# Patient Record
Sex: MALE | Race: WHITE | Employment: STUDENT | URBAN - METROPOLITAN AREA
[De-identification: names, ages, dates, MRNs, and addresses within clinical notes are randomized per-mention and may not be internally consistent; named-entity substitution may affect disease eponyms.]

---

## 2017-01-06 ENCOUNTER — APPOINTMENT (OUTPATIENT)
Dept: CT IMAGING | Age: 22
End: 2017-01-06
Attending: EMERGENCY MEDICINE
Payer: COMMERCIAL

## 2017-01-06 ENCOUNTER — HOSPITAL ENCOUNTER (EMERGENCY)
Age: 22
Discharge: HOME | End: 2017-01-06
Attending: EMERGENCY MEDICINE
Payer: COMMERCIAL

## 2017-01-06 ENCOUNTER — APPOINTMENT (OUTPATIENT)
Dept: ULTRASOUND IMAGING | Age: 22
End: 2017-01-06
Attending: EMERGENCY MEDICINE
Payer: COMMERCIAL

## 2017-01-06 VITALS
DIASTOLIC BLOOD PRESSURE: 68 MMHG | SYSTOLIC BLOOD PRESSURE: 130 MMHG | OXYGEN SATURATION: 98 % | RESPIRATION RATE: 18 BRPM | WEIGHT: 140 LBS | TEMPERATURE: 98 F | HEART RATE: 90 BPM | BODY MASS INDEX: 22 KG/M2

## 2017-01-06 DIAGNOSIS — N23 RENAL COLIC: Primary | ICD-10-CM

## 2017-01-06 LAB
BILIRUB UR QL STRIP.AUTO: NEGATIVE
BUN BLD-MCNC: 13 MG/DL (ref 8–20)
CALCIUM BLD-MCNC: 8.3 MG/DL (ref 8.3–10.3)
CHLORIDE: 100 MMOL/L (ref 101–111)
CLARITY UR REFRACT.AUTO: CLEAR
CO2: 33 MMOL/L (ref 22–32)
COLOR UR AUTO: YELLOW
CREAT BLD-MCNC: 0.88 MG/DL (ref 0.7–1.3)
GLUCOSE BLD-MCNC: 102 MG/DL (ref 70–99)
GLUCOSE UR STRIP.AUTO-MCNC: NEGATIVE MG/DL
KETONES UR STRIP.AUTO-MCNC: NEGATIVE MG/DL
LEUKOCYTE ESTERASE UR QL STRIP.AUTO: NEGATIVE
NITRITE UR QL STRIP.AUTO: NEGATIVE
PH UR STRIP.AUTO: 8 [PH] (ref 4.5–8)
POTASSIUM SERPL-SCNC: 4.1 MMOL/L (ref 3.6–5.1)
PROT UR STRIP.AUTO-MCNC: NEGATIVE MG/DL
RBC UR QL AUTO: NEGATIVE
SODIUM SERPL-SCNC: 139 MMOL/L (ref 136–144)
SP GR UR STRIP.AUTO: 1.02 (ref 1–1.03)
UROBILINOGEN UR STRIP.AUTO-MCNC: 0.2 MG/DL

## 2017-01-06 PROCEDURE — 99285 EMERGENCY DEPT VISIT HI MDM: CPT

## 2017-01-06 PROCEDURE — 81003 URINALYSIS AUTO W/O SCOPE: CPT | Performed by: EMERGENCY MEDICINE

## 2017-01-06 PROCEDURE — 76775 US EXAM ABDO BACK WALL LIM: CPT

## 2017-01-06 PROCEDURE — 74176 CT ABD & PELVIS W/O CONTRAST: CPT

## 2017-01-06 PROCEDURE — 96361 HYDRATE IV INFUSION ADD-ON: CPT

## 2017-01-06 PROCEDURE — 80048 BASIC METABOLIC PNL TOTAL CA: CPT | Performed by: EMERGENCY MEDICINE

## 2017-01-06 PROCEDURE — 96374 THER/PROPH/DIAG INJ IV PUSH: CPT

## 2017-01-06 PROCEDURE — 96375 TX/PRO/DX INJ NEW DRUG ADDON: CPT

## 2017-01-06 RX ORDER — TAMSULOSIN HYDROCHLORIDE 0.4 MG/1
0.4 CAPSULE ORAL DAILY
Qty: 7 CAPSULE | Refills: 0 | Status: SHIPPED | OUTPATIENT
Start: 2017-01-06 | End: 2017-01-13

## 2017-01-06 RX ORDER — SODIUM CHLORIDE 9 MG/ML
INJECTION, SOLUTION INTRAVENOUS ONCE
Status: COMPLETED | OUTPATIENT
Start: 2017-01-06 | End: 2017-01-06

## 2017-01-06 RX ORDER — HYDROCODONE BITARTRATE AND ACETAMINOPHEN 5; 325 MG/1; MG/1
1-2 TABLET ORAL EVERY 6 HOURS PRN
Qty: 20 TABLET | Refills: 0 | Status: SHIPPED | OUTPATIENT
Start: 2017-01-06 | End: 2017-01-16

## 2017-01-06 RX ORDER — MORPHINE SULFATE 4 MG/ML
INJECTION, SOLUTION INTRAMUSCULAR; INTRAVENOUS
Status: COMPLETED
Start: 2017-01-06 | End: 2017-01-06

## 2017-01-06 RX ORDER — ONDANSETRON 8 MG/1
8 TABLET, ORALLY DISINTEGRATING ORAL EVERY 6 HOURS PRN
Qty: 10 TABLET | Refills: 0 | Status: SHIPPED | OUTPATIENT
Start: 2017-01-06 | End: 2017-01-13

## 2017-01-06 RX ORDER — ONDANSETRON 2 MG/ML
4 INJECTION INTRAMUSCULAR; INTRAVENOUS
Status: DISCONTINUED | OUTPATIENT
Start: 2017-01-06 | End: 2017-01-07

## 2017-01-06 RX ORDER — HYDROMORPHONE HYDROCHLORIDE 1 MG/ML
1 INJECTION, SOLUTION INTRAMUSCULAR; INTRAVENOUS; SUBCUTANEOUS EVERY 30 MIN PRN
Status: DISCONTINUED | OUTPATIENT
Start: 2017-01-06 | End: 2017-01-06

## 2017-01-06 RX ORDER — MORPHINE SULFATE 4 MG/ML
5 INJECTION, SOLUTION INTRAMUSCULAR; INTRAVENOUS EVERY 30 MIN PRN
Status: DISCONTINUED | OUTPATIENT
Start: 2017-01-06 | End: 2017-01-07

## 2017-01-07 NOTE — ED PROVIDER NOTES
Patient Seen in: [facility] Emergency Department In [place]    History   Patient presents with:  Abdomen/Flank Pain (GI/)    Stated Complaint: flank pain    HPI    Patient complains of pain in the flank.   He has been having pain in his lower back and fl Alcohol Use: No                Review of Systems    Positive for stated complaint: flank pain  Other systems are as noted in HPI. Constitutional and vital signs reviewed. All other systems reviewed and negative except as noted above.     PSFH element abdominal organs is incomplete due to lack of intravenous contrast.  There are no calculi in the kidneys, renal collecting systems, ureters or bladder. Renal Collecting systems are not dilated. No lesions in the kidneys. Lung bases are unremarkable.  No p Kathya Noble  [phone]    Schedule an appointment as soon as possible for a visit        Medications Prescribed:  Current Discharge Medication List    START taking these medications    HYDROcodone-acetaminophen 5-325 MG Oral Tab  Take 1-2 tab

## 2018-01-05 ENCOUNTER — LAB ENCOUNTER (OUTPATIENT)
Dept: LAB | Age: 23
End: 2018-01-05
Attending: INTERNAL MEDICINE
Payer: COMMERCIAL

## 2018-01-05 DIAGNOSIS — E72.09: Primary | ICD-10-CM

## 2018-01-05 PROCEDURE — 82131 AMINO ACIDS SINGLE QUANT: CPT

## 2018-06-05 PROCEDURE — 83993 ASSAY FOR CALPROTECTIN FECAL: CPT | Performed by: INTERNAL MEDICINE

## 2018-06-05 PROCEDURE — 87177 OVA AND PARASITES SMEARS: CPT | Performed by: INTERNAL MEDICINE

## 2018-06-05 PROCEDURE — 87209 SMEAR COMPLEX STAIN: CPT | Performed by: INTERNAL MEDICINE

## 2018-06-05 PROCEDURE — 87329 GIARDIA AG IA: CPT | Performed by: INTERNAL MEDICINE

## 2018-06-05 PROCEDURE — 87272 CRYPTOSPORIDIUM AG IF: CPT | Performed by: INTERNAL MEDICINE

## 2018-06-22 PROCEDURE — 88305 TISSUE EXAM BY PATHOLOGIST: CPT | Performed by: INTERNAL MEDICINE

## 2019-04-29 PROBLEM — F33.9 MAJOR DEPRESSIVE DISORDER, RECURRENT EPISODE (HCC): Status: ACTIVE | Noted: 2019-04-29

## 2019-04-29 PROBLEM — F32.3 MAJOR DEPRESSION WITH PSYCHOTIC FEATURES (HCC): Status: ACTIVE | Noted: 2019-04-29

## 2019-04-29 PROBLEM — F33.9 MAJOR DEPRESSIVE DISORDER, RECURRENT EPISODE: Status: ACTIVE | Noted: 2019-04-29

## 2019-06-22 ENCOUNTER — HOSPITAL ENCOUNTER (EMERGENCY)
Facility: HOSPITAL | Age: 24
Discharge: HOME | End: 2019-06-23
Payer: COMMERCIAL

## 2019-06-22 DIAGNOSIS — T88.7XXA MEDICATION SIDE EFFECT: ICD-10-CM

## 2019-06-22 DIAGNOSIS — G43.809 OTHER MIGRAINE WITHOUT STATUS MIGRAINOSUS, NOT INTRACTABLE: Primary | ICD-10-CM

## 2019-06-22 PROCEDURE — 96374 THER/PROPH/DIAG INJ IV PUSH: CPT

## 2019-06-22 PROCEDURE — 80053 COMPREHEN METABOLIC PANEL: CPT

## 2019-06-22 PROCEDURE — 99284 EMERGENCY DEPT VISIT MOD MDM: CPT

## 2019-06-22 PROCEDURE — 96376 TX/PRO/DX INJ SAME DRUG ADON: CPT

## 2019-06-22 PROCEDURE — 96375 TX/PRO/DX INJ NEW DRUG ADDON: CPT

## 2019-06-22 RX ORDER — DIPHENHYDRAMINE HYDROCHLORIDE 50 MG/ML
25 INJECTION INTRAMUSCULAR; INTRAVENOUS ONCE
Status: COMPLETED | OUTPATIENT
Start: 2019-06-22 | End: 2019-06-22

## 2019-06-22 RX ORDER — METOCLOPRAMIDE HYDROCHLORIDE 5 MG/ML
5 INJECTION INTRAMUSCULAR; INTRAVENOUS ONCE
Status: COMPLETED | OUTPATIENT
Start: 2019-06-22 | End: 2019-06-22

## 2019-06-22 RX ORDER — VALSARTAN AND HYDROCHLOROTHIAZIDE 320; 12.5 MG/1; MG/1
25 TABLET, FILM COATED ORAL NIGHTLY
COMMUNITY
End: 2019-06-27

## 2019-06-22 RX ORDER — SODIUM CHLORIDE 9 MG/ML
1000 INJECTION, SOLUTION INTRAVENOUS ONCE
Status: DISCONTINUED | OUTPATIENT
Start: 2019-06-22 | End: 2019-06-23

## 2019-06-23 ENCOUNTER — APPOINTMENT (OUTPATIENT)
Dept: CT IMAGING | Facility: HOSPITAL | Age: 24
End: 2019-06-23
Payer: COMMERCIAL

## 2019-06-23 VITALS
RESPIRATION RATE: 16 BRPM | HEART RATE: 84 BPM | WEIGHT: 165 LBS | SYSTOLIC BLOOD PRESSURE: 128 MMHG | HEIGHT: 67 IN | BODY MASS INDEX: 25.9 KG/M2 | TEMPERATURE: 97 F | DIASTOLIC BLOOD PRESSURE: 88 MMHG | OXYGEN SATURATION: 98 %

## 2019-06-23 PROCEDURE — 70450 CT HEAD/BRAIN W/O DYE: CPT

## 2019-06-23 RX ORDER — SODIUM CHLORIDE 9 MG/ML
1000 INJECTION, SOLUTION INTRAVENOUS ONCE
Status: COMPLETED | OUTPATIENT
Start: 2019-06-23 | End: 2019-06-23

## 2019-06-23 RX ORDER — DIPHENHYDRAMINE HYDROCHLORIDE 50 MG/ML
25 INJECTION INTRAMUSCULAR; INTRAVENOUS ONCE
Status: COMPLETED | OUTPATIENT
Start: 2019-06-23 | End: 2019-06-23

## 2019-06-23 RX ORDER — LORAZEPAM 2 MG/ML
0.5 INJECTION INTRAMUSCULAR ONCE
Status: COMPLETED | OUTPATIENT
Start: 2019-06-23 | End: 2019-06-23

## 2019-06-23 NOTE — ED PROVIDER NOTES
Patient Seen in: [facility] Emergency Department    History   Patient presents with:  Headache (neurologic)    Stated Complaint: headache    HPI    59-year-old with complex history including psychiatric disease and intermittent migraines in the leigha Smokeless tobacco: Never Used    Alcohol use: No    Drug use: No      Review of Systems    Positive for stated complaint: headache  Other systems are as noted in HPI. Constitutional and vital signs reviewed.       All other systems reviewed and negative ex extremities throughout         ED Course     Labs Reviewed   COMP METABOLIC PANEL (14) - Abnormal; Notable for the following components:       Result Value    Glucose 100 (*)     AST 11 (*)     All other components within normal limits          CT HEAD WIT and Plan     Clinical Impression:  Other migraine without status migrainosus, not intractable  (primary encounter diagnosis)  Medication side effect    Disposition:  There is no disposition on file for this visit.   There is no disposition time on file for

## 2019-06-23 NOTE — ED INITIAL ASSESSMENT (HPI)
Pt to ED for c/o headache and difficulty sleeping. Pt was started on fluvoximine recently from [facility]. Pt also c/o generalized muscle spasms.

## 2020-01-15 PROBLEM — G43.001 MIGRAINE WITHOUT AURA AND WITH STATUS MIGRAINOSUS, NOT INTRACTABLE: Status: ACTIVE | Noted: 2020-01-15

## 2020-03-21 ENCOUNTER — APPOINTMENT (OUTPATIENT)
Dept: ULTRASOUND IMAGING | Age: 25
End: 2020-03-21
Attending: PHYSICIAN ASSISTANT
Payer: COMMERCIAL

## 2020-03-21 ENCOUNTER — HOSPITAL ENCOUNTER (EMERGENCY)
Age: 25
Discharge: HOME | End: 2020-03-21
Attending: EMERGENCY MEDICINE
Payer: COMMERCIAL

## 2020-03-21 VITALS
TEMPERATURE: 100 F | HEIGHT: 67 IN | HEART RATE: 78 BPM | SYSTOLIC BLOOD PRESSURE: 132 MMHG | BODY MASS INDEX: 24.33 KG/M2 | DIASTOLIC BLOOD PRESSURE: 85 MMHG | RESPIRATION RATE: 18 BRPM | WEIGHT: 155 LBS | OXYGEN SATURATION: 98 %

## 2020-03-21 DIAGNOSIS — R10.9 BILATERAL FLANK PAIN: Primary | ICD-10-CM

## 2020-03-21 LAB
ALBUMIN SERPL-MCNC: 4.6 G/DL (ref 3.4–5)
ALBUMIN/GLOB SERPL: 1.2 {RATIO} (ref 1–2)
ALP LIVER SERPL-CCNC: 63 U/L (ref 45–117)
ALT SERPL-CCNC: 31 U/L (ref 16–61)
ANION GAP SERPL CALC-SCNC: 5 MMOL/L (ref 0–18)
AST SERPL-CCNC: 40 U/L (ref 15–37)
BASOPHILS # BLD AUTO: 0.04 X10(3) UL (ref 0–0.2)
BASOPHILS NFR BLD AUTO: 0.8 %
BILIRUB SERPL-MCNC: 0.6 MG/DL (ref 0.1–2)
BILIRUB UR QL STRIP.AUTO: NEGATIVE
BUN BLD-MCNC: 18 MG/DL (ref 7–18)
BUN/CREAT SERPL: 19.8 (ref 10–20)
CALCIUM BLD-MCNC: 9.4 MG/DL (ref 8.5–10.1)
CHLORIDE SERPL-SCNC: 100 MMOL/L (ref 98–112)
CO2 SERPL-SCNC: 30 MMOL/L (ref 21–32)
COLOR UR AUTO: YELLOW
CREAT BLD-MCNC: 0.91 MG/DL (ref 0.7–1.3)
DEPRECATED RDW RBC AUTO: 40.8 FL (ref 35.1–46.3)
EOSINOPHIL # BLD AUTO: 0.05 X10(3) UL (ref 0–0.7)
EOSINOPHIL NFR BLD AUTO: 1 %
ERYTHROCYTE [DISTWIDTH] IN BLOOD BY AUTOMATED COUNT: 12.1 % (ref 11–15)
GLOBULIN PLAS-MCNC: 3.9 G/DL (ref 2.8–4.4)
GLUCOSE BLD-MCNC: 86 MG/DL (ref 70–99)
GLUCOSE UR STRIP.AUTO-MCNC: NEGATIVE MG/DL
HCT VFR BLD AUTO: 48.3 % (ref 39–53)
HGB BLD-MCNC: 16 G/DL (ref 13–17.5)
IMM GRANULOCYTES # BLD AUTO: 0.01 X10(3) UL (ref 0–1)
IMM GRANULOCYTES NFR BLD: 0.2 %
KETONES UR STRIP.AUTO-MCNC: NEGATIVE MG/DL
LEUKOCYTE ESTERASE UR QL STRIP.AUTO: NEGATIVE
LYMPHOCYTES # BLD AUTO: 1.76 X10(3) UL (ref 1–4)
LYMPHOCYTES NFR BLD AUTO: 34 %
M PROTEIN MFR SERPL ELPH: 8.5 G/DL (ref 6.4–8.2)
MCH RBC QN AUTO: 30.5 PG (ref 26–34)
MCHC RBC AUTO-ENTMCNC: 33.1 G/DL (ref 31–37)
MCV RBC AUTO: 92 FL (ref 80–100)
MONOCYTES # BLD AUTO: 0.41 X10(3) UL (ref 0.1–1)
MONOCYTES NFR BLD AUTO: 7.9 %
NEUTROPHILS # BLD AUTO: 2.9 X10 (3) UL (ref 1.5–7.7)
NEUTROPHILS # BLD AUTO: 2.9 X10(3) UL (ref 1.5–7.7)
NEUTROPHILS NFR BLD AUTO: 56.1 %
NITRITE UR QL STRIP.AUTO: NEGATIVE
OSMOLALITY SERPL CALC.SUM OF ELEC: 281 MOSM/KG (ref 275–295)
PH UR STRIP.AUTO: 8.5 [PH] (ref 4.5–8)
PLATELET # BLD AUTO: 301 10(3)UL (ref 150–450)
POTASSIUM SERPL-SCNC: 4.8 MMOL/L (ref 3.5–5.1)
PROT UR STRIP.AUTO-MCNC: NEGATIVE MG/DL
RBC # BLD AUTO: 5.25 X10(6)UL (ref 4.3–5.7)
RBC #/AREA URNS AUTO: >10 /HPF
SODIUM SERPL-SCNC: 135 MMOL/L (ref 136–145)
SP GR UR STRIP.AUTO: 1.01 (ref 1–1.03)
UROBILINOGEN UR STRIP.AUTO-MCNC: 0.2 MG/DL
WBC # BLD AUTO: 5.2 X10(3) UL (ref 4–11)

## 2020-03-21 PROCEDURE — 96375 TX/PRO/DX INJ NEW DRUG ADDON: CPT

## 2020-03-21 PROCEDURE — 96361 HYDRATE IV INFUSION ADD-ON: CPT

## 2020-03-21 PROCEDURE — 99284 EMERGENCY DEPT VISIT MOD MDM: CPT

## 2020-03-21 PROCEDURE — 76770 US EXAM ABDO BACK WALL COMP: CPT | Performed by: PHYSICIAN ASSISTANT

## 2020-03-21 PROCEDURE — 80053 COMPREHEN METABOLIC PANEL: CPT | Performed by: PHYSICIAN ASSISTANT

## 2020-03-21 PROCEDURE — 96374 THER/PROPH/DIAG INJ IV PUSH: CPT

## 2020-03-21 PROCEDURE — 85025 COMPLETE CBC W/AUTO DIFF WBC: CPT | Performed by: PHYSICIAN ASSISTANT

## 2020-03-21 PROCEDURE — 81001 URINALYSIS AUTO W/SCOPE: CPT | Performed by: PHYSICIAN ASSISTANT

## 2020-03-21 RX ORDER — HYDROCODONE BITARTRATE AND ACETAMINOPHEN 5; 325 MG/1; MG/1
1-2 TABLET ORAL EVERY 6 HOURS PRN
Qty: 12 TABLET | Refills: 0 | Status: SHIPPED | OUTPATIENT
Start: 2020-03-21 | End: 2020-04-09

## 2020-03-21 RX ORDER — KETOROLAC TROMETHAMINE 30 MG/ML
30 INJECTION, SOLUTION INTRAMUSCULAR; INTRAVENOUS ONCE
Status: COMPLETED | OUTPATIENT
Start: 2020-03-21 | End: 2020-03-21

## 2020-03-21 RX ORDER — ONDANSETRON 2 MG/ML
4 INJECTION INTRAMUSCULAR; INTRAVENOUS ONCE
Status: COMPLETED | OUTPATIENT
Start: 2020-03-21 | End: 2020-03-21

## 2020-03-21 RX ORDER — MORPHINE SULFATE 4 MG/ML
4 INJECTION, SOLUTION INTRAMUSCULAR; INTRAVENOUS ONCE
Status: COMPLETED | OUTPATIENT
Start: 2020-03-21 | End: 2020-03-21

## 2020-03-21 RX ORDER — TAMSULOSIN HYDROCHLORIDE 0.4 MG/1
0.4 CAPSULE ORAL DAILY
Qty: 7 CAPSULE | Refills: 0 | Status: SHIPPED | OUTPATIENT
Start: 2020-03-21 | End: 2020-04-09

## 2020-03-21 NOTE — ED PROVIDER NOTES
Patient Seen in: [facility] Emergency Department In [place]      History   Patient presents with:  Urinary Symptoms  Abdomen/Flank Pain    Stated Complaint: [address]    HPI    Patient is a 28-year-old male.   Congenital cystinuria, renal ca Current:/85   Pulse 78   Temp 99.5 °F (37.5 °C)   Resp 18   Ht 170.2 cm (5' 7\")   Wt 70.3 kg   SpO2 98%   BMI 24.28 kg/m²         Physical Exam    Gen: Well appearing, well groomed, alert and aware x 3  Neck: Supple, full range of motion, no t 3/21/2020  PROCEDURE:  US KIDNEY/BLADDER (CPT=76770)  [facility] 1/06/2017, 8:27 PM.  INDICATIONS:  HEMITURIA AND FLANK PAIN  TECHNIQUE:  Transabdominal gray scale ultrasound imaging of the bilateral kidneys and bl nephrology and urology. Report to the main campus for any acute worsening or changes. Patient remains nontoxic-appearing with normal vital signs. His pain is well controlled.           Disposition and Plan     Clinical Impression:  Bilateral flank pain

## 2020-03-22 NOTE — ED PROVIDER NOTES
I reviewed that chart and discussed the case. I have examined the patient and noted lungs clear to auscultation bilaterally. Cardiovascular plus S1-S2 regular rate rhythm. Abdomen soft nontender nondistended. Extremities range of motion. Slater August   No CVA tende

## 2020-05-09 ENCOUNTER — HOSPITAL ENCOUNTER (EMERGENCY)
Age: 25
Discharge: HOME | End: 2020-05-09
Attending: EMERGENCY MEDICINE
Payer: COMMERCIAL

## 2020-05-09 ENCOUNTER — APPOINTMENT (OUTPATIENT)
Dept: GENERAL RADIOLOGY | Age: 25
End: 2020-05-09
Attending: EMERGENCY MEDICINE
Payer: COMMERCIAL

## 2020-05-09 VITALS
DIASTOLIC BLOOD PRESSURE: 70 MMHG | WEIGHT: 159 LBS | OXYGEN SATURATION: 98 % | TEMPERATURE: 99 F | SYSTOLIC BLOOD PRESSURE: 131 MMHG | HEART RATE: 96 BPM | RESPIRATION RATE: 16 BRPM | BODY MASS INDEX: 24.96 KG/M2 | HEIGHT: 67 IN

## 2020-05-09 DIAGNOSIS — N20.0 KIDNEY STONE: Primary | ICD-10-CM

## 2020-05-09 PROCEDURE — 87086 URINE CULTURE/COLONY COUNT: CPT | Performed by: EMERGENCY MEDICINE

## 2020-05-09 PROCEDURE — 99285 EMERGENCY DEPT VISIT HI MDM: CPT

## 2020-05-09 PROCEDURE — 96374 THER/PROPH/DIAG INJ IV PUSH: CPT

## 2020-05-09 PROCEDURE — 96375 TX/PRO/DX INJ NEW DRUG ADDON: CPT

## 2020-05-09 PROCEDURE — 81015 MICROSCOPIC EXAM OF URINE: CPT | Performed by: EMERGENCY MEDICINE

## 2020-05-09 PROCEDURE — 96376 TX/PRO/DX INJ SAME DRUG ADON: CPT

## 2020-05-09 PROCEDURE — 51701 INSERT BLADDER CATHETER: CPT

## 2020-05-09 PROCEDURE — 99284 EMERGENCY DEPT VISIT MOD MDM: CPT

## 2020-05-09 PROCEDURE — 96361 HYDRATE IV INFUSION ADD-ON: CPT

## 2020-05-09 PROCEDURE — 85025 COMPLETE CBC W/AUTO DIFF WBC: CPT | Performed by: EMERGENCY MEDICINE

## 2020-05-09 PROCEDURE — 74018 RADEX ABDOMEN 1 VIEW: CPT | Performed by: EMERGENCY MEDICINE

## 2020-05-09 PROCEDURE — 81001 URINALYSIS AUTO W/SCOPE: CPT | Performed by: EMERGENCY MEDICINE

## 2020-05-09 PROCEDURE — 80053 COMPREHEN METABOLIC PANEL: CPT | Performed by: EMERGENCY MEDICINE

## 2020-05-09 RX ORDER — ONDANSETRON 2 MG/ML
4 INJECTION INTRAMUSCULAR; INTRAVENOUS ONCE
Status: DISCONTINUED | OUTPATIENT
Start: 2020-05-09 | End: 2020-05-09

## 2020-05-09 RX ORDER — ONDANSETRON 4 MG/1
TABLET, ORALLY DISINTEGRATING ORAL
Status: DISCONTINUED
Start: 2020-05-09 | End: 2020-05-09

## 2020-05-09 RX ORDER — MORPHINE SULFATE 4 MG/ML
4 INJECTION, SOLUTION INTRAMUSCULAR; INTRAVENOUS ONCE
Status: COMPLETED | OUTPATIENT
Start: 2020-05-09 | End: 2020-05-09

## 2020-05-09 RX ORDER — KETOROLAC TROMETHAMINE 30 MG/ML
15 INJECTION, SOLUTION INTRAMUSCULAR; INTRAVENOUS ONCE
Status: COMPLETED | OUTPATIENT
Start: 2020-05-09 | End: 2020-05-09

## 2020-05-09 NOTE — ED PROVIDER NOTES
Patient Seen in: [facility] In [place]      History   Patient presents with:  Postop/Procedure Problem    Stated Complaint: post surgical pain s/o kidney stone surgery yesterday     HPI    Patient is a 60-year-old male with a history Pulse 96   Temp 99.3 °F (37.4 °C) (Temporal)   Resp 16   Ht 170.2 cm (5' 7\")   Wt 72.1 kg   SpO2 98%   BMI 24.90 kg/m²         Physical Exam    General: Well-appearing patient of stated age resting comfortably  HEENT: Normocephalic atraumatic.   Nonicteric Will check stent placement with KUB. Will hydrate. Will treat with IV pain medicines. Will check UA. Will discuss with urology. KUB: Bilateral ureteral stents. Distention of the colon. Possible ileus.     MDM     Patient presents after bilateral st

## 2020-05-09 NOTE — ED INITIAL ASSESSMENT (HPI)
Patient had bilateral kidney stone surgery yesterday - had increased in pain this am - 4mg zofran and 50mcg fentanyl pta by ems

## 2020-05-12 ENCOUNTER — APPOINTMENT (OUTPATIENT)
Dept: CT IMAGING | Age: 25
DRG: 660 | End: 2020-05-12
Attending: EMERGENCY MEDICINE
Payer: COMMERCIAL

## 2020-05-12 ENCOUNTER — ANESTHESIA (OUTPATIENT)
Dept: SURGERY | Facility: HOSPITAL | Age: 25
DRG: 660 | End: 2020-05-12
Payer: COMMERCIAL

## 2020-05-12 ENCOUNTER — ANESTHESIA EVENT (OUTPATIENT)
Dept: SURGERY | Facility: HOSPITAL | Age: 25
DRG: 660 | End: 2020-05-12
Payer: COMMERCIAL

## 2020-05-12 ENCOUNTER — APPOINTMENT (OUTPATIENT)
Dept: GENERAL RADIOLOGY | Facility: HOSPITAL | Age: 25
DRG: 660 | End: 2020-05-12
Attending: UROLOGY
Payer: COMMERCIAL

## 2020-05-12 ENCOUNTER — HOSPITAL ENCOUNTER (INPATIENT)
Facility: HOSPITAL | Age: 25
LOS: 1 days | Discharge: HOME | DRG: 660 | End: 2020-05-13
Attending: EMERGENCY MEDICINE | Admitting: INTERNAL MEDICINE
Payer: COMMERCIAL

## 2020-05-12 DIAGNOSIS — N13.30 HYDRONEPHROSIS: ICD-10-CM

## 2020-05-12 DIAGNOSIS — N13.9 OBSTRUCTIVE UROPATHY: Primary | ICD-10-CM

## 2020-05-12 DIAGNOSIS — E87.6 HYPOKALEMIA: ICD-10-CM

## 2020-05-12 PROCEDURE — 0TC68ZZ EXTIRPATION OF MATTER FROM RIGHT URETER, VIA NATURAL OR ARTIFICIAL OPENING ENDOSCOPIC: ICD-10-PCS | Performed by: UROLOGY

## 2020-05-12 PROCEDURE — 82365 CALCULUS SPECTROSCOPY: CPT | Performed by: UROLOGY

## 2020-05-12 PROCEDURE — 99285 EMERGENCY DEPT VISIT HI MDM: CPT

## 2020-05-12 PROCEDURE — 96375 TX/PRO/DX INJ NEW DRUG ADDON: CPT

## 2020-05-12 PROCEDURE — 96376 TX/PRO/DX INJ SAME DRUG ADON: CPT

## 2020-05-12 PROCEDURE — 96365 THER/PROPH/DIAG IV INF INIT: CPT

## 2020-05-12 PROCEDURE — 85025 COMPLETE CBC W/AUTO DIFF WBC: CPT | Performed by: EMERGENCY MEDICINE

## 2020-05-12 PROCEDURE — 96361 HYDRATE IV INFUSION ADD-ON: CPT

## 2020-05-12 PROCEDURE — 74176 CT ABD & PELVIS W/O CONTRAST: CPT | Performed by: EMERGENCY MEDICINE

## 2020-05-12 PROCEDURE — 80053 COMPREHEN METABOLIC PANEL: CPT | Performed by: EMERGENCY MEDICINE

## 2020-05-12 PROCEDURE — 0TP98DZ REMOVAL OF INTRALUMINAL DEVICE FROM URETER, VIA NATURAL OR ARTIFICIAL OPENING ENDOSCOPIC: ICD-10-PCS | Performed by: UROLOGY

## 2020-05-12 PROCEDURE — BT1DZZZ FLUOROSCOPY OF RIGHT KIDNEY, URETER AND BLADDER: ICD-10-PCS | Performed by: UROLOGY

## 2020-05-12 PROCEDURE — 0T768DZ DILATION OF RIGHT URETER WITH INTRALUMINAL DEVICE, VIA NATURAL OR ARTIFICIAL OPENING ENDOSCOPIC: ICD-10-PCS | Performed by: UROLOGY

## 2020-05-12 PROCEDURE — 83690 ASSAY OF LIPASE: CPT | Performed by: EMERGENCY MEDICINE

## 2020-05-12 DEVICE — URETERAL STENT
Type: IMPLANTABLE DEVICE | Site: URETER | Status: FUNCTIONAL
Brand: ASCERTA™

## 2020-05-12 RX ORDER — LIDOCAINE HYDROCHLORIDE 20 MG/ML
JELLY TOPICAL AS NEEDED
Status: DISCONTINUED | OUTPATIENT
Start: 2020-05-12 | End: 2020-05-12 | Stop reason: HOSPADM

## 2020-05-12 RX ORDER — POTASSIUM CHLORIDE 14.9 MG/ML
20 INJECTION INTRAVENOUS ONCE
Status: COMPLETED | OUTPATIENT
Start: 2020-05-12 | End: 2020-05-12

## 2020-05-12 RX ORDER — HYDROCODONE BITARTRATE AND ACETAMINOPHEN 5; 325 MG/1; MG/1
2 TABLET ORAL AS NEEDED
Status: DISCONTINUED | OUTPATIENT
Start: 2020-05-12 | End: 2020-05-12 | Stop reason: HOSPADM

## 2020-05-12 RX ORDER — METOCLOPRAMIDE HYDROCHLORIDE 5 MG/ML
10 INJECTION INTRAMUSCULAR; INTRAVENOUS AS NEEDED
Status: DISCONTINUED | OUTPATIENT
Start: 2020-05-12 | End: 2020-05-12 | Stop reason: HOSPADM

## 2020-05-12 RX ORDER — SODIUM CHLORIDE, SODIUM LACTATE, POTASSIUM CHLORIDE, CALCIUM CHLORIDE 600; 310; 30; 20 MG/100ML; MG/100ML; MG/100ML; MG/100ML
INJECTION, SOLUTION INTRAVENOUS CONTINUOUS
Status: DISCONTINUED | OUTPATIENT
Start: 2020-05-12 | End: 2020-05-12 | Stop reason: HOSPADM

## 2020-05-12 RX ORDER — SODIUM CHLORIDE 9 MG/ML
INJECTION, SOLUTION INTRAVENOUS CONTINUOUS
Status: DISCONTINUED | OUTPATIENT
Start: 2020-05-12 | End: 2020-05-12

## 2020-05-12 RX ORDER — ONDANSETRON 2 MG/ML
4 INJECTION INTRAMUSCULAR; INTRAVENOUS ONCE
Status: COMPLETED | OUTPATIENT
Start: 2020-05-12 | End: 2020-05-12

## 2020-05-12 RX ORDER — MORPHINE SULFATE 4 MG/ML
2 INJECTION, SOLUTION INTRAMUSCULAR; INTRAVENOUS EVERY 5 MIN PRN
Status: DISCONTINUED | OUTPATIENT
Start: 2020-05-12 | End: 2020-05-12 | Stop reason: HOSPADM

## 2020-05-12 RX ORDER — ONDANSETRON 2 MG/ML
4 INJECTION INTRAMUSCULAR; INTRAVENOUS EVERY 6 HOURS PRN
Status: DISCONTINUED | OUTPATIENT
Start: 2020-05-12 | End: 2020-05-13

## 2020-05-12 RX ORDER — KETOROLAC TROMETHAMINE 30 MG/ML
30 INJECTION, SOLUTION INTRAMUSCULAR; INTRAVENOUS ONCE
Status: COMPLETED | OUTPATIENT
Start: 2020-05-12 | End: 2020-05-12

## 2020-05-12 RX ORDER — MIDAZOLAM HYDROCHLORIDE 1 MG/ML
INJECTION INTRAMUSCULAR; INTRAVENOUS AS NEEDED
Status: DISCONTINUED | OUTPATIENT
Start: 2020-05-12 | End: 2020-05-12 | Stop reason: SURG

## 2020-05-12 RX ORDER — MORPHINE SULFATE 4 MG/ML
1 INJECTION, SOLUTION INTRAMUSCULAR; INTRAVENOUS EVERY 2 HOUR PRN
Status: DISCONTINUED | OUTPATIENT
Start: 2020-05-12 | End: 2020-05-12

## 2020-05-12 RX ORDER — CEFAZOLIN SODIUM 1 G/3ML
INJECTION, POWDER, FOR SOLUTION INTRAMUSCULAR; INTRAVENOUS AS NEEDED
Status: DISCONTINUED | OUTPATIENT
Start: 2020-05-12 | End: 2020-05-12 | Stop reason: SURG

## 2020-05-12 RX ORDER — NALOXONE HYDROCHLORIDE 0.4 MG/ML
80 INJECTION, SOLUTION INTRAMUSCULAR; INTRAVENOUS; SUBCUTANEOUS AS NEEDED
Status: DISCONTINUED | OUTPATIENT
Start: 2020-05-12 | End: 2020-05-12 | Stop reason: HOSPADM

## 2020-05-12 RX ORDER — HYDROMORPHONE HYDROCHLORIDE 1 MG/ML
0.4 INJECTION, SOLUTION INTRAMUSCULAR; INTRAVENOUS; SUBCUTANEOUS EVERY 2 HOUR PRN
Status: DISCONTINUED | OUTPATIENT
Start: 2020-05-12 | End: 2020-05-13

## 2020-05-12 RX ORDER — MORPHINE SULFATE 4 MG/ML
2 INJECTION, SOLUTION INTRAMUSCULAR; INTRAVENOUS EVERY 2 HOUR PRN
Status: DISCONTINUED | OUTPATIENT
Start: 2020-05-12 | End: 2020-05-12

## 2020-05-12 RX ORDER — ACETAMINOPHEN 325 MG/1
650 TABLET ORAL EVERY 6 HOURS PRN
Status: DISCONTINUED | OUTPATIENT
Start: 2020-05-12 | End: 2020-05-13

## 2020-05-12 RX ORDER — MEPERIDINE HYDROCHLORIDE 25 MG/ML
12.5 INJECTION INTRAMUSCULAR; INTRAVENOUS; SUBCUTANEOUS AS NEEDED
Status: DISCONTINUED | OUTPATIENT
Start: 2020-05-12 | End: 2020-05-12 | Stop reason: HOSPADM

## 2020-05-12 RX ORDER — LABETALOL HYDROCHLORIDE 5 MG/ML
5 INJECTION, SOLUTION INTRAVENOUS EVERY 5 MIN PRN
Status: DISCONTINUED | OUTPATIENT
Start: 2020-05-12 | End: 2020-05-12 | Stop reason: HOSPADM

## 2020-05-12 RX ORDER — HYDROMORPHONE HYDROCHLORIDE 1 MG/ML
0.8 INJECTION, SOLUTION INTRAMUSCULAR; INTRAVENOUS; SUBCUTANEOUS EVERY 2 HOUR PRN
Status: DISCONTINUED | OUTPATIENT
Start: 2020-05-12 | End: 2020-05-13

## 2020-05-12 RX ORDER — ONDANSETRON 2 MG/ML
4 INJECTION INTRAMUSCULAR; INTRAVENOUS AS NEEDED
Status: DISCONTINUED | OUTPATIENT
Start: 2020-05-12 | End: 2020-05-12 | Stop reason: HOSPADM

## 2020-05-12 RX ORDER — METOCLOPRAMIDE HYDROCHLORIDE 5 MG/ML
10 INJECTION INTRAMUSCULAR; INTRAVENOUS EVERY 8 HOURS PRN
Status: DISCONTINUED | OUTPATIENT
Start: 2020-05-12 | End: 2020-05-13

## 2020-05-12 RX ORDER — KETOROLAC TROMETHAMINE 30 MG/ML
30 INJECTION, SOLUTION INTRAMUSCULAR; INTRAVENOUS EVERY 6 HOURS PRN
Status: DISCONTINUED | OUTPATIENT
Start: 2020-05-12 | End: 2020-05-13

## 2020-05-12 RX ORDER — FLUOXETINE HYDROCHLORIDE 20 MG/1
40 CAPSULE ORAL DAILY
Status: DISCONTINUED | OUTPATIENT
Start: 2020-05-12 | End: 2020-05-13

## 2020-05-12 RX ORDER — SODIUM CHLORIDE, SODIUM LACTATE, POTASSIUM CHLORIDE, CALCIUM CHLORIDE 600; 310; 30; 20 MG/100ML; MG/100ML; MG/100ML; MG/100ML
INJECTION, SOLUTION INTRAVENOUS CONTINUOUS PRN
Status: DISCONTINUED | OUTPATIENT
Start: 2020-05-12 | End: 2020-05-12 | Stop reason: SURG

## 2020-05-12 RX ORDER — HYDROMORPHONE HYDROCHLORIDE 1 MG/ML
0.2 INJECTION, SOLUTION INTRAMUSCULAR; INTRAVENOUS; SUBCUTANEOUS EVERY 2 HOUR PRN
Status: DISCONTINUED | OUTPATIENT
Start: 2020-05-12 | End: 2020-05-13

## 2020-05-12 RX ORDER — KETOROLAC TROMETHAMINE 30 MG/ML
INJECTION, SOLUTION INTRAMUSCULAR; INTRAVENOUS AS NEEDED
Status: DISCONTINUED | OUTPATIENT
Start: 2020-05-12 | End: 2020-05-12 | Stop reason: SURG

## 2020-05-12 RX ORDER — SODIUM CHLORIDE 9 MG/ML
INJECTION, SOLUTION INTRAVENOUS CONTINUOUS
Status: DISCONTINUED | OUTPATIENT
Start: 2020-05-12 | End: 2020-05-13

## 2020-05-12 RX ORDER — BUSPIRONE HYDROCHLORIDE 5 MG/1
15 TABLET ORAL EVERY MORNING
Status: DISCONTINUED | OUTPATIENT
Start: 2020-05-12 | End: 2020-05-13

## 2020-05-12 RX ORDER — ONDANSETRON 2 MG/ML
4 INJECTION INTRAMUSCULAR; INTRAVENOUS EVERY 4 HOURS PRN
Status: DISCONTINUED | OUTPATIENT
Start: 2020-05-12 | End: 2020-05-12

## 2020-05-12 RX ORDER — BUSPIRONE HYDROCHLORIDE 15 MG/1
7.5 TABLET ORAL EVERY EVENING
Status: DISCONTINUED | OUTPATIENT
Start: 2020-05-12 | End: 2020-05-13

## 2020-05-12 RX ORDER — HEPARIN SODIUM 5000 [USP'U]/ML
5000 INJECTION, SOLUTION INTRAVENOUS; SUBCUTANEOUS EVERY 8 HOURS SCHEDULED
Status: DISCONTINUED | OUTPATIENT
Start: 2020-05-13 | End: 2020-05-13

## 2020-05-12 RX ORDER — LIDOCAINE HYDROCHLORIDE 10 MG/ML
INJECTION, SOLUTION EPIDURAL; INFILTRATION; INTRACAUDAL; PERINEURAL AS NEEDED
Status: DISCONTINUED | OUTPATIENT
Start: 2020-05-12 | End: 2020-05-12 | Stop reason: SURG

## 2020-05-12 RX ORDER — ONDANSETRON 2 MG/ML
INJECTION INTRAMUSCULAR; INTRAVENOUS AS NEEDED
Status: DISCONTINUED | OUTPATIENT
Start: 2020-05-12 | End: 2020-05-12 | Stop reason: SURG

## 2020-05-12 RX ORDER — SUMATRIPTAN 50 MG/1
50 TABLET, FILM COATED ORAL EVERY 2 HOUR PRN
Status: DISCONTINUED | OUTPATIENT
Start: 2020-05-12 | End: 2020-05-13

## 2020-05-12 RX ORDER — MORPHINE SULFATE 4 MG/ML
4 INJECTION, SOLUTION INTRAMUSCULAR; INTRAVENOUS EVERY 2 HOUR PRN
Status: DISCONTINUED | OUTPATIENT
Start: 2020-05-12 | End: 2020-05-12

## 2020-05-12 RX ORDER — BUSPIRONE HYDROCHLORIDE 7.5 MG/1
15 TABLET ORAL EVERY MORNING
COMMUNITY
Start: 2020-04-23 | End: 2020-05-18 | Stop reason: ALTCHOICE

## 2020-05-12 RX ORDER — BUSPIRONE HYDROCHLORIDE 7.5 MG/1
7.5 TABLET ORAL EVERY EVENING
COMMUNITY
End: 2020-05-18 | Stop reason: ALTCHOICE

## 2020-05-12 RX ORDER — MIDAZOLAM HYDROCHLORIDE 1 MG/ML
1 INJECTION INTRAMUSCULAR; INTRAVENOUS EVERY 5 MIN PRN
Status: DISCONTINUED | OUTPATIENT
Start: 2020-05-12 | End: 2020-05-12 | Stop reason: HOSPADM

## 2020-05-12 RX ORDER — MORPHINE SULFATE 4 MG/ML
4 INJECTION, SOLUTION INTRAMUSCULAR; INTRAVENOUS EVERY 30 MIN PRN
Status: DISCONTINUED | OUTPATIENT
Start: 2020-05-12 | End: 2020-05-12

## 2020-05-12 RX ORDER — DEXAMETHASONE SODIUM PHOSPHATE 4 MG/ML
VIAL (ML) INJECTION AS NEEDED
Status: DISCONTINUED | OUTPATIENT
Start: 2020-05-12 | End: 2020-05-12 | Stop reason: SURG

## 2020-05-12 RX ORDER — TAMSULOSIN HYDROCHLORIDE 0.4 MG/1
0.4 CAPSULE ORAL DAILY
Status: DISCONTINUED | OUTPATIENT
Start: 2020-05-12 | End: 2020-05-13

## 2020-05-12 RX ORDER — HYDROCODONE BITARTRATE AND ACETAMINOPHEN 5; 325 MG/1; MG/1
1 TABLET ORAL AS NEEDED
Status: DISCONTINUED | OUTPATIENT
Start: 2020-05-12 | End: 2020-05-12 | Stop reason: HOSPADM

## 2020-05-12 RX ORDER — PHENAZOPYRIDINE HYDROCHLORIDE 200 MG/1
200 TABLET, FILM COATED ORAL 3 TIMES DAILY PRN
Status: DISCONTINUED | OUTPATIENT
Start: 2020-05-12 | End: 2020-05-13

## 2020-05-12 RX ADMIN — CEFAZOLIN SODIUM 2 G: 1 INJECTION, POWDER, FOR SOLUTION INTRAMUSCULAR; INTRAVENOUS at 20:39:00

## 2020-05-12 RX ADMIN — SODIUM CHLORIDE, SODIUM LACTATE, POTASSIUM CHLORIDE, CALCIUM CHLORIDE: 600; 310; 30; 20 INJECTION, SOLUTION INTRAVENOUS at 20:32:00

## 2020-05-12 RX ADMIN — DEXAMETHASONE SODIUM PHOSPHATE 8 MG: 4 MG/ML VIAL (ML) INJECTION at 20:47:00

## 2020-05-12 RX ADMIN — SODIUM CHLORIDE, SODIUM LACTATE, POTASSIUM CHLORIDE, CALCIUM CHLORIDE: 600; 310; 30; 20 INJECTION, SOLUTION INTRAVENOUS at 21:59:00

## 2020-05-12 RX ADMIN — KETOROLAC TROMETHAMINE 30 MG: 30 INJECTION, SOLUTION INTRAMUSCULAR; INTRAVENOUS at 21:37:00

## 2020-05-12 RX ADMIN — ONDANSETRON 4 MG: 2 INJECTION INTRAMUSCULAR; INTRAVENOUS at 21:31:00

## 2020-05-12 RX ADMIN — LIDOCAINE HYDROCHLORIDE 30 MG: 10 INJECTION, SOLUTION EPIDURAL; INFILTRATION; INTRACAUDAL; PERINEURAL at 20:37:00

## 2020-05-12 RX ADMIN — MIDAZOLAM HYDROCHLORIDE 2 MG: 1 INJECTION INTRAMUSCULAR; INTRAVENOUS at 20:32:00

## 2020-05-12 NOTE — ANESTHESIA PREPROCEDURE EVALUATION
PRE-OP EVALUATION    Patient Name: Jaime Marina    Pre-op Diagnosis: Hydronephrosis [N13.30]    Procedure(s):  CYSTOSCOPY URETEROSCOPY, LASER LITHOTRIPSY STONE ETRACTION,RIGHT STENT EXCHANGE ,LEFT STENT REMOVAL    Surgeon(s) and Role:     * Ned MG/ML injection 0.8 mg, 0.8 mg, Intravenous, Q2H PRN        Outpatient Medications:  busPIRone HCl 7.5 MG Oral Tab, Take 15 mg by mouth every morning., Disp: , Rfl:   busPIRone HCl 7.5 MG Oral Tab, Take 7.5 mg by mouth every evening., Disp: , Rfl:   Phenaz Laterality Date   • COLONOSCOPY     • CYSTOSCOPY STENT INSERTION Left 11/21/2015    Performed by Kentrell Meng MD at [facility] MAIN OR   • LITHOTRIPSY     • OTHER SURGICAL HISTORY  2-17-11    Rt RPG, URS stone manipulation,laser litho,Rt stent, Dr. Alicia Duarte   • OT

## 2020-05-12 NOTE — H&P (VIEW-ONLY)
[facility]    Report of Consultation    Zoraida Lafleur Patient Status:  Inpatient    1995 MRN [id number]   [facility] 3NE-A Attending Martell Diane MD   Hosp Day # 0 PCP Alyce Nash MD     Date of Admission:  2020  Date o litho, stone extraction, stent placement, RPG Dr. Washburn Apo   • OTHER SURGICAL HISTORY  12/1/15    cysto stent removal   • UPPER GI ENDOSCOPY,EXAM         Family History  Family History   Problem Relation Age of Onset   • Heart Disorder Maternal Grandfather hours as needed for Pain.  tamsulosin (FLOMAX) cap, Take 1 capsule (0.4 mg total) by mouth daily. Potassium Citrate ER 10 MEQ (1080 MG) Oral Tab CR, Take 1 tablet (10 mEq total) by mouth 3 (three) times daily.  after meals  FLUoxetine HCl 40 MG Oral Cap, T 11/22/2015    PHOS 3.9 01/21/2014    B12 694 06/27/2019       Imaging:  CT A&P personally reviewed. Residual right renal calculi and stone debris within lower pole.       Impression:    25year old gentleman with persistent right flank pain after undergoin

## 2020-05-12 NOTE — PLAN OF CARE
NURSING ADMISSION NOTE      Patient admitted via Cart  Oriented to room. Safety precautions initiated. Bed in low position. Call light in reach. Admission Navigator completed. A&O x4. Complains of pain 7/10. IV PRN pain given.

## 2020-05-12 NOTE — ED PROVIDER NOTES
78-year-old male with recurrent obstructing ureteral stone status post stenting 4 days ago. Initially seen in [place] ED, is to be admitted but sent here to the ED for rapid covid swab prior to going to bed.   He has had multiple doses of morphine as we

## 2020-05-12 NOTE — CONSULTS
[facility]    Report of Consultation    Zoraida Lafleur Patient Status:  Inpatient    1995 MRN [id number]   [facility] 3NE-A Attending Martell Diane MD   Hosp Day # 0 PCP Alyce Nash MD     Date of Admission:  2020  Date o litho, stone extraction, stent placement, RPG Dr. Garcia Noss   • OTHER SURGICAL HISTORY  12/1/15    cysto stent removal   • UPPER GI ENDOSCOPY,EXAM         Family History  Family History   Problem Relation Age of Onset   • Heart Disorder Maternal Grandfather hours as needed for Pain.  tamsulosin (FLOMAX) cap, Take 1 capsule (0.4 mg total) by mouth daily. Potassium Citrate ER 10 MEQ (1080 MG) Oral Tab CR, Take 1 tablet (10 mEq total) by mouth 3 (three) times daily.  after meals  FLUoxetine HCl 40 MG Oral Cap, T 11/22/2015    PHOS 3.9 01/21/2014    B12 694 06/27/2019       Imaging:  CT A&P personally reviewed. Residual right renal calculi and stone debris within lower pole.       Impression:    25year old gentleman with persistent right flank pain after undergoin

## 2020-05-12 NOTE — H&P
[facility] Hospitalist H&P       CC: Patient presents with:  Abdomen/Flank Pain       PCP: Eddy Mercer MD    History of Present Illness:  Ms. Vanessa Valdez is a 25 you male with PMH of cystinuria, hx kidney stones, migraines, anxiety and depression who presented to mouth every morning., Disp: , Rfl:   busPIRone HCl 7.5 MG Oral Tab, Take 7.5 mg by mouth every evening., Disp: , Rfl:   Phenazopyridine HCl (PYRIDIUM OR), Take 2 tablets by mouth as needed. , Disp: , Rfl:   HYDROcodone-acetaminophen (NORCO) 5-325 MG Oral Ta (70.8 kg)    Gen: Appears uncomfortable, alert and oriented   HEENT: sclera anicteric, oral mucosa moist  Pulm: Lungs clear bilaterally, good inspiratory effort   CV:  nL S1/S2, RRR  Abd: soft, + tenderness in RLQ, ND, no hepatomegaly, +BS  MSK: moving all to ensure resolution.    Dictated by: Eloisa Renteria MD on 5/09/2020 at 7:52 AM     Finalized by: Eloisa Renteria MD on 5/09/2020 at 7:55 AM          Xr Retrograde Pyelogram (cpt=74420)    Result Date: 5/8/2020  DATE OF SERVICE: [phone] XR RETROGRADE PYELOGRAM (C cm.  Subtle high density adjacent to the right atrial stent in the proximal right ureter measures 8 mm and may represent a calculus. Subtle high density adjacent to the distal right double-J ureteral stent measures 7 mm in may represent a calculus.   Left bilateral retrograde pyelogram with ureteroscopy, right stone extraction and bilateral ureteral stent placement  - Urology c/s  - continue home flomax  - IVF  - NPO  - IV pain meds prn -- toradol, morphine.  Patient notes he had bad reaction to hydromorphon

## 2020-05-12 NOTE — ED PROVIDER NOTES
Patient Seen in: [facility] Emergency Department In [place]      History   Patient presents with:  Abdomen/Flank Pain    Stated Complaint:     HPI    Patient is a 27-year-old male with history of cystinuria, recurrent renal stones, status post bilateral u Current:BP (!) 147/99   Pulse 82   Temp 98 °F (36.7 °C) (Oral)   Resp 20   Wt 72.1 kg   SpO2 96%   BMI 24.90 kg/m²         Physical Exam    General: Well-developed, well-nourished adult male who is alert, oriented, no respiratory distress, but appear Status                     ---------                               -----------         ------                     CBC W/ DIFFERENTIAL[[id number]]                              Final result                 Please view results for these tests on the individu Disposition and Plan     Clinical Impression:  Obstructive uropathy  (primary encounter diagnosis)  Hypokalemia    Disposition:  Admit  5/12/2020  5:25 am    Follow-up:  No follow-up provider specified.         Medications Prescribed:  Current

## 2020-05-13 VITALS
HEART RATE: 92 BPM | DIASTOLIC BLOOD PRESSURE: 75 MMHG | RESPIRATION RATE: 17 BRPM | TEMPERATURE: 98 F | WEIGHT: 158.94 LBS | SYSTOLIC BLOOD PRESSURE: 124 MMHG | BODY MASS INDEX: 25 KG/M2 | OXYGEN SATURATION: 94 %

## 2020-05-13 PROCEDURE — 80053 COMPREHEN METABOLIC PANEL: CPT | Performed by: UROLOGY

## 2020-05-13 PROCEDURE — 83735 ASSAY OF MAGNESIUM: CPT | Performed by: UROLOGY

## 2020-05-13 PROCEDURE — 85025 COMPLETE CBC W/AUTO DIFF WBC: CPT | Performed by: UROLOGY

## 2020-05-13 RX ORDER — HYDROCODONE BITARTRATE AND ACETAMINOPHEN 5; 325 MG/1; MG/1
1 TABLET ORAL EVERY 6 HOURS PRN
Qty: 10 TABLET | Refills: 0 | Status: ON HOLD | OUTPATIENT
Start: 2020-05-13 | End: 2020-05-29

## 2020-05-13 NOTE — PLAN OF CARE
Patient is A&O x4. VSS- no tele. Patient had cystoscopy with right stent exchange and left stent removal. Per PACU report, one kidney stone removed from right side. Post procedure VSS. Patient c/o burning with urination- PRN pyridium administered.  Patient

## 2020-05-13 NOTE — PROGRESS NOTES
[facility] Urology Progress Note    Tyron Staff Patient Status:  Inpatient    1995 MRN [id number]   [facility] 3NE-A Attending Xavier Giron MD   Hosp Day # 1 PCP Robin Chavarria MD     Subjective:  Tyron Staff is a(n (ureteral stones/fragments cleared), right stent exchange  -Significant right renal stone burden remains     2. Cystinuria  -Recurrent urolithiasis  -Did not tolerate tiopronin in past  -Has been on potassium citrate but was off for ~1.5 yrs    PLAN    1.

## 2020-05-13 NOTE — ANESTHESIA PROCEDURE NOTES
Airway  Date/Time: 5/12/2020 8:37 PM  Urgency: elective    Airway not difficult    General Information and Staff    Patient location during procedure: OR  Anesthesiologist: Maxim Villatoro MD  Performed: anesthesiologist     Indications and Patient Nuno

## 2020-05-13 NOTE — PROGRESS NOTES
[facility] Urology Pre Op    Patient seen in preop area. Imaging reviewed. Discussed operative plan for cystoscopy, left ureteral stent removal, right ureteroscopy with laser lithotripsy and stone extraction, right ureteral stent exchange.     I discussed with

## 2020-05-13 NOTE — ANESTHESIA POSTPROCEDURE EVALUATION
Paul Andrews 30 Patient Status:  Inpatient   Age/Gender 25year old male MRN [id number]   Location [address] [facility] Attending Aydin Dillon MD   Hosp Day # 0 PCP Addy Reynoso MD       Anesthesia Post-op Note    Procedure(s):  CY

## 2020-05-13 NOTE — PLAN OF CARE
NURSING DISCHARGE NOTE    Discharged Home via [place]. Accompanied by Support staff  Belongings Taken by patient/family. Patient given discharge paper work. Patient verbalized understanding.

## 2020-05-13 NOTE — DISCHARGE SUMMARY
General Medicine Discharge Summary     Patient ID:  Pam East  25year old  7/7/1995    Admit date: 5/12/2020    Discharge date and time: 5/13/20    Attending Physician: Mallory Delaney MD     Primary Care Physician: Yudy Sahu MD     Reason for a every evening. Phenazopyridine HCl (PYRIDIUM OR)  Take 2 tablets by mouth as needed.     HYDROcodone-acetaminophen (NORCO) 5-325 MG Oral Tab  Take 1 tablet by mouth every 6 (six) hours as needed for Pain.    tamsulosin (FLOMAX) cap  Take 1 capsule (0.4 m

## 2020-05-13 NOTE — PAYOR COMM NOTE
--------------  ADMISSION REVIEW     Payor: MAVIS CHAVIS  Subscriber #:  [id number]  Authorization Number: [id number]    Admit date: 5/12/20  Admit time: [address] [facility]       Patient Seen in: [facility] Emergency Department In [place]    History   Patient presents with or guarding. Remainder of his abdomen is nontender. No CVA tenderness. Extremities: No deformity, nontender throughout, and normal active range of motion of all 4 extremities.   Distal pulses normal and symmetric  Skin: No masses or nodules or abnormalit control, continued hydration, possibly urologic procedure to flush the stent.   I discussed the patient with the on-call [facility] hospitalist, Dr. Romario Rubio and he was transferred in stable condition to the main hospital.    Disposition and Plan     Clinical Imp kg)   SpO2 94%   BMI 24.90 kg/m²      Gen: Appears uncomfortable, alert and oriented   HEENT: sclera anicteric, oral mucosa moist  Pulm: Lungs clear bilaterally, good inspiratory effort   CV:  nL S1/S2, RRR  Abd: soft, + tenderness in RLQ, ND, no hepatomeg injection 5,000 Units     Date Action Dose Route     5/13/2020 0507 Given 5000 Units Subcutaneous (Left Lower Abdomen)       HYDROmorphone HCl (DILAUDID) 1 MG/ML injection 0.4 mg     Date Action Dose Route     5/12/2020 2329 Given 0.4 mg Intravenous

## 2020-05-13 NOTE — BRIEF OP NOTE
Pre-Operative Diagnosis: Hydronephrosis [N13.30]     Post-Operative Diagnosis: Hydronephrosis [N13.30]      Procedure Performed:   Procedure(s):  CYSTOSCOPY, RIGHT URETEROSCOPY,  STONE EXTRACTION, RIGHT URETERAL STENT EXCHANGE, LEFT STENT REMOVAL    Jonathan Burr

## 2020-05-13 NOTE — OPERATIVE REPORT
[facility]    PATIENT'S NAME: Selena Mathews   ATTENDING PHYSICIAN: Aris Lambert MD   OPERATING PHYSICIAN: Homer Sahni DO   PATIENT ACCOUNT#:   [de-identified]    LOCATION:  [address] [place] [address]  MEDICAL RECORD #:   [id number]       DATE OF CAMACHO cystoscopy table in the supine position. After bilateral sequential compression devices had been applied and satisfactory general anesthesia had been achieved, the patient's legs were raised to a dorsal lithotomy position.   The patient's groin was prepped and given the remaining stone burden and need for further procedure, we elected to abort further attempts.   The cystoscope was back-loaded over the safety Glidewire and a 6-Slovenian x 26 cm right ureteral stent was placed in conventional fashion and was seen

## 2020-05-14 NOTE — PAYOR COMM NOTE
Payor:  MAVIS CHAVIS  Subscriber #:  [id number]  Authorization Number: [id number]    Admit date: 5/12/20  Admit time:  5077  Discharge Date: 5/13/2020

## 2020-05-18 RX ORDER — DIPHENHYDRAMINE HCL 25 MG
25 CAPSULE ORAL EVERY 6 HOURS PRN
COMMUNITY

## 2020-05-20 ENCOUNTER — ANESTHESIA EVENT (OUTPATIENT)
Dept: SURGERY | Facility: HOSPITAL | Age: 25
DRG: 660 | End: 2020-05-20
Payer: COMMERCIAL

## 2020-05-25 ENCOUNTER — LAB ENCOUNTER (OUTPATIENT)
Dept: LAB | Facility: HOSPITAL | Age: 25
End: 2020-05-25
Attending: UROLOGY
Payer: COMMERCIAL

## 2020-05-25 DIAGNOSIS — N20.0 RIGHT NEPHROLITHIASIS: ICD-10-CM

## 2020-05-26 NOTE — CM/SW NOTE
Patient was screened, no dc needs are identified at this time. RN to contact SW/CM if needs arise.     Aissatou Shafer [facility]  [phone]

## 2020-05-28 ENCOUNTER — APPOINTMENT (OUTPATIENT)
Dept: GENERAL RADIOLOGY | Facility: HOSPITAL | Age: 25
DRG: 660 | End: 2020-05-28
Attending: UROLOGY
Payer: COMMERCIAL

## 2020-05-28 ENCOUNTER — HOSPITAL ENCOUNTER (INPATIENT)
Facility: HOSPITAL | Age: 25
LOS: 1 days | Discharge: HOME | DRG: 660 | End: 2020-05-29
Attending: UROLOGY | Admitting: UROLOGY
Payer: COMMERCIAL

## 2020-05-28 ENCOUNTER — ANESTHESIA (OUTPATIENT)
Dept: SURGERY | Facility: HOSPITAL | Age: 25
DRG: 660 | End: 2020-05-28
Payer: COMMERCIAL

## 2020-05-28 DIAGNOSIS — N20.0 RIGHT NEPHROLITHIASIS: Primary | ICD-10-CM

## 2020-05-28 PROCEDURE — 0TP98DZ REMOVAL OF INTRALUMINAL DEVICE FROM URETER, VIA NATURAL OR ARTIFICIAL OPENING ENDOSCOPIC: ICD-10-PCS | Performed by: UROLOGY

## 2020-05-28 PROCEDURE — 76000 FLUOROSCOPY <1 HR PHYS/QHP: CPT | Performed by: UROLOGY

## 2020-05-28 PROCEDURE — 0TC68ZZ EXTIRPATION OF MATTER FROM RIGHT URETER, VIA NATURAL OR ARTIFICIAL OPENING ENDOSCOPIC: ICD-10-PCS | Performed by: UROLOGY

## 2020-05-28 PROCEDURE — BT1D1ZZ FLUOROSCOPY OF RIGHT KIDNEY, URETER AND BLADDER USING LOW OSMOLAR CONTRAST: ICD-10-PCS | Performed by: UROLOGY

## 2020-05-28 PROCEDURE — 0TC04ZZ EXTIRPATION OF MATTER FROM RIGHT KIDNEY, PERCUTANEOUS ENDOSCOPIC APPROACH: ICD-10-PCS | Performed by: UROLOGY

## 2020-05-28 PROCEDURE — 82365 CALCULUS SPECTROSCOPY: CPT | Performed by: UROLOGY

## 2020-05-28 RX ORDER — MORPHINE SULFATE 4 MG/ML
INJECTION, SOLUTION INTRAMUSCULAR; INTRAVENOUS
Status: DISPENSED
Start: 2020-05-28 | End: 2020-05-29

## 2020-05-28 RX ORDER — DEXAMETHASONE SODIUM PHOSPHATE 4 MG/ML
VIAL (ML) INJECTION AS NEEDED
Status: DISCONTINUED | OUTPATIENT
Start: 2020-05-28 | End: 2020-05-28 | Stop reason: SURG

## 2020-05-28 RX ORDER — SODIUM CHLORIDE, SODIUM LACTATE, POTASSIUM CHLORIDE, CALCIUM CHLORIDE 600; 310; 30; 20 MG/100ML; MG/100ML; MG/100ML; MG/100ML
INJECTION, SOLUTION INTRAVENOUS CONTINUOUS
Status: DISCONTINUED | OUTPATIENT
Start: 2020-05-28 | End: 2020-05-28 | Stop reason: HOSPADM

## 2020-05-28 RX ORDER — MORPHINE SULFATE 4 MG/ML
2 INJECTION, SOLUTION INTRAMUSCULAR; INTRAVENOUS EVERY 2 HOUR PRN
Status: DISCONTINUED | OUTPATIENT
Start: 2020-05-28 | End: 2020-05-29

## 2020-05-28 RX ORDER — FLUOXETINE HYDROCHLORIDE 20 MG/1
40 CAPSULE ORAL DAILY
Status: DISCONTINUED | OUTPATIENT
Start: 2020-05-28 | End: 2020-05-29

## 2020-05-28 RX ORDER — NEOSTIGMINE METHYLSULFATE 1 MG/ML
INJECTION INTRAVENOUS AS NEEDED
Status: DISCONTINUED | OUTPATIENT
Start: 2020-05-28 | End: 2020-05-28 | Stop reason: SURG

## 2020-05-28 RX ORDER — ACETAMINOPHEN 500 MG
1000 TABLET ORAL ONCE
Status: DISCONTINUED | OUTPATIENT
Start: 2020-05-28 | End: 2020-05-28 | Stop reason: HOSPADM

## 2020-05-28 RX ORDER — PHENAZOPYRIDINE HYDROCHLORIDE 200 MG/1
200 TABLET, FILM COATED ORAL 3 TIMES DAILY PRN
Status: DISCONTINUED | OUTPATIENT
Start: 2020-05-28 | End: 2020-05-29

## 2020-05-28 RX ORDER — BUPIVACAINE HYDROCHLORIDE AND EPINEPHRINE 5; 5 MG/ML; UG/ML
INJECTION, SOLUTION EPIDURAL; INTRACAUDAL; PERINEURAL AS NEEDED
Status: DISCONTINUED | OUTPATIENT
Start: 2020-05-28 | End: 2020-05-28 | Stop reason: HOSPADM

## 2020-05-28 RX ORDER — LABETALOL HYDROCHLORIDE 5 MG/ML
INJECTION, SOLUTION INTRAVENOUS
Status: DISCONTINUED
Start: 2020-05-28 | End: 2020-05-28 | Stop reason: WASHOUT

## 2020-05-28 RX ORDER — MIDAZOLAM HYDROCHLORIDE 1 MG/ML
INJECTION INTRAMUSCULAR; INTRAVENOUS AS NEEDED
Status: DISCONTINUED | OUTPATIENT
Start: 2020-05-28 | End: 2020-05-28 | Stop reason: SURG

## 2020-05-28 RX ORDER — ROCURONIUM BROMIDE 10 MG/ML
INJECTION, SOLUTION INTRAVENOUS AS NEEDED
Status: DISCONTINUED | OUTPATIENT
Start: 2020-05-28 | End: 2020-05-28 | Stop reason: SURG

## 2020-05-28 RX ORDER — CEFAZOLIN SODIUM/WATER 2 G/20 ML
2 SYRINGE (ML) INTRAVENOUS ONCE
Status: COMPLETED | OUTPATIENT
Start: 2020-05-28 | End: 2020-05-28

## 2020-05-28 RX ORDER — SODIUM CHLORIDE 9 MG/ML
INJECTION, SOLUTION INTRAVENOUS CONTINUOUS
Status: DISCONTINUED | OUTPATIENT
Start: 2020-05-28 | End: 2020-05-29

## 2020-05-28 RX ORDER — ONDANSETRON 2 MG/ML
INJECTION INTRAMUSCULAR; INTRAVENOUS AS NEEDED
Status: DISCONTINUED | OUTPATIENT
Start: 2020-05-28 | End: 2020-05-28 | Stop reason: SURG

## 2020-05-28 RX ORDER — SUMATRIPTAN 50 MG/1
50 TABLET, FILM COATED ORAL EVERY 2 HOUR PRN
Status: DISCONTINUED | OUTPATIENT
Start: 2020-05-28 | End: 2020-05-29

## 2020-05-28 RX ORDER — ESMOLOL HYDROCHLORIDE 10 MG/ML
INJECTION INTRAVENOUS AS NEEDED
Status: DISCONTINUED | OUTPATIENT
Start: 2020-05-28 | End: 2020-05-28 | Stop reason: SURG

## 2020-05-28 RX ORDER — LABETALOL HYDROCHLORIDE 5 MG/ML
INJECTION, SOLUTION INTRAVENOUS AS NEEDED
Status: DISCONTINUED | OUTPATIENT
Start: 2020-05-28 | End: 2020-05-28 | Stop reason: SURG

## 2020-05-28 RX ORDER — CEFAZOLIN SODIUM/WATER 2 G/20 ML
2 SYRINGE (ML) INTRAVENOUS EVERY 8 HOURS
Status: COMPLETED | OUTPATIENT
Start: 2020-05-28 | End: 2020-05-29

## 2020-05-28 RX ORDER — HYDROMORPHONE HYDROCHLORIDE 1 MG/ML
0.4 INJECTION, SOLUTION INTRAMUSCULAR; INTRAVENOUS; SUBCUTANEOUS EVERY 5 MIN PRN
Status: DISCONTINUED | OUTPATIENT
Start: 2020-05-28 | End: 2020-05-28 | Stop reason: HOSPADM

## 2020-05-28 RX ORDER — ONDANSETRON 2 MG/ML
4 INJECTION INTRAMUSCULAR; INTRAVENOUS EVERY 6 HOURS PRN
Status: DISCONTINUED | OUTPATIENT
Start: 2020-05-28 | End: 2020-05-29

## 2020-05-28 RX ORDER — GLYCOPYRROLATE 0.2 MG/ML
INJECTION, SOLUTION INTRAMUSCULAR; INTRAVENOUS AS NEEDED
Status: DISCONTINUED | OUTPATIENT
Start: 2020-05-28 | End: 2020-05-28

## 2020-05-28 RX ORDER — NALOXONE HYDROCHLORIDE 0.4 MG/ML
80 INJECTION, SOLUTION INTRAMUSCULAR; INTRAVENOUS; SUBCUTANEOUS AS NEEDED
Status: DISCONTINUED | OUTPATIENT
Start: 2020-05-28 | End: 2020-05-28 | Stop reason: HOSPADM

## 2020-05-28 RX ORDER — LABETALOL HYDROCHLORIDE 5 MG/ML
5 INJECTION, SOLUTION INTRAVENOUS ONCE
Status: DISCONTINUED | OUTPATIENT
Start: 2020-05-28 | End: 2020-05-28

## 2020-05-28 RX ORDER — ONDANSETRON 4 MG/1
4 TABLET, FILM COATED ORAL EVERY 6 HOURS PRN
Status: DISCONTINUED | OUTPATIENT
Start: 2020-05-28 | End: 2020-05-29

## 2020-05-28 RX ORDER — DIPHENHYDRAMINE HCL 25 MG
25 CAPSULE ORAL EVERY 6 HOURS PRN
Status: DISCONTINUED | OUTPATIENT
Start: 2020-05-28 | End: 2020-05-29

## 2020-05-28 RX ORDER — GLYCOPYRROLATE 0.2 MG/ML
INJECTION, SOLUTION INTRAMUSCULAR; INTRAVENOUS AS NEEDED
Status: DISCONTINUED | OUTPATIENT
Start: 2020-05-28 | End: 2020-05-28 | Stop reason: SURG

## 2020-05-28 RX ORDER — ONDANSETRON 2 MG/ML
4 INJECTION INTRAMUSCULAR; INTRAVENOUS AS NEEDED
Status: DISCONTINUED | OUTPATIENT
Start: 2020-05-28 | End: 2020-05-28 | Stop reason: HOSPADM

## 2020-05-28 RX ORDER — HYDROCODONE BITARTRATE AND ACETAMINOPHEN 5; 325 MG/1; MG/1
1 TABLET ORAL EVERY 4 HOURS PRN
Status: DISCONTINUED | OUTPATIENT
Start: 2020-05-28 | End: 2020-05-29

## 2020-05-28 RX ADMIN — ESMOLOL HYDROCHLORIDE 10 MG: 10 INJECTION INTRAVENOUS at 16:43:00

## 2020-05-28 RX ADMIN — NEOSTIGMINE METHYLSULFATE 4 MG: 1 INJECTION INTRAVENOUS at 16:28:00

## 2020-05-28 RX ADMIN — ESMOLOL HYDROCHLORIDE 10 MG: 10 INJECTION INTRAVENOUS at 16:35:00

## 2020-05-28 RX ADMIN — ESMOLOL HYDROCHLORIDE 10 MG: 10 INJECTION INTRAVENOUS at 16:39:00

## 2020-05-28 RX ADMIN — ROCURONIUM BROMIDE 20 MG: 10 INJECTION, SOLUTION INTRAVENOUS at 15:14:00

## 2020-05-28 RX ADMIN — DEXAMETHASONE SODIUM PHOSPHATE 8 MG: 4 MG/ML VIAL (ML) INJECTION at 13:11:00

## 2020-05-28 RX ADMIN — ROCURONIUM BROMIDE 20 MG: 10 INJECTION, SOLUTION INTRAVENOUS at 14:35:00

## 2020-05-28 RX ADMIN — ONDANSETRON 4 MG: 2 INJECTION INTRAMUSCULAR; INTRAVENOUS at 13:11:00

## 2020-05-28 RX ADMIN — GLYCOPYRROLATE 0.8 MG: 0.2 INJECTION, SOLUTION INTRAMUSCULAR; INTRAVENOUS at 16:28:00

## 2020-05-28 RX ADMIN — ROCURONIUM BROMIDE 20 MG: 10 INJECTION, SOLUTION INTRAVENOUS at 13:54:00

## 2020-05-28 RX ADMIN — CEFAZOLIN SODIUM/WATER 2 G: 2 G/20 ML SYRINGE (ML) INTRAVENOUS at 13:10:00

## 2020-05-28 RX ADMIN — ROCURONIUM BROMIDE 50 MG: 10 INJECTION, SOLUTION INTRAVENOUS at 13:00:00

## 2020-05-28 RX ADMIN — LABETALOL HYDROCHLORIDE 5 MG: 5 INJECTION, SOLUTION INTRAVENOUS at 16:37:00

## 2020-05-28 RX ADMIN — ROCURONIUM BROMIDE 15 MG: 10 INJECTION, SOLUTION INTRAVENOUS at 15:46:00

## 2020-05-28 RX ADMIN — MIDAZOLAM HYDROCHLORIDE 2 MG: 1 INJECTION INTRAMUSCULAR; INTRAVENOUS at 12:58:00

## 2020-05-28 NOTE — CONSULTS
General Medicine Consult      Reason for consult: medical management    Consulted by: Dr. Leticia Heredia    PCP: Emery Campbell MD      History of Present Illness: Patient is a 24 yo with mmp including but not limited to anxiety/depression, OCD, is consulted f (NORCO) 5-325 MG Oral Tab, Take 1 tablet by mouth every 6 (six) hours as needed for Pain., Disp: 10 tablet, Rfl: 0  tamsulosin (FLOMAX) cap, Take 1 capsule (0.4 mg total) by mouth daily.  (Patient taking differently: Take 0.4 mg by mouth as needed.  ), Disp (BP Location: Right arm)   Pulse 83   Temp 98.1 °F (36.7 °C) (Oral)   Resp 16   Ht 170.2 cm (5' 7\")   Wt 158 lb 11.7 oz (72 kg)   SpO2 95%   BMI 24.86 kg/m²   General:  Alert, NAD, appears stated age, on RA   Head:  Normocephalic, without obvious abnormal

## 2020-05-28 NOTE — ANESTHESIA PREPROCEDURE EVALUATION
PRE-OP EVALUATION    Patient Name: Lv Malave    Pre-op Diagnosis: Right nephrolithiasis [N20.0]    Procedure(s):  CYSTOSCOPY RIGHT URETERAL CATHETERIZATION, RIGHT PERCUTANEOUS RENAL ACCESS, RIGHT PERCUTANEOUS NEPHROLITHOTOMY, RIGHT URETERAL STENT RE MET: >4                             (-) angina     (-) DELGADO         Endo/Other                                  Pulmonary      (-) asthma         (-) shortness of breath  (-) recent URI   (-) sleep apnea       Neuro/Psych      (+) depression 05/13/2020            Airway      Mallampati: II  Mouth opening: >3 FB  TM distance: 4 - 6 cm  Neck ROM: full Cardiovascular    Cardiovascular exam normal.  Rhythm: regular  Rate: normal     Dental    No notable dental history.          Pulmonary    Pulmona

## 2020-05-28 NOTE — BRIEF OP NOTE
Pre-Operative Diagnosis: Right nephrolithiasis [N20.0]     Post-Operative Diagnosis: Right nephrolithiasis [N20.0], right ureteral calculi     Procedure Performed:   Procedure(s):  CYSTOSCOPY , RIGHT URETEROSCOPY WITH STONE EXTRACTION, RIGHT URETERAL Jeris Baylee

## 2020-05-28 NOTE — ANESTHESIA PROCEDURE NOTES
Airway  Date/Time: 5/28/2020 1:02 PM  Urgency: elective    Airway not difficult    General Information and Staff    Patient location during procedure: OR  Anesthesiologist: Jennifer Issa DO  Performed: anesthesiologist     Indications and Patient C

## 2020-05-28 NOTE — INTERVAL H&P NOTE
H&P dated 5/12/20 by Dr. Santy Hemphill was reviewed. The patient subsequently underwent right ureteroscopy with clearance of the right ureteral calculi fragments.   After discussion of options for his remaining right renal stone burden he has elected to proceed wi

## 2020-05-28 NOTE — ANESTHESIA POSTPROCEDURE EVALUATION
Paul Andrews 30 Patient Status:  Inpatient   Age/Gender 25year old male MRN [id number]   [facility] SURGERY Attending Sharyle Chew, [address] [facility] Day # 0 PCP Kate Allison MD       Anesthesia Post-op Note    Procedure(s):

## 2020-05-29 VITALS
DIASTOLIC BLOOD PRESSURE: 79 MMHG | WEIGHT: 158.75 LBS | RESPIRATION RATE: 16 BRPM | HEIGHT: 67 IN | BODY MASS INDEX: 24.92 KG/M2 | HEART RATE: 79 BPM | OXYGEN SATURATION: 98 % | SYSTOLIC BLOOD PRESSURE: 123 MMHG | TEMPERATURE: 98 F

## 2020-05-29 PROCEDURE — 80048 BASIC METABOLIC PNL TOTAL CA: CPT | Performed by: UROLOGY

## 2020-05-29 PROCEDURE — 85027 COMPLETE CBC AUTOMATED: CPT | Performed by: UROLOGY

## 2020-05-29 RX ORDER — HYDROCODONE BITARTRATE AND ACETAMINOPHEN 5; 325 MG/1; MG/1
1 TABLET ORAL EVERY 6 HOURS PRN
Qty: 10 TABLET | Refills: 0 | Status: SHIPPED | OUTPATIENT
Start: 2020-05-29

## 2020-05-29 RX ORDER — PHENAZOPYRIDINE HYDROCHLORIDE 200 MG/1
200 TABLET, FILM COATED ORAL 3 TIMES DAILY PRN
Qty: 15 TABLET | Refills: 1 | Status: SHIPPED | OUTPATIENT
Start: 2020-05-29

## 2020-05-29 NOTE — PLAN OF CARE
NURSING DISCHARGE NOTE    Discharged Home via Wheelchair. Accompanied by Support staff  Belongings Taken by patient/family. Pt verbalized understanding of discharge instructions. IV D/Cd.  Dressing changed, and extra supplies given for additional ch

## 2020-05-29 NOTE — CM/SW NOTE
Received message from [address] @ [facility], would like call back. This writer unable to reach Sade Ovalle plan: home no needs.     Shreyas Suárez RN   Bhavna Rosario [address] [facility]  [phone]

## 2020-05-29 NOTE — OPERATIVE REPORT
[facility]    PATIENT'S NAME: Osborne [facility]   ATTENDING PHYSICIAN: Erinn Funez DO   OPERATING PHYSICIAN: Erinn Funez DO   PATIENT ACCOUNT#:   [de-identified]    LOCATION:  [address] [place] [address]  MEDICAL RECORD #:   [id number]       DATE OF chart, the patient was given preoperative antibiotics and taken to the operating room.   After bilateral sequential compression devices had been applied and satisfactory general anesthesia had been achieved with the patient on the hospital cart, his legs we pole calyces. There was mild right hydronephrosis seen. The ureteroscope was then removed. The cystoscope was replaced in the bladder and all the calculi fragments were then irrigated from the bladder.   A 5-Croatian open-ended ureteral catheter was then p infundibulum extending into the renal pelvis which was not safely passable with the nephroscope. Therefore, the balloon was replaced and advanced into the renal pelvis over which the access sheath was advanced into the renal pelvis as well.   There were se was applied. The patient was then turned supine onto the hospital cart, where he was extubated and awakened from anesthesia. He was transferred to the postanesthesia care unit in stable condition with no immediately apparent complications.   He will be ad

## 2020-05-29 NOTE — PROGRESS NOTES
UROLOGY ADDENDUM    Pt tolerated R. NT clamping. Nephrostomy tube removed in its entirety  Dressings applied    Ok for d/c home today   F/u 2 wks with MD  Pt does not have an internal ureter stent.          Mayela Holbrook PA-C  Department of Urology

## 2020-05-29 NOTE — PLAN OF CARE
Pt alert and orientatedx4. Room air. Pulse Ox. SCDs. Clear liquid diet and tolerating well. [place] in place, order to discontinue @ 0700. Nephrostomy tube, bloody, low output, dressing clean, dry, and intact. Morphine and norco used for pain management.  Charito Thompson Consider OT/PT consult to assist with strengthening/mobility  - Encourage toileting schedule  Outcome: Progressing     Problem: DISCHARGE PLANNING  Goal: Discharge to home or other facility with appropriate resources  Description  INTERVENTIONS:  - Identif

## 2020-05-29 NOTE — PROGRESS NOTES
[facility]    Progress Note    Tarun Ye Patient Status:  Inpatient    1995 MRN [id number]   [facility] 3NW-A Attending Joann Callejas,    Hosp Day # 1 PCP Willa Means MD        Subjective:   Tarun Ye is a(n) 2

## 2023-11-06 ENCOUNTER — HOSPITAL ENCOUNTER (EMERGENCY)
Age: 28
Discharge: HOME | End: 2023-11-06
Attending: EMERGENCY MEDICINE
Payer: COMMERCIAL

## 2023-11-06 ENCOUNTER — APPOINTMENT (OUTPATIENT)
Dept: CT IMAGING | Age: 28
End: 2023-11-06
Attending: NURSE PRACTITIONER
Payer: COMMERCIAL

## 2023-11-06 VITALS
SYSTOLIC BLOOD PRESSURE: 130 MMHG | OXYGEN SATURATION: 97 % | TEMPERATURE: 97 F | BODY MASS INDEX: 25.11 KG/M2 | HEIGHT: 67 IN | RESPIRATION RATE: 16 BRPM | HEART RATE: 87 BPM | WEIGHT: 160 LBS | DIASTOLIC BLOOD PRESSURE: 94 MMHG

## 2023-11-06 DIAGNOSIS — N20.0 BILATERAL KIDNEY STONES: ICD-10-CM

## 2023-11-06 DIAGNOSIS — E72.01: Primary | ICD-10-CM

## 2023-11-06 LAB
ALBUMIN SERPL-MCNC: 3.3 G/DL (ref 3.4–5)
ALBUMIN/GLOB SERPL: 1 {RATIO} (ref 1–2)
ALP LIVER SERPL-CCNC: 72 U/L
ALT SERPL-CCNC: 27 U/L
ANION GAP SERPL CALC-SCNC: 5 MMOL/L (ref 0–18)
AST SERPL-CCNC: 12 U/L (ref 15–37)
BASOPHILS # BLD AUTO: 0.08 X10(3) UL (ref 0–0.2)
BASOPHILS NFR BLD AUTO: 0.8 %
BILIRUB SERPL-MCNC: 0.3 MG/DL (ref 0.1–2)
BILIRUB UR QL STRIP.AUTO: NEGATIVE
BUN BLD-MCNC: 21 MG/DL (ref 9–23)
CALCIUM BLD-MCNC: 8.3 MG/DL (ref 8.5–10.1)
CHLORIDE SERPL-SCNC: 108 MMOL/L (ref 98–112)
CLARITY UR REFRACT.AUTO: CLEAR
CO2 SERPL-SCNC: 26 MMOL/L (ref 21–32)
COLOR UR AUTO: YELLOW
CREAT BLD-MCNC: 1.1 MG/DL
EGFRCR SERPLBLD CKD-EPI 2021: 94 ML/MIN/1.73M2 (ref 60–?)
EOSINOPHIL # BLD AUTO: 0.07 X10(3) UL (ref 0–0.7)
EOSINOPHIL NFR BLD AUTO: 0.7 %
ERYTHROCYTE [DISTWIDTH] IN BLOOD BY AUTOMATED COUNT: 12.5 %
GLOBULIN PLAS-MCNC: 3.4 G/DL (ref 2.8–4.4)
GLUCOSE BLD-MCNC: 92 MG/DL (ref 70–99)
GLUCOSE UR STRIP.AUTO-MCNC: NEGATIVE MG/DL
HCT VFR BLD AUTO: 41.8 %
HGB BLD-MCNC: 14.4 G/DL
IMM GRANULOCYTES # BLD AUTO: 0.41 X10(3) UL (ref 0–1)
IMM GRANULOCYTES NFR BLD: 4.3 %
KETONES UR STRIP.AUTO-MCNC: NEGATIVE MG/DL
LEUKOCYTE ESTERASE UR QL STRIP.AUTO: NEGATIVE
LYMPHOCYTES # BLD AUTO: 3.56 X10(3) UL (ref 1–4)
LYMPHOCYTES NFR BLD AUTO: 37.2 %
MCH RBC QN AUTO: 31.1 PG (ref 26–34)
MCHC RBC AUTO-ENTMCNC: 34.4 G/DL (ref 31–37)
MCV RBC AUTO: 90.3 FL
MONOCYTES # BLD AUTO: 0.85 X10(3) UL (ref 0.1–1)
MONOCYTES NFR BLD AUTO: 8.9 %
NEUTROPHILS # BLD AUTO: 4.59 X10 (3) UL (ref 1.5–7.7)
NEUTROPHILS # BLD AUTO: 4.59 X10(3) UL (ref 1.5–7.7)
NEUTROPHILS NFR BLD AUTO: 48.1 %
NITRITE UR QL STRIP.AUTO: NEGATIVE
OSMOLALITY SERPL CALC.SUM OF ELEC: 291 MOSM/KG (ref 275–295)
PH UR STRIP.AUTO: 6 [PH] (ref 5–8)
PLATELET # BLD AUTO: 320 10(3)UL (ref 150–450)
POTASSIUM SERPL-SCNC: 3.6 MMOL/L (ref 3.5–5.1)
PROT SERPL-MCNC: 6.7 G/DL (ref 6.4–8.2)
RBC # BLD AUTO: 4.63 X10(6)UL
RBC #/AREA URNS AUTO: >10 /HPF
SODIUM SERPL-SCNC: 139 MMOL/L (ref 136–145)
SP GR UR STRIP.AUTO: >=1.03 (ref 1–1.03)
UROBILINOGEN UR STRIP.AUTO-MCNC: 0.2 MG/DL
WBC # BLD AUTO: 9.6 X10(3) UL (ref 4–11)

## 2023-11-06 PROCEDURE — 96375 TX/PRO/DX INJ NEW DRUG ADDON: CPT

## 2023-11-06 PROCEDURE — 96374 THER/PROPH/DIAG INJ IV PUSH: CPT

## 2023-11-06 PROCEDURE — 85025 COMPLETE CBC W/AUTO DIFF WBC: CPT | Performed by: NURSE PRACTITIONER

## 2023-11-06 PROCEDURE — 99284 EMERGENCY DEPT VISIT MOD MDM: CPT

## 2023-11-06 PROCEDURE — 80053 COMPREHEN METABOLIC PANEL: CPT | Performed by: NURSE PRACTITIONER

## 2023-11-06 PROCEDURE — 81015 MICROSCOPIC EXAM OF URINE: CPT | Performed by: NURSE PRACTITIONER

## 2023-11-06 PROCEDURE — 96376 TX/PRO/DX INJ SAME DRUG ADON: CPT

## 2023-11-06 PROCEDURE — 74176 CT ABD & PELVIS W/O CONTRAST: CPT | Performed by: NURSE PRACTITIONER

## 2023-11-06 PROCEDURE — 96361 HYDRATE IV INFUSION ADD-ON: CPT

## 2023-11-06 PROCEDURE — 81001 URINALYSIS AUTO W/SCOPE: CPT | Performed by: NURSE PRACTITIONER

## 2023-11-06 RX ORDER — KETOROLAC TROMETHAMINE 15 MG/ML
15 INJECTION, SOLUTION INTRAMUSCULAR; INTRAVENOUS ONCE
Status: COMPLETED | OUTPATIENT
Start: 2023-11-06 | End: 2023-11-06

## 2023-11-06 RX ORDER — HYDROCODONE BITARTRATE AND ACETAMINOPHEN 5; 325 MG/1; MG/1
1-2 TABLET ORAL EVERY 6 HOURS PRN
Qty: 8 TABLET | Refills: 0 | Status: SHIPPED | OUTPATIENT
Start: 2023-11-06 | End: 2023-11-11

## 2023-11-06 RX ORDER — MORPHINE SULFATE 4 MG/ML
4 INJECTION, SOLUTION INTRAMUSCULAR; INTRAVENOUS EVERY 30 MIN PRN
Status: DISCONTINUED | OUTPATIENT
Start: 2023-11-06 | End: 2023-11-06

## 2023-11-06 NOTE — ED QUICK NOTES
Pt verbalized of feeling much better. Mild discomfort 3/10. Discharge instructions given and verbalized of understanding.

## 2024-02-06 ENCOUNTER — ANESTHESIA (OUTPATIENT)
Dept: SURGERY | Facility: HOSPITAL | Age: 29
End: 2024-02-06
Payer: COMMERCIAL

## 2024-02-06 ENCOUNTER — APPOINTMENT (OUTPATIENT)
Dept: GENERAL RADIOLOGY | Facility: HOSPITAL | Age: 29
End: 2024-02-06
Attending: UROLOGY
Payer: COMMERCIAL

## 2024-02-06 ENCOUNTER — HOSPITAL ENCOUNTER (INPATIENT)
Facility: HOSPITAL | Age: 29
LOS: 1 days | Discharge: HOME | End: 2024-02-07
Attending: EMERGENCY MEDICINE | Admitting: INTERNAL MEDICINE
Payer: COMMERCIAL

## 2024-02-06 ENCOUNTER — APPOINTMENT (OUTPATIENT)
Dept: CT IMAGING | Age: 29
End: 2024-02-06
Attending: EMERGENCY MEDICINE
Payer: COMMERCIAL

## 2024-02-06 ENCOUNTER — ANESTHESIA EVENT (OUTPATIENT)
Dept: SURGERY | Facility: HOSPITAL | Age: 29
End: 2024-02-06
Payer: COMMERCIAL

## 2024-02-06 DIAGNOSIS — N20.1 CALCULI, URETER: ICD-10-CM

## 2024-02-06 DIAGNOSIS — R52 INTRACTABLE PAIN: ICD-10-CM

## 2024-02-06 DIAGNOSIS — N13.2 URETERAL STONE WITH HYDRONEPHROSIS: Primary | ICD-10-CM

## 2024-02-06 LAB
ALBUMIN SERPL-MCNC: 3.8 G/DL (ref 3.4–5)
ALBUMIN/GLOB SERPL: 1.1 {RATIO} (ref 1–2)
ALP LIVER SERPL-CCNC: 78 U/L
ALT SERPL-CCNC: 28 U/L
ANION GAP SERPL CALC-SCNC: 4 MMOL/L (ref 0–18)
AST SERPL-CCNC: 14 U/L (ref 15–37)
BASOPHILS # BLD AUTO: 0.05 X10(3) UL (ref 0–0.2)
BASOPHILS NFR BLD AUTO: 0.7 %
BILIRUB SERPL-MCNC: 0.5 MG/DL (ref 0.1–2)
BILIRUB UR QL STRIP.AUTO: NEGATIVE
BUN BLD-MCNC: 19 MG/DL (ref 9–23)
CALCIUM BLD-MCNC: 9.1 MG/DL (ref 8.5–10.1)
CHLORIDE SERPL-SCNC: 103 MMOL/L (ref 98–112)
CLARITY UR REFRACT.AUTO: CLEAR
CO2 SERPL-SCNC: 32 MMOL/L (ref 21–32)
COLOR UR AUTO: YELLOW
CREAT BLD-MCNC: 1.09 MG/DL
EGFRCR SERPLBLD CKD-EPI 2021: 95 ML/MIN/1.73M2 (ref 60–?)
EOSINOPHIL # BLD AUTO: 0.11 X10(3) UL (ref 0–0.7)
EOSINOPHIL NFR BLD AUTO: 1.5 %
ERYTHROCYTE [DISTWIDTH] IN BLOOD BY AUTOMATED COUNT: 12.6 %
GLOBULIN PLAS-MCNC: 3.5 G/DL (ref 2.8–4.4)
GLUCOSE BLD-MCNC: 95 MG/DL (ref 70–99)
GLUCOSE UR STRIP.AUTO-MCNC: NEGATIVE MG/DL
HCT VFR BLD AUTO: 44.7 %
HGB BLD-MCNC: 15 G/DL
IMM GRANULOCYTES # BLD AUTO: 0.02 X10(3) UL (ref 0–1)
IMM GRANULOCYTES NFR BLD: 0.3 %
KETONES UR STRIP.AUTO-MCNC: NEGATIVE MG/DL
LEUKOCYTE ESTERASE UR QL STRIP.AUTO: NEGATIVE
LYMPHOCYTES # BLD AUTO: 2.74 X10(3) UL (ref 1–4)
LYMPHOCYTES NFR BLD AUTO: 37.4 %
MCH RBC QN AUTO: 30.5 PG (ref 26–34)
MCHC RBC AUTO-ENTMCNC: 33.6 G/DL (ref 31–37)
MCV RBC AUTO: 91 FL
MONOCYTES # BLD AUTO: 0.56 X10(3) UL (ref 0.1–1)
MONOCYTES NFR BLD AUTO: 7.6 %
NEUTROPHILS # BLD AUTO: 3.85 X10 (3) UL (ref 1.5–7.7)
NEUTROPHILS # BLD AUTO: 3.85 X10(3) UL (ref 1.5–7.7)
NEUTROPHILS NFR BLD AUTO: 52.5 %
NITRITE UR QL STRIP.AUTO: NEGATIVE
OSMOLALITY SERPL CALC.SUM OF ELEC: 290 MOSM/KG (ref 275–295)
PH UR STRIP.AUTO: 7 [PH] (ref 5–8)
PLATELET # BLD AUTO: 321 10(3)UL (ref 150–450)
POTASSIUM SERPL-SCNC: 3.5 MMOL/L (ref 3.5–5.1)
PROT SERPL-MCNC: 7.3 G/DL (ref 6.4–8.2)
PROT UR STRIP.AUTO-MCNC: NEGATIVE MG/DL
RBC # BLD AUTO: 4.91 X10(6)UL
SODIUM SERPL-SCNC: 139 MMOL/L (ref 136–145)
SP GR UR STRIP.AUTO: 1.01 (ref 1–1.03)
UROBILINOGEN UR STRIP.AUTO-MCNC: 0.2 MG/DL
WBC # BLD AUTO: 7.3 X10(3) UL (ref 4–11)

## 2024-02-06 PROCEDURE — 96374 THER/PROPH/DIAG INJ IV PUSH: CPT

## 2024-02-06 PROCEDURE — 99285 EMERGENCY DEPT VISIT HI MDM: CPT

## 2024-02-06 PROCEDURE — 81001 URINALYSIS AUTO W/SCOPE: CPT | Performed by: EMERGENCY MEDICINE

## 2024-02-06 PROCEDURE — BT1FYZZ FLUOROSCOPY OF LEFT KIDNEY, URETER AND BLADDER USING OTHER CONTRAST: ICD-10-PCS | Performed by: UROLOGY

## 2024-02-06 PROCEDURE — 81015 MICROSCOPIC EXAM OF URINE: CPT | Performed by: EMERGENCY MEDICINE

## 2024-02-06 PROCEDURE — 88300 SURGICAL PATH GROSS: CPT | Performed by: UROLOGY

## 2024-02-06 PROCEDURE — 85025 COMPLETE CBC W/AUTO DIFF WBC: CPT

## 2024-02-06 PROCEDURE — 96376 TX/PRO/DX INJ SAME DRUG ADON: CPT

## 2024-02-06 PROCEDURE — 80053 COMPREHEN METABOLIC PANEL: CPT

## 2024-02-06 PROCEDURE — 0T778DZ DILATION OF LEFT URETER WITH INTRALUMINAL DEVICE, VIA NATURAL OR ARTIFICIAL OPENING ENDOSCOPIC: ICD-10-PCS | Performed by: UROLOGY

## 2024-02-06 PROCEDURE — 80053 COMPREHEN METABOLIC PANEL: CPT | Performed by: EMERGENCY MEDICINE

## 2024-02-06 PROCEDURE — 96375 TX/PRO/DX INJ NEW DRUG ADDON: CPT

## 2024-02-06 PROCEDURE — 74176 CT ABD & PELVIS W/O CONTRAST: CPT | Performed by: EMERGENCY MEDICINE

## 2024-02-06 PROCEDURE — 82365 CALCULUS SPECTROSCOPY: CPT | Performed by: UROLOGY

## 2024-02-06 PROCEDURE — 85025 COMPLETE CBC W/AUTO DIFF WBC: CPT | Performed by: EMERGENCY MEDICINE

## 2024-02-06 PROCEDURE — 0TC78ZZ EXTIRPATION OF MATTER FROM LEFT URETER, VIA NATURAL OR ARTIFICIAL OPENING ENDOSCOPIC: ICD-10-PCS | Performed by: UROLOGY

## 2024-02-06 DEVICE — IMPLANTABLE DEVICE: Type: IMPLANTABLE DEVICE | Site: URETER | Status: FUNCTIONAL

## 2024-02-06 RX ORDER — ENOXAPARIN SODIUM 100 MG/ML
40 INJECTION SUBCUTANEOUS DAILY
Status: DISCONTINUED | OUTPATIENT
Start: 2024-02-06 | End: 2024-02-07

## 2024-02-06 RX ORDER — PROCHLORPERAZINE EDISYLATE 5 MG/ML
5 INJECTION INTRAMUSCULAR; INTRAVENOUS EVERY 8 HOURS PRN
Status: DISCONTINUED | OUTPATIENT
Start: 2024-02-06 | End: 2024-02-06 | Stop reason: HOSPADM

## 2024-02-06 RX ORDER — MORPHINE SULFATE 4 MG/ML
4 INJECTION, SOLUTION INTRAMUSCULAR; INTRAVENOUS EVERY 30 MIN PRN
Status: COMPLETED | OUTPATIENT
Start: 2024-02-06 | End: 2024-02-06

## 2024-02-06 RX ORDER — CEFAZOLIN SODIUM 1 G/3ML
INJECTION, POWDER, FOR SOLUTION INTRAMUSCULAR; INTRAVENOUS AS NEEDED
Status: DISCONTINUED | OUTPATIENT
Start: 2024-02-06 | End: 2024-02-06 | Stop reason: SURG

## 2024-02-06 RX ORDER — SODIUM CHLORIDE, SODIUM LACTATE, POTASSIUM CHLORIDE, CALCIUM CHLORIDE 600; 310; 30; 20 MG/100ML; MG/100ML; MG/100ML; MG/100ML
INJECTION, SOLUTION INTRAVENOUS CONTINUOUS PRN
Status: DISCONTINUED | OUTPATIENT
Start: 2024-02-06 | End: 2024-02-06 | Stop reason: SURG

## 2024-02-06 RX ORDER — ACETAMINOPHEN 500 MG
1000 TABLET ORAL EVERY 6 HOURS PRN
Status: DISCONTINUED | OUTPATIENT
Start: 2024-02-06 | End: 2024-02-07

## 2024-02-06 RX ORDER — HYDROMORPHONE HYDROCHLORIDE 1 MG/ML
0.4 INJECTION, SOLUTION INTRAMUSCULAR; INTRAVENOUS; SUBCUTANEOUS EVERY 5 MIN PRN
Status: DISCONTINUED | OUTPATIENT
Start: 2024-02-06 | End: 2024-02-06 | Stop reason: HOSPADM

## 2024-02-06 RX ORDER — NALOXONE HYDROCHLORIDE 0.4 MG/ML
0.08 INJECTION, SOLUTION INTRAMUSCULAR; INTRAVENOUS; SUBCUTANEOUS AS NEEDED
Status: DISCONTINUED | OUTPATIENT
Start: 2024-02-06 | End: 2024-02-06 | Stop reason: HOSPADM

## 2024-02-06 RX ORDER — HYDROMORPHONE HYDROCHLORIDE 1 MG/ML
0.2 INJECTION, SOLUTION INTRAMUSCULAR; INTRAVENOUS; SUBCUTANEOUS EVERY 5 MIN PRN
Status: DISCONTINUED | OUTPATIENT
Start: 2024-02-06 | End: 2024-02-06 | Stop reason: HOSPADM

## 2024-02-06 RX ORDER — ONDANSETRON 2 MG/ML
4 INJECTION INTRAMUSCULAR; INTRAVENOUS EVERY 6 HOURS PRN
Status: DISCONTINUED | OUTPATIENT
Start: 2024-02-06 | End: 2024-02-06 | Stop reason: HOSPADM

## 2024-02-06 RX ORDER — SODIUM CHLORIDE 9 MG/ML
INJECTION, SOLUTION INTRAVENOUS CONTINUOUS
Status: DISCONTINUED | OUTPATIENT
Start: 2024-02-06 | End: 2024-02-07

## 2024-02-06 RX ORDER — HYDROMORPHONE HYDROCHLORIDE 1 MG/ML
0.6 INJECTION, SOLUTION INTRAMUSCULAR; INTRAVENOUS; SUBCUTANEOUS EVERY 5 MIN PRN
Status: DISCONTINUED | OUTPATIENT
Start: 2024-02-06 | End: 2024-02-06 | Stop reason: HOSPADM

## 2024-02-06 RX ORDER — MIDAZOLAM HYDROCHLORIDE 1 MG/ML
1 INJECTION INTRAMUSCULAR; INTRAVENOUS EVERY 5 MIN PRN
Status: DISCONTINUED | OUTPATIENT
Start: 2024-02-06 | End: 2024-02-06 | Stop reason: HOSPADM

## 2024-02-06 RX ORDER — ONDANSETRON 2 MG/ML
4 INJECTION INTRAMUSCULAR; INTRAVENOUS EVERY 4 HOURS PRN
Status: DISCONTINUED | OUTPATIENT
Start: 2024-02-06 | End: 2024-02-07

## 2024-02-06 RX ORDER — ACETAMINOPHEN 500 MG
500 TABLET ORAL EVERY 6 HOURS PRN
Status: DISCONTINUED | OUTPATIENT
Start: 2024-02-06 | End: 2024-02-07

## 2024-02-06 RX ORDER — ONDANSETRON 2 MG/ML
4 INJECTION INTRAMUSCULAR; INTRAVENOUS ONCE
Status: COMPLETED | OUTPATIENT
Start: 2024-02-06 | End: 2024-02-06

## 2024-02-06 RX ORDER — LIDOCAINE HYDROCHLORIDE 10 MG/ML
INJECTION, SOLUTION EPIDURAL; INFILTRATION; INTRACAUDAL; PERINEURAL AS NEEDED
Status: DISCONTINUED | OUTPATIENT
Start: 2024-02-06 | End: 2024-02-06 | Stop reason: SURG

## 2024-02-06 RX ORDER — ACETAMINOPHEN 500 MG
1000 TABLET ORAL ONCE AS NEEDED
Status: DISCONTINUED | OUTPATIENT
Start: 2024-02-06 | End: 2024-02-06 | Stop reason: HOSPADM

## 2024-02-06 RX ORDER — SERTRALINE HYDROCHLORIDE 100 MG/1
100 TABLET, FILM COATED ORAL DAILY
COMMUNITY

## 2024-02-06 RX ORDER — KETOROLAC TROMETHAMINE 15 MG/ML
15 INJECTION, SOLUTION INTRAMUSCULAR; INTRAVENOUS EVERY 6 HOURS PRN
Status: DISCONTINUED | OUTPATIENT
Start: 2024-02-06 | End: 2024-02-07

## 2024-02-06 RX ORDER — HYDROMORPHONE HYDROCHLORIDE 1 MG/ML
INJECTION, SOLUTION INTRAMUSCULAR; INTRAVENOUS; SUBCUTANEOUS
Status: COMPLETED
Start: 2024-02-06 | End: 2024-02-06

## 2024-02-06 RX ORDER — HYDROCODONE BITARTRATE AND ACETAMINOPHEN 5; 325 MG/1; MG/1
1 TABLET ORAL EVERY 6 HOURS PRN
Status: DISCONTINUED | OUTPATIENT
Start: 2024-02-06 | End: 2024-02-07

## 2024-02-06 RX ORDER — KETOROLAC TROMETHAMINE 15 MG/ML
15 INJECTION, SOLUTION INTRAMUSCULAR; INTRAVENOUS ONCE
Status: COMPLETED | OUTPATIENT
Start: 2024-02-06 | End: 2024-02-06

## 2024-02-06 RX ORDER — POTASSIUM CITRATE 10 MEQ/1
10 TABLET, EXTENDED RELEASE ORAL DAILY
COMMUNITY

## 2024-02-06 RX ORDER — GLYCOPYRROLATE 0.2 MG/ML
INJECTION, SOLUTION INTRAMUSCULAR; INTRAVENOUS AS NEEDED
Status: DISCONTINUED | OUTPATIENT
Start: 2024-02-06 | End: 2024-02-06 | Stop reason: SURG

## 2024-02-06 RX ORDER — CEFAZOLIN SODIUM/WATER 2 G/20 ML
2 SYRINGE (ML) INTRAVENOUS
Status: DISCONTINUED | OUTPATIENT
Start: 2024-02-06 | End: 2024-02-07

## 2024-02-06 RX ORDER — ACETAMINOPHEN 325 MG/1
650 TABLET ORAL EVERY 6 HOURS PRN
Status: DISCONTINUED | OUTPATIENT
Start: 2024-02-06 | End: 2024-02-07

## 2024-02-06 RX ORDER — SODIUM CHLORIDE, SODIUM LACTATE, POTASSIUM CHLORIDE, CALCIUM CHLORIDE 600; 310; 30; 20 MG/100ML; MG/100ML; MG/100ML; MG/100ML
INJECTION, SOLUTION INTRAVENOUS CONTINUOUS
Status: DISCONTINUED | OUTPATIENT
Start: 2024-02-06 | End: 2024-02-06 | Stop reason: HOSPADM

## 2024-02-06 RX ORDER — HYDROCODONE BITARTRATE AND ACETAMINOPHEN 5; 325 MG/1; MG/1
2 TABLET ORAL ONCE AS NEEDED
Status: DISCONTINUED | OUTPATIENT
Start: 2024-02-06 | End: 2024-02-06 | Stop reason: HOSPADM

## 2024-02-06 RX ORDER — TAMSULOSIN HYDROCHLORIDE 0.4 MG/1
CAPSULE ORAL
Status: ON HOLD | COMMUNITY
End: 2024-02-06

## 2024-02-06 RX ORDER — ONDANSETRON 2 MG/ML
4 INJECTION INTRAMUSCULAR; INTRAVENOUS EVERY 6 HOURS PRN
Status: DISCONTINUED | OUTPATIENT
Start: 2024-02-06 | End: 2024-02-06

## 2024-02-06 RX ORDER — HYDROMORPHONE HYDROCHLORIDE 1 MG/ML
1 INJECTION, SOLUTION INTRAMUSCULAR; INTRAVENOUS; SUBCUTANEOUS ONCE
Status: COMPLETED | OUTPATIENT
Start: 2024-02-06 | End: 2024-02-06

## 2024-02-06 RX ORDER — DEXAMETHASONE SODIUM PHOSPHATE 4 MG/ML
VIAL (ML) INJECTION AS NEEDED
Status: DISCONTINUED | OUTPATIENT
Start: 2024-02-06 | End: 2024-02-06 | Stop reason: SURG

## 2024-02-06 RX ORDER — ROCURONIUM BROMIDE 10 MG/ML
INJECTION, SOLUTION INTRAVENOUS AS NEEDED
Status: DISCONTINUED | OUTPATIENT
Start: 2024-02-06 | End: 2024-02-06 | Stop reason: SURG

## 2024-02-06 RX ORDER — ONDANSETRON 2 MG/ML
INJECTION INTRAMUSCULAR; INTRAVENOUS AS NEEDED
Status: DISCONTINUED | OUTPATIENT
Start: 2024-02-06 | End: 2024-02-06 | Stop reason: SURG

## 2024-02-06 RX ORDER — HYDROMORPHONE HYDROCHLORIDE 1 MG/ML
0.5 INJECTION, SOLUTION INTRAMUSCULAR; INTRAVENOUS; SUBCUTANEOUS EVERY 2 HOUR PRN
Status: DISCONTINUED | OUTPATIENT
Start: 2024-02-06 | End: 2024-02-07

## 2024-02-06 RX ORDER — HYDROCODONE BITARTRATE AND ACETAMINOPHEN 5; 325 MG/1; MG/1
1 TABLET ORAL ONCE AS NEEDED
Status: DISCONTINUED | OUTPATIENT
Start: 2024-02-06 | End: 2024-02-06 | Stop reason: HOSPADM

## 2024-02-06 RX ORDER — NEOSTIGMINE METHYLSULFATE 1 MG/ML
INJECTION, SOLUTION INTRAVENOUS AS NEEDED
Status: DISCONTINUED | OUTPATIENT
Start: 2024-02-06 | End: 2024-02-06 | Stop reason: SURG

## 2024-02-06 RX ORDER — DIPHENHYDRAMINE HYDROCHLORIDE 50 MG/ML
12.5 INJECTION INTRAMUSCULAR; INTRAVENOUS AS NEEDED
Status: DISCONTINUED | OUTPATIENT
Start: 2024-02-06 | End: 2024-02-06 | Stop reason: HOSPADM

## 2024-02-06 RX ORDER — KETOROLAC TROMETHAMINE 30 MG/ML
INJECTION, SOLUTION INTRAMUSCULAR; INTRAVENOUS AS NEEDED
Status: DISCONTINUED | OUTPATIENT
Start: 2024-02-06 | End: 2024-02-06 | Stop reason: SURG

## 2024-02-06 RX ORDER — MEPERIDINE HYDROCHLORIDE 25 MG/ML
12.5 INJECTION INTRAMUSCULAR; INTRAVENOUS; SUBCUTANEOUS AS NEEDED
Status: DISCONTINUED | OUTPATIENT
Start: 2024-02-06 | End: 2024-02-06 | Stop reason: HOSPADM

## 2024-02-06 RX ADMIN — DEXAMETHASONE SODIUM PHOSPHATE 8 MG: 4 MG/ML VIAL (ML) INJECTION at 20:16:00

## 2024-02-06 RX ADMIN — ROCURONIUM BROMIDE 20 MG: 10 INJECTION, SOLUTION INTRAVENOUS at 21:00:00

## 2024-02-06 RX ADMIN — SODIUM CHLORIDE, SODIUM LACTATE, POTASSIUM CHLORIDE, CALCIUM CHLORIDE: 600; 310; 30; 20 INJECTION, SOLUTION INTRAVENOUS at 20:16:00

## 2024-02-06 RX ADMIN — KETOROLAC TROMETHAMINE 30 MG: 30 INJECTION, SOLUTION INTRAMUSCULAR; INTRAVENOUS at 21:56:00

## 2024-02-06 RX ADMIN — ONDANSETRON 4 MG: 2 INJECTION INTRAMUSCULAR; INTRAVENOUS at 20:16:00

## 2024-02-06 RX ADMIN — LIDOCAINE HYDROCHLORIDE 100 MG: 10 INJECTION, SOLUTION EPIDURAL; INFILTRATION; INTRACAUDAL; PERINEURAL at 20:22:00

## 2024-02-06 RX ADMIN — ROCURONIUM BROMIDE 50 MG: 10 INJECTION, SOLUTION INTRAVENOUS at 20:22:00

## 2024-02-06 RX ADMIN — CEFAZOLIN SODIUM 2 G: 1 INJECTION, POWDER, FOR SOLUTION INTRAMUSCULAR; INTRAVENOUS at 20:28:00

## 2024-02-06 RX ADMIN — NEOSTIGMINE METHYLSULFATE 3 MG: 1 INJECTION, SOLUTION INTRAVENOUS at 21:56:00

## 2024-02-06 RX ADMIN — GLYCOPYRROLATE 0.6 MG: 0.2 INJECTION, SOLUTION INTRAMUSCULAR; INTRAVENOUS at 21:56:00

## 2024-02-06 RX ADMIN — SODIUM CHLORIDE, SODIUM LACTATE, POTASSIUM CHLORIDE, CALCIUM CHLORIDE: 600; 310; 30; 20 INJECTION, SOLUTION INTRAVENOUS at 21:13:00

## 2024-02-06 NOTE — CONSULTS
[facility]  Department of Urology   Consultation Note    Devon East Patient Status:  Inpatient    1995 MRN [id number]   Location [facility] 0SW-A Attending Mariza Castillo MD   Hosp Day # 0 PCP Spenser Mccormick MD     Reason for Consultation:  Acute left ureteral colic    History of Present Illness:  Devon East is a a(n) 28 year old male with history of cystinuria with recurrent urolithiasis s/p URS, PCNL    Has seen nephrology ([facility]) in the past for cystinuria.    The patient reports he last took potassium citrate in 2023. He said he stopped given issues with insurance at the time. He has used tiopronin in the past, once as a child and another trial in , but says each trial resulted in significant proteinuria.     Abdominal/pelvic CT scan without contrast 23:  A few 1-3 mm nonobstructing stones scattered in the left kidney.  There are some scattered nonobstructing stones in the right kidney as well measuring up to 11 mm.  There is mild right perinephric stranding with mild to moderate right   hydroureteronephrosis.  No current obstructing stone identified.   Underwent cystourethroscopy, right RGPG, URS, nephroscopy, laser lithotripsy, stone removal, stent insertion on 12/15/23 with Dr. Garcias ([facility])  Findings: The stone was dusted well, no obvious fragments >2mm were remaining. Satisfactory ureteroscopy and laser lithotripsy were performed   Stone analysis 12/15/23: 100% cystine    Patient presented with left sided pain.  Pain started on Friday.  +nausea.  No vomiting, fever or chills.  No dysuria or gross hematuria.  Voiding ok.      Labs:  WBC 7.3  Hgb 15  Platelet count 321  Serum creat 1.09  Serum calcium level 9.1    UA: no  WBC, 0-2 RBC/hpf, no bacteria    Abdominal/pelvic CT scan without contrast 24:  CONCLUSION:    1. Obstructing stone in the left renal pelvis measuring up to 17 mm resulting in mild  to moderate left hydronephrosis and mild left perinephric stranding.   2. Additional bilateral nephrolithiasis.     Urology was consulted.    No FH of urolithiasis    History:  Past Medical History:   Diagnosis Date    Anxiety state     Calculus of kidney     Cystine disease (HCC)     Depression     IBS (irritable bowel syndrome)     KIDNEY STONE     Migraines     OCD (obsessive compulsive disorder)      Past Surgical History:   Procedure Laterality Date    COLONOSCOPY      LITHOTRIPSY      OTHER SURGICAL HISTORY  2-17-11    Rt RPG, URS stone manipulation,laser litho,Rt stent, Dr. Mcdonough    OTHER SURGICAL HISTORY  2/21/11    Cysto stent removal- Dr. Mcdonough    OTHER SURGICAL HISTORY  11/21/15    Cysto, L URS, laser litho, stone extraction, stent placement, RPG Dr. Finn    OTHER SURGICAL HISTORY  12/1/15    cysto stent removal    UPPER GI ENDOSCOPY,EXAM       Family History   Problem Relation Age of Onset    Heart Disorder Maternal Grandfather     Diabetes Paternal Grandfather     Cancer Paternal Grandfather         meloma    Hypertension Father     Other (Other) Father     Cancer Maternal Grandmother     Cancer Paternal Grandmother       reports that he has never smoked. He has never used smokeless tobacco. He reports that he does not drink alcohol and does not use drugs.    Allergies:  Allergies   Allergen Reactions    Ciprofloxacin DIZZINESS     Dizziness, difficulty with walking    Seroquel [Quetiapine] RESTLESSNESS     Shaking, neurological side effects.    Tiopronin OTHER (SEE COMMENTS)     WEIGHT LOSS AND HAIR LOSS PER PT REPORT and protein excretion       Medications:    Current Facility-Administered Medications:     morphINE PF 4 MG/ML injection 4 mg, 4 mg, Intravenous, Q30 Min PRN    Review of Systems:  Pertinent items are noted in HPI.  10 point review of systems completed.    Physical Exam:  BP (!) 129/99   Pulse 78   Temp 97.9 °F (36.6 °C) (Oral)   Resp 16   Wt 170 lb (77.1 kg)   SpO2 100%   BMI  26.63 kg/m²   GENERAL: the patient is resting in bed in no acute distress.    HEENT: Unremarkable.  NECK: Supple.   LUNGS: non-labored respirations.   ABDOMEN:  The abdomen is soft with left abdominal/flank tenderness.    SKIN: Without stigmata.   NEUROLOGIC: Grossly intact.  EXTREMITIES: Without edema.    Laboratory Data:  Lab Results   Component Value Date    WBC 7.3 02/06/2024    HGB 15.0 02/06/2024    HCT 44.7 02/06/2024    .0 02/06/2024    CREATSERUM 1.09 02/06/2024    BUN 19 02/06/2024     02/06/2024    K 3.5 02/06/2024     02/06/2024    CO2 32.0 02/06/2024    GLU 95 02/06/2024    CA 9.1 02/06/2024    ALB 3.8 02/06/2024    ALKPHO 78 02/06/2024    BILT 0.5 02/06/2024    TP 7.3 02/06/2024    AST 14 02/06/2024    ALT 28 02/06/2024       UA: no  WBC, 0-2 RBC/hpf, no bacteria     Imaging:    CT ABDOMEN+PELVIS KIDNEYSTONE 2D RNDR(NO IV,NO ORAL)(CPT=74176)    Result Date: 2/6/2024  CONCLUSION:   1. Obstructing stone in the left renal pelvis measuring up to 17 mm resulting in mild to moderate left hydronephrosis and mild left perinephric stranding.  2. Additional bilateral nephrolithiasis.   Please see above for further details.  LOCATION:  [facility]   Dictated by (CST): Arsenio Hook MD on 2/06/2024 at 6:32 AM     Finalized by (CST): Arsenio Hook MD on 2/06/2024 at 6:35 AM       Impression:  Patient Active Problem List   Diagnosis    Cystinuria (HCC)    Cystine stones (HCC)    Proteinuria    IBS (irritable bowel syndrome)    Kidney stone    Major depression with psychotic features (HCC)    Major depressive disorder, recurrent episode (HCC)    Migraine without aura and with status migrainosus, not intractable    Obstructive uropathy    Hypokalemia    Nephrolithiasis    Ureteral stone with hydronephrosis    Intractable pain     OBSTRUCTING LEFT RENAL PELVIS STONE (17 MM), BILATERAL UROLITHIASIS (left - measuring up to 10 mm, right - measuring up to 7 mm). CYSTINURIA  Afebrile  UA does not appear  infectious  No leukocytosis  Hgb 15  Platelet count 321  Serum creat 1.09  Serum calcium level 9.1    Recommendations:  We discussed ureteroscopy as a minimally invasive surgical procedure whereby a small scope is placed through the urethra, through the bladder, up the ureter and potentially into the kidney to allow treatment of disease. A balloon or dilator is often used to stretch the ureter to ease scope passage or treat a scar or stricture. Contrast is often placed into the ureter and kidney to evaluate the anatomy with fluoroscopic x-ray. Lasers are often used to treat any obstruction such as stone or stricture. In most cases, the goal is complete removal of stone, occasionally this is not possible or indicated and in those cases there may be need for future procedures to remove remaining stones. Typically, the procedure causes some swelling in the ureter which can cause obstruction and pain, for that reason a ureteral stent is often left in place afterwards to alleviate those symptoms. Also, the stent allows easy access back into the kidney should there be residual stones to treat. The stent must be removed within 3 months or otherwise risk that the stent may become encrusted making removal difficult and risk permanent renal damage. As with any procedure there are risks of bleeding, anesthesia, and infection. Also, the scope and laser are capable of perforating the ureter or kidney which could result in a scar or stricture requiring future procedures. These considerations were thoroughly discussed with the patient.  The patient is aware that any blood-thinning medications must be avoided for 7 days preoperatively and 5 days postoperatively.     Recommend cystourethroscopy, left retrograde pyelogram, left ureteroscopic stone extraction with laser lithotripsy, and insertion of a left ureteral stent. Reviewed risks, benefits, alternatives, and complications of the procedure including but not limited to risk of  anesthesia, bladder/ureteral injury, use of nephrostomy tube, bleeding, infection, inability to reach the stone necessitating a subsequent procedure, need for additional treatment, and ureteral stent related symptoms with the patient who understands and wishes to proceed.  Patient informed that the ureteral stent is a not a permament implant and would eventually need to be removed (timing of stent removal per urologist). Patient agrees and understands.      NPO  Consent to be signed  Ancef on call to OR    In the interim, continue with IV fluids, pain management.      Follow labs, temp    Patient to follow-up with nephrology after discharge regarding cystinuria.      Above discussed with patient, nurse.  Will update Dr. Hilliard.    Thank you for allowing me to participate in the care of your patient.    Angeles Shea PA-C  [facility]  Department of Urology  2/6/2024  9:15 AM

## 2024-02-06 NOTE — PROGRESS NOTES
Pt admitted from  ED with Left kidney stone with hydronephrosis. Planned cystoscopy this evening, NPO with IVF until procedure. C/o LLQ tenderness and pain, pain meds per MAR. +Nausea, prn zofran. Voids, strain all urine. Up self. POC reviewed, call light within reach.

## 2024-02-06 NOTE — ED PROVIDER NOTES
Patient Seen in: [facility] In [place]      History     Chief Complaint   Patient presents with    Abdomen/Flank Pain     Stated Complaint: flank pain    Subjective:   28-year-old male, history of kidney stones, presents with left-sided abdominal pain.  States pain started about 4 days ago, then resolved and started again yesterday afternoon.  Nauseous but no vomiting.  Pain in the left lower quadrant.  Feels more tense than his history of kidney stones.  States he had multiple stones in the past, has passed some as needed stenting for others.  No vomiting.  No fever.  No dysuria or obvious hematuria although he states his urine is slightly darker than normal.            Objective:   Past Medical History:   Diagnosis Date    Anxiety state     Calculus of kidney     Cystine disease (HCC)     Depression     IBS (irritable bowel syndrome)     KIDNEY STONE     Migraines     OCD (obsessive compulsive disorder)               Past Surgical History:   Procedure Laterality Date    COLONOSCOPY      LITHOTRIPSY      OTHER SURGICAL HISTORY  2-17-11    Rt RPG, URS stone manipulation,laser litho,Rt stent, Dr. Mcdonough    OTHER SURGICAL HISTORY  2/21/11    Cysto stent removal- Dr. Mcdonough    OTHER SURGICAL HISTORY  11/21/15    Cysto, L URS, laser litho, stone extraction, stent placement, RPG Dr. Finn    OTHER SURGICAL HISTORY  12/1/15    cysto stent removal    UPPER GI ENDOSCOPY,EXAM                  Social History     Socioeconomic History    Marital status: Single   Tobacco Use    Smoking status: Never    Smokeless tobacco: Never   Substance and Sexual Activity    Alcohol use: No    Drug use: No              Review of Systems   Constitutional:  Negative for fever.   Respiratory:  Negative for shortness of breath.    Cardiovascular:  Negative for chest pain.   Gastrointestinal:  Positive for abdominal pain and nausea.   Genitourinary:  Positive for flank pain. Negative for dysuria.       Positive for stated  complaint: flank pain  Other systems are as noted in HPI.  Constitutional and vital signs reviewed.      All other systems reviewed and negative except as noted above.    Physical Exam     ED Triage Vitals [02/06/24 0519]   /87   Pulse 97   Resp 20   Temp    Temp src    SpO2 97 %   O2 Device None (Room air)       Current:/88   Pulse 81   Resp 15   Wt 77.1 kg   SpO2 98%   BMI 26.63 kg/m²         Physical Exam  Vitals and nursing note reviewed.   Constitutional:       General: He is in acute distress.   Cardiovascular:      Rate and Rhythm: Normal rate and regular rhythm.   Pulmonary:      Effort: Pulmonary effort is normal.      Breath sounds: Normal breath sounds.   Abdominal:      Palpations: Abdomen is soft.      Tenderness: There is abdominal tenderness in the left lower quadrant. There is left CVA tenderness.   Skin:     General: Skin is warm and dry.   Neurological:      Mental Status: He is alert.   Psychiatric:         Mood and Affect: Mood is anxious.               ED Course     Labs Reviewed   URINALYSIS, ROUTINE - Abnormal; Notable for the following components:       Result Value    Blood Urine Trace-lysed (*)     All other components within normal limits   COMP METABOLIC PANEL (14) - Abnormal; Notable for the following components:    AST 14 (*)     All other components within normal limits   UA MICROSCOPIC ONLY, URINE - Normal   CBC WITH DIFFERENTIAL WITH PLATELET    Narrative:     The following orders were created for panel order CBC With Differential With Platelet.  Procedure                               Abnormality         Status                     ---------                               -----------         ------                     CBC W/ DIFFERENTIAL[[id number]]                              Final result                 Please view results for these tests on the individual orders.   CBC W/ DIFFERENTIAL                      MDM      CT ABDOMEN+PELVIS KIDNEYSTONE 2D RNDR(NO IV,NO  ORAL)(CPT=74176)    Result Date: 2/6/2024  CONCLUSION:   1. Obstructing stone in the left renal pelvis measuring up to 17 mm resulting in mild to moderate left hydronephrosis and mild left perinephric stranding.  2. Additional bilateral nephrolithiasis.   Please see above for further details.  LOCATION:  [facility]   Dictated by (CST): Arsenio Hook MD on 2/06/2024 at 6:32 AM     Finalized by (CST): Arsenio Hook MD on 2/06/2024 at 6:35 AM        I independently interpreted the CT abdomen pelvis and note the large proximal ureteral/renal pelvis stone causing hydronephrosis      Admission disposition: 2/6/2024  6:55 AM         Patient's family at bedside helpful to provide information on the history of presenting illness    External chart review demonstrates remote encounter with urology in 2020 for stenting and stent exchange    Differential diagnosis includes, but is not limited to, hydronephrosis, obstructing kidney stone, diverticulitis, diverticulosis    28-year-old male presents with large obstructing stone in the left renal pelvis.  Discussed with Dr. Hilliard, radha and will see in consultation.  N.p.o. since yesterday, to remain n.p.o. now.  Pain is controlled after Toradol followed by my morphine and eventually by Dilaudid.  Received IV fluids and Zofran as well.  Creatinine is stable.  Urine is unremarkable with no signs of infected stone at this time.  Admitted to [facility] hospitalist who accepts admission, awaiting bed assignment and transport to [facility] hospital for definitive management.  Patient and family are both aware and in agreement with plan                                     Medical Decision Making      Disposition and Plan     Clinical Impression:  1. Ureteral stone with hydronephrosis    2. Intractable pain         Disposition:  Admit  2/6/2024  6:55 am    Follow-up:  No follow-up provider specified.        Medications Prescribed:  Current Discharge Medication List                             Hospital Problems       Present on Admission  Date Reviewed: 6/8/2020            ICD-10-CM Noted POA    * (Principal) Ureteral stone with hydronephrosis N13.2 2/6/2024 Unknown

## 2024-02-06 NOTE — H&P
[facility] Hospitalist H&P       CC:   Chief Complaint   Patient presents with    Abdomen/Flank Pain        PCP: Spenser Mccormick MD    History of Present Illness:    Patient is a 28-year-old male with significant past medical history of moderate episode of recurrent depression, migraine cystinuria with recurrent nephrolithiasis and urolithiasis status post recent ureteroscopy with laser lithotripsy stent placement on the right chest in December 2023 at [facility].  Pathology of the stone was 100% 16 presented with left flank pain as well as left groin pain.  Symptoms very similar to his prior obstructive stones he presented with symptoms that started 3 days prior to presentation, possible nausea.  Patient is frustrated because of how quickly his stones are recurring.  He was on potassium citrate in the past however has not taken it recently since January 2023.  Otherwise no fevers no chills no chest pain no shortness of breath review of system otherwise is completely negative.    In the emergency room patient is been afebrile, rest of vitals have been stable  All labs are grossly unremarkable, creatinine is within normal limits at 1.09, CBC without any leukocytosis, UA was negative, CT abdomen pelvis was done that showed obstructing stone in the left renal pelvis measuring up to 17 mm with moderate left hydronephrosis and mild left perinephric stranding with stones also located in the right side.      Patient started on empiric antibiotics, fluids and admitted    PMH  Past Medical History:   Diagnosis Date    Anxiety state     Calculus of kidney     Cystine disease (HCC)     Depression     IBS (irritable bowel syndrome)     KIDNEY STONE     Migraines     OCD (obsessive compulsive disorder)         PSH  Past Surgical History:   Procedure Laterality Date    COLONOSCOPY      LITHOTRIPSY      OTHER SURGICAL HISTORY  2-17-11    Rt RPG, URS stone manipulation,laser litho,Rt stent, Dr. Mcdonough    OTHER SURGICAL  HISTORY  2/21/11    Cysto stent removal- Dr. Mcdonough    OTHER SURGICAL HISTORY  11/21/15    Cysto, L URS, laser litho, stone extraction, stent placement, RPG Dr. Finn    OTHER SURGICAL HISTORY  12/1/15    cysto stent removal    UPPER GI ENDOSCOPY,EXAM          ALL:  Allergies   Allergen Reactions    Ciprofloxacin DIZZINESS     Dizziness, difficulty with walking    Seroquel [Quetiapine] RESTLESSNESS     Shaking, neurological side effects.    Tiopronin OTHER (SEE COMMENTS)     WEIGHT LOSS AND HAIR LOSS PER PT REPORT and protein excretion        Home Medications:  Outpatient Medications Marked as Taking for the 2/6/24 encounter (Hospital Encounter)   Medication Sig Dispense Refill    potassium citrate 10 MEQ (1080 MG) Oral Tab CR Take 1 tablet (10 mEq total) by mouth daily.      sertraline 100 MG Oral Tab Take 1 tablet (100 mg total) by mouth daily.      HYDROcodone-acetaminophen (NORCO) 5-325 MG Oral Tab Take 1 tablet by mouth every 6 (six) hours as needed. 10 tablet 0    tamsulosin (FLOMAX) cap Take 1 capsule (0.4 mg total) by mouth daily. (Patient taking differently: Take 1 capsule (0.4 mg total) by mouth as needed.) 30 capsule 3         Soc Hx  Social History     Tobacco Use    Smoking status: Never    Smokeless tobacco: Never   Substance Use Topics    Alcohol use: No        Fam Hx  Family History   Problem Relation Age of Onset    Heart Disorder Maternal Grandfather     Diabetes Paternal Grandfather     Cancer Paternal Grandfather         meloma    Hypertension Father     Other (Other) Father     Cancer Maternal Grandmother     Cancer Paternal Grandmother        Review of Systems  General: Denies unintentional weight loss, fevers, or chills-negative except above  HEENT: Denies vision loss or double vision, denies hearing loss  Cardiovascular: Denies chest pain, palpitations, peripheral edema  Pulmonary: Denies cough, shortness of breath, or wheezing  Gastrointestinal: Denies abdominal pain, melena, or  hematochezia  Genitourinary: Denies urinary frequency, urgency, and dysuria  Neurologic: Denies numbness, headaches, focal weakness  Skin: Denies rashes, sores  Endocrine: Denies heat or cold intolerance, denies polydipsia  Hematologic: Denies abnormal bleeding or bruising     OBJECTIVE:  /78 (BP Location: Left arm)   Pulse 78   Temp 97.7 °F (36.5 °C) (Oral)   Resp 17   Wt 170 lb (77.1 kg)   SpO2 97%   BMI 26.63 kg/m²     BP Readings from Last 3 Encounters:   02/06/24 117/78   11/06/23 (!) 130/94   05/29/20 123/79     Wt Readings from Last 3 Encounters:   02/06/24 170 lb (77.1 kg)   11/06/23 160 lb (72.6 kg)   05/28/20 158 lb 11.7 oz (72 kg)       Wt Readings from Last 6 Encounters:   02/06/24 170 lb (77.1 kg)   11/06/23 160 lb (72.6 kg)   05/28/20 158 lb 11.7 oz (72 kg)   05/19/20 159 lb (72.1 kg)   05/12/20 158 lb 15.2 oz (72.1 kg)   05/09/20 159 lb (72.1 kg)     Gen: No acute distress, alert and oriented x 3  Pulm: Lungs clear bilaterally, good inspiratory effort   CV:  nL S1/S2  Abd: soft, NT/ND, no hepatomegaly, +BS-left CVA tenderness, left groin tenderness  MSK: moving all extremities, no edema  Neuro: no focal deficits  Skin: no rashes/lesions  Psych: normal mood/affect          Diagnostic Data:    CBC/Chem  Recent Labs   Lab 02/06/24  0534   WBC 7.3   HGB 15.0   MCV 91.0   .0       Recent Labs   Lab 02/06/24  0535      K 3.5      CO2 32.0   BUN 19   CREATSERUM 1.09   GLU 95   CA 9.1       Recent Labs   Lab 02/06/24  0535   ALT 28   AST 14*   ALB 3.8       No results for input(s): \"TROP\" in the last 168 hours.        Radiology: CT ABDOMEN+PELVIS KIDNEYSTONE 2D RNDR(NO IV,NO ORAL)(CPT=74176)    Result Date: 2/6/2024  PROCEDURE:  CT ABDOMEN+PELVIS KIDNEYSTONE 2D RNDR(NO IV,NO ORAL)(CPT=74176)  COMPARISON:  [facility], CT, CT ABDOMEN+PELVIS KIDNEYSTONE 2D RNDR(NO IV,NO ORAL)(CPT=74176), 11/06/2023, 10:31 AM.  INDICATIONS:  flank pain  TECHNIQUE:  Unenhanced multislice CT scanning  from above the kidneys to below the urinary bladder.  2D rendering are generated on the CT scanner workstation to localize potential stones in the cranio-caudal plane.  Dose reduction techniques were used. Dose information is transmitted to the ACR (American College of Radiology) NRDR (National Radiology Data Registry) which includes the Dose Index Registry.  PATIENT STATED HISTORY: (As transcribed by Technologist)  hx kidney stones, surgery for kidney stone rt side, pain is on left flank.   FINDINGS: Evaluation of the visceral organs is limited due to the lack of IV contrast. LUNG BASE:  Unremarkable. LIVER:  Unremarkable. BILIARY:  Unremarkable. SPLEEN:  Unremarkable. PANCREAS:  Unremarkable. ADRENALS:  Unremarkable. KIDNEYS:  Obstructing stone in the left renal pelvis measuring up to 17 mm resulting in mild to moderate left hydronephrosis and mild left perinephric stranding.  Additional scattered nonobstructing stones elsewhere in the left kidney measuring up to 10 mm.  Nonobstructing stones in the right kidney measuring up to 7 mm. AORTA/VASCULAR:  Unremarkable. RETROPERITONEUM:  Unremarkable. BOWEL/MESENTERY:  Unremarkable appendix.  Scattered feces in the colon.  No large or small bowel dilatation.  No free air or free fluid. ABDOMINAL WALL:  Minimal fat containing umbilical hernia. PELVIC ORGANS:  Pelvic phleboliths.  Unremarkable urinary bladder.  Unremarkable prostate gland. LYMPH NODES:  No lymphadenopathy in the abdomen or pelvis. BONES:  Unremarkable. OTHER:  None.            CONCLUSION:   1. Obstructing stone in the left renal pelvis measuring up to 17 mm resulting in mild to moderate left hydronephrosis and mild left perinephric stranding.  2. Additional bilateral nephrolithiasis.   Please see above for further details.  LOCATION:  [facility]   Dictated by (CST): Arsenio Hook MD on 2/06/2024 at 6:32 AM     Finalized by (CST): Arsenio Hook MD on 2/06/2024 at 6:35 AM              ASSESSMENT / PLAN:        28-year-old male with significant past medical history of moderate episode of recurrent depression, migraine cystinuria with recurrent nephrolithiasis and urolithiasis        # Left hydronephrosis secondary to a left obstructive nephrolithiasis  # Cystinuria  -Patient with a history of recurrent cystine stones status post recent right lithotripsy and stent placement  -Recurrence of left nephrolithiasis with obstructive hydronephrosis as well as well as perinephric stranding  -UA negative for any infection, empiric Ancef  -Urology consulted, plan for ureteroscopy with stent placement and lithotripsy possibly today  -Patient will need to follow-up with nephrology for management of cystinuria  -Continue fluids pain control  -N.p.o.    # History of major depression resume home sertraline    # History of migraines  -Resume Imitrex    Prophy:  DVT: Lovenox  Deconditioning prevention: PT OT    [facility]    Outpatient records reviewed confirming patient's medical history and medications.     Further recommendations pending patient's clinical course.  [facility] hospitalist to continue to follow patient while in house    Time spent: greater than 95 minutes spent in d/w pt/family, coordination of care, and d/w staff.   Shakira gómez MD   Internal Medicine  DM Hospitalist  Pager: [phone]         No

## 2024-02-06 NOTE — PROGRESS NOTES
NURSING ADMISSION NOTE      Patient admitted via Ambulance  Oriented to room.  Safety precautions initiated.  Bed in low position.  Call light in reach.

## 2024-02-07 VITALS
RESPIRATION RATE: 16 BRPM | HEART RATE: 88 BPM | WEIGHT: 170 LBS | SYSTOLIC BLOOD PRESSURE: 130 MMHG | DIASTOLIC BLOOD PRESSURE: 90 MMHG | BODY MASS INDEX: 27 KG/M2 | OXYGEN SATURATION: 99 % | TEMPERATURE: 98 F

## 2024-02-07 NOTE — ANESTHESIA POSTPROCEDURE EVALUATION
[facility]    Devon East Patient Status:  Inpatient   Age/Gender 28 year old male MRN [id number]   Location [facility] POST ANESTHESIA CARE UNIT Attending Mariza Castillo MD   Hosp Day # 0 PCP Spenser Mccormick MD       Anesthesia Post-op Note    CYSTOSCOPY, LEFT RETROGRADE PYELOGRAM, LEFT URETEROSCOPY,  LASER LITHOTRIPSY, LEFT URETERAL STENT INSERTION    Procedure Summary       Date: 02/06/24 Room / Location:  MAIN OR 07 /  MAIN OR    Anesthesia Start: 2016 Anesthesia Stop: 2227    Procedure: CYSTOSCOPY, LEFT RETROGRADE PYELOGRAM, LEFT URETEROSCOPY,  LASER LITHOTRIPSY, LEFT URETERAL STENT INSERTION (Left: Ureter) Diagnosis:       Calculi, ureter      (Calculi, ureter [N20.1])    Surgeons: Valdemar Hilliard MD Anesthesiologist: Christos Freeman MD    Anesthesia Type: general ASA Status: 1            Anesthesia Type: general    Vitals Value Taken Time   /81 02/06/24 2224   Temp 100.5 °F (38.1 °C) 02/06/24 2219   Pulse 95 02/06/24 2226   Resp 10 02/06/24 2226   SpO2 97 % 02/06/24 2226   Vitals shown include unfiled device data.    Patient Location: PACU    Anesthesia Type: general    Airway Patency: extubated    Postop Pain Control: adequate    Mental Status: mildly sedated but able to meaningfully participate in the post-anesthesia evaluation    Nausea/Vomiting: none    Cardiopulmonary/Hydration status: stable euvolemic    Complications: no apparent anesthesia related complications    Postop vital signs: stable    Dental Exam: Unchanged from Preop

## 2024-02-07 NOTE — PLAN OF CARE
NURSING DISCHARGE NOTE    Discharged Home via Wheelchair @0120 driven by mother  Accompanied by Family member and RN  Belongings Taken by patient/family.  Received pt at 1930  Pt AOx4, RA, VSS  IVF  Down to Cysto @1950. Returned @2340.  Voided in PACU & on unit. Ate snack, walked in room  Pt stated pain was controlled & he had PRN mediation at home.   Hosp notified of pt's request to DC as well as urology's OK  All questions answered. Needs met

## 2024-02-07 NOTE — DISCHARGE INSTRUCTIONS
Home Care Instructions Following Your Ureteroscopy     Devon-  We hope you were pleased with your care at [facility].  We wish you the best outcome and overall experience with your operation.  These instructions will help to minimize pain and improve the likelihood of a successful result.    You Should Expect:  Bloody urine until the stent is removed  Burning with urination until the stent is removed    Care of Your Stent  The ureteral stent is a small caliber silastic tube placed in the drainage passage between your kidney and bladder (also known as the ureter) to help keep the passage open. The stent will be removed when the ureter has healed or there is no longer a risk of blockage by a stone.  There is a small string taped near your pubis, your inner thigh, or on the shaft of the penis,  which is attached to the ureteral stent. Please do not pull on this string, until you are instructed to remove your stent.    Remove your stent Monday morning, or call the office to have it removed. Remove the tape and pull on the string to remove the stent in its entirety.  Take ibuprofen before you remove the stent.  Once you pull on the stent, do not push it back in.  Place stent in the garbage, after it is removed. Do not use force.  If you sense resistence or difficulty, stop, and contact the office for an appointment for stent removal.    Some discomfort is normal. Certain movements may trigger pain or a feeling that you need to urinate. You may also feel mild soreness or pressure before or during urination. These symptoms will go away a few days after the stent is removed.   Azo is an over-the-counter medication to control pain or bladder spasms or to prevent infection may be prescribed. Take this as directed. Do not take Azo, if you have kidney failure  Drink plenty of fluids to help flush out your urinary tract.   Your urine may be slightly pink or red. This is due to bleeding caused by minor irritation from the  PHYSICIAN NEXT STEPS:   Review Only      CHIEF COMPLAINT:   Chief Complaint/Protocol Used: No Contact or Duplicate Contact Call   Onset: Past few months         ASSESSMENT:   Â» Onset: Past few months   -------------------------------------------------------      DISPOSITION:   Disposition Recommendation: Unspecified   Patient Directed To: Unspecified   Patient Intended Action: Seek care in the doctor's office          CALL NOTES:   08/23/2018 at 4:50 PM by Stefani CANTU» No contact; the patient is not with the caller.  An appointment made for 09/13/2018 with Dr. Harrison for 1620 at the [facility] site.      DISPOSITION OVERRIDE/PROVIDER CONSULT:   Disposition Override: No Disposition Reached   Override Source: Unspecified   Consulted with PCP: No   Consulted with On-Call Physician: No      CALLER CONTACT INFO:   Name: NICOLE WALLS MCMEDITH (Self)   Phone 1: [phone] (Home)   Phone 2: [phone] (Cell)   Phone 3: [phone] - Preferred         ENCOUNTER STARTED:   08/23/18 04:37:42 PM   ENCOUNTER ASSIGNED TO/CLOSED BY:   Stefani Taylor @ 08/23/18 04:51:01 PM         -------------------------------------------------------      UNDERSTANDS CARE ADVICE: No      AGREES WITH CARE ADVICE: No      WILL FOLLOW CARE ADVICE: No      -------------------------------------------------------   stent. This may happen on and off while you have the stent.     Bathing/Showers  You can resume showering tomorrow    Over-The-Counter (OTC) Medication:  Azo (phenazopyridine hydrochloride 97.5 mg) can help to alleviate burning with urination and bladder spasms  Take 2 tablets 3 times daily with meals as needed for up to two days  Take with a full glass of water  Do not take Azo, if you have kidney failure  You can also take OTC Tylenol or ibuprofen as needed for pain. Take medication as directed on the bottle.    Home Medication  Resume your home medications      Diet  Resume your normal diet    Activity  Resume activity as tolerated.    Driving  Okay to drive Thursday.    Return to Work or School  You can return to work/school Thursday as tolerated.  Contact Dr. Hilliard's office, if you need a medical note.      Follow-up Appointment with Dr. Lyon  Call Dr. Hilliard's office tomorrow for an appointment 1 month.  Verify the appointment date, time, and office location when you call.    Questions or Concerns  Call Dr. Hilliard immediately if you have any of the following:   Chills or fever above 100.4°F (38°C)   Persistent Nausea and vomiting   Unable to urinate   If your call is made after office hours, a physician's assistant or nurse practitioner will be available to help you.  There is always a surgeon covering Dr. Hilliard's patients, if he is unavailable.    Devon  Thank you for coming to [facility] for your operation.  The nurses and the anesthesiologist try very hard to make sure you receive the best care possible.  Your trust in them is greatly appreciated.    Thanks so much,  Dr. Hilliard

## 2024-02-07 NOTE — OPERATIVE REPORT
[facility]   OPERATIVE REPORT    Devon East Patient Status:  Inpatient    1995 MRN [id number]   Location [facility] SURGERY Attending Mariza Castillo MD   Hosp Day # 0 PCP Spenser Mccormick MD        DATE of OPERATION:  2024      PREOPERATIVE DIAGNOSIS: Left ureteral calculus.    POSTOPERATIVE DIAGNOSIS: Same    PROCEDURE PERFORMED:  Left Ureteroscopic Stone Extraction, with Laser Lithotripsy, Cystoscopy, Retrograde Pyelogram, And Placement of Ureteral Stent     ANESTHESIA:  General.     INDICATIONS:  Flank pain related to 17 x 12 mm left renal pelvis stone.  He also has a 4 or 5 mm stone in the left lower pole.  Long history of cystine stones.  He has been unable to tolerate some of the medications for this and is followed by the nephrology clinic at the [facility].     FINDINGS: Renal pelvis stone and left lower pole stone completely fragmented and most of the fragments were removed.  Okay to discharge this evening if the pain is well-controlled.  He may remove his Dangler stent Friday morning.  Follow-up in 1 month.    EBL: None    COMPLICATIONS: None     TECHNIQUE:  A general anesthesia was induced.  A time-out was  reviewed.  Preoperative antibiotics were administered.  The patient  was prepped and draped in the dorsal lithotomy position.  Sequential  compression devices were in place on the lower legs.  I could see the large left renal pelvis stone in the expected position on the plain images and I could also see the 5 mm stone adjacent to one of the spinous processes on the right.     The cystoscope was passed into the bladder and the bladder was  examined.  The ureteral orifices were in normal position.  The  cystoscope was used to guide a ureteral catheter into the left  ureteral orifice and water soluble contrast was injected retrograde.  The contrast outlining the stone in the renal pelvis.     The 11/13 Frisian ureteral sheath/dilator was then passed over a guidewire up  into the  ureter and then the semirigid ureteroscope was advanced up to  the level of the stone.  The stone was fragmented with the 365 µm holmium laser fiber using fragmentation and dusting settings.  The dusting settings were 0.3 J and 60 Hz and the fragmentation settings were 1.5 J and 15 Hz.  I stopped a few times during the procedure to irrigate out the renal pelvis and stone dust.  The stone fragments were then removed  with a basket.  The ureter was reexamined.  Contrast was injected as I removed the ureteroscope and there was no extravasation.    A 4.8-Yoruba 24-cm  stent was then passed over a guidewire up into the ureter.  The  stent was in good position when the guidewire was removed and the  dangler was taped to the patient's penis.  The patient was  awakened, and taken to the recovery area in good condition.     Valdemar Hilliard MD  [facility]  [phone]  2/6/2024  10:13 PM

## 2024-02-07 NOTE — ANESTHESIA PREPROCEDURE EVALUATION
PRE-OP EVALUATION    Patient Name: Devon East    Admit Diagnosis: Intractable pain [R52]  Ureteral stone with hydronephrosis [N13.2]    Pre-op Diagnosis: Calculi, ureter [N20.1]    CYSTOSCOPY, LEFT RETROGRADE PYELOGRAM, LEFT URETEROSCOPY, POSSIBLE LASER LITHOTRIPSY, LEFT URETERAL STENT INSERTION    Anesthesia Procedure: CYSTOSCOPY, LEFT RETROGRADE PYELOGRAM, LEFT URETEROSCOPY, POSSIBLE LASER LITHOTRIPSY, LEFT URETERAL STENT INSERTION (Left)    Surgeon(s) and Role:     * Valdemar Hilliard MD - Primary    Pre-op vitals reviewed.  Temp: 98.2 °F (36.8 °C)  Pulse: 74  Resp: 16  BP: 127/78  SpO2: 99 %  Body mass index is 26.63 kg/m².    Current medications reviewed.  Hospital Medications:   [COMPLETED] ketorolac (Toradol) 15 MG/ML injection 15 mg  15 mg Intravenous Once    [COMPLETED] morphINE PF 4 MG/ML injection 4 mg  4 mg Intravenous Q30 Min PRN    [COMPLETED] ondansetron (Zofran) 4 MG/2ML injection 4 mg  4 mg Intravenous Once    [COMPLETED] HYDROmorphone (Dilaudid) 1 MG/ML injection 1 mg  1 mg Intravenous Once    sodium chloride 0.9% infusion   Intravenous Continuous    [MAR Hold] enoxaparin (Lovenox) 40 MG/0.4ML SUBQ injection 40 mg  40 mg Subcutaneous Daily    [MAR Hold] HYDROmorphone (Dilaudid) 1 MG/ML injection 0.5 mg  0.5 mg Intravenous Q2H PRN    [MAR Hold] ketorolac (Toradol) 15 MG/ML injection 15 mg  15 mg Intravenous Q6H PRN    [MAR Hold] acetaminophen (Tylenol) tab 650 mg  650 mg Oral Q6H PRN    ceFAZolin (Ancef) 2 g in 20mL IV syringe premix  2 g Intravenous On Call to OR    [MAR Hold] ondansetron (Zofran) 4 MG/2ML injection 4 mg  4 mg Intravenous Q4H PRN       Outpatient Medications:     Medications Prior to Admission   Medication Sig Dispense Refill Last Dose    potassium citrate 10 MEQ (1080 MG) Oral Tab CR Take 1 tablet (10 mEq total) by mouth daily.   2/6/2024    sertraline 100 MG Oral Tab Take 1 tablet (100 mg total) by mouth daily.   2/5/2024    HYDROcodone-acetaminophen (NORCO) 5-325 MG Oral Tab  Take 1 tablet by mouth every 6 (six) hours as needed. 10 tablet 0 Past Week    tamsulosin (FLOMAX) cap Take 1 capsule (0.4 mg total) by mouth daily. (Patient taking differently: Take 1 capsule (0.4 mg total) by mouth as needed.) 30 capsule 3 2/6/2024    SUMAtriptan Succinate (IMITREX) 50 MG Oral Tab Take 1 tablet (50 mg total) by mouth every 2 (two) hours as needed for Migraine (max 3 in 24 hrs). 12 tablet 3 More than a month       Allergies: Ciprofloxacin, Seroquel [quetiapine], and Tiopronin      Anesthesia Evaluation    Patient summary reviewed.    Anesthetic Complications           GI/Hepatic/Renal                                 Cardiovascular                                                       Endo/Other                                  Pulmonary                           Neuro/Psych      (+) depression                                Past Surgical History:   Procedure Laterality Date    COLONOSCOPY      LITHOTRIPSY      OTHER SURGICAL HISTORY  2-17-11    Rt RPG, URS stone manipulation,laser litho,Rt stent, Dr. Mcdonough    OTHER SURGICAL HISTORY  2/21/11    Cysto stent removal- Dr. Mcdonough    OTHER SURGICAL HISTORY  11/21/15    Cysto, L URS, laser litho, stone extraction, stent placement, RPG Dr. Finn    OTHER SURGICAL HISTORY  12/1/15    cysto stent removal    UPPER GI ENDOSCOPY,EXAM       Social History     Socioeconomic History    Marital status: Single   Tobacco Use    Smoking status: Never    Smokeless tobacco: Never   Substance and Sexual Activity    Alcohol use: No    Drug use: No     History   Drug Use No     Available pre-op labs reviewed.  Lab Results   Component Value Date    WBC 7.3 02/06/2024    RBC 4.91 02/06/2024    HGB 15.0 02/06/2024    HCT 44.7 02/06/2024    MCV 91.0 02/06/2024    MCH 30.5 02/06/2024    MCHC 33.6 02/06/2024    RDW 12.6 02/06/2024    .0 02/06/2024     Lab Results   Component Value Date     02/06/2024    K 3.5 02/06/2024     02/06/2024    CO2 32.0 02/06/2024     BUN 19 02/06/2024    CREATSERUM 1.09 02/06/2024    GLU 95 02/06/2024    CA 9.1 02/06/2024            Airway      Mallampati: II  Mouth opening: >3 FB  TM distance: > 6 cm  Neck ROM: full Cardiovascular    Cardiovascular exam normal.         Dental             Pulmonary    Pulmonary exam normal.                 Other findings              ASA: 1   Plan: general  NPO status verified and           Plan/risks discussed with: patient                Present on Admission:  **None**

## 2024-02-07 NOTE — ANESTHESIA PROCEDURE NOTES
Airway  Date/Time: 2/6/2024 8:23 PM  Urgency: elective      General Information and Staff    Patient location during procedure: OR  Anesthesiologist: Christos Freeman MD  Performed: anesthesiologist   Performed by: Christos Freeman MD  Authorized by: Christos Freeman MD      Indications and Patient Condition  Indications for airway management: anesthesia  Sedation level: deep  Preoxygenated: yes  Patient position: sniffing  Mask difficulty assessment: 1 - vent by mask    Final Airway Details  Final airway type: endotracheal airway      Successful airway: ETT  Cuffed: yes   Successful intubation technique: direct laryngoscopy  Endotracheal tube insertion site: oral  Blade: Sommer  Blade size: #2  ETT size (mm): 7.0    Cormack-Lehane Classification: grade IIA - partial view of glottis  Placement verified by: capnometry   Measured from: lips  ETT to lips (cm): 23  Number of attempts at approach: 1

## 2024-02-09 NOTE — PAYOR COMM NOTE
--------------  ADMISSION REVIEW     Payor: MAVIS CHAVIS  Subscriber #:  [id number]  Authorization Number: [id number]    Admit date: 2/6/24  Admit time:  9:06 AM       REVIEW DOCUMENTATION:     ED Provider Notes        Subjective:   28-year-old male, history of kidney stones, presents with left-sided abdominal pain.  States pain started about 4 days ago, then resolved and started again yesterday afternoon.  Nauseous but no vomiting.  Pain in the left lower quadrant.  Feels more tense than his history of kidney stones.  States he had multiple stones in the past, has passed some as needed stenting for others.  No vomiting.  No fever.  No dysuria or obvious hematuria although he states his urine is slightly darker than normal.      Review of Systems   Constitutional:  Negative for fever.   Respiratory:  Negative for shortness of breath.    Cardiovascular:  Negative for chest pain.   Gastrointestinal:  Positive for abdominal pain and nausea.   Genitourinary:  Positive for flank pain. Negative for dysuria.       Physical Exam     ED Triage Vitals [02/06/24 0519]   /87   Pulse 97   Resp 20   Temp    Temp src    SpO2 97 %   O2 Device None (Room air)       Current:/88   Pulse 81   Resp 15   Wt 77.1 kg   SpO2 98%   BMI 26.63 kg/m²         Physical Exam  Vitals and nursing note reviewed.   Constitutional:       General: He is in acute distress.   Cardiovascular:      Rate and Rhythm: Normal rate and regular rhythm.   Pulmonary:      Effort: Pulmonary effort is normal.      Breath sounds: Normal breath sounds.   Abdominal:      Palpations: Abdomen is soft.      Tenderness: There is abdominal tenderness in the left lower quadrant. There is left CVA tenderness.   Skin:     General: Skin is warm and dry.   Neurological:      Mental Status: He is alert.   Psychiatric:         Mood and Affect: Mood is anxious.               ED Course     Labs Reviewed   URINALYSIS, ROUTINE - Abnormal; Notable for the following  components:       Result Value    Blood Urine Trace-lysed (*)     All other components within normal limits   COMP METABOLIC PANEL (14) - Abnormal; Notable for the following components:    AST 14 (*)     All other components within normal limits   UA MICROSCOPIC ONLY, URINE - Normal   CBC WITH DIFFERENTIAL WITH PLATELET        MDM      CT ABDOMEN+PELVIS KIDNEYSTONE 2D RNDR(NO IV,NO ORAL)(CPT=74176)    Result Date: 2/6/2024  CONCLUSION:   1. Obstructing stone in the left renal pelvis measuring up to 17 mm resulting in mild to moderate left hydronephrosis and mild left perinephric stranding.  2. Additional bilateral nephrolithiasis.   Please see above for further details.  LOCATION:  [facility]   Dictated by (CST): Arsenio Hook MD on 2/06/2024 at 6:32 AM     Finalized by (CST): Arsenio Hook MD on 2/06/2024 at 6:35 AM        I independently interpreted the CT abdomen pelvis and note the large proximal ureteral/renal pelvis stone causing hydronephrosis      Admission disposition: 2/6/2024  6:55 AM         Patient's family at bedside helpful to provide information on the history of presenting illness    External chart review demonstrates remote encounter with urology in 2020 for stenting and stent exchange    Differential diagnosis includes, but is not limited to, hydronephrosis, obstructing kidney stone, diverticulitis, diverticulosis    28-year-old male presents with large obstructing stone in the left renal pelvis.  Discussed with Dr. Hilliard, radha and will see in consultation.  N.p.o. since yesterday, to remain n.p.o. now.  Pain is controlled after Toradol followed by my morphine and eventually by Dilaudid.  Received IV fluids and Zofran as well.  Creatinine is stable.  Urine is unremarkable with no signs of infected stone at this time.  Admitted to [facility] hospitalist who accepts admission, awaiting bed assignment and transport to [facility] for definitive management.  Patient and family are both aware and  in agreement with plan    Disposition and Plan     Clinical Impression:  1. Ureteral stone with hydronephrosis    2. Intractable pain         Disposition:  Admit  2/6/2024  6:55 am    History of Present Illness:     Patient is a 28-year-old male with significant past medical history of moderate episode of recurrent depression, migraine cystinuria with recurrent nephrolithiasis and urolithiasis status post recent ureteroscopy with laser lithotripsy stent placement on the right chest in December 2023 at [facility].  Pathology of the stone was 100% 16 presented with left flank pain as well as left groin pain.  Symptoms very similar to his prior obstructive stones he presented with symptoms that started 3 days prior to presentation, possible nausea.  Patient is frustrated because of how quickly his stones are recurring.  He was on potassium citrate in the past however has not taken it recently since January 2023.  Otherwise no fevers no chills no chest pain no shortness of breath review of system otherwise is completely negative.     In the emergency room patient is been afebrile, rest of vitals have been stable  All labs are grossly unremarkable, creatinine is within normal limits at 1.09, CBC without any leukocytosis, UA was negative, CT abdomen pelvis was done that showed obstructing stone in the left renal pelvis measuring up to 17 mm with moderate left hydronephrosis and mild left perinephric stranding with stones also located in the right side.      ASSESSMENT / PLAN:         28-year-old male with significant past medical history of moderate episode of recurrent depression, migraine cystinuria with recurrent nephrolithiasis and urolithiasis           # Left hydronephrosis secondary to a left obstructive nephrolithiasis  # Cystinuria  -Patient with a history of recurrent cystine stones status post recent right lithotripsy and stent placement  -Recurrence of left nephrolithiasis with obstructive  hydronephrosis as well as well as perinephric stranding  -UA negative for any infection, empiric Ancef  -Urology consulted, plan for ureteroscopy with stent placement and lithotripsy possibly today  -Patient will need to follow-up with nephrology for management of cystinuria  -Continue fluids pain control  -N.p.o.     # History of major depression resume home sertraline     # History of migraines  -Resume Imitrex     Prophy:  DVT: Lovenox  Deconditioning prevention: PT OT     Dispo: admit to [facility] with telemetry     Outpatient records reviewed confirming patient's medical history and medications.      Further recommendations pending patient's clinical course.  [facility] hospitalist to continue to follow patient while in house     Time spent: greater than 95 minutes spent in d/w pt/family, coordination of care, and d/w staff.   Shakira gómez MD   Internal Medicine  [facility] Hospitalist  Pager: [phone]                UROLOGY:    DATE of OPERATION:  2/6/2024        PREOPERATIVE DIAGNOSIS: Left ureteral calculus.     POSTOPERATIVE DIAGNOSIS: Same     PROCEDURE PERFORMED:  Left Ureteroscopic Stone Extraction, with Laser Lithotripsy, Cystoscopy, Retrograde Pyelogram, And Placement of Ureteral Stent      ANESTHESIA:  General.     INDICATIONS:  Flank pain related to 17 x 12 mm left renal pelvis stone.  He also has a 4 or 5 mm stone in the left lower pole.  Long history of cystine stones.  He has been unable to tolerate some of the medications for this and is followed by the nephrology clinic at the [facility].     FINDINGS: Renal pelvis stone and left lower pole stone completely fragmented and most of the fragments were removed.  Okay to discharge this evening if the pain is well-controlled.  He may remove his Dangler stent Friday morning.  Follow-up in 1 month.     EBL: None     COMPLICATIONS: None     TECHNIQUE:  A general anesthesia was induced.  A time-out was  reviewed.  Preoperative antibiotics were  administered.  The patient  was prepped and draped in the dorsal lithotomy position.  Sequential  compression devices were in place on the lower legs.  I could see the large left renal pelvis stone in the expected position on the plain images and I could also see the 5 mm stone adjacent to one of the spinous processes on the right.     The cystoscope was passed into the bladder and the bladder was  examined.  The ureteral orifices were in normal position.  The  cystoscope was used to guide a ureteral catheter into the left  ureteral orifice and water soluble contrast was injected retrograde.  The contrast outlining the stone in the renal pelvis.     The 11/13 Austrian ureteral sheath/dilator was then passed over a guidewire up into the  ureter and then the semirigid ureteroscope was advanced up to  the level of the stone.  The stone was fragmented with the 365 µm holmium laser fiber using fragmentation and dusting settings.  The dusting settings were 0.3 J and 60 Hz and the fragmentation settings were 1.5 J and 15 Hz.  I stopped a few times during the procedure to irrigate out the renal pelvis and stone dust.  The stone fragments were then removed  with a basket.  The ureter was reexamined.  Contrast was injected as I removed the ureteroscope and there was no extravasation.    A 4.8-Bruneian 24-cm  stent was then passed over a guidewire up into the ureter.  The  stent was in good position when the guidewire was removed and the  dangler was taped to the patient's penis.  The patient was  awakened, and taken to the recovery area in good condition.      Valdemar Hilliard MD  [facility] hospital Urology  [phone]  2/6/2024  10:13 PM                           Vitals (last day) before discharge       Date/Time Temp Pulse Resp BP SpO2 Weight O2 Device O2 Flow Rate (L/min) [facility]    02/06/24 2335 98 °F (36.7 °C) 88 16 130/90 99 % -- None (Room air) -- LP    02/06/24 2315 -- -- -- -- 95 % -- -- -- LS    02/06/24 2302 -- 85 15 133/98 99 % -- --  -- LS    02/06/24 2300 -- 85 15 -- 98 % -- -- -- LS    02/06/24 2245 98.8 °F (37.1 °C) 84 16 127/94 97 % -- -- -- LS    02/06/24 2233 -- 90 14 131/90 98 % -- -- -- LS    02/06/24 2230 -- 91 16 131/90 96 % -- -- -- LS    02/06/24 2225 -- 100 13 127/81 -- -- -- -- LS    02/06/24 2220 -- 98 10 -- -- -- -- -- LS    02/06/24 2219 100.5 °F (38.1 °C) 100 14 124/88 96 % -- None (Room air) -- LS    02/06/24 1945 98.2 °F (36.8 °C) 74 16 127/78 99 % -- None (Room air) -- NA    02/06/24 1430 -- 96 -- -- 100 % -- -- -- MN    02/06/24 1400 -- 91 -- -- 97 % -- -- -- MN    02/06/24 1330 -- 110 -- -- 97 % -- -- -- MN    02/06/24 1300 -- 93 -- -- 96 % -- -- -- MN    02/06/24 1230 -- 80 -- -- 95 % -- -- -- MN    02/06/24 1130 -- 72 -- 117/78 -- -- -- -- MN    02/06/24 1123 97.7 °F (36.5 °C) -- 17 117/78 97 % -- None (Room air) -- MN    02/06/24 1030 -- 77 -- -- 100 % -- -- -- MN    02/06/24 1000 -- 81 -- -- 97 % -- -- -- MN    02/06/24 0925 -- -- -- -- -- 170 lb -- -- CD    02/06/24 0925 -- 78 16 -- 100 % -- None (Room air) -- MN    02/06/24 0746 97.9 °F (36.6 °C) 88 16 129/99 97 % -- None (Room air) -- AB    02/06/24 0621 -- 81 15 118/88 98 % -- None (Room air) -- CW    02/06/24 0519 -- 97 20 137/87 97 % 170 lb None (Room air) -- CW

## 2024-02-09 NOTE — PAYOR COMM NOTE
Discharge Notification    Patient Name: REID CAMPO  Payor: MAVIS CHAVIS  Subscriber #: [id number]  Authorization Number: [id number]  Admit Date/Time: 2/6/2024 5:08 AM  Discharge Date/Time: 2/7/2024 1:30 AM

## 2024-02-10 ENCOUNTER — ANESTHESIA EVENT (OUTPATIENT)
Dept: SURGERY | Facility: HOSPITAL | Age: 29
End: 2024-02-10
Payer: COMMERCIAL

## 2024-02-10 ENCOUNTER — APPOINTMENT (OUTPATIENT)
Dept: CT IMAGING | Age: 29
End: 2024-02-10
Attending: EMERGENCY MEDICINE
Payer: COMMERCIAL

## 2024-02-10 ENCOUNTER — HOSPITAL ENCOUNTER (INPATIENT)
Facility: HOSPITAL | Age: 29
LOS: 1 days | Discharge: HOME | End: 2024-02-11
Attending: EMERGENCY MEDICINE | Admitting: INTERNAL MEDICINE
Payer: COMMERCIAL

## 2024-02-10 ENCOUNTER — ANESTHESIA (OUTPATIENT)
Dept: SURGERY | Facility: HOSPITAL | Age: 29
End: 2024-02-10
Payer: COMMERCIAL

## 2024-02-10 ENCOUNTER — APPOINTMENT (OUTPATIENT)
Dept: GENERAL RADIOLOGY | Facility: HOSPITAL | Age: 29
End: 2024-02-10
Attending: UROLOGY
Payer: COMMERCIAL

## 2024-02-10 DIAGNOSIS — N20.1 CALCULUS OF LEFT URETER: Primary | ICD-10-CM

## 2024-02-10 DIAGNOSIS — N13.2 URETERAL STONE WITH HYDRONEPHROSIS: ICD-10-CM

## 2024-02-10 LAB
ALBUMIN SERPL-MCNC: 3.4 G/DL (ref 3.4–5)
ALBUMIN/GLOB SERPL: 0.9 {RATIO} (ref 1–2)
ALP LIVER SERPL-CCNC: 82 U/L
ALT SERPL-CCNC: 23 U/L
ANION GAP SERPL CALC-SCNC: 4 MMOL/L (ref 0–18)
AST SERPL-CCNC: 10 U/L (ref 15–37)
BASOPHILS # BLD AUTO: 0.03 X10(3) UL (ref 0–0.2)
BASOPHILS NFR BLD AUTO: 0.6 %
BILIRUB SERPL-MCNC: 0.3 MG/DL (ref 0.1–2)
BILIRUB UR QL STRIP.AUTO: NEGATIVE
BUN BLD-MCNC: 23 MG/DL (ref 9–23)
CALCIUM BLD-MCNC: 9.3 MG/DL (ref 8.5–10.1)
CHLORIDE SERPL-SCNC: 105 MMOL/L (ref 98–112)
CLARITY UR REFRACT.AUTO: CLEAR
CO2 SERPL-SCNC: 29 MMOL/L (ref 21–32)
COLOR UR AUTO: YELLOW
CREAT BLD-MCNC: 0.87 MG/DL
EGFRCR SERPLBLD CKD-EPI 2021: 121 ML/MIN/1.73M2 (ref 60–?)
EOSINOPHIL # BLD AUTO: 0.11 X10(3) UL (ref 0–0.7)
EOSINOPHIL NFR BLD AUTO: 2.1 %
ERYTHROCYTE [DISTWIDTH] IN BLOOD BY AUTOMATED COUNT: 12.3 %
GLOBULIN PLAS-MCNC: 3.6 G/DL (ref 2.8–4.4)
GLUCOSE BLD-MCNC: 93 MG/DL (ref 70–99)
GLUCOSE UR STRIP.AUTO-MCNC: NEGATIVE MG/DL
HCT VFR BLD AUTO: 42.1 %
HGB BLD-MCNC: 14.4 G/DL
IMM GRANULOCYTES # BLD AUTO: 0.02 X10(3) UL (ref 0–1)
IMM GRANULOCYTES NFR BLD: 0.4 %
KETONES UR STRIP.AUTO-MCNC: NEGATIVE MG/DL
LEUKOCYTE ESTERASE UR QL STRIP.AUTO: NEGATIVE
LYMPHOCYTES # BLD AUTO: 1.76 X10(3) UL (ref 1–4)
LYMPHOCYTES NFR BLD AUTO: 33.8 %
MCH RBC QN AUTO: 30.8 PG (ref 26–34)
MCHC RBC AUTO-ENTMCNC: 34.2 G/DL (ref 31–37)
MCV RBC AUTO: 90 FL
MONOCYTES # BLD AUTO: 0.4 X10(3) UL (ref 0.1–1)
MONOCYTES NFR BLD AUTO: 7.7 %
NEUTROPHILS # BLD AUTO: 2.88 X10 (3) UL (ref 1.5–7.7)
NEUTROPHILS # BLD AUTO: 2.88 X10(3) UL (ref 1.5–7.7)
NEUTROPHILS NFR BLD AUTO: 55.4 %
NITRITE UR QL STRIP.AUTO: NEGATIVE
OSMOLALITY SERPL CALC.SUM OF ELEC: 289 MOSM/KG (ref 275–295)
PH UR STRIP.AUTO: 7 [PH] (ref 5–8)
PLATELET # BLD AUTO: 309 10(3)UL (ref 150–450)
POTASSIUM SERPL-SCNC: 3.8 MMOL/L (ref 3.5–5.1)
PROT SERPL-MCNC: 7 G/DL (ref 6.4–8.2)
PROT UR STRIP.AUTO-MCNC: NEGATIVE MG/DL
RBC # BLD AUTO: 4.68 X10(6)UL
RBC #/AREA URNS AUTO: >10 /HPF
SODIUM SERPL-SCNC: 138 MMOL/L (ref 136–145)
SP GR UR STRIP.AUTO: 1.02 (ref 1–1.03)
UROBILINOGEN UR STRIP.AUTO-MCNC: 0.2 MG/DL
WBC # BLD AUTO: 5.2 X10(3) UL (ref 4–11)

## 2024-02-10 PROCEDURE — 74176 CT ABD & PELVIS W/O CONTRAST: CPT | Performed by: EMERGENCY MEDICINE

## 2024-02-10 PROCEDURE — 81015 MICROSCOPIC EXAM OF URINE: CPT | Performed by: EMERGENCY MEDICINE

## 2024-02-10 PROCEDURE — 81001 URINALYSIS AUTO W/SCOPE: CPT | Performed by: EMERGENCY MEDICINE

## 2024-02-10 PROCEDURE — 80053 COMPREHEN METABOLIC PANEL: CPT | Performed by: EMERGENCY MEDICINE

## 2024-02-10 PROCEDURE — 99285 EMERGENCY DEPT VISIT HI MDM: CPT

## 2024-02-10 PROCEDURE — 96375 TX/PRO/DX INJ NEW DRUG ADDON: CPT

## 2024-02-10 PROCEDURE — 85025 COMPLETE CBC W/AUTO DIFF WBC: CPT | Performed by: EMERGENCY MEDICINE

## 2024-02-10 PROCEDURE — 0T778DZ DILATION OF LEFT URETER WITH INTRALUMINAL DEVICE, VIA NATURAL OR ARTIFICIAL OPENING ENDOSCOPIC: ICD-10-PCS | Performed by: UROLOGY

## 2024-02-10 PROCEDURE — 0TC78ZZ EXTIRPATION OF MATTER FROM LEFT URETER, VIA NATURAL OR ARTIFICIAL OPENING ENDOSCOPIC: ICD-10-PCS | Performed by: UROLOGY

## 2024-02-10 PROCEDURE — 96374 THER/PROPH/DIAG INJ IV PUSH: CPT

## 2024-02-10 PROCEDURE — 96376 TX/PRO/DX INJ SAME DRUG ADON: CPT

## 2024-02-10 PROCEDURE — BT1F1ZZ FLUOROSCOPY OF LEFT KIDNEY, URETER AND BLADDER USING LOW OSMOLAR CONTRAST: ICD-10-PCS | Performed by: UROLOGY

## 2024-02-10 DEVICE — ASCERTA STENT URET 26CM 6FR: Type: IMPLANTABLE DEVICE | Site: URETER | Status: FUNCTIONAL

## 2024-02-10 RX ORDER — LIDOCAINE HYDROCHLORIDE 20 MG/ML
JELLY TOPICAL AS NEEDED
Status: DISCONTINUED | OUTPATIENT
Start: 2024-02-10 | End: 2024-02-10 | Stop reason: HOSPADM

## 2024-02-10 RX ORDER — PROCHLORPERAZINE EDISYLATE 5 MG/ML
5 INJECTION INTRAMUSCULAR; INTRAVENOUS EVERY 8 HOURS PRN
Status: DISCONTINUED | OUTPATIENT
Start: 2024-02-10 | End: 2024-02-11

## 2024-02-10 RX ORDER — SODIUM CHLORIDE 9 MG/ML
INJECTION, SOLUTION INTRAVENOUS CONTINUOUS PRN
Status: DISCONTINUED | OUTPATIENT
Start: 2024-02-10 | End: 2024-02-10 | Stop reason: SURG

## 2024-02-10 RX ORDER — HYDROCODONE BITARTRATE AND ACETAMINOPHEN 5; 325 MG/1; MG/1
1 TABLET ORAL ONCE AS NEEDED
Status: DISCONTINUED | OUTPATIENT
Start: 2024-02-10 | End: 2024-02-10 | Stop reason: HOSPADM

## 2024-02-10 RX ORDER — PROCHLORPERAZINE EDISYLATE 5 MG/ML
INJECTION INTRAMUSCULAR; INTRAVENOUS
Status: COMPLETED
Start: 2024-02-10 | End: 2024-02-10

## 2024-02-10 RX ORDER — HYDROCODONE BITARTRATE AND ACETAMINOPHEN 5; 325 MG/1; MG/1
2 TABLET ORAL ONCE AS NEEDED
Status: DISCONTINUED | OUTPATIENT
Start: 2024-02-10 | End: 2024-02-10 | Stop reason: HOSPADM

## 2024-02-10 RX ORDER — ONDANSETRON 2 MG/ML
4 INJECTION INTRAMUSCULAR; INTRAVENOUS ONCE
Status: COMPLETED | OUTPATIENT
Start: 2024-02-10 | End: 2024-02-10

## 2024-02-10 RX ORDER — HYDROMORPHONE HYDROCHLORIDE 1 MG/ML
0.2 INJECTION, SOLUTION INTRAMUSCULAR; INTRAVENOUS; SUBCUTANEOUS EVERY 5 MIN PRN
Status: DISCONTINUED | OUTPATIENT
Start: 2024-02-10 | End: 2024-02-10 | Stop reason: HOSPADM

## 2024-02-10 RX ORDER — TAMSULOSIN HYDROCHLORIDE 0.4 MG/1
0.4 CAPSULE ORAL DAILY
Status: DISCONTINUED | OUTPATIENT
Start: 2024-02-11 | End: 2024-02-11

## 2024-02-10 RX ORDER — HYDROMORPHONE HYDROCHLORIDE 1 MG/ML
0.5 INJECTION, SOLUTION INTRAMUSCULAR; INTRAVENOUS; SUBCUTANEOUS ONCE
Status: COMPLETED | OUTPATIENT
Start: 2024-02-10 | End: 2024-02-10

## 2024-02-10 RX ORDER — NALOXONE HYDROCHLORIDE 0.4 MG/ML
0.08 INJECTION, SOLUTION INTRAMUSCULAR; INTRAVENOUS; SUBCUTANEOUS AS NEEDED
Status: DISCONTINUED | OUTPATIENT
Start: 2024-02-10 | End: 2024-02-10 | Stop reason: HOSPADM

## 2024-02-10 RX ORDER — SODIUM CHLORIDE, SODIUM LACTATE, POTASSIUM CHLORIDE, CALCIUM CHLORIDE 600; 310; 30; 20 MG/100ML; MG/100ML; MG/100ML; MG/100ML
INJECTION, SOLUTION INTRAVENOUS CONTINUOUS
Status: DISCONTINUED | OUTPATIENT
Start: 2024-02-10 | End: 2024-02-10 | Stop reason: HOSPADM

## 2024-02-10 RX ORDER — HYDROMORPHONE HYDROCHLORIDE 1 MG/ML
0.6 INJECTION, SOLUTION INTRAMUSCULAR; INTRAVENOUS; SUBCUTANEOUS EVERY 5 MIN PRN
Status: DISCONTINUED | OUTPATIENT
Start: 2024-02-10 | End: 2024-02-10 | Stop reason: HOSPADM

## 2024-02-10 RX ORDER — ACETAMINOPHEN 500 MG
500 TABLET ORAL EVERY 4 HOURS PRN
Status: DISCONTINUED | OUTPATIENT
Start: 2024-02-10 | End: 2024-02-11

## 2024-02-10 RX ORDER — HYDROMORPHONE HYDROCHLORIDE 1 MG/ML
1 INJECTION, SOLUTION INTRAMUSCULAR; INTRAVENOUS; SUBCUTANEOUS ONCE
Status: COMPLETED | OUTPATIENT
Start: 2024-02-10 | End: 2024-02-10

## 2024-02-10 RX ORDER — MELATONIN
3 NIGHTLY PRN
Status: DISCONTINUED | OUTPATIENT
Start: 2024-02-10 | End: 2024-02-11

## 2024-02-10 RX ORDER — MORPHINE SULFATE 2 MG/ML
2 INJECTION, SOLUTION INTRAMUSCULAR; INTRAVENOUS EVERY 2 HOUR PRN
Status: DISCONTINUED | OUTPATIENT
Start: 2024-02-10 | End: 2024-02-11

## 2024-02-10 RX ORDER — SERTRALINE HYDROCHLORIDE 100 MG/1
100 TABLET, FILM COATED ORAL DAILY
Status: DISCONTINUED | OUTPATIENT
Start: 2024-02-11 | End: 2024-02-11

## 2024-02-10 RX ORDER — MORPHINE SULFATE 4 MG/ML
4 INJECTION, SOLUTION INTRAMUSCULAR; INTRAVENOUS EVERY 2 HOUR PRN
Status: DISCONTINUED | OUTPATIENT
Start: 2024-02-10 | End: 2024-02-11

## 2024-02-10 RX ORDER — PROCHLORPERAZINE EDISYLATE 5 MG/ML
5 INJECTION INTRAMUSCULAR; INTRAVENOUS EVERY 8 HOURS PRN
Status: DISCONTINUED | OUTPATIENT
Start: 2024-02-10 | End: 2024-02-10 | Stop reason: HOSPADM

## 2024-02-10 RX ORDER — ONDANSETRON 2 MG/ML
4 INJECTION INTRAMUSCULAR; INTRAVENOUS EVERY 6 HOURS PRN
Status: DISCONTINUED | OUTPATIENT
Start: 2024-02-10 | End: 2024-02-10 | Stop reason: HOSPADM

## 2024-02-10 RX ORDER — ONDANSETRON 2 MG/ML
4 INJECTION INTRAMUSCULAR; INTRAVENOUS EVERY 6 HOURS PRN
Status: DISCONTINUED | OUTPATIENT
Start: 2024-02-10 | End: 2024-02-11

## 2024-02-10 RX ORDER — HYDROMORPHONE HYDROCHLORIDE 1 MG/ML
0.4 INJECTION, SOLUTION INTRAMUSCULAR; INTRAVENOUS; SUBCUTANEOUS EVERY 5 MIN PRN
Status: DISCONTINUED | OUTPATIENT
Start: 2024-02-10 | End: 2024-02-10 | Stop reason: HOSPADM

## 2024-02-10 RX ORDER — ACETAMINOPHEN 500 MG
1000 TABLET ORAL ONCE AS NEEDED
Status: DISCONTINUED | OUTPATIENT
Start: 2024-02-10 | End: 2024-02-10 | Stop reason: HOSPADM

## 2024-02-10 RX ORDER — KETOROLAC TROMETHAMINE 30 MG/ML
30 INJECTION, SOLUTION INTRAMUSCULAR; INTRAVENOUS EVERY 6 HOURS PRN
Status: DISCONTINUED | OUTPATIENT
Start: 2024-02-10 | End: 2024-02-11

## 2024-02-10 RX ORDER — CEFAZOLIN SODIUM/WATER 2 G/20 ML
SYRINGE (ML) INTRAVENOUS AS NEEDED
Status: DISCONTINUED | OUTPATIENT
Start: 2024-02-10 | End: 2024-02-10 | Stop reason: SURG

## 2024-02-10 RX ORDER — LIDOCAINE HYDROCHLORIDE 10 MG/ML
INJECTION, SOLUTION EPIDURAL; INFILTRATION; INTRACAUDAL; PERINEURAL AS NEEDED
Status: DISCONTINUED | OUTPATIENT
Start: 2024-02-10 | End: 2024-02-10 | Stop reason: SURG

## 2024-02-10 RX ORDER — MIDAZOLAM HYDROCHLORIDE 1 MG/ML
INJECTION INTRAMUSCULAR; INTRAVENOUS AS NEEDED
Status: DISCONTINUED | OUTPATIENT
Start: 2024-02-10 | End: 2024-02-10 | Stop reason: SURG

## 2024-02-10 RX ORDER — OXYCODONE HYDROCHLORIDE 5 MG/1
5 TABLET ORAL EVERY 4 HOURS PRN
COMMUNITY
Start: 2023-12-15

## 2024-02-10 RX ORDER — MORPHINE SULFATE 2 MG/ML
1 INJECTION, SOLUTION INTRAMUSCULAR; INTRAVENOUS EVERY 2 HOUR PRN
Status: DISCONTINUED | OUTPATIENT
Start: 2024-02-10 | End: 2024-02-11

## 2024-02-10 RX ORDER — ONDANSETRON 2 MG/ML
INJECTION INTRAMUSCULAR; INTRAVENOUS AS NEEDED
Status: DISCONTINUED | OUTPATIENT
Start: 2024-02-10 | End: 2024-02-10 | Stop reason: SURG

## 2024-02-10 RX ORDER — SODIUM CHLORIDE 9 MG/ML
INJECTION, SOLUTION INTRAVENOUS CONTINUOUS
Status: DISCONTINUED | OUTPATIENT
Start: 2024-02-10 | End: 2024-02-11

## 2024-02-10 RX ORDER — DEXAMETHASONE SODIUM PHOSPHATE 4 MG/ML
VIAL (ML) INJECTION AS NEEDED
Status: DISCONTINUED | OUTPATIENT
Start: 2024-02-10 | End: 2024-02-10 | Stop reason: SURG

## 2024-02-10 RX ORDER — HYDROMORPHONE HYDROCHLORIDE 1 MG/ML
INJECTION, SOLUTION INTRAMUSCULAR; INTRAVENOUS; SUBCUTANEOUS
Status: COMPLETED
Start: 2024-02-10 | End: 2024-02-10

## 2024-02-10 RX ADMIN — DEXAMETHASONE SODIUM PHOSPHATE 4 MG: 4 MG/ML VIAL (ML) INJECTION at 21:32:00

## 2024-02-10 RX ADMIN — MIDAZOLAM HYDROCHLORIDE 2 MG: 1 INJECTION INTRAMUSCULAR; INTRAVENOUS at 21:26:00

## 2024-02-10 RX ADMIN — SODIUM CHLORIDE: 9 INJECTION, SOLUTION INTRAVENOUS at 21:26:00

## 2024-02-10 RX ADMIN — CEFAZOLIN SODIUM/WATER 2 G: 2 G/20 ML SYRINGE (ML) INTRAVENOUS at 21:34:00

## 2024-02-10 RX ADMIN — LIDOCAINE HYDROCHLORIDE 50 MG: 10 INJECTION, SOLUTION EPIDURAL; INFILTRATION; INTRACAUDAL; PERINEURAL at 21:28:00

## 2024-02-10 RX ADMIN — ONDANSETRON 4 MG: 2 INJECTION INTRAMUSCULAR; INTRAVENOUS at 22:23:00

## 2024-02-10 NOTE — ED QUICK NOTES
Pt returns from CT scan. Pt reports significant improvement in pain- now 3/10. Resting much more comfortably on cart. Side rails up x2, cart locked and in lowest position, call light in reach. Vitals stable. Pt offers no additional complaints at this time.

## 2024-02-10 NOTE — ED PROVIDER NOTES
Patient Seen in: [facility] [place]      History     Chief Complaint   Patient presents with    Abdomen/Flank Pain     Stated Complaint: Left flank pain- recent kidney stone surgery    Subjective:   HPI    28-year-old male with a past medical history as below including cystinuria and recurrent kidney stones presents with left flank and left abdominal pain starting approximately 2 hours ago after he pulled his ureteral stent.  Patient states the stent was put in 4 days ago for a left-sided kidney stone and he was instructed to pull it yesterday.  He states he did not pull until this morning and pain started almost immediately after he was removed.  He states the pain feels the same as the kidney stone pain he had prior to stent placement.  He states there was small amount of blood on the stent when he removed it.  He states he took ibuprofen and oxycodone without improvement.  Denies nausea or vomiting.  Has some mild dysuria.  Denies fever or chills.    Objective:   Past Medical History:   Diagnosis Date    Anxiety state     Calculus of kidney     Cystine disease (HCC)     Depression     IBS (irritable bowel syndrome)     KIDNEY STONE     Migraines     OCD (obsessive compulsive disorder)               Past Surgical History:   Procedure Laterality Date    COLONOSCOPY      LITHOTRIPSY      OTHER SURGICAL HISTORY  2-17-11    Rt RPG, URS stone manipulation,laser litho,Rt stent, Dr. Mcdonough    OTHER SURGICAL HISTORY  2/21/11    Cysto stent removal- Dr. Mcdonough    OTHER SURGICAL HISTORY  11/21/15    Cysto, L URS, laser litho, stone extraction, stent placement, RPG Dr. Finn    OTHER SURGICAL HISTORY  12/1/15    cysto stent removal    UPPER GI ENDOSCOPY,EXAM                  No pertinent social history.            Review of Systems   Constitutional:  Negative for chills and fever.   Gastrointestinal:  Positive for abdominal pain. Negative for diarrhea, nausea and vomiting.   Genitourinary:  Positive  for flank pain.       Positive for stated complaint: Left flank pain- recent kidney stone surgery  Other systems are as noted in HPI.  Constitutional and vital signs reviewed.      All other systems reviewed and negative except as noted above.    Physical Exam     ED Triage Vitals [02/10/24 0751]   BP (!) 158/110   Pulse 98   Resp 20   Temp 98.2 °F (36.8 °C)   Temp src Temporal   SpO2 97 %   O2 Device None (Room air)       Current:BP (!) 132/96   Pulse 80   Temp 98.2 °F (36.8 °C) (Temporal)   Resp 18   Ht 170.2 cm (5' 7\")   Wt 77.1 kg   SpO2 97%   BMI 26.63 kg/m²         Physical Exam  Vitals and nursing note reviewed.   Constitutional:       General: He is not in acute distress.     Appearance: He is well-developed. He is not ill-appearing.   HENT:      Head: Normocephalic and atraumatic.      Mouth/Throat:      Mouth: Mucous membranes are moist.   Eyes:      General: No scleral icterus.     Extraocular Movements: Extraocular movements intact.   Cardiovascular:      Rate and Rhythm: Normal rate and regular rhythm.   Pulmonary:      Effort: Pulmonary effort is normal.      Breath sounds: Normal breath sounds.   Abdominal:      General: There is no distension.      Palpations: Abdomen is soft.      Tenderness: There is abdominal tenderness. There is left CVA tenderness. There is no guarding or rebound.      Comments: LLQ and left flank tenderness   Musculoskeletal:      Cervical back: Neck supple.   Skin:     General: Skin is warm and dry.      Capillary Refill: Capillary refill takes less than 2 seconds.   Neurological:      Mental Status: He is alert and oriented to person, place, and time.      GCS: GCS eye subscore is 4. GCS verbal subscore is 5. GCS motor subscore is 6.   Psychiatric:         Mood and Affect: Mood normal.         Behavior: Behavior normal.               ED Course     Labs Reviewed   URINALYSIS WITH CULTURE REFLEX - Abnormal; Notable for the following components:       Result Value     Blood Urine Large (*)     All other components within normal limits   COMP METABOLIC PANEL (14) - Abnormal; Notable for the following components:    AST 10 (*)     A/G Ratio 0.9 (*)     All other components within normal limits   CBC WITH DIFFERENTIAL WITH PLATELET    Narrative:     The following orders were created for panel order CBC With Differential With Platelet.  Procedure                               Abnormality         Status                     ---------                               -----------         ------                     CBC W/ DIFFERENTIAL[[id number]]                              Final result                 Please view results for these tests on the individual orders.   UA MICROSCOPIC ONLY, URINE   RAINBOW DRAW LIGHT GREEN   RAINBOW DRAW LAVENDER   CBC W/ DIFFERENTIAL             CT ABDOMEN+PELVIS KIDNEYSTONE 2D RNDR(NO IV,NO ORAL)(CPT=74176)    Result Date: 2/10/2024  CONCLUSION:   1. There has been interval fragmentation of the previously visualized stone within the left renal pelvis.  Multiple stone fragments are now seen along the course of the left ureter with elongated configuration.  One dominant stone fragment along the distal left ureter positioned within 2 cm of the left UVJ measures up to 15 x 4 mm.  A stone fragment within the mid to distal left ureter best seen on image 59 series 601 measures up to 15 x 5 mm.  There is persistent mild to moderate left hydroureteronephrosis noted.  Multiple nonobstructing stones are seen scattered in the mid and lower pole of the left kidney measuring up to 11 mm.  There is mild left perinephric stranding.   2. A stone is also now seen within the proximal right ureter in the region of the right UPJ measuring up to 8 x 4 mm with mild right hydronephrosis noted.  There are other scattered nonobstructing stones in the right kidney measuring up to 7mm.  There is  minimal right perinephric stranding.  3. Interval development of urinary bladder wall  thickening with fatty induration is concerning for cystitis.  Clinical correlation recommended  Please see above for further details.  LOCATION:  [facility]   Dictated by (CST): Arsenio Hook MD on 2/10/2024 at 9:06 AM     Finalized by (CST): Arsenio Hook MD on 2/10/2024 at 9:12 AM               MDM   28-year-old male with a past medical history as below including cystinuria and recurrent kidney stones presents with left flank and left abdominal pain starting approximately 2 hours ago after he pulled his ureteral stent.     Differential includes but is not limited to obstructing ureteral calculus, complication from ureteral stent removal, UTI/pyelonephritis    Labs show normal WBC, renal function and electrolytes.  UA without evidence of UTI.    Independent trepidation of CT abdomen/pelvis shows several oblong stones in left ureter.  Radiology report reviewed as above noting fragmentation to previously visualized stone in the left renal pelvis with 15 x 4 and 15 x 5 mm stones in the distal ureter causing moderate hydro.    Discussed with urology Dr. Plascencia, will admit for further management given large stones that will not likely pass spontaneously.  Discussed with hospitalist Dr. Rojas.    Medical Decision Making  Amount and/or Complexity of Data Reviewed  External Data Reviewed: notes.     Details: Operative report on 2/6/2024 reviewed where patient had ureteroscopy with stent placement and lithotripsy with Dr. Hilliard  Labs: ordered. Decision-making details documented in ED Course.  Radiology: ordered and independent interpretation performed. Decision-making details documented in ED Course.  Discussion of management or test interpretation with external provider(s): Urology, hospitalist    Risk  Parenteral controlled substances.  Decision regarding hospitalization.        Disposition and Plan     Clinical Impression:  1. Calculus of left ureter         Disposition:  Admit  2/10/2024 10:34 am    Follow-up:  No  follow-up provider specified.        Medications Prescribed:  Current Discharge Medication List                            Hospital Problems       Present on Admission  Date Reviewed: 6/8/2020            ICD-10-CM Noted POA    * (Principal) Calculus of left ureter N20.1 2/10/2024 Unknown

## 2024-02-10 NOTE — H&P
[facility] History and Physical      Chief Complaint   Patient presents with    Abdomen/Flank Pain        PCP: Spenser Mccormick MD      History of Present Illness: Patient is a 28 year old male with PMH sig for nephrolithiasis, anxiety presents for flank pain.        ently underwent a left URS/LL with Dr. Hilliard on 2/6/2024 for a left renal pelvic calculus. He had a stent placed post-operatively and pulled this am.      Had severe pain.  Minimal blood on stent.  No hematuria.      Pain improved after IV meds.        Past Medical History:   Diagnosis Date    Anxiety state     Calculus of kidney     Cystine disease (HCC)     Depression     IBS (irritable bowel syndrome)     KIDNEY STONE     Migraines     OCD (obsessive compulsive disorder)       Past Surgical History:   Procedure Laterality Date    COLONOSCOPY      LITHOTRIPSY      OTHER SURGICAL HISTORY  2-17-11    Rt RPG, URS stone manipulation,laser litho,Rt stent, Dr. Mcdonough    OTHER SURGICAL HISTORY  2/21/11    Cysto stent removal- Dr. Mcdonough    OTHER SURGICAL HISTORY  11/21/15    Cysto, L URS, laser litho, stone extraction, stent placement, RPG Dr. Finn    OTHER SURGICAL HISTORY  12/1/15    cysto stent removal    UPPER GI ENDOSCOPY,EXAM          ALL:  Allergies   Allergen Reactions    Ciprofloxacin DIZZINESS     Dizziness, difficulty with walking    Seroquel [Quetiapine] RESTLESSNESS     Shaking, neurological side effects.    Tiopronin OTHER (SEE COMMENTS)     WEIGHT LOSS AND HAIR LOSS PER PT REPORT and protein excretion        No current outpatient medications on file.       Social History     Tobacco Use    Smoking status: Never    Smokeless tobacco: Never   Substance Use Topics    Alcohol use: No        Fam Hx  Family History   Problem Relation Age of Onset    Heart Disorder Maternal Grandfather     Diabetes Paternal Grandfather     Cancer Paternal Grandfather         meloma    Hypertension Father     Other (Other) Father     Cancer Maternal Grandmother      Cancer Paternal Grandmother        Review of Systems  Comprehensive ROS reviewed and negative except for what is stated in HPI.      OBJECTIVE:  /85 (BP Location: Left arm)   Pulse 97   Temp 98.3 °F (36.8 °C) (Oral)   Resp 24   Ht 5' 7\" (1.702 m)   Wt 170 lb (77.1 kg)   SpO2 92%   BMI 26.63 kg/m²   General:  Alert, no distress, appears stated age.    Head:  Normocephalic, without obvious abnormality, atraumatic.   Eyes:  Sclera anicteric, No conjunctival pallor, EOMs intact.    Nose: Nares normal. Septum midline. Mucosa normal. No drainage.   Throat: Lips, mucosa, and tongue normal. Teeth and gums normal.   Neck: Supple, symmetrical, trachea midline, no cervical or supraclavicular lymph adenopathy, thyroid: no enlargment/tenderness/nodules appreciated   Lungs:   Clear to auscultation bilaterally. Normal effort   Chest wall:  No tenderness or deformity.   Heart:  Regular rate and rhythm, S1, S2 normal, no murmur, rub or gallop appreciated   Abdomen:   Soft, non-tender. Bowel sounds normal. No masses,  No organomegaly. Non distended   Extremities: Extremities normal, atraumatic, no cyanosis or edema.   Skin: Skin color, texture, turgor normal. No rashes or lesions.    Neurologic: Normal strength, no focal deficit appreciated     Data Review:    LABS:   Lab Results   Component Value Date    WBC 5.2 02/10/2024    HGB 14.4 02/10/2024    HCT 42.1 02/10/2024    .0 02/10/2024    CREATSERUM 0.87 02/10/2024    BUN 23 02/10/2024     02/10/2024    K 3.8 02/10/2024     02/10/2024    CO2 29.0 02/10/2024    GLU 93 02/10/2024    CA 9.3 02/10/2024    ALB 3.4 02/10/2024    ALKPHO 82 02/10/2024    BILT 0.3 02/10/2024    TP 7.0 02/10/2024    AST 10 02/10/2024    ALT 23 02/10/2024       CXR: image personally reviewed.      Radiology: CT ABDOMEN+PELVIS KIDNEYSTONE 2D RNDR(NO IV,NO ORAL)(CPT=74176)    Result Date: 2/10/2024  PROCEDURE:  CT ABDOMEN+PELVIS KIDNEYSTONE 2D RNDR(NO IV,NO ORAL)(CPT=74176)   COMPARISON:  [facility], CT, CT ABDOMEN+PELVIS KIDNEYSTONE 2D RNDR(NO IV,NO ORAL)(CPT=74176), 2/06/2024, 5:52 AM.  INDICATIONS:  Left flank pain- recent kidney stone surgery  TECHNIQUE:  Unenhanced multislice CT scanning from above the kidneys to below the urinary bladder.  2D rendering are generated on the CT scanner workstation to localize potential stones in the cranio-caudal plane.  Dose reduction techniques were used. Dose information is transmitted to the ACR (American College of Radiology) NRDR (National Radiology Data Registry) which includes the Dose Index Registry.  PATIENT STATED HISTORY: (As transcribed by Technologist)  Patient has had left flank pain after removing a left ureteral stent at 0600 hrs.    FINDINGS: Evaluation of the visceral organs is limited due to the lack of IV contrast. LUNG BASE:  Unremarkable. LIVER:  Unremarkable. BILIARY:  Unremarkable. SPLEEN:  Unremarkable. PANCREAS:  Unremarkable. ADRENALS:  Unremarkable. KIDNEYS:  There has been interval fragmentation of the previously visualized stone within the left renal pelvis.  Multiple stone fragments are now seen along the course of the left ureter with elongated configuration.  One dominant stone fragment along the distal left ureter positioned within 2 cm of the left UVJ measures up to 15 x 4 mm.  A stone fragment within the mid to distal left ureter best seen on image 59 series 601 measures up to 15 x 5 mm.  There is persistent mild to moderate left hydroureteronephrosis noted.  Multiple nonobstructing stones are seen scattered in the mid and lower pole of the left kidney measuring up to 11 mm.  There is mild left perinephric stranding.  A stone is also now seen within the proximal right ureter in the region of the right UPJ measuring up to 8 x 4 mm with mild right hydronephrosis noted.  There are other scattered nonobstructing stones in the right kidney measuring up to 7mm.  There is minimal right perinephric stranding.   AORTA/VASCULAR:  Unremarkable. RETROPERITONEUM:  Unremarkable. BOWEL/MESENTERY:  Unremarkable appendix.  Scattered feces in the colon.  No large or small bowel dilatation.  No free air or free fluid. ABDOMINAL WALL:  Minimal fat containing umbilical hernia. PELVIC ORGANS:  Interval development of urinary bladder wall thickening with fatty induration is concerning for cystitis.  Clinical correlation recommended.  Unremarkable prostate gland.  Pelvic phleboliths. LYMPH NODES:  No lymphadenopathy in the abdomen or pelvis. BONES:  Unremarkable. OTHER:  None.            CONCLUSION:   1. There has been interval fragmentation of the previously visualized stone within the left renal pelvis.  Multiple stone fragments are now seen along the course of the left ureter with elongated configuration.  One dominant stone fragment along the distal left ureter positioned within 2 cm of the left UVJ measures up to 15 x 4 mm.  A stone fragment within the mid to distal left ureter best seen on image 59 series 601 measures up to 15 x 5 mm.  There is persistent mild to moderate left hydroureteronephrosis noted.  Multiple nonobstructing stones are seen scattered in the mid and lower pole of the left kidney measuring up to 11 mm.  There is mild left perinephric stranding.   2. A stone is also now seen within the proximal right ureter in the region of the right UPJ measuring up to 8 x 4 mm with mild right hydronephrosis noted.  There are other scattered nonobstructing stones in the right kidney measuring up to 7mm.  There is  minimal right perinephric stranding.  3. Interval development of urinary bladder wall thickening with fatty induration is concerning for cystitis.  Clinical correlation recommended  Please see above for further details.  LOCATION:  [place]   Dictated by (CST): Arsenio Hook MD on 2/10/2024 at 9:06 AM     Finalized by (CST): Arsenio Hook MD on 2/10/2024 at 9:12 AM       XR OR - N/C    Result Date:  2/6/2024  PROCEDURE:  XR OR - N/C  COMPARISON:  [facility] , XR, XR OR - N/C, 5/12/2020, 9:02 PM.  INDICATIONS:  CYSTOSCOPY, LEFT RETROGRADE PYELOGRAM, LEFT URETEROSCOPY, POSSIBLE LASER LITHOTRIPSY, LEFT URETERAL STENT INSERTION  TECHNIQUE:   FLUOROSCOPY IMAGES OBTAINED:  4 FLUOROSCOPY TIME:  49 seconds TECHNOLOGIST TIME:  40 minutes RADIATION DOSE (AIR KERMA PRODUCT):  367.4uGy/m2   FINDINGS:  Fluoroscopic guidance for left retrograde pyelogram.            CONCLUSION:  Fluoroscopic guidance for left retrograde pyelogram.   LOCATION:  [facility]    Dictated by (CST): Robin Lee MD on 2/06/2024 at 10:25 PM     Finalized by (CST): Robin Lee MD on 2/06/2024 at 10:25 PM       CT ABDOMEN+PELVIS KIDNEYSTONE 2D RNDR(NO IV,NO ORAL)(CPT=74176)    Result Date: 2/6/2024  PROCEDURE:  CT ABDOMEN+PELVIS KIDNEYSTONE 2D RNDR(NO IV,NO ORAL)(CPT=74176)  COMPARISON:  [facility], CT, CT ABDOMEN+PELVIS KIDNEYSTONE 2D RNDR(NO IV,NO ORAL)(CPT=74176), 11/06/2023, 10:31 AM.  INDICATIONS:  flank pain  TECHNIQUE:  Unenhanced multislice CT scanning from above the kidneys to below the urinary bladder.  2D rendering are generated on the CT scanner workstation to localize potential stones in the cranio-caudal plane.  Dose reduction techniques were used. Dose information is transmitted to the ACR (American College of Radiology) NRDR (National Radiology Data Registry) which includes the Dose Index Registry.  PATIENT STATED HISTORY: (As transcribed by Technologist)  hx kidney stones, surgery for kidney stone rt side, pain is on left flank.   FINDINGS: Evaluation of the visceral organs is limited due to the lack of IV contrast. LUNG BASE:  Unremarkable. LIVER:  Unremarkable. BILIARY:  Unremarkable. SPLEEN:  Unremarkable. PANCREAS:  Unremarkable. ADRENALS:  Unremarkable. KIDNEYS:  Obstructing stone in the left renal pelvis measuring up to 17 mm resulting in mild to moderate left hydronephrosis and mild left perinephric stranding.  Additional scattered  nonobstructing stones elsewhere in the left kidney measuring up to 10 mm.  Nonobstructing stones in the right kidney measuring up to 7 mm. AORTA/VASCULAR:  Unremarkable. RETROPERITONEUM:  Unremarkable. BOWEL/MESENTERY:  Unremarkable appendix.  Scattered feces in the colon.  No large or small bowel dilatation.  No free air or free fluid. ABDOMINAL WALL:  Minimal fat containing umbilical hernia. PELVIC ORGANS:  Pelvic phleboliths.  Unremarkable urinary bladder.  Unremarkable prostate gland. LYMPH NODES:  No lymphadenopathy in the abdomen or pelvis. BONES:  Unremarkable. OTHER:  None.            CONCLUSION:   1. Obstructing stone in the left renal pelvis measuring up to 17 mm resulting in mild to moderate left hydronephrosis and mild left perinephric stranding.  2. Additional bilateral nephrolithiasis.   Please see above for further details.  LOCATION:  [facility]   Dictated by (CST): Arsenio Hook MD on 2/06/2024 at 6:32 AM     Finalized by (CST): Arsenio Hook MD on 2/06/2024 at 6:35 AM          Assessment/Plan:     # L stone fragements  - plan  procedure to remove remaining fragments  -flomax  - pain control    # depression  - zoloft    Ok to dc after procedure if ok w     Outpatient records or previous hospital records reviewed.   [facility] hospitalist to continue to follow patient while in house  A total of 75  minutes taken with patient and coordinating care.  Greater than 50% face to face encounter.      Juan Carlos Rojas MD  [facility] Hospitalist  [phone]

## 2024-02-10 NOTE — CONSULTS
[facility]    Report of Consultation    [place] Patient Status:  Inpatient    1995 MRN [id number]   [facility] 3NW-A Attending Eric Rojas MD   Hosp Day # 0 PCP Spenser Mccormick MD     Date of Admission:  2/10/2024  Date of Consult:  2/10/2024  Reason for Consultation:   Left Ureteral Calculi    History of Present Illness:   Patient is a 28 year old male who was admitted to the hospital for Calculus of left ureter:    29 year old gentleman with a history of cystinuria and multiple prior episodes of nephrolithiasis.     He recently underwent a left URS/LL with Dr. Hilliard on 2024 for a sizeable left renal pelvic calculus.  He had a stent placed post-operatively and was instructed to remove the stent this morning.     Shortly after removing his stent, he developed worsening left flank pain .    UA in the ER was not suggestive of an infection. Blood work was unremarkable.    He underwent a CT which showed multiple residual stone fragments in the left ureter as well as within the lower pole of the left kidney.  There is mild to moderate left hydronephrosis.    He was admitted for pain control.  He wishes to proceed with surgical intervention to remove any residual stone fragments from the kidney and ureter.     Past Medical History  Past Medical History:   Diagnosis Date    Anxiety state     Calculus of kidney     Cystine disease (HCC)     Depression     IBS (irritable bowel syndrome)     KIDNEY STONE     Migraines     OCD (obsessive compulsive disorder)        Past Surgical History  Past Surgical History:   Procedure Laterality Date    COLONOSCOPY      LITHOTRIPSY      OTHER SURGICAL HISTORY  11    Rt RPG, URS stone manipulation,laser litho,Rt stent, Dr. Mcdonough    OTHER SURGICAL HISTORY  11    Cysto stent removal- Dr. Mcdonough    OTHER SURGICAL HISTORY  11/21/15    Cysto, L URS, laser litho, stone extraction, stent placement, RPG Dr. Finn    OTHER SURGICAL HISTORY   12/1/15    cysto stent removal    UPPER GI ENDOSCOPY,EXAM         Family History  Family History   Problem Relation Age of Onset    Heart Disorder Maternal Grandfather     Diabetes Paternal Grandfather     Cancer Paternal Grandfather         meloma    Hypertension Father     Other (Other) Father     Cancer Maternal Grandmother     Cancer Paternal Grandmother        Social History  Social History     Socioeconomic History    Marital status: Single   Tobacco Use    Smoking status: Never    Smokeless tobacco: Never   Substance and Sexual Activity    Alcohol use: No    Drug use: No     Social Determinants of Health     Food Insecurity: No Food Insecurity (2/10/2024)    Food Insecurity     Food Insecurity: Never true   Transportation Needs: No Transportation Needs (2/10/2024)    Transportation Needs     Lack of Transportation: No   Housing Stability: Low Risk  (2/10/2024)    Housing Stability     Housing Instability: No        Current Medications:  Current Facility-Administered Medications   Medication Dose Route Frequency    ketorolac (Toradol) 30 MG/ML injection 30 mg  30 mg Intravenous Q6H PRN    morphINE PF 2 MG/ML injection 1 mg  1 mg Intravenous Q2H PRN    Or    morphINE PF 2 MG/ML injection 2 mg  2 mg Intravenous Q2H PRN    Or    morphINE PF 4 MG/ML injection 4 mg  4 mg Intravenous Q2H PRN    sodium chloride 0.9% infusion   Intravenous Continuous    acetaminophen (Tylenol Extra Strength) tab 500 mg  500 mg Oral Q4H PRN    melatonin tab 3 mg  3 mg Oral Nightly PRN    ondansetron (Zofran) 4 MG/2ML injection 4 mg  4 mg Intravenous Q6H PRN    prochlorperazine (Compazine) 10 MG/2ML injection 5 mg  5 mg Intravenous Q8H PRN    sodium chloride 0.9% infusion   Intravenous Continuous    [START ON 2/11/2024] sertraline (Zoloft) tab 100 mg  100 mg Oral Daily    [START ON 2/11/2024] tamsulosin (Flomax) cap 0.4 mg  0.4 mg Oral Daily     Medications Prior to Admission   Medication Sig    oxyCODONE 5 MG Oral Tab Take 1 tablet (5  mg total) by mouth every 4 (four) hours as needed for Pain.    potassium citrate 10 MEQ (1080 MG) Oral Tab CR Take 1 tablet (10 mEq total) by mouth daily.    sertraline 100 MG Oral Tab Take 1 tablet (100 mg total) by mouth daily.    tamsulosin (FLOMAX) cap Take 1 capsule (0.4 mg total) by mouth daily. (Patient taking differently: Take 1 capsule (0.4 mg total) by mouth as needed.)    HYDROcodone-acetaminophen (NORCO) 5-325 MG Oral Tab Take 1 tablet by mouth every 6 (six) hours as needed.    SUMAtriptan Succinate (IMITREX) 50 MG Oral Tab Take 1 tablet (50 mg total) by mouth every 2 (two) hours as needed for Migraine (max 3 in 24 hrs).       Allergies  Allergies   Allergen Reactions    Ciprofloxacin DIZZINESS     Dizziness, difficulty with walking    Seroquel [Quetiapine] RESTLESSNESS     Shaking, neurological side effects.    Tiopronin OTHER (SEE COMMENTS)     WEIGHT LOSS AND HAIR LOSS PER PT REPORT and protein excretion       Review of Systems:    Pertinent items are noted in HPI.    Physical Exam:   Blood pressure 142/85, pulse 97, temperature 98.3 °F (36.8 °C), temperature source Oral, resp. rate 24, height 5' 7\" (1.702 m), weight 170 lb (77.1 kg), SpO2 92%.    General appearance: alert, appears stated age, and cooperative  Head: Normocephalic, without obvious abnormality, atraumatic  Pulmonary:  Non labored respirations  Cardiovascular: regular rate and rhythm  Abdominal: soft, non-tender; bowel sounds normal; no masses,  no organomegaly  Neurologic: Grossly normal  Psychiatric: anxious    Results:     Laboratory Data:  Lab Results   Component Value Date    WBC 5.2 02/10/2024    HGB 14.4 02/10/2024    HCT 42.1 02/10/2024    .0 02/10/2024    CREATSERUM 0.87 02/10/2024    BUN 23 02/10/2024     02/10/2024    K 3.8 02/10/2024     02/10/2024    CO2 29.0 02/10/2024    GLU 93 02/10/2024    CA 9.3 02/10/2024    ALB 3.4 02/10/2024    ALKPHO 82 02/10/2024    TP 7.0 02/10/2024    AST 10 (L) 02/10/2024     ALT 23 02/10/2024    TSH 0.957 04/29/2019    LIP 60 (L) 05/12/2020    CRP <0.3 03/09/2010    MG 2.2 05/13/2020    PHOS 3.80 06/29/2020    B12 694 06/27/2019         Imaging:  CT ABDOMEN+PELVIS KIDNEYSTONE 2D RNDR(NO IV,NO ORAL)(CPT=74176)    Result Date: 2/10/2024  CONCLUSION:   1. There has been interval fragmentation of the previously visualized stone within the left renal pelvis.  Multiple stone fragments are now seen along the course of the left ureter with elongated configuration.  One dominant stone fragment along the distal left ureter positioned within 2 cm of the left UVJ measures up to 15 x 4 mm.  A stone fragment within the mid to distal left ureter best seen on image 59 series 601 measures up to 15 x 5 mm.  There is persistent mild to moderate left hydroureteronephrosis noted.  Multiple nonobstructing stones are seen scattered in the mid and lower pole of the left kidney measuring up to 11 mm.  There is mild left perinephric stranding.   2. A stone is also now seen within the proximal right ureter in the region of the right UPJ measuring up to 8 x 4 mm with mild right hydronephrosis noted.  There are other scattered nonobstructing stones in the right kidney measuring up to 7mm.  There is  minimal right perinephric stranding.  3. Interval development of urinary bladder wall thickening with fatty induration is concerning for cystitis.  Clinical correlation recommended  Please see above for further details.  LOCATION:  [place]   Dictated by (CST): Arsenio Hook MD on 2/10/2024 at 9:06 AM     Finalized by (CST): Arsenio Hook MD on 2/10/2024 at 9:12 AM           Impression:   28 year old gentleman with a history of cystinuria who presents with obstructing left ureteral stone fragments following a recent left ureteroscopy and laser lithotripsy with Dr. Hilliard on 2/6/2024.    We discussed that his CT shows obstructing stone fragments within the ureter in the distal and mid portion.  These are likely a cluster  of smaller stones, causing obstruction.  WE reviewed treatment options of observation, pain control, and hydration, versus proceeding with repeat ureteroscopy and laser lithotripsy to finish clearing any residual stones in the left ureter and kidney.    He wishes to proceed with surgery this evening.  Keep NPO until the procedure.  IV hydration. Pain control.    After careful review of the patient's medical history and imaging, we had a discussion regarding the various treatment options for their kidney stone.     We then discussed ureteroscopic stone removal.  This is a surgical procedure in which a small telescope is passed through the urethra, into the bladder, and into the ureter/kidney.  When the stone is identified, it is either grasped and removed as a single piece, or fragmented into smaller pieces using a laser fiber and then removed (laser lithotripsy).  This procedure has higher rates of stone clearance, but is more invasive than ESWL.  They understand that a stent is typically placed into the ureter after the completion of the procedure to prevent the ureter from swelling to the point of obstruction, causing persistent symptoms.  This stent remains in place for days to weeks after the procedure, and is typically removed in the office via cystoscopy.  They understand that a ureteral stent is not permanent, and require follow-up for removal.  Risks of ureteroscopy include blood in the urine, urinary tract or kidney infection, damage to the urethra/bladder/ureter/kidney, as well as a possible inability to clear the stone in one procedure.  Rarely, in ~10% of patients, the ureter is unable to accommodate the scope.  This requires placement of a ureteral stent and subsequent follow-up for ureteroscopy and definitive stone treatment once the ureter has passively dilated with the stent in place for at least 1 week.  Sometimes the stone cannot be cleared in 1 treatment session, and repeat ureteroscopic  treatments are required.     I have discussed the risks, benefits and alternatives to the proposed procedure/treatment plan with the patient.  Our discussion also included the risks and benefits of the alternatives treatment options, including doing nothing. They were encouraged to ask questions and all questions were answered to their satisfaction.  At the end of our discussion they gave their verbal consent to the proposed procedure/treatment plan.    Thank you for allowing me to participate in the care of your patient.    Crow Plascencia MD  2/10/2024

## 2024-02-10 NOTE — PROGRESS NOTES
NURSING ADMISSION NOTE      Patient admitted via Wheelchair.  Oriented to room.  Safety precautions initiated.  Bed in low position.  Call light in reach.

## 2024-02-10 NOTE — ED QUICK NOTES
Orders for admission, patient is aware of plan and ready to go upstairs. Any questions, please call ED RN Karla  at extension [phone]. Pt being driven to campus by his father. Pt departed with all belongings and paperwork. Pain and nausea improved. Wheeled out to car.     Patient Covid vaccination status: Fully vaccinated     COVID Test Ordered in ED: None    COVID Suspicion at Admission: N/A    Running Infusions:  None    Mental Status/LOC at time of transport: AxOx4    Other pertinent information:   CIWA score: N/A   NIH score:  N/A

## 2024-02-10 NOTE — ED INITIAL ASSESSMENT (HPI)
Pt recently with stent to left kidney, was told to pull stent out this morning. Having pain since.

## 2024-02-11 VITALS
BODY MASS INDEX: 26.68 KG/M2 | TEMPERATURE: 98 F | OXYGEN SATURATION: 98 % | DIASTOLIC BLOOD PRESSURE: 69 MMHG | SYSTOLIC BLOOD PRESSURE: 117 MMHG | WEIGHT: 170 LBS | HEART RATE: 74 BPM | HEIGHT: 67 IN | RESPIRATION RATE: 15 BRPM

## 2024-02-11 LAB
ANION GAP SERPL CALC-SCNC: 4 MMOL/L (ref 0–18)
BASOPHILS # BLD AUTO: 0.01 X10(3) UL (ref 0–0.2)
BASOPHILS NFR BLD AUTO: 0.2 %
BUN BLD-MCNC: 15 MG/DL (ref 9–23)
CALCIUM BLD-MCNC: 8.7 MG/DL (ref 8.5–10.1)
CHLORIDE SERPL-SCNC: 108 MMOL/L (ref 98–112)
CO2 SERPL-SCNC: 25 MMOL/L (ref 21–32)
CREAT BLD-MCNC: 1.1 MG/DL
EGFRCR SERPLBLD CKD-EPI 2021: 94 ML/MIN/1.73M2 (ref 60–?)
EOSINOPHIL # BLD AUTO: 0 X10(3) UL (ref 0–0.7)
EOSINOPHIL NFR BLD AUTO: 0 %
ERYTHROCYTE [DISTWIDTH] IN BLOOD BY AUTOMATED COUNT: 12.1 %
GLUCOSE BLD-MCNC: 166 MG/DL (ref 70–99)
HCT VFR BLD AUTO: 42.3 %
HGB BLD-MCNC: 13.9 G/DL
IMM GRANULOCYTES # BLD AUTO: 0.02 X10(3) UL (ref 0–1)
IMM GRANULOCYTES NFR BLD: 0.4 %
LYMPHOCYTES # BLD AUTO: 0.46 X10(3) UL (ref 1–4)
LYMPHOCYTES NFR BLD AUTO: 9 %
MCH RBC QN AUTO: 30.8 PG (ref 26–34)
MCHC RBC AUTO-ENTMCNC: 32.9 G/DL (ref 31–37)
MCV RBC AUTO: 93.8 FL
MONOCYTES # BLD AUTO: 0.17 X10(3) UL (ref 0.1–1)
MONOCYTES NFR BLD AUTO: 3.3 %
NEUTROPHILS # BLD AUTO: 4.43 X10 (3) UL (ref 1.5–7.7)
NEUTROPHILS # BLD AUTO: 4.43 X10(3) UL (ref 1.5–7.7)
NEUTROPHILS NFR BLD AUTO: 87.1 %
OSMOLALITY SERPL CALC.SUM OF ELEC: 289 MOSM/KG (ref 275–295)
PLATELET # BLD AUTO: 249 10(3)UL (ref 150–450)
POTASSIUM SERPL-SCNC: 4.3 MMOL/L (ref 3.5–5.1)
RBC # BLD AUTO: 4.51 X10(6)UL
SODIUM SERPL-SCNC: 137 MMOL/L (ref 136–145)
WBC # BLD AUTO: 5.1 X10(3) UL (ref 4–11)

## 2024-02-11 PROCEDURE — 80048 BASIC METABOLIC PNL TOTAL CA: CPT | Performed by: UROLOGY

## 2024-02-11 PROCEDURE — 85025 COMPLETE CBC W/AUTO DIFF WBC: CPT | Performed by: UROLOGY

## 2024-02-11 RX ORDER — HYDROCODONE BITARTRATE AND ACETAMINOPHEN 5; 325 MG/1; MG/1
1-2 TABLET ORAL EVERY 4 HOURS PRN
Qty: 12 TABLET | Refills: 0 | Status: SHIPPED | OUTPATIENT
Start: 2024-02-11

## 2024-02-11 NOTE — ANESTHESIA POSTPROCEDURE EVALUATION
[facility]    Devon East Patient Status:  Inpatient   Age/Gender 28 year old male MRN [id number]   Location [facility] POST ANESTHESIA CARE UNIT Attending Eric Rojas MD   Hosp Day # 0 PCP Spenser Mccormick MD       Anesthesia Post-op Note    CYSTOSCOPY, LEFT RETROGRADE PYELOGRAM, LEFT URETEROSCOPY WITH LASER LITHOTRIPSY, LEFT URETERAL STENT PLACEMENT    Procedure Summary       Date: 02/10/24 Room / Location:  MAIN OR 07 /  MAIN OR    Anesthesia Start: 2124 Anesthesia Stop: 2238    Procedure: CYSTOSCOPY, LEFT RETROGRADE PYELOGRAM, LEFT URETEROSCOPY WITH LASER LITHOTRIPSY, LEFT URETERAL STENT PLACEMENT (Left: Bladder) Diagnosis:       Left ureteral calculus      (Left ureteral calculus [N20.1])    Surgeons: Crow Plascencia MD Anesthesiologist: John Sheriff MD    Anesthesia Type: general ASA Status: 2            Anesthesia Type: general    Vitals Value Taken Time   /69 02/10/24 2240   Temp 97.4 °F (36.3 °C) 02/10/24 2240   Pulse 76 02/10/24 2240   Resp 14 02/10/24 2240   SpO2 96 % 02/10/24 2240   Vitals shown include unfiled device data.    Patient Location: PACU    Anesthesia Type: general    Airway Patency: patent    Postop Pain Control: adequate    Mental Status: preanesthetic baseline    Nausea/Vomiting: none    Cardiopulmonary/Hydration status: stable euvolemic    Complications: no apparent anesthesia related complications    Postop vital signs: stable    Dental Exam: Unchanged from Preop    Patient to be discharged from PACU when criteria met.

## 2024-02-11 NOTE — DISCHARGE SUMMARY
General Medicine Discharge Summary     Patient ID:  Devon East  28 year old  7/7/1995    Admit date: 2/10/2024    Discharge date and time: 2/11/2024 10:34 AM     Attending Physician: No att. providers found     Primary Care Physician: Spenser Mccormick MD     Discharge Diagnoses: Calculus of left ureter [N20.1]    Primary Diagnosis at discharge from Hospital:   kidney stone    Please note that only [facility] DMG and EMG patients enrolled in the Medicare ACO, BCBS ACO and [facility] HMOs will be handled by the [facility] Care Management team.  For all other patients, please follow usual protocol for discharge care transition.    Secondary Diagnoses: None    Risk of readmission: Devon East has Moderate Risk of readmission after discharge from the hospital.    Discharge Condition: stable    Disposition: home    Important Follow up:  - PCP within   - Consults:     Crow De La Rosa MD  [address] [place] [address]  [address]  [facility] [id number] [phone]    Follow up in 1 week(s)  Our office will call to schedule you for a stent removal with one of my partners in 7-10 days.    - Labs: none  - Radiology: none    Hospital Course:        Pt admitted w L kindgerber stone fragments sp cysto with Dr de la rosa.        Consults: IP CONSULT TO UROLOGY  IP CONSULT TO HOSPITALIST    Operative Procedures: Procedure(s) (LRB):  CYSTOSCOPY, LEFT RETROGRADE PYELOGRAM, LEFT URETEROSCOPY WITH LASER LITHOTRIPSY, LEFT URETERAL STENT PLACEMENT (Left)       Patient instructions:      Discharge Medication List as of 2/11/2024  9:51 AM        START taking these medications    Details   !! HYDROcodone-acetaminophen 5-325 MG Oral Tab Take 1-2 tablets by mouth every 4 (four) hours as needed for Pain., Normal, Disp-12 tablet, R-0       !! - Potential duplicate medications found. Please discuss with provider.        CONTINUE these medications which have NOT CHANGED    Details   oxyCODONE 5 MG Oral Tab Take 1 tablet (5 mg  total) by mouth every 4 (four) hours as needed for Pain., Historical      potassium citrate 10 MEQ (1080 MG) Oral Tab CR Take 1 tablet (10 mEq total) by mouth daily., Historical      sertraline 100 MG Oral Tab Take 1 tablet (100 mg total) by mouth daily., Historical      tamsulosin (FLOMAX) cap Take 1 capsule (0.4 mg total) by mouth daily., Normal, Disp-30 capsule, R-3      !! HYDROcodone-acetaminophen (NORCO) 5-325 MG Oral Tab Take 1 tablet by mouth every 6 (six) hours as needed., Print Script, Disp-10 tablet, R-0      SUMAtriptan Succinate (IMITREX) 50 MG Oral Tab Take 1 tablet (50 mg total) by mouth every 2 (two) hours as needed for Migraine (max 3 in 24 hrs)., Normal, Disp-12 tablet, R-3       !! - Potential duplicate medications found. Please discuss with provider.          I PERSONALLY RECONCILED CURRENT AND DISCHARGE MEDICATIONS ON DAY OF DISCHARGE      Activity: activity as tolerated  Diet: regular diet  Wound Care: as directed  Code Status: Full Code      Exam on day of discharge:     Vitals:    02/11/24 0751   BP: 117/69   Pulse: 74   Resp: 15   Temp: 98.1 °F (36.7 °C)       General: no acute distress, alert and oriented x 3  Heart: RRR  Lungs: clear bilaterally, no active wheezing  Abdomen: nontender, nondistended, intact BS  Extremities: no pedal edema   Neuro: CN inact, no focal deficits      Total time coordinating care for discharge: Greater than 30 minutes    .Juan Carlos Rojas  [facility] Hospitalist  [phone]

## 2024-02-11 NOTE — ANESTHESIA PROCEDURE NOTES
Airway  Date/Time: 2/10/2024 9:30 PM  Urgency: elective    Airway not difficult    General Information and Staff    Patient location during procedure: OR  Anesthesiologist: John Sheriff MD  Performed: anesthesiologist   Performed by: John Sheriff MD  Authorized by: John Sheriff MD      Indications and Patient Condition  Indications for airway management: anesthesia  Sedation level: deep  Preoxygenated: yes  Patient position: sniffing  Mask difficulty assessment: 1 - vent by mask    Final Airway Details  Final airway type: supraglottic airway      Successful airway: classic  Size 4       Number of attempts at approach: 1

## 2024-02-11 NOTE — PLAN OF CARE
A&Ox4. VSS. 2L O2 NC  . Denies chest pain and SOB.    GI: Abdomen soft, nondistended. Passing gas.   Denies nausea.   : Voids.   Pain controlled with PRN pain medications.   Up with standby assist.   Diet: Regular  IVF running per order.   All appropriate safety measures in place. All questions and concerns addressed.

## 2024-02-11 NOTE — PROGRESS NOTES
[facility]    Progress Note    Devon East Patient Status:  Inpatient    1995 MRN [id number]   [facility] 3NW-A Attending Eric Rojas MD   Hosp Day # 1 PCP Spenser Mccormick MD     Subjective:   Devon East is a(n) 28 year old male admitted yesterday with obstructing ureteral calculi.    He underwent a left URS/LL last PM.  Numerous small stone fragments obstructing ureter.  All fragments rather small, but were obstructing the distal and mid ureter.  Stones cleared and stent placed.     No overnight events. Pain improved this AM.     Objective:   Blood pressure 111/62, pulse 69, temperature 97.7 °F (36.5 °C), temperature source Oral, resp. rate 14, height 5' 7\" (1.702 m), weight 170 lb (77.1 kg), SpO2 95%.    General appearance: alert, appears stated age, and cooperative  Head: Normocephalic, without obvious abnormality, atraumatic  Eyes: EOMI  Ears: Normal hearing  Pulmonary:  Non labored respirations  Abdominal: soft, non-tender; bowel sounds normal; no masses,  no organomegaly  Neurologic: Grossly normal  Psychiatric: calm    Results:   Lab Results   Component Value Date    WBC 5.1 2024    HGB 13.9 2024    HCT 42.3 2024    .0 2024    CREATSERUM 1.10 2024    BUN 15 2024     2024    K 4.3 2024     2024    CO2 25.0 2024     (H) 2024    CA 8.7 2024    ALB 3.4 02/10/2024    ALKPHO 82 02/10/2024    BILT 0.3 02/10/2024    TP 7.0 02/10/2024    AST 10 (L) 02/10/2024    ALT 23 02/10/2024    TSH 0.957 2019    LIP 60 (L) 2020    CRP <0.3 2010    MG 2.2 2020    PHOS 3.80 2020    B12 694 2019       XR OR - N/C    Result Date: 2/10/2024  CONCLUSION:   Dual fluoroscopic images demonstrate presence of a left-sided nephroureteral stent.  Please refer to dedicated procedure report for full detail.   LOCATION:  [facility]    Dictated by (CST): Josiane Villeda MD  on 2/10/2024 at 11:41 PM     Finalized by (CST): Josiane Villeda MD on 2/10/2024 at 11:41 PM       CT ABDOMEN+PELVIS KIDNEYSTONE 2D RNDR(NO IV,NO ORAL)(CPT=74176)    Result Date: 2/10/2024  CONCLUSION:   1. There has been interval fragmentation of the previously visualized stone within the left renal pelvis.  Multiple stone fragments are now seen along the course of the left ureter with elongated configuration.  One dominant stone fragment along the distal left ureter positioned within 2 cm of the left UVJ measures up to 15 x 4 mm.  A stone fragment within the mid to distal left ureter best seen on image 59 series 601 measures up to 15 x 5 mm.  There is persistent mild to moderate left hydroureteronephrosis noted.  Multiple nonobstructing stones are seen scattered in the mid and lower pole of the left kidney measuring up to 11 mm.  There is mild left perinephric stranding.   2. A stone is also now seen within the proximal right ureter in the region of the right UPJ measuring up to 8 x 4 mm with mild right hydronephrosis noted.  There are other scattered nonobstructing stones in the right kidney measuring up to 7mm.  There is  minimal right perinephric stranding.  3. Interval development of urinary bladder wall thickening with fatty induration is concerning for cystitis.  Clinical correlation recommended  Please see above for further details.  LOCATION:  [facility]   Dictated by (CST): Arsenio Hook MD on 2/10/2024 at 9:06 AM     Finalized by (CST): Arsenio Hook MD on 2/10/2024 at 9:12 AM            Assessment & Plan:     Calculus of left ureter    POD 1 s/p left URS/LL, multiple small stone fragments cleared from ureter and lower pole of the kidney.  Stent is in place.    He is feeling better this AM.  Afebrile.     Discharge to home today.  Follow up in 7-10 days in the office for cystoscopy and left ureteral stent removal.     Script for norco sent.  Patient has tamsulosin at home, and I recommended he takes 0.4mg  nightly until stent removal.     Crow Plascencia MD  2/11/2024

## 2024-02-11 NOTE — PROGRESS NOTES
Patient discharged to home. Discharge instructions reviewed with the patient, and the patient verbalized his understanding of his discharge instructions. The patient was taken down to the [facility] by wheelchair by his Patient Care Technician, and he will go home with his parents.

## 2024-02-11 NOTE — OPERATIVE REPORT
OPERATIVE NOTE    PATIENT NAME: Devon East  YOB: 1995  DATE OF SERVICE: 2/10/2024    SURGEON:  Crow Plascencia MD    ASSISTANT:  None    PREOPERATIVE DIAGNOSIS:  Cystinuria, Left ureteral calculi following recent left URS/LL, Left hydronephrosis    POSTOPERATIVE DIAGNOSIS:  Same    PROCEDURE PERFORMED:  Cystoscopy  Left Retrograde Pyelogram  Left Ureteroscopy with Laser Lithotripsy and Stone Basket Extraction  Left Ureteral Stent Placement  Intraoperative interpretation of fluoroscopic images    ASSISTANTS:  None    ANESTHESIA:  General    ANTIBIOTIC PROPHYLAXIS:  Ancef    SPECIMENS:  None    DRAINS:  6F x 26 cm JJ left ureteral stent    ESTIMATED BLOOD LOSS:  Minimal    COMPLICATIONS:  No immediate complications     INDICATIONS FOR PROCEDURE:  Mr. East is a 28 year old gentleman with a history of cystinuria.  He has had multiple prior stone procedures.  He recently presented with an obstructing left renal pelvic calculus measuring ~1.7cm.  He underwent a left URS/LL with Dr. Hilliard on 2/6/2024.  He was instructed to remove his stent this AM and shortly after doing so, developed sudden onset left flank pain.  He presented to the ER for evaluation and a CT showed multiple stone fragments within the mid and distal ureter, as well as a large amount of stone debris in the lower pole calyces.  We discussed options including continued observation, stent placement, or ureteroscopy with laser lithotripsy to clear any residual stone debris. He elected to proceed with surgical intervention.     We discussed the risks of the procedure including bleeding, infection, or damage to the urologic structures.  The patient understands that in ~10% of patients, the ureter is too narrow to accommodate a ureteroscope.  If this occurs, a ureteral stent will be placed and the patient will need to return to the OR in a delayed fashion for definitive stone treatment after the stent has allowed passive dilation of the  ureter to occur.  Furthermore, they understand that ureteral stents can cause flank pain and/or urinary symptoms.     DESCRIPTION OF PROCEDURE:  After reviewing the indications for the procedure, informed consent was reviewed and signed by the patient.    The patient was brought to the operating room and placed in the supine position on the OR table.  SCD's were applied and all pressure points were carefully padded.  At this point, anesthesia was successfully induced.  The patient was then given the perioperative antibiotics listed above prior to the procedure.  The patient was transferred to the dorsal lithotomy position and prepped and draped in the normal sterile fashion using betadine.  We then performed an operative time out to confirm the correct patient, procedure, and laterality.  Everyone in the room was in agreement.     I began by inserting a 22F rigid cystoscope into the bladder per urethra.  The urethra was without lesions or strictures.  Upon entering the bladder I performed a thorough cystoscopy which did not reveal any bladder lesions. There were several small stone fragments within the bladder.  I identified the bilateral ureteral orifices in their orthotopic locations.      I first began by performing a Left retrograde pyelogram using a 5F ureteral catheter and contrast dye.  There was a distal filling defect, consistent with his known stone debris    I next cannulated the left ureteral orifice using a 0.035 wire. I followed this up into the renal pelvis under fluoroscopic guidance.      I then inserted a semi rigid ureteroscope into the left distal ureter.  I immediately encountered a numerous <1mm stone fragments.  He has steinstrasse causing distal ureteral obstruction.  I used irrigation from the scope to flush out any smaller stone debris, and I then spent ~30 minutes grasping any removing all fragments from the ureter. They were placed in the bladder for later evacuation.  After clearing the  distal ureter of stones, I also encountered several <1mm stones in the mid ureter, which I cleared in a similar fashion. I was able to advance the semi rigid ureteroscope into the proximal ureter, and no additional ureteral calculi or fragments were appreciated.     At this point, I advanced an 11/13 x 36cm ureteral access sheath into the proximal ureter under fluoroscopic guidance.  I then inserted a flexible ureteroscope and performed a proximal ureteroscopy and nephroscopy.  The proximal ureter did not have any stone fragments. There was a good deal of stone sediment in the lower pole and mid pole of the kidney.  I used a 200 micron laser fiber and fragmented this sediment into fine powder and used irrigation from the scope to flush as much of this as possible down the ureteral access sheath. I spent a good deal of time clearing this sediment using irrigation and further laser lithotripsy.  Once I was satisfied that all sizeable stone fragments had been fragmented into <<1mm fragments, I backed my scope and sheath down the ureter, to ensure no migration of stones into the ureter.  The ureter remained clear. I replaced a 0.035 wire and removed the scope and sheath.     Over the wire, I then advanced a 6F x 26 cm JJ left ureteral stent under fluoroscopic guidance.  There was a good proximal curl in the left renal pelvis, and a good distal curl on fluoroscopy.     I then emptied the bladder of any stone debris and irrigant. 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 2% viscous lidocaine was instilled into the urethra for post-operative analgesia.    This completed the procedure.  They tolerated it well without complications.    Please note that I was present for, and completed the entirety of this operation myself.    PLAN:  Follow up in 7-10 days in the office for cystoscopy and left ureteral stent removal. Observe overnight, anticipate discharge to home tomorrow.       Crow Plascencia,  MD  [facility]  Department of Urology  Office: [phone]

## 2024-02-11 NOTE — PLAN OF CARE
Patient arrived to floor from PACU on bed. Vitals taken. Pt is stable condition. Resting comfortably

## 2024-02-11 NOTE — ANESTHESIA PREPROCEDURE EVALUATION
PRE-OP EVALUATION    Patient Name: Devon East    Admit Diagnosis: Intractable pain [R52]  Ureteral stone with hydronephrosis [N13.2]    Pre-op Diagnosis: Calculi, ureter [N20.1]    CYSTOSCOPY, LEFT RETROGRADE PYELOGRAM, LEFT URETEROSCOPY WITH LASER LITHOTRIPSY, LEFT URETERAL STENT PLACEMENT    Anesthesia Procedure: CYSTOSCOPY, LEFT RETROGRADE PYELOGRAM, LEFT URETEROSCOPY WITH LASER LITHOTRIPSY, LEFT URETERAL STENT PLACEMENT (Left)    Surgeon(s) and Role:     * Thais PASCAL    Pre-op vitals reviewed.  Temp: 98.3 °F (36.8 °C)  Pulse: 97  Resp: 24  BP: 142/85  SpO2: 92 %  Body mass index is 26.63 kg/m².    Current medications reviewed.  Hospital Medications:   [COMPLETED] HYDROmorphone (Dilaudid) 1 MG/ML injection 1 mg  1 mg Intravenous Once    [COMPLETED] HYDROmorphone (Dilaudid) 1 MG/ML injection 0.5 mg  0.5 mg Intravenous Once    [COMPLETED] ondansetron (Zofran) 4 MG/2ML injection 4 mg  4 mg Intravenous Once    ketorolac (Toradol) 30 MG/ML injection 30 mg  30 mg Intravenous Q6H PRN    morphINE PF 2 MG/ML injection 1 mg  1 mg Intravenous Q2H PRN    Or    morphINE PF 2 MG/ML injection 2 mg  2 mg Intravenous Q2H PRN    Or    morphINE PF 4 MG/ML injection 4 mg  4 mg Intravenous Q2H PRN    sodium chloride 0.9% infusion   Intravenous Continuous    acetaminophen (Tylenol Extra Strength) tab 500 mg  500 mg Oral Q4H PRN    melatonin tab 3 mg  3 mg Oral Nightly PRN    ondansetron (Zofran) 4 MG/2ML injection 4 mg  4 mg Intravenous Q6H PRN    prochlorperazine (Compazine) 10 MG/2ML injection 5 mg  5 mg Intravenous Q8H PRN    sodium chloride 0.9% infusion   Intravenous Continuous    [START ON 2/11/2024] sertraline (Zoloft) tab 100 mg  100 mg Oral Daily    [START ON 2/11/2024] tamsulosin (Flomax) cap 0.4 mg  0.4 mg Oral Daily    [COMPLETED] HYDROmorphone (Dilaudid) 1 MG/ML injection 1 mg  1 mg Intravenous Once       Outpatient Medications:     Medications Prior to Admission   Medication Sig Dispense Refill Last Dose    oxyCODONE  5 MG Oral Tab Take 1 tablet (5 mg total) by mouth every 4 (four) hours as needed for Pain.   2/10/2024    potassium citrate 10 MEQ (1080 MG) Oral Tab CR Take 1 tablet (10 mEq total) by mouth daily.   2/9/2024    sertraline 100 MG Oral Tab Take 1 tablet (100 mg total) by mouth daily.   2/9/2024    tamsulosin (FLOMAX) cap Take 1 capsule (0.4 mg total) by mouth daily. (Patient taking differently: Take 1 capsule (0.4 mg total) by mouth as needed.) 30 capsule 3 2/10/2024    HYDROcodone-acetaminophen (NORCO) 5-325 MG Oral Tab Take 1 tablet by mouth every 6 (six) hours as needed. 10 tablet 0     SUMAtriptan Succinate (IMITREX) 50 MG Oral Tab Take 1 tablet (50 mg total) by mouth every 2 (two) hours as needed for Migraine (max 3 in 24 hrs). 12 tablet 3        Allergies: Ciprofloxacin, Seroquel [quetiapine], and Tiopronin      Anesthesia Evaluation    Patient summary reviewed.    Anesthetic Complications           GI/Hepatic/Renal                                 Cardiovascular                                                       Endo/Other                                  Pulmonary                           Neuro/Psych      (+) depression             (+) headaches           Cysteine disorder with chronic kidney stones        Past Surgical History:   Procedure Laterality Date    COLONOSCOPY      LITHOTRIPSY      OTHER SURGICAL HISTORY  2-17-11    Rt RPG, URS stone manipulation,laser litho,Rt stent, Dr. Mcdonough    OTHER SURGICAL HISTORY  2/21/11    Cysto stent removal- Dr. Mcdonough    OTHER SURGICAL HISTORY  11/21/15    Cysto, L URS, laser litho, stone extraction, stent placement, RPG Dr. Finn    OTHER SURGICAL HISTORY  12/1/15    cysto stent removal    UPPER GI ENDOSCOPY,EXAM       Social History     Socioeconomic History    Marital status: Single   Tobacco Use    Smoking status: Never    Smokeless tobacco: Never   Substance and Sexual Activity    Alcohol use: No    Drug use: No     History   Drug Use No     Available pre-op labs  reviewed.  Lab Results   Component Value Date    WBC 5.2 02/10/2024    RBC 4.68 02/10/2024    HGB 14.4 02/10/2024    HCT 42.1 02/10/2024    MCV 90.0 02/10/2024    MCH 30.8 02/10/2024    MCHC 34.2 02/10/2024    RDW 12.3 02/10/2024    .0 02/10/2024     Lab Results   Component Value Date     02/10/2024    K 3.8 02/10/2024     02/10/2024    CO2 29.0 02/10/2024    BUN 23 02/10/2024    CREATSERUM 0.87 02/10/2024    GLU 93 02/10/2024    CA 9.3 02/10/2024            Airway      Mallampati: II  Mouth opening: >3 FB  TM distance: > 6 cm  Neck ROM: full Cardiovascular    Cardiovascular exam normal.         Dental             Pulmonary    Pulmonary exam normal.                 Other findings              ASA: 2   Plan: general  NPO status verified and           Plan/risks discussed with: patient                Present on Admission:  **None**

## 2024-02-13 NOTE — PAYOR COMM NOTE
--------------  ADMISSION REVIEW     Payor: [facility]  Subscriber #:  [id number]  Authorization Number: [id number]    Admit date: 2/10/24  Admit time: 12:43 PM       REVIEW DOCUMENTATION:     ED Provider Notes        ED Provider Notes signed by Cecelia Peng MD at 2/10/2024 10:41 AM       Author: Cecelia Peng MD Service: -- Author Type: Physician    Filed: 2/10/2024 10:41 AM Date of Service: 2/10/2024  8:05 AM Status: Signed    : Cecelia Peng MD (Physician)           Patient Seen in: [facility] In [place]      History     Chief Complaint   Patient presents with    Abdomen/Flank Pain     Stated Complaint: Left flank pain- recent kidney stone surgery    Subjective:   HPI    28-year-old male with a past medical history as below including cystinuria and recurrent kidney stones presents with left flank and left abdominal pain starting approximately 2 hours ago after he pulled his ureteral stent.  Patient states the stent was put in 4 days ago for a left-sided kidney stone and he was instructed to pull it yesterday.  He states he did not pull until this morning and pain started almost immediately after he was removed.  He states the pain feels the same as the kidney stone pain he had prior to stent placement.  He states there was small amount of blood on the stent when he removed it.  He states he took ibuprofen and oxycodone without improvement.  Denies nausea or vomiting.  Has some mild dysuria.  Denies fever or chills.    Objective:   Past Medical History:   Diagnosis Date    Anxiety state     Calculus of kidney     Cystine disease (HCC)     Depression     IBS (irritable bowel syndrome)     KIDNEY STONE     Migraines     OCD (obsessive compulsive disorder)               Past Surgical History:   Procedure Laterality Date    COLONOSCOPY      LITHOTRIPSY      OTHER SURGICAL HISTORY  2-17-11    Rt RPG, URS stone manipulation,laser litho,Rt stent, Dr. Mcdonough    OTHER SURGICAL HISTORY   2/21/11    Cysto stent removal- Dr. Mcdonough    OTHER SURGICAL HISTORY  11/21/15    Cysto, L URS, laser litho, stone extraction, stent placement, RPG Dr. Finn    OTHER SURGICAL HISTORY  12/1/15    cysto stent removal    UPPER GI ENDOSCOPY,EXAM                  No pertinent social history.            Review of Systems   Constitutional:  Negative for chills and fever.   Gastrointestinal:  Positive for abdominal pain. Negative for diarrhea, nausea and vomiting.   Genitourinary:  Positive for flank pain.       Positive for stated complaint: Left flank pain- recent kidney stone surgery  Other systems are as noted in HPI.  Constitutional and vital signs reviewed.      All other systems reviewed and negative except as noted above.    Physical Exam     ED Triage Vitals [02/10/24 0751]   BP (!) 158/110   Pulse 98   Resp 20   Temp 98.2 °F (36.8 °C)   Temp src Temporal   SpO2 97 %   O2 Device None (Room air)       Current:BP (!) 132/96   Pulse 80   Temp 98.2 °F (36.8 °C) (Temporal)   Resp 18   Ht 170.2 cm (5' 7\")   Wt 77.1 kg   SpO2 97%   BMI 26.63 kg/m²         Physical Exam  Vitals and nursing note reviewed.   Constitutional:       General: He is not in acute distress.     Appearance: He is well-developed. He is not ill-appearing.   HENT:      Head: Normocephalic and atraumatic.      Mouth/Throat:      Mouth: Mucous membranes are moist.   Eyes:      General: No scleral icterus.     Extraocular Movements: Extraocular movements intact.   Cardiovascular:      Rate and Rhythm: Normal rate and regular rhythm.   Pulmonary:      Effort: Pulmonary effort is normal.      Breath sounds: Normal breath sounds.   Abdominal:      General: There is no distension.      Palpations: Abdomen is soft.      Tenderness: There is abdominal tenderness. There is left CVA tenderness. There is no guarding or rebound.      Comments: LLQ and left flank tenderness   Musculoskeletal:      Cervical back: Neck supple.   Skin:     General: Skin is warm  and dry.      Capillary Refill: Capillary refill takes less than 2 seconds.   Neurological:      Mental Status: He is alert and oriented to person, place, and time.      GCS: GCS eye subscore is 4. GCS verbal subscore is 5. GCS motor subscore is 6.   Psychiatric:         Mood and Affect: Mood normal.         Behavior: Behavior normal.               ED Course     Labs Reviewed   URINALYSIS WITH CULTURE REFLEX - Abnormal; Notable for the following components:       Result Value    Blood Urine Large (*)     All other components within normal limits   COMP METABOLIC PANEL (14) - Abnormal; Notable for the following components:    AST 10 (*)     A/G Ratio 0.9 (*)     All other components within normal limits   CBC WITH DIFFERENTIAL WITH PLATELET    Narrative:     The following orders were created for panel order CBC With Differential With Platelet.  Procedure                               Abnormality         Status                     ---------                               -----------         ------                     CBC W/ DIFFERENTIAL[[id number]]                              Final result                 Please view results for these tests on the individual orders.   UA MICROSCOPIC ONLY, URINE   RAINBOW DRAW LIGHT GREEN   RAINBOW DRAW LAVENDER   CBC W/ DIFFERENTIAL             CT ABDOMEN+PELVIS KIDNEYSTONE 2D RNDR(NO IV,NO ORAL)(CPT=74176)    Result Date: 2/10/2024  CONCLUSION:   1. There has been interval fragmentation of the previously visualized stone within the left renal pelvis.  Multiple stone fragments are now seen along the course of the left ureter with elongated configuration.  One dominant stone fragment along the distal left ureter positioned within 2 cm of the left UVJ measures up to 15 x 4 mm.  A stone fragment within the mid to distal left ureter best seen on image 59 series 601 measures up to 15 x 5 mm.  There is persistent mild to moderate left hydroureteronephrosis noted.  Multiple nonobstructing  stones are seen scattered in the mid and lower pole of the left kidney measuring up to 11 mm.  There is mild left perinephric stranding.   2. A stone is also now seen within the proximal right ureter in the region of the right UPJ measuring up to 8 x 4 mm with mild right hydronephrosis noted.  There are other scattered nonobstructing stones in the right kidney measuring up to 7mm.  There is  minimal right perinephric stranding.  3. Interval development of urinary bladder wall thickening with fatty induration is concerning for cystitis.  Clinical correlation recommended  Please see above for further details.  LOCATION:  [facility]   Dictated by (CST): Arsenio Hook MD on 2/10/2024 at 9:06 AM     Finalized by (CST): Arsenio Hook MD on 2/10/2024 at 9:12 AM              MDM   28-year-old male with a past medical history as below including cystinuria and recurrent kidney stones presents with left flank and left abdominal pain starting approximately 2 hours ago after he pulled his ureteral stent.     Differential includes but is not limited to obstructing ureteral calculus, complication from ureteral stent removal, UTI/pyelonephritis    Labs show normal WBC, renal function and electrolytes.  UA without evidence of UTI.    Independent trepidation of CT abdomen/pelvis shows several oblong stones in left ureter.  Radiology report reviewed as above noting fragmentation to previously visualized stone in the left renal pelvis with 15 x 4 and 15 x 5 mm stones in the distal ureter causing moderate hydro.    Discussed with urology Dr. Plascencia, will admit for further management given large stones that will not likely pass spontaneously.  Discussed with hospitalist Dr. Rojas.    Medical Decision Making  Amount and/or Complexity of Data Reviewed  External Data Reviewed: notes.     Details: Operative report on 2/6/2024 reviewed where patient had ureteroscopy with stent placement and lithotripsy with Dr. Hilliard  Labs: ordered.  Decision-making details documented in ED Course.  Radiology: ordered and independent interpretation performed. Decision-making details documented in ED Course.  Discussion of management or test interpretation with external provider(s): Urology, hospitalist    Risk  Parenteral controlled substances.  Decision regarding hospitalization.        Disposition and Plan     Clinical Impression:  1. Calculus of left ureter         Disposition:  Admit  2/10/2024 10:34 am    Follow-up:  No follow-up provider specified.        Medications Prescribed:  Current Discharge Medication List                            Hospital Problems       Present on Admission  Date Reviewed: 6/8/2020            ICD-10-CM Noted POA    * (Principal) Calculus of left ureter N20.1 2/10/2024 Unknown                     Signed by Cecelia Peng MD on 2/10/2024 10:41 AM     [facility] Hospitalist History and Physical    Assessment/Plan:      # L stone fragements  - plan  procedure to remove remaining fragments  -flomax  - pain control     # depression  - zoloft     Impression:   28 year old gentleman with a history of cystinuria who presents with obstructing left ureteral stone fragments following a recent left ureteroscopy and laser lithotripsy with Dr. Hilliard on 2/6/2024.     We discussed that his CT shows obstructing stone fragments within the ureter in the distal and mid portion.  These are likely a cluster of smaller stones, causing obstruction.  WE reviewed treatment options of observation, pain control, and hydration, versus proceeding with repeat ureteroscopy and laser lithotripsy to finish clearing any residual stones in the left ureter and kidney.     He wishes to proceed with surgery this evening.  Keep NPO until the procedure.  IV hydration. Pain control.     After careful review of the patient's medical history and imaging, we had a discussion regarding the various treatment options for their kidney stone.      We then discussed ureteroscopic  stone removal.  This is a surgical procedure in which a small telescope is passed through the urethra, into the bladder, and into the ureter/kidney.  When the stone is identified, it is either grasped and removed as a single piece, or fragmented into smaller pieces using a laser fiber and then removed (laser lithotripsy).  This procedure has higher rates of stone clearance, but is more invasive than ESWL.  They understand that a stent is typically placed into the ureter after the completion of the procedure to prevent the ureter from swelling to the point of obstruction, causing persistent symptoms.  This stent remains in place for days to weeks after the procedure, and is typically removed in the office via cystoscopy.  They understand that a ureteral stent is not permanent, and require follow-up for removal.  Risks of ureteroscopy include blood in the urine, urinary tract or kidney infection, damage to the urethra/bladder/ureter/kidney, as well as a possible inability to clear the stone in one procedure.  Rarely, in ~10% of patients, the ureter is unable to accommodate the scope.  This requires placement of a ureteral stent and subsequent follow-up for ureteroscopy and definitive stone treatment once the ureter has passively dilated with the stent in place for at least 1 week.  Sometimes the stone cannot be cleared in 1 treatment session, and repeat ureteroscopic treatments are required.      I have discussed the risks, benefits and alternatives to the proposed procedure/treatment plan with the patient.  Our discussion also included the risks and benefits of the alternatives treatment options, including doing nothing. They were encouraged to ask questions and all questions were answered to their satisfaction.  At the end of our discussion they gave their verbal consent to the proposed procedure/treatment plan.  2/11  Assessment & Plan:   Calculus of left ureter     POD 1 s/p left URS/LL, multiple small stone  fragments cleared from ureter and lower pole of the kidney.  Stent is in place.       MEDICATIONS ADMINISTERED IN LAST 1 DAY:  Administration History       None            Vitals (last day) before discharge       Date/Time Temp Pulse Resp BP SpO2 Weight O2 Device O2 Flow Rate (L/min) [facility]    02/10/24 2350 -- 79 12 125/72 95 % -- Nasal cannula 2 L/min     02/10/24 2315 98.2 °F (36.8 °C) 82 17 112/80 95 % -- -- 2 L/min     02/10/24 2305 -- 79 10 115/86 95 % -- -- 3 L/min     02/10/24 2250 -- 81 10 118/89 97 % -- -- 6 L/min     02/10/24 2236 97.4 °F (36.3 °C) 83 11 102/58 90 % -- Nasal cannula 8 L/min     02/10/24 1139 -- 76 16 124/94 99 % -- None (Room air) -- [place]    02/10/24 1020 -- 80 18 132/96 97 % -- None (Room air) -- [place]    02/10/24 0853 -- 74 16 133/91 99 % -- None (Room air) -- [place]    02/10/24 0751 98.2 °F (36.8 °C) 98 20 158/110 97 % 170 lb None (Room air) -- [place]          CIWA Scores (last 2 days) before discharge       None            PLEASE FAX DAYS CERTIFIED AND NEXT REVIEW DATE [phone]

## 2024-02-13 NOTE — PAYOR COMM NOTE
--------------  DISCHARGE REVIEW    Payor: [facility] PPO  Subscriber #:  [id number]  Authorization Number: [id number]    Admit date: 2/10/24  Admit time:  12:43 PM  Discharge Date: 2/11/2024 10:34 AM     Admitting Physician: Eric Rojas MD  Attending Physician:  No att. providers found  Primary Care Physician: Spenser Mccormick MD          Discharge Summary Notes        Discharge Summary signed by Eric Rojas MD at 2/11/2024 11:44 AM       Author: Eric Rojas MD Specialty: HOSPITALIST, Internal Medicine Author Type: Physician    Filed: 2/11/2024 11:44 AM Date of Service: 2/11/2024 11:43 AM Status: Signed    : Eric Rojas MD (Physician)                                                              General Medicine Discharge Summary     Patient ID:  Devon East  28 year old  7/7/1995    Admit date: 2/10/2024    Discharge date and time: 2/11/2024 10:34 AM     Attending Physician: No att. providers found     Primary Care Physician: Spenser Mccormick MD     Discharge Diagnoses: Calculus of left ureter [N20.1]    Primary Diagnosis at discharge from Hospital:   kidney stone    Please note that only IHP DMG and EMG patients enrolled in the Medicare ACO, [facility] ACO and [facility] HMOs will be handled by the \[facility]\"" Care Management team.  For all other patients, please follow usual protocol for discharge care transition.    Secondary Diagnoses: None    Risk of readmission: Devon East has Moderate Risk of readmission after discharge from the hospital.    Discharge Condition: stable    Disposition: home    Important Follow up:  - PCP within   - Consults:     Crow De La Rosa MD  [address] [place] [address]  [address] [place] [id number]  [phone]    Follow up in 1 week(s)  Our office will call to schedule you for a stent removal with one of my partners in 7-10 days.    - Labs: none  - Radiology: none    Hospital Course:        Pt admitted w L kindgerber stone fragments sp cysto with Dr de la rosa.         Consults: IP CONSULT TO UROLOGY  IP CONSULT TO HOSPITALIST    Operative Procedures: Procedure(s) (LRB):  CYSTOSCOPY, LEFT RETROGRADE PYELOGRAM, LEFT URETEROSCOPY WITH LASER LITHOTRIPSY, LEFT URETERAL STENT PLACEMENT (Left)       Patient instructions:      Discharge Medication List as of 2/11/2024  9:51 AM        START taking these medications    Details   !! HYDROcodone-acetaminophen 5-325 MG Oral Tab Take 1-2 tablets by mouth every 4 (four) hours as needed for Pain., Normal, Disp-12 tablet, R-0       !! - Potential duplicate medications found. Please discuss with provider.        CONTINUE these medications which have NOT CHANGED    Details   oxyCODONE 5 MG Oral Tab Take 1 tablet (5 mg total) by mouth every 4 (four) hours as needed for Pain., Historical      potassium citrate 10 MEQ (1080 MG) Oral Tab CR Take 1 tablet (10 mEq total) by mouth daily., Historical      sertraline 100 MG Oral Tab Take 1 tablet (100 mg total) by mouth daily., Historical      tamsulosin (FLOMAX) cap Take 1 capsule (0.4 mg total) by mouth daily., Normal, Disp-30 capsule, R-3      !! HYDROcodone-acetaminophen (NORCO) 5-325 MG Oral Tab Take 1 tablet by mouth every 6 (six) hours as needed., Print Script, Disp-10 tablet, R-0      SUMAtriptan Succinate (IMITREX) 50 MG Oral Tab Take 1 tablet (50 mg total) by mouth every 2 (two) hours as needed for Migraine (max 3 in 24 hrs)., Normal, Disp-12 tablet, R-3       !! - Potential duplicate medications found. Please discuss with provider.          I PERSONALLY RECONCILED CURRENT AND DISCHARGE MEDICATIONS ON DAY OF DISCHARGE      Activity: activity as tolerated  Diet: regular diet  Wound Care: as directed  Code Status: Full Code      Exam on day of discharge:     Vitals:    02/11/24 0751   BP: 117/69   Pulse: 74   Resp: 15   Temp: 98.1 °F (36.7 °C)       General: no acute distress, alert and oriented x 3  Heart: RRR  Lungs: clear bilaterally, no active wheezing  Abdomen: nontender, nondistended,  intact BS  Extremities: no pedal edema   Neuro: CN inact, no focal deficits      Total time coordinating care for discharge: Greater than 30 minutes    .Juan Carlos Rojas  [facility] Hospitalist  [phone]      Electronically signed by Eric Rojas MD on 2/11/2024 11:44 AM         REVIEWER COMMENTS

## 2024-02-16 LAB
Lab: 100 %
Lab: 100 %
WEIGHT-STONE: 71 MG

## 2024-03-07 ENCOUNTER — HOSPITAL ENCOUNTER (INPATIENT)
Facility: HOSPITAL | Age: 29
LOS: 1 days | Discharge: HOME | End: 2024-03-08
Attending: EMERGENCY MEDICINE | Admitting: INTERNAL MEDICINE
Payer: COMMERCIAL

## 2024-03-07 ENCOUNTER — ANESTHESIA EVENT (OUTPATIENT)
Dept: SURGERY | Facility: HOSPITAL | Age: 29
End: 2024-03-07
Payer: COMMERCIAL

## 2024-03-07 ENCOUNTER — APPOINTMENT (OUTPATIENT)
Dept: CT IMAGING | Age: 29
End: 2024-03-07
Attending: PHYSICIAN ASSISTANT
Payer: COMMERCIAL

## 2024-03-07 ENCOUNTER — APPOINTMENT (OUTPATIENT)
Dept: GENERAL RADIOLOGY | Facility: HOSPITAL | Age: 29
End: 2024-03-07
Attending: UROLOGY
Payer: COMMERCIAL

## 2024-03-07 ENCOUNTER — ANESTHESIA (OUTPATIENT)
Dept: SURGERY | Facility: HOSPITAL | Age: 29
End: 2024-03-07
Payer: COMMERCIAL

## 2024-03-07 DIAGNOSIS — Q62.11 HYDRONEPHROSIS WITH URETEROPELVIC JUNCTION (UPJ) OBSTRUCTION: ICD-10-CM

## 2024-03-07 DIAGNOSIS — E72.01 CYSTINURIA (HCC): ICD-10-CM

## 2024-03-07 DIAGNOSIS — N13.2 URETERAL STONE WITH HYDRONEPHROSIS: ICD-10-CM

## 2024-03-07 DIAGNOSIS — N20.1 URETERAL CALCULI: ICD-10-CM

## 2024-03-07 DIAGNOSIS — N20.0 NEPHROLITHIASIS: ICD-10-CM

## 2024-03-07 DIAGNOSIS — N20.1 CALCULUS OF LEFT URETER: ICD-10-CM

## 2024-03-07 DIAGNOSIS — N23 RENAL COLIC: Primary | ICD-10-CM

## 2024-03-07 LAB
ALBUMIN SERPL-MCNC: 4.3 G/DL (ref 3.4–5)
ALBUMIN/GLOB SERPL: 1.2 {RATIO} (ref 1–2)
ALP LIVER SERPL-CCNC: 78 U/L
ALT SERPL-CCNC: 38 U/L
ANION GAP SERPL CALC-SCNC: 3 MMOL/L (ref 0–18)
AST SERPL-CCNC: 21 U/L (ref 15–37)
BASOPHILS # BLD AUTO: 0.04 X10(3) UL (ref 0–0.2)
BASOPHILS NFR BLD AUTO: 0.8 %
BILIRUB SERPL-MCNC: 0.5 MG/DL (ref 0.1–2)
BILIRUB UR QL STRIP.AUTO: NEGATIVE
BUN BLD-MCNC: 21 MG/DL (ref 9–23)
CALCIUM BLD-MCNC: 9.7 MG/DL (ref 8.5–10.1)
CHLORIDE SERPL-SCNC: 101 MMOL/L (ref 98–112)
CLARITY UR REFRACT.AUTO: CLEAR
CO2 SERPL-SCNC: 31 MMOL/L (ref 21–32)
COLOR UR AUTO: YELLOW
CREAT BLD-MCNC: 0.98 MG/DL
EGFRCR SERPLBLD CKD-EPI 2021: 108 ML/MIN/1.73M2 (ref 60–?)
EOSINOPHIL # BLD AUTO: 0.06 X10(3) UL (ref 0–0.7)
EOSINOPHIL NFR BLD AUTO: 1.1 %
ERYTHROCYTE [DISTWIDTH] IN BLOOD BY AUTOMATED COUNT: 12.5 %
GLOBULIN PLAS-MCNC: 3.6 G/DL (ref 2.8–4.4)
GLUCOSE BLD-MCNC: 93 MG/DL (ref 70–99)
GLUCOSE UR STRIP.AUTO-MCNC: NEGATIVE MG/DL
HCT VFR BLD AUTO: 44.4 %
HGB BLD-MCNC: 15 G/DL
IMM GRANULOCYTES # BLD AUTO: 0.04 X10(3) UL (ref 0–1)
IMM GRANULOCYTES NFR BLD: 0.8 %
KETONES UR STRIP.AUTO-MCNC: NEGATIVE MG/DL
LEUKOCYTE ESTERASE UR QL STRIP.AUTO: NEGATIVE
LYMPHOCYTES # BLD AUTO: 1.74 X10(3) UL (ref 1–4)
LYMPHOCYTES NFR BLD AUTO: 33.3 %
MCH RBC QN AUTO: 30.8 PG (ref 26–34)
MCHC RBC AUTO-ENTMCNC: 33.8 G/DL (ref 31–37)
MCV RBC AUTO: 91.2 FL
MONOCYTES # BLD AUTO: 0.35 X10(3) UL (ref 0.1–1)
MONOCYTES NFR BLD AUTO: 6.7 %
NEUTROPHILS # BLD AUTO: 2.99 X10 (3) UL (ref 1.5–7.7)
NEUTROPHILS # BLD AUTO: 2.99 X10(3) UL (ref 1.5–7.7)
NEUTROPHILS NFR BLD AUTO: 57.3 %
NITRITE UR QL STRIP.AUTO: NEGATIVE
OSMOLALITY SERPL CALC.SUM OF ELEC: 283 MOSM/KG (ref 275–295)
PH UR STRIP.AUTO: 8.5 [PH] (ref 5–8)
PLATELET # BLD AUTO: 306 10(3)UL (ref 150–450)
POTASSIUM SERPL-SCNC: 4.2 MMOL/L (ref 3.5–5.1)
PROT SERPL-MCNC: 7.9 G/DL (ref 6.4–8.2)
RBC # BLD AUTO: 4.87 X10(6)UL
RBC #/AREA URNS AUTO: >10 /HPF
SODIUM SERPL-SCNC: 135 MMOL/L (ref 136–145)
SP GR UR STRIP.AUTO: 1.02 (ref 1–1.03)
UROBILINOGEN UR STRIP.AUTO-MCNC: 0.2 MG/DL
WBC # BLD AUTO: 5.2 X10(3) UL (ref 4–11)

## 2024-03-07 PROCEDURE — 81015 MICROSCOPIC EXAM OF URINE: CPT | Performed by: EMERGENCY MEDICINE

## 2024-03-07 PROCEDURE — 74176 CT ABD & PELVIS W/O CONTRAST: CPT | Performed by: PHYSICIAN ASSISTANT

## 2024-03-07 PROCEDURE — 0TC08ZZ EXTIRPATION OF MATTER FROM RIGHT KIDNEY, VIA NATURAL OR ARTIFICIAL OPENING ENDOSCOPIC: ICD-10-PCS | Performed by: UROLOGY

## 2024-03-07 PROCEDURE — 99285 EMERGENCY DEPT VISIT HI MDM: CPT

## 2024-03-07 PROCEDURE — 0T7D7ZZ DILATION OF URETHRA, VIA NATURAL OR ARTIFICIAL OPENING: ICD-10-PCS | Performed by: UROLOGY

## 2024-03-07 PROCEDURE — BT1DYZZ FLUOROSCOPY OF RIGHT KIDNEY, URETER AND BLADDER USING OTHER CONTRAST: ICD-10-PCS | Performed by: UROLOGY

## 2024-03-07 PROCEDURE — 96361 HYDRATE IV INFUSION ADD-ON: CPT

## 2024-03-07 PROCEDURE — 80053 COMPREHEN METABOLIC PANEL: CPT | Performed by: PHYSICIAN ASSISTANT

## 2024-03-07 PROCEDURE — 81001 URINALYSIS AUTO W/SCOPE: CPT | Performed by: EMERGENCY MEDICINE

## 2024-03-07 PROCEDURE — 0T768DZ DILATION OF RIGHT URETER WITH INTRALUMINAL DEVICE, VIA NATURAL OR ARTIFICIAL OPENING ENDOSCOPIC: ICD-10-PCS | Performed by: UROLOGY

## 2024-03-07 PROCEDURE — 85025 COMPLETE CBC W/AUTO DIFF WBC: CPT | Performed by: PHYSICIAN ASSISTANT

## 2024-03-07 PROCEDURE — 96375 TX/PRO/DX INJ NEW DRUG ADDON: CPT

## 2024-03-07 PROCEDURE — 82365 CALCULUS SPECTROSCOPY: CPT | Performed by: UROLOGY

## 2024-03-07 PROCEDURE — 96374 THER/PROPH/DIAG INJ IV PUSH: CPT

## 2024-03-07 PROCEDURE — 88300 SURGICAL PATH GROSS: CPT | Performed by: UROLOGY

## 2024-03-07 DEVICE — URETERAL STENT
Type: IMPLANTABLE DEVICE | Site: URETER | Status: FUNCTIONAL
Brand: ASCERTA™

## 2024-03-07 RX ORDER — ONDANSETRON 2 MG/ML
4 INJECTION INTRAMUSCULAR; INTRAVENOUS EVERY 4 HOURS PRN
Status: DISCONTINUED | OUTPATIENT
Start: 2024-03-07 | End: 2024-03-07 | Stop reason: ALTCHOICE

## 2024-03-07 RX ORDER — DEXTROSE, SODIUM CHLORIDE, SODIUM LACTATE, POTASSIUM CHLORIDE, AND CALCIUM CHLORIDE 5; .6; .31; .03; .02 G/100ML; G/100ML; G/100ML; G/100ML; G/100ML
INJECTION, SOLUTION INTRAVENOUS CONTINUOUS
Status: DISCONTINUED | OUTPATIENT
Start: 2024-03-07 | End: 2024-03-08

## 2024-03-07 RX ORDER — ONDANSETRON 2 MG/ML
INJECTION INTRAMUSCULAR; INTRAVENOUS
Status: COMPLETED
Start: 2024-03-07 | End: 2024-03-07

## 2024-03-07 RX ORDER — ACETAMINOPHEN 500 MG
1000 TABLET ORAL ONCE
Status: DISCONTINUED | OUTPATIENT
Start: 2024-03-07 | End: 2024-03-07 | Stop reason: HOSPADM

## 2024-03-07 RX ORDER — SODIUM CHLORIDE, SODIUM LACTATE, POTASSIUM CHLORIDE, CALCIUM CHLORIDE 600; 310; 30; 20 MG/100ML; MG/100ML; MG/100ML; MG/100ML
INJECTION, SOLUTION INTRAVENOUS CONTINUOUS
Status: DISCONTINUED | OUTPATIENT
Start: 2024-03-07 | End: 2024-03-08

## 2024-03-07 RX ORDER — ONDANSETRON 2 MG/ML
INJECTION INTRAMUSCULAR; INTRAVENOUS AS NEEDED
Status: DISCONTINUED | OUTPATIENT
Start: 2024-03-07 | End: 2024-03-07 | Stop reason: SURG

## 2024-03-07 RX ORDER — KETOROLAC TROMETHAMINE 15 MG/ML
15 INJECTION, SOLUTION INTRAMUSCULAR; INTRAVENOUS ONCE
Status: COMPLETED | OUTPATIENT
Start: 2024-03-07 | End: 2024-03-07

## 2024-03-07 RX ORDER — HYDROMORPHONE HYDROCHLORIDE 1 MG/ML
0.5 INJECTION, SOLUTION INTRAMUSCULAR; INTRAVENOUS; SUBCUTANEOUS EVERY 30 MIN PRN
Status: DISCONTINUED | OUTPATIENT
Start: 2024-03-07 | End: 2024-03-07 | Stop reason: ALTCHOICE

## 2024-03-07 RX ORDER — CEFAZOLIN SODIUM/WATER 2 G/20 ML
SYRINGE (ML) INTRAVENOUS
Status: DISPENSED
Start: 2024-03-07 | End: 2024-03-08

## 2024-03-07 RX ORDER — SODIUM CHLORIDE, SODIUM LACTATE, POTASSIUM CHLORIDE, CALCIUM CHLORIDE 600; 310; 30; 20 MG/100ML; MG/100ML; MG/100ML; MG/100ML
INJECTION, SOLUTION INTRAVENOUS CONTINUOUS PRN
Status: DISCONTINUED | OUTPATIENT
Start: 2024-03-07 | End: 2024-03-07 | Stop reason: SURG

## 2024-03-07 RX ORDER — PROCHLORPERAZINE EDISYLATE 5 MG/ML
5 INJECTION INTRAMUSCULAR; INTRAVENOUS EVERY 8 HOURS PRN
Status: DISCONTINUED | OUTPATIENT
Start: 2024-03-07 | End: 2024-03-08

## 2024-03-07 RX ORDER — NALOXONE HYDROCHLORIDE 0.4 MG/ML
80 INJECTION, SOLUTION INTRAMUSCULAR; INTRAVENOUS; SUBCUTANEOUS AS NEEDED
Status: DISCONTINUED | OUTPATIENT
Start: 2024-03-07 | End: 2024-03-07 | Stop reason: HOSPADM

## 2024-03-07 RX ORDER — HYDROMORPHONE HYDROCHLORIDE 1 MG/ML
0.2 INJECTION, SOLUTION INTRAMUSCULAR; INTRAVENOUS; SUBCUTANEOUS EVERY 5 MIN PRN
Status: DISCONTINUED | OUTPATIENT
Start: 2024-03-07 | End: 2024-03-07 | Stop reason: HOSPADM

## 2024-03-07 RX ORDER — SODIUM CHLORIDE 9 MG/ML
INJECTION, SOLUTION INTRAVENOUS ONCE
Status: COMPLETED | OUTPATIENT
Start: 2024-03-07 | End: 2024-03-07

## 2024-03-07 RX ORDER — SCOLOPAMINE TRANSDERMAL SYSTEM 1 MG/1
1 PATCH, EXTENDED RELEASE TRANSDERMAL ONCE
Status: DISCONTINUED | OUTPATIENT
Start: 2024-03-07 | End: 2024-03-07 | Stop reason: HOSPADM

## 2024-03-07 RX ORDER — KETOROLAC TROMETHAMINE 30 MG/ML
INJECTION, SOLUTION INTRAMUSCULAR; INTRAVENOUS AS NEEDED
Status: DISCONTINUED | OUTPATIENT
Start: 2024-03-07 | End: 2024-03-07 | Stop reason: SURG

## 2024-03-07 RX ORDER — HYDROMORPHONE HYDROCHLORIDE 1 MG/ML
0.4 INJECTION, SOLUTION INTRAMUSCULAR; INTRAVENOUS; SUBCUTANEOUS EVERY 5 MIN PRN
Status: DISCONTINUED | OUTPATIENT
Start: 2024-03-07 | End: 2024-03-07 | Stop reason: HOSPADM

## 2024-03-07 RX ORDER — ACETAMINOPHEN 650 MG/1
650 SUPPOSITORY RECTAL EVERY 4 HOURS PRN
COMMUNITY
End: 2024-03-08

## 2024-03-07 RX ORDER — ONDANSETRON 2 MG/ML
4 INJECTION INTRAMUSCULAR; INTRAVENOUS EVERY 6 HOURS PRN
Status: DISCONTINUED | OUTPATIENT
Start: 2024-03-07 | End: 2024-03-07 | Stop reason: HOSPADM

## 2024-03-07 RX ORDER — HYDROMORPHONE HYDROCHLORIDE 1 MG/ML
0.4 INJECTION, SOLUTION INTRAMUSCULAR; INTRAVENOUS; SUBCUTANEOUS EVERY 2 HOUR PRN
Status: DISCONTINUED | OUTPATIENT
Start: 2024-03-07 | End: 2024-03-08

## 2024-03-07 RX ORDER — ONDANSETRON 2 MG/ML
4 INJECTION INTRAMUSCULAR; INTRAVENOUS ONCE
Status: COMPLETED | OUTPATIENT
Start: 2024-03-07 | End: 2024-03-07

## 2024-03-07 RX ORDER — MORPHINE SULFATE 4 MG/ML
2 INJECTION, SOLUTION INTRAMUSCULAR; INTRAVENOUS ONCE
Status: COMPLETED | OUTPATIENT
Start: 2024-03-07 | End: 2024-03-07

## 2024-03-07 RX ORDER — SODIUM CHLORIDE 9 MG/ML
INJECTION, SOLUTION INTRAVENOUS CONTINUOUS
Status: DISCONTINUED | OUTPATIENT
Start: 2024-03-07 | End: 2024-03-07 | Stop reason: HOSPADM

## 2024-03-07 RX ORDER — HEPARIN SODIUM 5000 [USP'U]/ML
5000 INJECTION, SOLUTION INTRAVENOUS; SUBCUTANEOUS EVERY 8 HOURS SCHEDULED
Status: DISCONTINUED | OUTPATIENT
Start: 2024-03-08 | End: 2024-03-08

## 2024-03-07 RX ORDER — HYDROMORPHONE HYDROCHLORIDE 1 MG/ML
0.6 INJECTION, SOLUTION INTRAMUSCULAR; INTRAVENOUS; SUBCUTANEOUS EVERY 5 MIN PRN
Status: DISCONTINUED | OUTPATIENT
Start: 2024-03-07 | End: 2024-03-07 | Stop reason: HOSPADM

## 2024-03-07 RX ORDER — ACETAMINOPHEN 500 MG
1000 TABLET ORAL ONCE AS NEEDED
Status: DISCONTINUED | OUTPATIENT
Start: 2024-03-07 | End: 2024-03-07 | Stop reason: HOSPADM

## 2024-03-07 RX ORDER — LIDOCAINE HYDROCHLORIDE 10 MG/ML
INJECTION, SOLUTION EPIDURAL; INFILTRATION; INTRACAUDAL; PERINEURAL AS NEEDED
Status: DISCONTINUED | OUTPATIENT
Start: 2024-03-07 | End: 2024-03-07 | Stop reason: SURG

## 2024-03-07 RX ORDER — POTASSIUM CITRATE 5 MEQ/1
10 TABLET, EXTENDED RELEASE ORAL DAILY
Status: DISCONTINUED | OUTPATIENT
Start: 2024-03-08 | End: 2024-03-08

## 2024-03-07 RX ORDER — HYDROMORPHONE HYDROCHLORIDE 1 MG/ML
1 INJECTION, SOLUTION INTRAMUSCULAR; INTRAVENOUS; SUBCUTANEOUS EVERY 30 MIN PRN
Status: DISCONTINUED | OUTPATIENT
Start: 2024-03-07 | End: 2024-03-08

## 2024-03-07 RX ORDER — ONDANSETRON 2 MG/ML
4 INJECTION INTRAMUSCULAR; INTRAVENOUS EVERY 6 HOURS PRN
Status: DISCONTINUED | OUTPATIENT
Start: 2024-03-07 | End: 2024-03-08

## 2024-03-07 RX ORDER — HYDROCODONE BITARTRATE AND ACETAMINOPHEN 5; 325 MG/1; MG/1
1 TABLET ORAL ONCE AS NEEDED
Status: DISCONTINUED | OUTPATIENT
Start: 2024-03-07 | End: 2024-03-07 | Stop reason: HOSPADM

## 2024-03-07 RX ORDER — LABETALOL HYDROCHLORIDE 5 MG/ML
5 INJECTION, SOLUTION INTRAVENOUS EVERY 5 MIN PRN
Status: DISCONTINUED | OUTPATIENT
Start: 2024-03-07 | End: 2024-03-07 | Stop reason: HOSPADM

## 2024-03-07 RX ORDER — HYDROMORPHONE HYDROCHLORIDE 1 MG/ML
0.8 INJECTION, SOLUTION INTRAMUSCULAR; INTRAVENOUS; SUBCUTANEOUS EVERY 2 HOUR PRN
Status: DISCONTINUED | OUTPATIENT
Start: 2024-03-07 | End: 2024-03-08

## 2024-03-07 RX ORDER — HYDROCODONE BITARTRATE AND ACETAMINOPHEN 5; 325 MG/1; MG/1
2 TABLET ORAL ONCE AS NEEDED
Status: DISCONTINUED | OUTPATIENT
Start: 2024-03-07 | End: 2024-03-07 | Stop reason: HOSPADM

## 2024-03-07 RX ORDER — POLYETHYLENE GLYCOL 3350 17 G/17G
17 POWDER, FOR SOLUTION ORAL DAILY PRN
Status: DISCONTINUED | OUTPATIENT
Start: 2024-03-07 | End: 2024-03-08

## 2024-03-07 RX ORDER — SODIUM CHLORIDE, SODIUM LACTATE, POTASSIUM CHLORIDE, CALCIUM CHLORIDE 600; 310; 30; 20 MG/100ML; MG/100ML; MG/100ML; MG/100ML
INJECTION, SOLUTION INTRAVENOUS CONTINUOUS
Status: DISCONTINUED | OUTPATIENT
Start: 2024-03-07 | End: 2024-03-07 | Stop reason: HOSPADM

## 2024-03-07 RX ORDER — OXYCODONE HYDROCHLORIDE 10 MG/1
10 TABLET ORAL EVERY 4 HOURS PRN
Status: DISCONTINUED | OUTPATIENT
Start: 2024-03-07 | End: 2024-03-08

## 2024-03-07 RX ORDER — DEXAMETHASONE SODIUM PHOSPHATE 4 MG/ML
VIAL (ML) INJECTION AS NEEDED
Status: DISCONTINUED | OUTPATIENT
Start: 2024-03-07 | End: 2024-03-07 | Stop reason: SURG

## 2024-03-07 RX ORDER — CEFAZOLIN SODIUM/WATER 2 G/20 ML
2 SYRINGE (ML) INTRAVENOUS ONCE
Status: COMPLETED | OUTPATIENT
Start: 2024-03-07 | End: 2024-03-07

## 2024-03-07 RX ORDER — FEXOFENADINE HCL 180 MG/1
180 TABLET ORAL DAILY
COMMUNITY

## 2024-03-07 RX ORDER — MIDAZOLAM HYDROCHLORIDE 1 MG/ML
INJECTION INTRAMUSCULAR; INTRAVENOUS AS NEEDED
Status: DISCONTINUED | OUTPATIENT
Start: 2024-03-07 | End: 2024-03-07 | Stop reason: SURG

## 2024-03-07 RX ORDER — MIDAZOLAM HYDROCHLORIDE 1 MG/ML
1 INJECTION INTRAMUSCULAR; INTRAVENOUS EVERY 5 MIN PRN
Status: DISCONTINUED | OUTPATIENT
Start: 2024-03-07 | End: 2024-03-07 | Stop reason: HOSPADM

## 2024-03-07 RX ORDER — MELATONIN
3 NIGHTLY PRN
Status: DISCONTINUED | OUTPATIENT
Start: 2024-03-07 | End: 2024-03-08

## 2024-03-07 RX ORDER — TAMSULOSIN HYDROCHLORIDE 0.4 MG/1
0.4 CAPSULE ORAL DAILY
Status: DISCONTINUED | OUTPATIENT
Start: 2024-03-08 | End: 2024-03-08

## 2024-03-07 RX ORDER — MEPERIDINE HYDROCHLORIDE 25 MG/ML
12.5 INJECTION INTRAMUSCULAR; INTRAVENOUS; SUBCUTANEOUS AS NEEDED
Status: DISCONTINUED | OUTPATIENT
Start: 2024-03-07 | End: 2024-03-07 | Stop reason: HOSPADM

## 2024-03-07 RX ORDER — PROCHLORPERAZINE EDISYLATE 5 MG/ML
5 INJECTION INTRAMUSCULAR; INTRAVENOUS EVERY 8 HOURS PRN
Status: DISCONTINUED | OUTPATIENT
Start: 2024-03-07 | End: 2024-03-07 | Stop reason: HOSPADM

## 2024-03-07 RX ORDER — OXYCODONE HYDROCHLORIDE 10 MG/1
5 TABLET ORAL EVERY 4 HOURS PRN
Status: DISCONTINUED | OUTPATIENT
Start: 2024-03-07 | End: 2024-03-08

## 2024-03-07 RX ORDER — DEXTROSE AND SODIUM CHLORIDE 5; .45 G/100ML; G/100ML
INJECTION, SOLUTION INTRAVENOUS CONTINUOUS
Status: ACTIVE | OUTPATIENT
Start: 2024-03-07 | End: 2024-03-07

## 2024-03-07 RX ORDER — DOCUSATE SODIUM 100 MG/1
100 CAPSULE, LIQUID FILLED ORAL 2 TIMES DAILY
Status: DISCONTINUED | OUTPATIENT
Start: 2024-03-07 | End: 2024-03-08

## 2024-03-07 RX ORDER — LIDOCAINE HYDROCHLORIDE 20 MG/ML
JELLY TOPICAL AS NEEDED
Status: DISCONTINUED | OUTPATIENT
Start: 2024-03-07 | End: 2024-03-07 | Stop reason: HOSPADM

## 2024-03-07 RX ADMIN — MIDAZOLAM HYDROCHLORIDE 2 MG: 1 INJECTION INTRAMUSCULAR; INTRAVENOUS at 17:35:00

## 2024-03-07 RX ADMIN — KETOROLAC TROMETHAMINE 30 MG: 30 INJECTION, SOLUTION INTRAMUSCULAR; INTRAVENOUS at 18:49:00

## 2024-03-07 RX ADMIN — DEXAMETHASONE SODIUM PHOSPHATE 4 MG: 4 MG/ML VIAL (ML) INJECTION at 17:41:00

## 2024-03-07 RX ADMIN — SODIUM CHLORIDE, SODIUM LACTATE, POTASSIUM CHLORIDE, CALCIUM CHLORIDE: 600; 310; 30; 20 INJECTION, SOLUTION INTRAVENOUS at 18:49:00

## 2024-03-07 RX ADMIN — LIDOCAINE HYDROCHLORIDE 50 MG: 10 INJECTION, SOLUTION EPIDURAL; INFILTRATION; INTRACAUDAL; PERINEURAL at 17:38:00

## 2024-03-07 RX ADMIN — ONDANSETRON 4 MG: 2 INJECTION INTRAMUSCULAR; INTRAVENOUS at 18:49:00

## 2024-03-07 RX ADMIN — CEFAZOLIN SODIUM/WATER 2 G: 2 G/20 ML SYRINGE (ML) INTRAVENOUS at 17:41:00

## 2024-03-07 RX ADMIN — SODIUM CHLORIDE, SODIUM LACTATE, POTASSIUM CHLORIDE, CALCIUM CHLORIDE: 600; 310; 30; 20 INJECTION, SOLUTION INTRAVENOUS at 17:35:00

## 2024-03-07 NOTE — ED QUICK NOTES
Mom arrives with patient to waiting room states \"why is he still waiting\". Mom informed of triage process and wait time for a clean room.

## 2024-03-07 NOTE — ED INITIAL ASSESSMENT (HPI)
Right flank pain that started about 0700.  Took tylenol about 0840.  Denies n/v.  Drove self to er

## 2024-03-07 NOTE — ANESTHESIA PREPROCEDURE EVALUATION
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  Temp: 98 °F (36.7 °C)  Pulse: 103  Resp: 16  BP: 138/99  SpO2: 96 %  Body mass index is 27.49 kg/m².    Current medications reviewed.  Hospital Medications:   [COMPLETED] ketorolac (Toradol) 15 MG/ML injection 15 mg  15 mg Intravenous Once    [COMPLETED] ondansetron (Zofran) 4 MG/2ML injection 4 mg  4 mg Intravenous Once    [COMPLETED] morphINE PF 4 MG/ML injection 2 mg  2 mg Intravenous Once    [COMPLETED] sodium chloride 0.9% infusion   Intravenous Once    [MAR Hold] HYDROmorphone (Dilaudid) 1 MG/ML injection 1 mg  1 mg Intravenous Q30 Min PRN    [MAR Hold] acetaminophen (Tylenol Extra Strength) tab 1,000 mg  1,000 mg Oral Once    [MAR Hold] scopolamine (Transderm-Scop) 1 MG/3DAYS patch 1 patch  1 patch Transdermal Once    lactated ringers infusion   Intravenous Continuous    [MAR Hold] acetaminophen (Tylenol Extra Strength) tab 1,000 mg  1,000 mg Oral Once    [MAR Hold] scopolamine (Transderm-Scop) 1 MG/3DAYS patch 1 patch  1 patch Transdermal Once    lactated ringers infusion   Intravenous Continuous    ceFAZolin (Ancef) 2 g in 20mL IV syringe premix  2 g Intravenous Once    ceFAZolin (Ancef) 2 g/20mL IV syringe premix        sodium chloride 0.9% infusion   Intravenous Continuous    lactated ringers infusion   Intravenous Continuous    lactated ringers IV bolus 500 mL  500 mL Intravenous Once PRN    acetaminophen (Tylenol Extra Strength) tab 1,000 mg  1,000 mg  Oral Once PRN    Or    HYDROcodone-acetaminophen (Norco) 5-325 MG per tab 1 tablet  1 tablet Oral Once PRN    Or    HYDROcodone-acetaminophen (Norco) 5-325 MG per tab 2 tablet  2 tablet Oral Once PRN    atropine 0.1 MG/ML injection 0.5 mg  0.5 mg Intravenous PRN    labetalol (Trandate) 5 mg/mL injection 5 mg  5 mg Intravenous Q5 Min PRN    ondansetron (Zofran) 4 MG/2ML injection 4 mg  4 mg Intravenous Q6H PRN    prochlorperazine (Compazine) 10 MG/2ML injection 5 mg  5 mg Intravenous Q8H PRN    naloxone (Narcan) 0.4 MG/ML injection 80 mcg  80 mcg Intravenous PRN    midazolam (Versed) 2 MG/2ML injection 1 mg  1 mg Intravenous Q5 Min PRN    meperidine (Demerol) 25 MG/ML injection 12.5 mg  12.5 mg Intravenous PRN    fentaNYL (Sublimaze) 50 mcg/mL injection 25 mcg  25 mcg Intravenous Q5 Min PRN    Or    fentaNYL (Sublimaze) 50 mcg/mL injection 50 mcg  50 mcg Intravenous Q5 Min PRN    HYDROmorphone (Dilaudid) 1 MG/ML injection 0.2 mg  0.2 mg Intravenous Q5 Min PRN    Or    HYDROmorphone (Dilaudid) 1 MG/ML injection 0.4 mg  0.4 mg Intravenous Q5 Min PRN    Or    HYDROmorphone (Dilaudid) 1 MG/ML injection 0.6 mg  0.6 mg Intravenous Q5 Min PRN       Outpatient Medications:     Medications Prior to Admission   Medication Sig Dispense Refill Last Dose    fexofenadine 180 MG Oral Tab Take 1 tablet (180 mg total) by mouth daily.   3/7/2024 at 0600    acetaminophen 650 MG Rectal Suppos Place 1 suppository (650 mg total) rectally every 4 (four) hours as needed for Fever.   3/7/2024 at 0905    HYDROcodone-acetaminophen 5-325 MG Oral Tab Take 1-2 tablets by mouth every 4 (four) hours as needed for Pain. 12 tablet 0 Past Month    potassium citrate 10 MEQ (1080 MG) Oral Tab CR Take 1 tablet (10 mEq total) by mouth daily.   3/7/2024 at 0600    sertraline 100 MG Oral Tab Take 1 tablet (100 mg total) by mouth daily.   3/7/2024 at 0830    HYDROcodone-acetaminophen (NORCO) 5-325 MG Oral Tab Take 1 tablet by mouth every 6 (six) hours as  needed. 10 tablet 0 Past Month    tamsulosin (FLOMAX) cap Take 1 capsule (0.4 mg total) by mouth daily. (Patient taking differently: Take 1 capsule (0.4 mg total) by mouth as needed.) 30 capsule 3 Past Month       Allergies: Ciprofloxacin, Seroquel [quetiapine], and Tiopronin      Anesthesia Evaluation    Patient summary reviewed.    Anesthetic Complications  (-) history of anesthetic complications         GI/Hepatic/Renal      (-) GERD                           Cardiovascular        Exercise tolerance: good     MET: >4    (-) obesity  (-) hypertension     (-) CAD                  (-) angina     (-) DELGADO         Endo/Other      (-) diabetes                            Pulmonary      (-) asthma  (-) COPD       (-) shortness of breath     (-) sleep apnea       Neuro/Psych      (+) depression                        Patient Active Problem List:     Cystinuria (HCC)     Cystine stones (HCC)     Proteinuria     IBS (irritable bowel syndrome)     Kidney stone     Major depression with psychotic features (HCC)     Major depressive disorder, recurrent episode (HCC)     Migraine without aura and with status migrainosus, not intractable     Obstructive uropathy     Hypokalemia     Nephrolithiasis     Ureteral stone with hydronephrosis     Intractable pain     Calculus of left ureter     Renal colic     Hydronephrosis with ureteropelvic junction (UPJ) obstruction            Past Surgical History:   Procedure Laterality Date    COLONOSCOPY      LITHOTRIPSY      OTHER SURGICAL HISTORY  2-17-11    Rt RPG, URS stone manipulation,laser litho,Rt stent, Dr. Mcdonough    OTHER SURGICAL HISTORY  2/21/11    Cysto stent removal- Dr. Mcdonough    OTHER SURGICAL HISTORY  11/21/15    Cysto, L URS, laser litho, stone extraction, stent placement, RPG Dr. Finn    OTHER SURGICAL HISTORY  12/1/15    cysto stent removal    UPPER GI ENDOSCOPY,EXAM       Social History     Socioeconomic History    Marital status: Single   Tobacco Use    Smoking status: Never     Smokeless tobacco: Never   Vaping Use    Vaping Use: Never used   Substance and Sexual Activity    Alcohol use: No    Drug use: No     History   Drug Use No     Available pre-op labs reviewed.  Lab Results   Component Value Date    WBC 5.2 03/07/2024    RBC 4.87 03/07/2024    HGB 15.0 03/07/2024    HCT 44.4 03/07/2024    MCV 91.2 03/07/2024    MCH 30.8 03/07/2024    MCHC 33.8 03/07/2024    RDW 12.5 03/07/2024    .0 03/07/2024     Lab Results   Component Value Date     (L) 03/07/2024    K 4.2 03/07/2024     03/07/2024    CO2 31.0 03/07/2024    BUN 21 03/07/2024    CREATSERUM 0.98 03/07/2024    GLU 93 03/07/2024    CA 9.7 03/07/2024            Airway      Mallampati: II  Mouth opening: >3 FB  TM distance: 4 - 6 cm  Neck ROM: full Cardiovascular    Cardiovascular exam normal.  Rhythm: regular  Rate: normal     Dental  Comment: Patient denies any loose/missing/cracked teeth. No gross abnormalities or loose teeth noted on exam.      Dentition appears grossly intact         Pulmonary    Pulmonary exam normal.                 Other findings              ASA: 1   Plan: general  NPO status verified and patient meets guidelines.    Post-procedure pain management plan discussed with surgeon and patient.    Comment:     A detailed discussion about the anesthetic plan was held with Devon East in the preoperative area. Discussed benefits and risks of general anesthesia including, reasonable expectations of post-operative pain, nausea, vomiting, injury to lips, gums, tongue, teeth, eyes, awareness under anesthesia, cardiac, pulmonary, aspiration, stroke, and death. All questions were answered appropriately and patient demonstrated understanding of realistic expectations and risks of undergoing anesthesia. Devon East consents to receiving anesthesia and wishes to proceed.      Plan/risks discussed with: patient                Present on Admission:  **None**

## 2024-03-07 NOTE — ED QUICK NOTES
Orders for admission, patient is aware of plan and ready to go upstairs. Any questions, please call ED RN Karla at extension [phone]  .     Patient Covid vaccination status: Fully vaccinated     COVID Test Ordered in ED: None    COVID Suspicion at Admission: N/A    Running Infusions:  None    Mental Status/LOC at time of transport: A/Ox4    Other pertinent information: pt will arrive via private car.  CIWA score: N/A   NIH score:  N/A

## 2024-03-07 NOTE — ANESTHESIA PROCEDURE NOTES
Airway  Date/Time: 3/7/2024 5:39 PM  Urgency: elective      General Information and Staff    Patient location during procedure: OR  Anesthesiologist: Layton Garrison MD  Performed: anesthesiologist   Performed by: Layton Garrison MD  Authorized by: Layton Garrison MD      Indications and Patient Condition  Indications for airway management: anesthesia  Spontaneous Ventilation: absent  Sedation level: deep  Preoxygenated: yes  Patient position: sniffing  Mask difficulty assessment: 0 - not attempted    Final Airway Details  Final airway type: supraglottic airway      Successful airway: classic  Size 4       Number of attempts at approach: 1  Number of other approaches attempted: 0    Additional Comments    Teeth intact post-procedure.

## 2024-03-07 NOTE — ED PROVIDER NOTES
Patient was discharged from the hospital last month with kidney stone.  Patient underwent a cystoscopy and ureteroscopy with laser lithotripsy and left ureteral stent.  According to urology consultation,  \"Patient is a 28 year old male who was admitted to the hospital for Calculus of left ureter:     28 year old gentleman with a history of cystinuria and multiple prior episodes of nephrolithiasis.      He recently underwent a left URS/LL with Dr. Hilliard on 2/6/2024 for a sizeable left renal pelvic calculus.  He had a stent placed post-operatively and was instructed to remove the stent this morning.      Shortly after removing his stent, he developed worsening left flank pain .     UA in the ER was not suggestive of an infection. Blood work was unremarkable.     He underwent a CT which showed multiple residual stone fragments in the left ureter as well as within the lower pole of the left kidney.  There is mild to moderate left hydronephrosis.     He was admitted for pain control.  He wishes to proceed with surgical intervention to remove any residual stone fragments from the kidney and ureter.\"      Patient complaining of right flank pain that started about 7 AM.  Patient took Tylenol without relief.  No vomiting or diarrhea.    On examination, is alert adult man who appears very uncomfortable  Abdomen is soft but there is diffuse tenderness throughout the right abdomen.  No CVA tenderness is noted.    Patient's presentation consistent with renal colic.  Pyelonephritis must be excluded.    Patient treated with IV fluid and offered pain and nausea medicine    CBC shows normal white count, hemoglobin, and platelets  Metabolic panel shows normal glucose and electrolytes.  Creatinine normal  Urinalysis shows positive blood but negative nitrites and leukocytes with only 1-5 WBCs      I discussed case with urology, Dr. Plascencia.  He recommends hospitalization and maintaining patient n.p.o. pending possible intervention later  today  Discussed case with duly hospitalist, Dr. Kaye.  She will admit and make further recommendations

## 2024-03-07 NOTE — CONSULTS
[facility]   part of [facility]    Report of Consultation    Devon East Patient Status:  Emergency    1995 MRN [id number]   Location [facility] PRE OP HOLDING Attending Crow Plascencia MD   Hosp Day # 0 PCP Spenser Mccormick MD     Date of Admission:  3/7/2024  Date of Consult:  3/7/2024  Reason for Consultation:   Right UPJ calculus, right renal calculi    History of Present Illness:   Patient is a 28 year old male who was admitted to the hospital for Renal colic:    28 year old gentleman, known to our service for a history of cystinuria and recurrent nephrolithiasis. He underwent a ureteroscopy and laser lithotripsy with me on 2/10, after he had undergone a prior ureteroscopy with Dr. Hilliard on 2024. He had developed obstructing ureteral fragments.     He presented to the ER today with right flank pain.      A CT showed an obstructing right UPJ calculus, as well as right renal calculi.      His UA was not suggestive of infection.    Due to pain symptoms, he was admitted and will undergo a right ureteroscopy with laser lithotripsy this evening.     Past Medical History  Past Medical History:   Diagnosis Date    Anxiety state     Calculus of kidney     Cystine disease (HCC)     Depression     IBS (irritable bowel syndrome)     KIDNEY STONE     Migraines     OCD (obsessive compulsive disorder)        Past Surgical History  Past Surgical History:   Procedure Laterality Date    COLONOSCOPY      LITHOTRIPSY      OTHER SURGICAL HISTORY  11    Rt RPG, URS stone manipulation,laser litho,Rt stent, Dr. Mcdonough    OTHER SURGICAL HISTORY  11    Cysto stent removal- Dr. Mcdonough    OTHER SURGICAL HISTORY  11/21/15    Cysto, L URS, laser litho, stone extraction, stent placement, RPG Dr. Finn    OTHER SURGICAL HISTORY  12/1/15    cysto stent removal    UPPER GI ENDOSCOPY,EXAM       Family History  Family History   Problem Relation Age of Onset    Heart Disorder Maternal Grandfather     Diabetes  Paternal Grandfather     Cancer Paternal Grandfather         meloma    Hypertension Father     Other (Other) Father     Cancer Maternal Grandmother     Cancer Paternal Grandmother      Social History  Social History     Socioeconomic History    Marital status: Single   Tobacco Use    Smoking status: Never    Smokeless tobacco: Never   Vaping Use    Vaping Use: Never used   Substance and Sexual Activity    Alcohol use: No    Drug use: No     Social Determinants of Health     Food Insecurity: No Food Insecurity (2/10/2024)    Food Insecurity     Food Insecurity: Never true   Transportation Needs: No Transportation Needs (2/10/2024)    Transportation Needs     Lack of Transportation: No   Housing Stability: Low Risk  (2/10/2024)    Housing Stability     Housing Instability: No        Current Medications:  Current Facility-Administered Medications   Medication Dose Route Frequency    [MAR Hold] HYDROmorphone (Dilaudid) 1 MG/ML injection 1 mg  1 mg Intravenous Q30 Min PRN    [MAR Hold] acetaminophen (Tylenol Extra Strength) tab 1,000 mg  1,000 mg Oral Once    [MAR Hold] scopolamine (Transderm-Scop) 1 MG/3DAYS patch 1 patch  1 patch Transdermal Once    lactated ringers infusion   Intravenous Continuous    [MAR Hold] acetaminophen (Tylenol Extra Strength) tab 1,000 mg  1,000 mg Oral Once    [MAR Hold] scopolamine (Transderm-Scop) 1 MG/3DAYS patch 1 patch  1 patch Transdermal Once    lactated ringers infusion   Intravenous Continuous    ceFAZolin (Ancef) 2 g in 20mL IV syringe premix  2 g Intravenous Once    ceFAZolin (Ancef) 2 g/20mL IV syringe premix        sodium chloride 0.9% infusion   Intravenous Continuous    lactated ringers infusion   Intravenous Continuous    lactated ringers IV bolus 500 mL  500 mL Intravenous Once PRN    acetaminophen (Tylenol Extra Strength) tab 1,000 mg  1,000 mg Oral Once PRN    Or    HYDROcodone-acetaminophen (Norco) 5-325 MG per tab 1 tablet  1 tablet Oral Once PRN    Or     HYDROcodone-acetaminophen (Norco) 5-325 MG per tab 2 tablet  2 tablet Oral Once PRN    atropine 0.1 MG/ML injection 0.5 mg  0.5 mg Intravenous PRN    labetalol (Trandate) 5 mg/mL injection 5 mg  5 mg Intravenous Q5 Min PRN    ondansetron (Zofran) 4 MG/2ML injection 4 mg  4 mg Intravenous Q6H PRN    prochlorperazine (Compazine) 10 MG/2ML injection 5 mg  5 mg Intravenous Q8H PRN    naloxone (Narcan) 0.4 MG/ML injection 80 mcg  80 mcg Intravenous PRN    midazolam (Versed) 2 MG/2ML injection 1 mg  1 mg Intravenous Q5 Min PRN    meperidine (Demerol) 25 MG/ML injection 12.5 mg  12.5 mg Intravenous PRN    fentaNYL (Sublimaze) 50 mcg/mL injection 25 mcg  25 mcg Intravenous Q5 Min PRN    Or    fentaNYL (Sublimaze) 50 mcg/mL injection 50 mcg  50 mcg Intravenous Q5 Min PRN    HYDROmorphone (Dilaudid) 1 MG/ML injection 0.2 mg  0.2 mg Intravenous Q5 Min PRN    Or    HYDROmorphone (Dilaudid) 1 MG/ML injection 0.4 mg  0.4 mg Intravenous Q5 Min PRN    Or    HYDROmorphone (Dilaudid) 1 MG/ML injection 0.6 mg  0.6 mg Intravenous Q5 Min PRN     Medications Prior to Admission   Medication Sig    fexofenadine 180 MG Oral Tab Take 1 tablet (180 mg total) by mouth daily.    acetaminophen 650 MG Rectal Suppos Place 1 suppository (650 mg total) rectally every 4 (four) hours as needed for Fever.    HYDROcodone-acetaminophen 5-325 MG Oral Tab Take 1-2 tablets by mouth every 4 (four) hours as needed for Pain.    potassium citrate 10 MEQ (1080 MG) Oral Tab CR Take 1 tablet (10 mEq total) by mouth daily.    sertraline 100 MG Oral Tab Take 1 tablet (100 mg total) by mouth daily.    HYDROcodone-acetaminophen (NORCO) 5-325 MG Oral Tab Take 1 tablet by mouth every 6 (six) hours as needed.    tamsulosin (FLOMAX) cap Take 1 capsule (0.4 mg total) by mouth daily. (Patient taking differently: Take 1 capsule (0.4 mg total) by mouth as needed.)       Allergies  Allergies   Allergen Reactions    Ciprofloxacin DIZZINESS     Dizziness, difficulty with walking     Seroquel [Quetiapine] RESTLESSNESS     Shaking, neurological side effects.    Tiopronin OTHER (SEE COMMENTS)     WEIGHT LOSS AND HAIR LOSS PER PT REPORT and protein excretion       Review of Systems:    Pertinent items are noted in HPI.    Physical Exam:   Blood pressure (!) 138/99, pulse 103, temperature 98 °F (36.7 °C), temperature source Temporal, resp. rate 16, height 5' 7\" (1.702 m), weight 175 lb 7.8 oz (79.6 kg), SpO2 96%.    General appearance: alert, appears stated age, and cooperative  Head: Normocephalic, without obvious abnormality, atraumatic  Eyes: EOMI  Pulmonary:  Non labored respirations  Cardiovascular: regular rate and rhythm  Abdominal: soft, non-tender; bowel sounds normal; no masses,  no organomegaly  Neurologic: Grossly normal  Psychiatric: calm    Results:     Laboratory Data:  Lab Results   Component Value Date    WBC 5.2 03/07/2024    HGB 15.0 03/07/2024    HCT 44.4 03/07/2024    .0 03/07/2024    CREATSERUM 0.98 03/07/2024    BUN 21 03/07/2024     (L) 03/07/2024    K 4.2 03/07/2024     03/07/2024    CO2 31.0 03/07/2024    GLU 93 03/07/2024    CA 9.7 03/07/2024    ALB 4.3 03/07/2024    ALKPHO 78 03/07/2024    TP 7.9 03/07/2024    AST 21 03/07/2024    ALT 38 03/07/2024    TSH 0.957 04/29/2019    LIP 60 (L) 05/12/2020    CRP <0.3 03/09/2010    MG 2.2 05/13/2020    PHOS 3.80 06/29/2020    B12 694 06/27/2019         Imaging:  CT ABDOMEN+PELVIS KIDNEYSTONE 2D RNDR(NO IV,NO ORAL)(CPT=74176)    Result Date: 3/7/2024  CONCLUSION:   1. There is mild to moderate right hydronephrosis and mild right perinephric stranding due to an 9 mm obstructing stone in the right UPJ.  A stone is also seen within the right renal pelvis measuring up to 10 mm.  2. Additional right nephrolithiasis.  Please see above for further details.   LOCATION:  [place]   Dictated by (CST): Arsenio Hook MD on 3/07/2024 at 11:29 AM     Finalized by (CST): Arsenio Hook MD on 3/07/2024 at 11:32 AM            Impression:   28 year old gentleman with a history of cystinuria, who presents with an obstructing right proximal ureteral calculus, as well as right renal calculi.    After careful review of the patient's medical history and imaging, we had a discussion regarding the various treatment options for their kidney stone.     We then discussed ureteroscopic stone removal.  This is a surgical procedure in which a small telescope is passed through the urethra, into the bladder, and into the ureter/kidney.  When the stone is identified, it is either grasped and removed as a single piece, or fragmented into smaller pieces using a laser fiber and then removed (laser lithotripsy).  This procedure has higher rates of stone clearance, but is more invasive than ESWL.  They understand that a stent is typically placed into the ureter after the completion of the procedure to prevent the ureter from swelling to the point of obstruction, causing persistent symptoms.  This stent remains in place for days to weeks after the procedure, and is typically removed in the office via cystoscopy.  They understand that a ureteral stent is not permanent, and require follow-up for removal.  Risks of ureteroscopy include blood in the urine, urinary tract or kidney infection, damage to the urethra/bladder/ureter/kidney, as well as a possible inability to clear the stone in one procedure.  Rarely, in ~10% of patients, the ureter is unable to accommodate the scope.  This requires placement of a ureteral stent and subsequent follow-up for ureteroscopy and definitive stone treatment once the ureter has passively dilated with the stent in place for at least 1 week.  Sometimes the stone cannot be cleared in 1 treatment session, and repeat ureteroscopic treatments are required.     I have discussed the risks, benefits and alternatives to the proposed procedure/treatment plan with the patient.  Our discussion also included the risks and benefits of  the alternatives treatment options, including doing nothing. They were encouraged to ask questions and all questions were answered to their satisfaction.  At the end of our discussion they gave their verbal consent to the proposed procedure/treatment plan.  Thank you for allowing me to participate in the care of your patient.    Crow Plascencia MD  [facility]  Office: [phone]

## 2024-03-08 VITALS
HEART RATE: 65 BPM | TEMPERATURE: 98 F | HEIGHT: 67 IN | BODY MASS INDEX: 27.55 KG/M2 | OXYGEN SATURATION: 95 % | RESPIRATION RATE: 18 BRPM | DIASTOLIC BLOOD PRESSURE: 83 MMHG | WEIGHT: 175.5 LBS | SYSTOLIC BLOOD PRESSURE: 123 MMHG

## 2024-03-08 RX ORDER — KETOROLAC TROMETHAMINE 15 MG/ML
30 INJECTION, SOLUTION INTRAMUSCULAR; INTRAVENOUS EVERY 6 HOURS PRN
Status: DISCONTINUED | OUTPATIENT
Start: 2024-03-08 | End: 2024-03-08

## 2024-03-08 RX ORDER — TAMSULOSIN HYDROCHLORIDE 0.4 MG/1
0.4 CAPSULE ORAL DAILY
Qty: 14 CAPSULE | Refills: 0 | Status: SHIPPED | OUTPATIENT
Start: 2024-03-08 | End: 2024-03-22

## 2024-03-08 RX ORDER — HYDROCODONE BITARTRATE AND ACETAMINOPHEN 5; 325 MG/1; MG/1
1-2 TABLET ORAL EVERY 4 HOURS PRN
Qty: 10 TABLET | Refills: 0 | Status: SHIPPED | OUTPATIENT
Start: 2024-03-08

## 2024-03-08 NOTE — PROGRESS NOTES
Assumed care at 2200      NURSING ADMISSION NOTE      Patient admitted via Cart  Oriented to room.  Safety precautions initiated.  Bed in low position.  Call light in reach.    A&ox4  RA  NSR on tele  SCD's on  D5 in LR infusing at 125  Regular diet  PRN oxycodone given for pain  Safety precautions in place  All nrrds met at this time

## 2024-03-08 NOTE — PROGRESS NOTES
Pt alert and oriented x 4   Assumed pt care at 0730  RA  Tele-NSR  Refused Heparin   PRN Dilaudid/Ketoralac for pain  PIV Left upper arm  D5 LR at 125ml/hr  Pt up standby  Urinal at bedside

## 2024-03-08 NOTE — PROGRESS NOTES
[facility]   part of [facility]    Progress Note    Devon East Patient Status:  Inpatient    1995 MRN [id number]   Formerly [facility] 3NE-A Attending Crow Plascencia MD   Hosp Day # 1 PCP Spenser Mccormick MD     Subjective:   Devon East is a(n) 28 year old male s/p ureter stent insertion 3/7/2024    For 9mm right proximal ureteral calculus, right renal calculi, cystinuria     Objective:   Blood pressure 118/72, pulse 69, temperature 97.4 °F (36.3 °C), temperature source Oral, resp. rate 18, height 5' 7\" (1.702 m), weight 175 lb 7.8 oz (79.6 kg), SpO2 96%.    GENERAL APPEARANCE: a well-developed, well-nourished, in no apparent distress.   PSYCH: A&O x 3  LUNGS: non labored breathing  HEENT: NC/AT, EOMI, trachea midline, normal external ears, nares patent      Results:   Lab Results   Component Value Date    WBC 5.2 2024    HGB 15.0 2024    HCT 44.4 2024    .0 2024    CREATSERUM 0.98 2024    BUN 21 2024     (L) 2024    K 4.2 2024     2024    CO2 31.0 2024    GLU 93 2024    CA 9.7 2024    ALB 4.3 2024    ALKPHO 78 2024    BILT 0.5 2024    TP 7.9 2024    AST 21 2024    ALT 38 2024    TSH 0.957 2019    LIP 60 (L) 2020    CRP <0.3 2010    MG 2.2 2020    PHOS 3.80 2020    B12 694 2019       CT ABDOMEN+PELVIS KIDNEYSTONE 2D RNDR(NO IV,NO ORAL)(CPT=74176)    Addendum Date: 3/8/2024    This report includes an Addendum and supersedes previous reports for this exam.    PROCEDURE:  CT ABDOMEN+PELVIS KIDNEYSTONE 2D RNDR(NO IV,NO ORAL)(CPT=74176)  COMPARISON:  [place], CT, CT ABDOMEN+PELVIS KIDNEYSTONE 2D RNDR(NO IV,NO ORAL)(CPT=74176), 2/10/2024, 8:42 AM.  INDICATIONS:  right flank pain  TECHNIQUE:  Unenhanced multislice CT scanning from above the kidneys to below the urinary bladder.  2D rendering are generated on the CT scanner  workstation to localize potential stones in the cranio-caudal plane.  Dose reduction techniques were used. Dose information is transmitted to the ACR (American College of Radiology) NRDR (National Radiology Data Registry) which includes the Dose Index Registry.  PATIENT STATED HISTORY: (As transcribed by Technologist)  Patient has had right flank pain since 0700 hrs, hx of kidney stones.    FINDINGS: Evaluation of the visceral organs is limited due to the lack of IV contrast. LUNG BASE:  Unremarkable. LIVER:  Unremarkable. BILIARY:  Unremarkable. SPLEEN:  Unremarkable. PANCREAS:  Unremarkable. ADRENALS:  Unremarkable. KIDNEYS:  There is mild to moderate right hydronephrosis and mild right perinephric stranding due to a 9 mm obstructing stone in the right UPJ.  A stone is also seen within the right renal pelvis measuring up to 10 mm.  There are additional scattered nonobstructing stones in the mid to upper pole right kidney measuring up to 10 mm.  No left renal stone identified.  There has been interval fragmentation of the previously visualized stone within the left renal pelvis.  Multiple stone fragments are now seen along the course of the left ureter with elongated configuration.  One dominant stone fragment along the distal left ureter positioned within 2 cm of the left UVJ measures up to 15 x 4 mm.  A stone fragment within the mid to distal left ureter best seen on image 59 series 601 measures up to 15 x 5 mm.  There is persistent mild to moderate left hydroureteronephrosis noted.  Multiple nonobstructing stones are seen scattered in the mid and lower pole of the left kidney measuring up to 11 mm.  There is  mild left perinephric stranding.  A stone is also now seen within the proximal right ureter in the region of the right UPJ measuring up to 8 x 4 mm with mild right hydronephrosis noted.  There are other scattered nonobstructing stones in the right kidney measuring up to 7mm.  There is minimal right  perinephric stranding.  AORTA/VASCULAR:  Unremarkable. RETROPERITONEUM:  Unremarkable. BOWEL/MESENTERY:  Unremarkable appendix.  Scattered feces in the colon.  No large or small bowel dilatation.  No free air or free fluid. ABDOMINAL WALL:  Minimal fat containing umbilical hernia. PELVIC ORGANS:  Unremarkable urinary bladder and prostate gland.  Pelvic phleboliths. LYMPH NODES:  No lymphadenopathy in the abdomen or pelvis. BONES:  Unremarkable. OTHER:  None.  CONCLUSION:   1. There is mild to moderate right hydronephrosis and mild right perinephric stranding due to an 9 mm obstructing stone in the right UPJ.  A stone is also seen within the right renal pelvis measuring up to 10 mm.  2. Additional right nephrolithiasis.  Please see above for further details.   LOCATION:  [place]   Dictated by (CST): Arsenio Hook MD on 3/07/2024 at 11:29 AM     Finalized by (CST): Arsenio Hook MD on 3/07/2024 at 11:32 AM     ADDENDUM:  There was a transcription error in the body of the report in regards the comparison with prior exam within the kidney section.  The report should read as follows:  FINDINGS: Evaluation of the visceral organs is limited due to the lack of IV contrast. LUNG BASE:  Unremarkable. LIVER:  Unremarkable. BILIARY:  Unremarkable. SPLEEN:  Unremarkable. PANCREAS:  Unremarkable. ADRENALS:  Unremarkable. KIDNEYS:  There is mild to moderate right hydronephrosis and mild right perinephric stranding due to a 9 mm obstructing stone in the right UPJ.  A stone is also seen within the right renal pelvis measuring up to 10 mm.  There are additional scattered nonobstructing stones in the mid to upper pole right kidney measuring up to 10 mm.  No left renal stone identified.  AORTA/VASCULAR:  Unremarkable. RETROPERITONEUM:  Unremarkable. BOWEL/MESENTERY:  Unremarkable appendix.  Scattered feces in the colon.  No large or small bowel dilatation.  No free air or free fluid. ABDOMINAL WALL:  Minimal fat containing  umbilical hernia. PELVIC ORGANS:  Unremarkable urinary bladder and prostate gland.  Pelvic phleboliths. LYMPH NODES:  No lymphadenopathy in the abdomen or pelvis. BONES:  Unremarkable. OTHER:  None.  CONCLUSION:   1. There is mild to moderate right hydronephrosis and mild right perinephric stranding due to a 9 mm obstructing stone in the right UPJ.  A stone is also seen within the right renal pelvis measuring up to 10 mm.  2. Additional right nephrolithiasis.  Please see above for further details.   Dictated by (CST): Arsenio Hook MD on 3/08/2024 at 6:46 AM     Finalized by (CST): Arsenio Hook MD on 3/08/2024 at 6:50 AM             Result Date: 3/8/2024  CONCLUSION:   1. There is mild to moderate right hydronephrosis and mild right perinephric stranding due to an 9 mm obstructing stone in the right UPJ.  A stone is also seen within the right renal pelvis measuring up to 10 mm.  2. Additional right nephrolithiasis.  Please see above for further details.   LOCATION:  [place]   Dictated by (CST): Arsenio Hook MD on 3/07/2024 at 11:29 AM     Finalized by (CST): Arsenio Hook MD on 3/07/2024 at 11:32 AM       XR OR - N/C    Result Date: 3/7/2024  CONCLUSION:  As above. The images obtained and the fluoroscopy which was provided was for planning purposes at the time of the procedure.    LOCATION:  [place]    Dictated by (CST): Julio Benjamin MD on 3/07/2024 at 8:27 PM     Finalized by (CST): Julio Benjamin MD on 3/07/2024 at 8:28 PM            Assessment & Plan:     Renal colic    Cystinuria (HCC)    Nephrolithiasis    Hydronephrosis with ureteropelvic junction (UPJ) obstruction  S/p lithotripsy, stent    Stent sequelae reviewed - possible urgency, frequency, flank discomfort, scant hematuria and the fact that this stent is temporary and needs to be removed.   Avoid constipation  Plan is in office stent removal 7-10 days  Stable for dc home from a urologic standpoint      Shellie Terry  MALATHI  3/8/2024

## 2024-03-08 NOTE — ANESTHESIA POSTPROCEDURE EVALUATION
[facility]    Devon East Patient Status:  Emergency   Age/Gender 28 year old male MRN [id number]   Location [facility] POST ANESTHESIA CARE UNIT Attending Crow Plascencia MD   Hosp Day # 0 PCP Spenser Mccormick MD       Anesthesia Post-op Note    CYSTOSCOPY, RIGHT URETEROSCOPY, LASER LITHOTRIPSY, RETROGRADE, PYELOGRAM, STENT INSERTION    Procedure Summary       Date: 03/07/24 Room / Location:  MAIN OR 07 / [facility] MAIN OR    Anesthesia Start: 1733 Anesthesia Stop: 1904    Procedure: CYSTOSCOPY, RIGHT URETEROSCOPY, LASER LITHOTRIPSY, RETROGRADE, PYELOGRAM, STENT INSERTION (Right) Diagnosis:       Ureteral calculi      (Ureteral calculi [N20.1])    Surgeons: Crow Plascencia MD Anesthesiologist: Layton Garrison MD    Anesthesia Type: general ASA Status: 1            Anesthesia Type: general    Vitals Value Taken Time   /78 03/07/24 1901   Temp 98 03/07/24 1904   Pulse 91 03/07/24 1903   Resp 9 03/07/24 1903   SpO2 97 % 03/07/24 1903   Vitals shown include unfiled device data.    Patient Location: PACU    Anesthesia Type: general    Airway Patency: patent and extubated    Postop Pain Control: adequate    Mental Status: mildly sedated but able to meaningfully participate in the post-anesthesia evaluation    Nausea/Vomiting: none    Cardiopulmonary/Hydration status: stable euvolemic    Complications: no apparent anesthesia related complications    Postop vital signs: stable    Comments:   The airway was removed in the procedure area.  Cable monitors were removed, and the patient was transported with observation to the recovery area personally with the OR team.  The patient was responsive in a meaningful way and demonstrated a good airway.  PACU monitors were then applied with device connection to Epic.  Full report signout, including report, identifications, history, procedure, anesthesia course, recovery expectations with chance for questions was provided to a responsible recovery RN.    Dental Exam:  Unchanged from Preop    Patient to be discharged from PACU when criteria met.

## 2024-03-08 NOTE — PROGRESS NOTES
NURSING DISCHARGE NOTE    Discharged Home via Wheelchair.  Accompanied by Family member and Support staff  Belongings Taken by patient/family.  Discharge instructions and medications reviewed with patient.

## 2024-03-08 NOTE — DISCHARGE INSTRUCTIONS
URETER STENT INSTRUCTIONS  Ureter stents are temporary and need to be removed - 3 months at the latest  Please call our office at [phone] to schedule your removal in 1 week  Ureter stents can provoke urinary frequency, blood in the urine, urgency, flank discomfort  Avoid constipation while you have a stent

## 2024-03-08 NOTE — PAYOR COMM NOTE
--------------  ADMISSION REVIEW   3/7-3/8  Payor: MAVIS CHAVIS  Subscriber #:  [id number]  Authorization Number: [id number]    Admit date: 3/7/24  Admit time:  8:49 PM       REVIEW DOCUMENTATION:    ED Provider Notes signed by Bonnie Dotson PA at 3/7/2024  3:19 PM    Patient Seen in: [facility] In [place]    History     Chief Complaint   Patient presents with    Abdomen/Flank Pain     Stated Complaint: right flank pain    Patient with history of cystinuria frequent nephrolithiasis presents to ED complaining of right flank pain that began around 7:30 AM today.  The onset was sudden and severe, radiating to the right lower quadrant.  Patient most recently had cystoscopy with lithotripsy and stent placement for left-sided stone on February 10 per Dr. Plascencia.  Denies any urinary symptoms, stating that he has not urinated yet today.  Denies hematuria or urinary frequency over the last several days.  Denies fevers, chills.  He reports nausea, no vomiting.    Objective:   Past Medical History:   Diagnosis Date    Anxiety state     Calculus of kidney     Cystine disease (HCC)     Depression     IBS (irritable bowel syndrome)     KIDNEY STONE     Migraines     OCD (obsessive compulsive disorder)    Positive for stated complaint: right flank pain  Other systems are as noted in HPI.  Constitutional and vital signs reviewed.      All other systems reviewed and negative except as noted above.    Physical Exam     ED Triage Vitals   BP 03/07/24 1043 (!) 143/97   Pulse 03/07/24 1043 97   Resp 03/07/24 1043 20   Temp 03/07/24 1042 98.4 °F (36.9 °C)   Temp src 03/07/24 1042 Temporal   SpO2 03/07/24 1043 95 %   O2 Device 03/07/24 1043 None (Room air)       Current:BP (!) 133/96   Pulse 100   Temp 98.4 °F (36.9 °C) (Temporal)   Resp 16   Ht 170.2 cm (5' 7\")   Wt 77.1 kg   SpO2 95%   BMI 26.63 kg/m²         Physical Exam  Vitals and nursing note reviewed.   Constitutional:       Appearance: He is  well-developed.   HENT:      Head: Normocephalic and atraumatic.   Cardiovascular:      Rate and Rhythm: Normal rate and regular rhythm.   Pulmonary:      Effort: Pulmonary effort is normal.      Breath sounds: Normal breath sounds.   Abdominal:      Tenderness: There is abdominal tenderness in the right lower quadrant. There is right CVA tenderness.   Skin:     General: Skin is warm and dry.   Neurological:      General: No focal deficit present.      Mental Status: He is alert.     Labs Reviewed   URINALYSIS WITH CULTURE REFLEX - Abnormal; Notable for the following components:       Result Value    Blood Urine Moderate (*)     pH Urine 8.5 (*)     Protein Urine 30 mg/dL (*)     All other components within normal limits   COMP METABOLIC PANEL (14) - Abnormal; Notable for the following components:    Sodium 135 (*)     All other components within normal limits   UA MICROSCOPIC ONLY, URINE - Abnormal; Notable for the following components:    RBC Urine >10 (*)     Bacteria Urine Rare (*)     Squamous Epi. Cells Few (*)     Cystine Crystal Few (*)     All other components within normal limits   CBC WITH DIFFERENTIAL WITH PLATELET    Narrative:     The following orders were created for panel order CBC With Differential With Platelet.  Procedure                               Abnormality         Status                     ---------                               -----------         ------                     CBC W/ DIFFERENTIAL[[id number]]                              Final result                 Please view results for these tests on the individual orders.   RAINBOW DRAW LAVENDER   RAINBOW DRAW LIGHT GREEN   CBC W/ DIFFERENTIAL    CT ABDOMEN+PELVIS KIDNEYSTONE 2D RNDR(NO IV,NO ORAL)(CPT=74176)    Result Date: 3/7/2024  CONCLUSION:   1. There is mild to moderate right hydronephrosis and mild right perinephric stranding due to an 9 mm obstructing stone in the right UPJ.  A stone is also seen within the right renal pelvis  measuring up to 10 mm.  2. Additional right nephrolithiasis.  Please see above for further details.   LOCATION:  [place]   Dictated by (CST): Arsenio Hook MD on 3/07/2024 at 11:29 AM     Finalized by (CST): Arsenio Hook MD on 3/07/2024 at 11:32 AM         Differential diagnosis includes but is not limited to kidney stone, UTI, pyelonephritis, appendicitis.    Patient very uncomfortable upon arrival, stable.  He has extensive history of nephrolithiasis with recent cystoscopy and lithotripsy with stent placement on 2/10/2024.  Patient presenting with similar symptoms to prior stones, onset around 730 this morning.  Dr. Angela evaluated patient personally and he discussed all results and plan for admission with patient and mom.  He spoke to admitting and consulting physicians.    Admission disposition: 3/7/2024 11:59 AM    Medical Decision Making  Amount and/or Complexity of Data Reviewed  Independent Historian: parent  External Data Reviewed: labs and radiology.  Labs:  Decision-making details documented in ED Course.  Radiology:  Decision-making details documented in ED Course.  ECG/medicine tests:  Decision-making details documented in ED Course.    Risk  Parenteral controlled substances.  Decision regarding hospitalization.  Minor surgery with no identified risk factors.  Disposition and Plan     Clinical Impression:  1. Renal colic    2. Nephrolithiasis    3. Hydronephrosis with ureteropelvic junction (UPJ) obstruction    4. Cystinuria (HCC)         Disposition:  Admit  3/7/2024 11:59 am    Hospital Problems       Present on Admission  Date Reviewed: 6/8/2020            ICD-10-CM Noted POA    * (Principal) Renal colic N23 3/7/2024 Unknown           ED Provider Notes signed by Gil Angela MD at 3/7/2024  1:24 PM       Author: Gil Angela MD Service: -- Author Type: Physician    Filed: 3/7/2024  1:24 PM Date of Service: 3/7/2024 11:46 AM Status: Signed    : Gil Angela MD (Physician)          Patient was discharged from the hospital last month with kidney stone.  Patient underwent a cystoscopy and ureteroscopy with laser lithotripsy and left ureteral stent.  According to urology consultation,  \"Patient is a 28 year old male who was admitted to the hospital for Calculus of left ureter:     28 year old gentleman with a history of cystinuria and multiple prior episodes of nephrolithiasis.      He recently underwent a left URS/LL with Dr. Hilliard on 2/6/2024 for a sizeable left renal pelvic calculus.  He had a stent placed post-operatively and was instructed to remove the stent this morning.      Shortly after removing his stent, he developed worsening left flank pain .     UA in the ER was not suggestive of an infection. Blood work was unremarkable.     He underwent a CT which showed multiple residual stone fragments in the left ureter as well as within the lower pole of the left kidney.  There is mild to moderate left hydronephrosis.     He was admitted for pain control.  He wishes to proceed with surgical intervention to remove any residual stone fragments from the kidney and ureter.\"      Patient complaining of right flank pain that started about 7 AM.  Patient took Tylenol without relief.  No vomiting or diarrhea.    On examination, is alert adult man who appears very uncomfortable  Abdomen is soft but there is diffuse tenderness throughout the right abdomen.  No CVA tenderness is noted.    Patient's presentation consistent with renal colic.  Pyelonephritis must be excluded.    Patient treated with IV fluid and offered pain and nausea medicine    CBC shows normal white count, hemoglobin, and platelets  Metabolic panel shows normal glucose and electrolytes.  Creatinine normal  Urinalysis shows positive blood but negative nitrites and leukocytes with only 1-5 WBCs      I discussed case with urology, Dr. Plascencia.  He recommends hospitalization and maintaining patient n.p.o. pending possible intervention later  today  Discussed case with [facility] hospitalist, Dr. Kaye.  She will admit and make further recommendations            3/7 Urology  Right UPJ calculus, right renal calculi     History of Present Illness:   Patient is a 28 year old male who was admitted to the hospital for Renal colic:     28 year old gentleman, known to our service for a history of cystinuria and recurrent nephrolithiasis. He underwent a ureteroscopy and laser lithotripsy with me on 2/10, after he had undergone a prior ureteroscopy with Dr. Hilliard on 2/6/2024. He had developed obstructing ureteral fragments.      He presented to the ER today with right flank pain.       A CT showed an obstructing right UPJ calculus, as well as right renal calculi.       His UA was not suggestive of infection.     Due to pain symptoms, he was admitted and will undergo a right ureteroscopy with laser lithotripsy this evening.      28 year old gentleman with a history of cystinuria, who presents with an obstructing right proximal ureteral calculus, as well as right renal calculi.     After careful review of the patient's medical history and imaging, we had a discussion regarding the various treatment options for their kidney stone.      We then discussed ureteroscopic stone removal.  This is a surgical procedure in which a small telescope is passed through the urethra, into the bladder, and into the ureter/kidney.  When the stone is identified, it is either grasped and removed as a single piece, or fragmented into smaller pieces using a laser fiber and then removed (laser lithotripsy).  This procedure has higher rates of stone clearance, but is more invasive than ESWL.  They understand that a stent is typically placed into the ureter after the completion of the procedure to prevent the ureter from swelling to the point of obstruction, causing persistent symptoms.  This stent remains in place for days to weeks after the procedure, and is typically removed in the office via  cystoscopy.  They understand that a ureteral stent is not permanent, and require follow-up for removal.  Risks of ureteroscopy include blood in the urine, urinary tract or kidney infection, damage to the urethra/bladder/ureter/kidney, as well as a possible inability to clear the stone in one procedure.  Rarely, in ~10% of patients, the ureter is unable to accommodate the scope.  This requires placement of a ureteral stent and subsequent follow-up for ureteroscopy and definitive stone treatment once the ureter has passively dilated with the stent in place for at least 1 week.  Sometimes the stone cannot be cleared in 1 treatment session, and repeat ureteroscopic treatments are required.      I have discussed the risks, benefits and alternatives to the proposed procedure/treatment plan with the patient.  Our discussion also included the risks and benefits of the alternatives treatment options, including doing nothing. They were encouraged to ask questions and all questions were answered to their satisfaction.  At the end of our discussion they gave their verbal consent to the proposed procedure/treatment plan.          3/7 Op Report CYSTOSCOPY, RIGHT URETEROSCOPY, LASER LITHOTRIPSY, RETROGRADE, PYELOGRAM, STENT INSERTION     PREOPERATIVE DIAGNOSIS:  9mm right proximal ureteral calculus, right renal calculi, cystinuria     POSTOPERATIVE DIAGNOSIS:  Same     PROCEDURE PERFORMED:  Cystoscopy  Urethral dilation  Right Retrograde Pyelogram  Right Ureteroscopy with Laser Lithotripsy and Stone Basket Extraction  Right Ureteral Stent Placement  Intraoperative interpretation of fluoroscopic images     ASSISTANTS:  None     ANESTHESIA:  General     ANTIBIOTIC PROPHYLAXIS:  Ancef     SPECIMENS:  Right ureteral calculus     DRAINS:  6F x 26 cm JJ right ureteral stent     ESTIMATED BLOOD LOSS:  Minimal     COMPLICATIONS:  No immediate complications      INDICATIONS FOR PROCEDURE:  Mr. East is a 28 year old gentleman with a  history of cystinuria and recurrent nephrolithiasis.  He recently underwent ureteroscopy and laser lithotripsy of his left sided calculi in Feb, 2024.  He presented to the ER this AM with right flank pain, due to an obstructing right proximal ureteral calculus. He presents this evening to undergo ureteroscopy and laser lithotripsy.      We discussed the risks of the procedure including bleeding, infection, or damage to the urologic structures.  The patient understands that in ~10% of patients, the ureter is too narrow to accommodate a ureteroscope.  If this occurs, a ureteral stent will be placed and the patient will need to return to the OR in a delayed fashion for definitive stone treatment after the stent has allowed passive dilation of the ureter to occur.  Furthermore, they understand that ureteral stents can cause flank pain and/or urinary symptoms.             MEDICATIONS ADMINISTERED IN LAST 1 DAY:  ceFAZolin (Ancef) 2 g in 20mL IV syringe premix       Date Action Dose Route User    3/7/2024 1741 Given 2 g Intravenous Layton Garrison MD          dexamethasone (Decadron) 4 MG/ML injection       Date Action Dose Route User    3/7/2024 1741 Given 4 mg Intravenous Layton Garrison MD          dextrose in lactated ringers 5% infusion       Date Action Dose Route User    3/8/2024 0539 New Bag (none) Intravenous Alyson Gomez RN    3/7/2024 2239 New Bag (none) Intravenous Luis Fernandol Delisa Francois RN          docusate sodium (Colace) cap 100 mg       Date Action Dose Route User    3/8/2024 0926 Given 100 mg Oral Johanna Howell RN lic pend    3/7/2024 2246 Given 100 mg Oral Littel Delisa Francois RN          fentaNYL (Sublimaze) 50 mcg/mL injection       Date Action Dose Route User    3/7/2024 1829 Given 50 mcg Intravenous Layton Garrison MD    3/7/2024 1809 Given 50 mcg Intravenous Layton Garrison MD    3/7/2024 1758 Given 50 mcg Intravenous Layton Garrison MD    3/7/2024 1741 Given 50 mcg  Intravenous Layton Garrison MD          HYDROmorphone (Dilaudid) 1 MG/ML injection 0.4 mg       Date Action Dose Route User    3/8/2024 0927 Given 0.4 mg Intravenous Johanna Howell RN lic pend          HYDROmorphone (Dilaudid) 1 MG/ML injection 0.8 mg       Date Action Dose Route User    3/8/2024 0408 Given 0.8 mg Intravenous Alyson Gomez RN          iohexol (Omnipaque) 300 MG/ML injection       Date Action Dose Route User    3/7/2024 1847 Given 5 mL Other (Per Procedure Note) Crow Plascencia MD          ketorolac (Toradol) 15 MG/ML injection 30 mg       Date Action Dose Route User    3/8/2024 1209 Given 30 mg Intravenous Johanna Howell RN lic pend          ketorolac (Toradol) 30 MG/ML injection       Date Action Dose Route User    3/7/2024 1849 Given 30 mg Intravenous Layton Garrison MD          lactated ringers infusion       Date Action Dose Route User    3/7/2024 1603 New Bag (none) Intravenous Michelle Lopez RN          lactated ringers infusion       Date Action Dose Route User    3/7/2024 1920 Rate/Dose Change (none) Intravenous Abbi Romano RN          lactated ringers infusion       Date Action Dose Route User    3/7/2024 1735 New Bag (none) Intravenous Layton Garrison MD          lidocaine (Urojet) 2 % urethral jelly       Date Action Dose Route User    3/7/2024 1847 Given 1 Application Urethral (Per Procedure Note) Crow Plascencia MD          lidocaine PF (Xylocaine-MPF) 1% injection       Date Action Dose Route User    3/7/2024 1738 Given 50 mg Intravenous Layton Garrison MD          melatonin tab 3 mg       Date Action Dose Route User    3/7/2024 2246 Given 3 mg Oral Littel Delisa Francois, RN          midazolam (Versed) 2 MG/2ML injection       Date Action Dose Route User    3/7/2024 1735 Given 2 mg Intravenous Layton Garrison MD          ondansetron (Zofran) 4 MG/2ML injection 4 mg       Date Action Dose Route User    3/7/2024 1920 Given 4 mg Intravenous  Abbi Romano RN          ondansetron (Zofran) 4 MG/2ML injection       Date Action Dose Route User    3/7/2024 1849 Given 4 mg Intravenous Layton Garrison MD          ondansetron (Zofran) 4 MG/2ML injection       Date Action Dose Route User    3/7/2024 1920 Given 4 mg Intravenous Abbi Romano RN          ondansetron (Zofran) 4 MG/2ML injection 4 mg       Date Action Dose Route User    3/8/2024 0501 Given 4 mg Intravenous Alyson Gomez RN          oxyCODONE immediate release tab 5 mg       Date Action Dose Route User    3/7/2024 2246 Given 5 mg Oral Littel Delisa Francois RN          potassium citrate (Urocit-K) tab 10 mEq       Date Action Dose Route User    3/8/2024 1210 Given 10 mEq Oral Johanna Howell RN lic pend          propofol (Diprivan) 10 MG/ML injection       Date Action Dose Route User    3/7/2024 1738 Given 200 mg Intravenous Layton Garrison MD          sertraline (Zoloft) tab 100 mg       Date Action Dose Route User    3/8/2024 0926 Given 100 mg Oral Johanna Howell RN lic pend          tamsulosin (Flomax) cap 0.4 mg       Date Action Dose Route User    3/8/2024 0926 Given 0.4 mg Oral Johanna Howell RN lic pend            Vitals (last day)       Date/Time Temp Pulse Resp BP SpO2 Weight O2 Device O2 Flow Rate (L/min) [facility]    03/08/24 1207 97.9 °F (36.6 °C) 65 18 123/83 95 % -- None (Room air) 0 L/min     03/08/24 0750 97.4 °F (36.3 °C) 69 18 118/72 96 % -- Nasal cannula 0 L/min     03/08/24 0404 98.2 °F (36.8 °C) 85 18 118/70 96 % -- None (Room air) 0 L/min     03/08/24 0404 -- -- -- -- -- 175 lb 7.8 oz -- -- KCA    03/07/24 2330 -- 91 -- 125/95 94 % -- -- -- NN    03/07/24 2300 98.8 °F (37.1 °C) 82 19 120/82 95 % -- None (Room air) 0 L/min NN    03/07/24 2230 -- 81 -- 126/91 95 % -- -- -- NN    03/07/24 2215 -- 82 -- 121/87 97 % -- -- -- NN    03/07/24 2200 -- 81 -- 124/84 95 % -- -- -- NN    03/07/24 2145 -- 80 -- 127/87 94 % -- -- -- NN    03/07/24 2130 -- 83 --  130/91 96 % -- -- -- NN    03/07/24 2115 -- 86 -- 137/93 96 % -- -- -- NN    03/07/24 2100 98.6 °F (37 °C) 94 18 136/90 94 % -- None (Room air) 0 L/min NN    03/07/24 2030 -- 84 0 125/92 99 % -- -- -- NN    03/07/24 2016 97.4 °F (36.3 °C) 93 15 136/93 97 % -- None (Room air) -- MS    03/07/24 2015 -- 95 14 -- 95 % -- -- -- NN    03/07/24 2011 -- 103 24 -- 94 % -- -- -- MS    03/07/24 2001 -- 94 11 135/91 98 % -- None (Room air) -- MS    03/07/24 2000 -- 98 13 -- 98 % -- -- -- NN    03/07/24 1946 -- 91 12 135/97 96 % -- None (Room air) -- MS    03/07/24 1931 -- 90 12 144/95 99 % -- None (Room air) -- MS    03/07/24 1916 -- 84 14 147/85 98 % -- Simple mask 6 L/min MS    03/07/24 1911 -- 83 12 136/88 97 % -- -- -- MS    03/07/24 1906 -- 84 12 142/85 96 % -- Simple mask 8 L/min MS    03/07/24 1901 97.2 °F (36.2 °C) 83 11 128/78 97 % -- Simple mask 8 L/min MS    03/07/24 1556 98 °F (36.7 °C) 103 16 138/99 96 % 175 lb 7.8 oz None (Room air) -- KM    03/07/24 1455 -- 100 16 133/96 95 % -- None (Room air) -- LM    03/07/24 1235 -- 101 16 131/89 96 % -- None (Room air) -- MG    03/07/24 1043 -- 97 20 143/97 95 % -- None (Room air) -- JJ    03/07/24 1042 98.4 °F (36.9 °C) -- -- -- -- 170 lb -- -- ANANTH

## 2024-03-08 NOTE — OPERATIVE REPORT
OPERATIVE NOTE    PATIENT NAME: Devon East  YOB: 1995  DATE OF SERVICE: 3/7/2024    SURGEON:  Crow Plascencia MD    ASSISTANT:  None    PREOPERATIVE DIAGNOSIS:  9mm right proximal ureteral calculus, right renal calculi, cystinuria    POSTOPERATIVE DIAGNOSIS:  Same    PROCEDURE PERFORMED:  Cystoscopy  Urethral dilation  Right Retrograde Pyelogram  Right Ureteroscopy with Laser Lithotripsy and Stone Basket Extraction  Right Ureteral Stent Placement  Intraoperative interpretation of fluoroscopic images    ASSISTANTS:  None    ANESTHESIA:  General    ANTIBIOTIC PROPHYLAXIS:  Ancef    SPECIMENS:  Right ureteral calculus    DRAINS:  6F x 26 cm JJ right ureteral stent    ESTIMATED BLOOD LOSS:  Minimal    COMPLICATIONS:  No immediate complications     INDICATIONS FOR PROCEDURE:  Mr. East is a 28 year old gentleman with a history of cystinuria and recurrent nephrolithiasis.  He recently underwent ureteroscopy and laser lithotripsy of his left sided calculi in Feb, 2024.  He presented to the ER this AM with right flank pain, due to an obstructing right proximal ureteral calculus. He presents this evening to undergo ureteroscopy and laser lithotripsy.     We discussed the risks of the procedure including bleeding, infection, or damage to the urologic structures.  The patient understands that in ~10% of patients, the ureter is too narrow to accommodate a ureteroscope.  If this occurs, a ureteral stent will be placed and the patient will need to return to the OR in a delayed fashion for definitive stone treatment after the stent has allowed passive dilation of the ureter to occur.  Furthermore, they understand that ureteral stents can cause flank pain and/or urinary symptoms.     DESCRIPTION OF PROCEDURE:  After reviewing the indications for the procedure, informed consent was reviewed and signed by the patient.    The patient was brought to the operating room and placed in the supine position on the OR  table.  SCD's were applied and all pressure points were carefully padded.  At this point, anesthesia was successfully induced.  The patient was then given the perioperative antibiotics listed above prior to the procedure.  The patient was transferred to the dorsal lithotomy position and prepped and draped in the normal sterile fashion using betadine.  We then performed an operative time out to confirm the correct patient, procedure, and laterality.  Everyone in the room was in agreement.     He had a stenotic urethral meatus that required dilation with Stefano sounds.  I sequentially dilated from 18F up to 24F.     I began by inserting a 22F rigid cystoscope into the bladder per urethra.  The urethra was without lesions or strictures.  Upon entering the bladder I performed a thorough cystoscopy which did not reveal any bladder lesions, stones, or other pathology.  I identified the bilateral ureteral orifices in their orthotopic locations.      I first began by performing a right retrograde pyelogram using a 5F ureteral catheter and contrast dye.  There were no distal or mid ureteral filling defects noted.    I next cannulated the right ureteral orifice using a 0.035 wire. I followed this up into the renal pelvis under fluoroscopic guidance.      I then advanced an 11/13 x 36 cm ureteral access sheath over the wire and into the proximal ureter under fluoroscopic guidance. I then inserted a flexible ureteroscope and inspected the proximal ureter. His proximal stone had migrated back into the renal pelvis.  I moved the stone into an upper pole calyx.  I identified another upper pole stone. I then used a 365 micron laser fiber to fragment these stones into fine debris.  I used a 0 tip basket to extract any sizeable stone fragments. Fragments were sent for chemical analysis.     I also identified a large lower pole calculus. This was moved into an upper pole calyx and was fragmented using the laser in a similar  fashion. Any sizeable fragments were extracted using a nitinol basket.  I then further fragmented all stones into a fine debris and irrigated out as much of this as possible down the access sheath.     At this point, I was satisfied that all stones had been adequately fragmented. I backed the scope and sheath down the ureter after replacing the 0.035 wire.  I then reinserted a cystoscope and advanced a 6F x 26cm JJ right ureteral stent over the wire. The stent was deployed with a good proximal curl on fluoroscopy and a good distal curl visually within the bladder lumen.       I then emptied the bladder of any stone debris and irrigant. 2% viscous lidocaine was instilled into the urethra for post-operative analgesia.    This completed the procedure.  They tolerated it well without complications.    Please note that I was present for, and completed the entirety of this operation myself.    PLAN:  Monitor overnight for pain control and hydration. Anticipate discharge to home tomorrow. Follow up in 7-10 days for cystoscopy and stent removal in the office.       Crow Plascencia MD  [facility]  Office: [phone]

## 2024-03-11 NOTE — PAYOR COMM NOTE
--------------  CONTINUED STAY REVIEW    Payor: [facility]  Subscriber #:  [id number]  Authorization Number: [id number]    Admit date: 3/7/24  Admit time:  8:49 PM    Admitting Physician: Eric Rojas MD  Attending Physician:  No att. providers found  Primary Care Physician: Spenser Mccormick MD    REVIEW DOCUMENTATION:  3/8/24     Cystinuria (HCC)    Nephrolithiasis    Hydronephrosis with ureteropelvic junction (UPJ) obstruction  S/p lithotripsy, stent     Stent sequelae reviewed - possible urgency, frequency, flank discomfort, scant hematuria and the fact that this stent is temporary and needs to be removed.   Avoid constipation  Plan is in office stent removal 7-10 days  Stable for dc home from a urologic standpoint        Shellie Terry PA-C  3/8/2024      NURSING DISCHARGE NOTE     Discharged Home via Wheelchair.  Accompanied by Family member and Support staff  Belongings Taken by patient/family.  Discharge instructions and medications reviewed with patient.     Discharged: 3/8/2024

## 2024-03-13 NOTE — H&P
[facility] Hospitalist H&P       CC:   Chief Complaint   Patient presents with    Abdomen/Flank Pain        PCP: Spenser Mccormick MD    History of Present Illness:       Patient is a 28-year-old male with significant past medical history of moderate episode of recurrent depression, migraine, cystinuria with recurrent nephrolithiasis and urolithiasis status post recent ureteroscopy with laser lithotripsy stent placement on the right in December 2023 at [facility].  Pathology of the stone was 100%cistine stone, came in with right sided flank pain, also had recent cysto and lithotripsy and stent placement on the left sideon 5 8 hand per Dr. Estevez.  Patient denies any fevers chills he does have some nausea as well as no vomiting.  Patient came in yesterday in the emergency room    Vital signs were stable,AfebrileVital signs were stable, CT abdomen pelvis was done that showed mild to moderate right-sided hydronephrosis and mild right perinephric stranding to 9 mm obstructing stone in the right UPJ    CBC was unremarkable, BMP was also unremarkable urology was consulted    Patient underwent right cystoscopy ureteroscopy and laser lithotripsy today with stent placement'    Patient was admitted for observation overnight    PMH  Past Medical History:   Diagnosis Date    Anxiety state     Calculus of kidney     Cystine disease (HCC)     Depression     IBS (irritable bowel syndrome)     KIDNEY STONE     Migraines     OCD (obsessive compulsive disorder)         PSH  Past Surgical History:   Procedure Laterality Date    COLONOSCOPY      LITHOTRIPSY      OTHER SURGICAL HISTORY  2-17-11    Rt RPG, URS stone manipulation,laser litho,Rt stent, Dr. Mcdonough    OTHER SURGICAL HISTORY  2/21/11    Cysto stent removal- Dr. Mcdonough    OTHER SURGICAL HISTORY  11/21/15    Cysto, L URS, laser litho, stone extraction, stent placement, RPG Dr. Finn    OTHER SURGICAL HISTORY  12/1/15    cysto stent removal    UPPER GI ENDOSCOPY,EXAM           ALL:  Allergies   Allergen Reactions    Ciprofloxacin DIZZINESS     Dizziness, difficulty with walking    Seroquel [Quetiapine] RESTLESSNESS     Shaking, neurological side effects.    Tiopronin OTHER (SEE COMMENTS)     WEIGHT LOSS AND HAIR LOSS PER PT REPORT and protein excretion        Home Medications:  Outpatient Medications Marked as Taking for the 3/7/24 encounter (Hospital Encounter)   Medication Sig Dispense Refill    HYDROcodone-acetaminophen 5-325 MG Oral Tab Take 1-2 tablets by mouth every 4 (four) hours as needed for Pain. 10 tablet 0    tamsulosin 0.4 MG Oral Cap Take 1 capsule (0.4 mg total) by mouth daily for 14 days. 14 capsule 0    fexofenadine 180 MG Oral Tab Take 1 tablet (180 mg total) by mouth daily.      potassium citrate 10 MEQ (1080 MG) Oral Tab CR Take 2 tablets (20 mEq total) by mouth 3 (three) times daily with meals.      sertraline 100 MG Oral Tab Take 1 tablet (100 mg total) by mouth daily.      tamsulosin (FLOMAX) cap Take 1 capsule (0.4 mg total) by mouth daily. 30 capsule 3         Soc Hx  Social History     Tobacco Use    Smoking status: Never    Smokeless tobacco: Never   Substance Use Topics    Alcohol use: No        Fam Hx  Family History   Problem Relation Age of Onset    Heart Disorder Maternal Grandfather     Diabetes Paternal Grandfather     Cancer Paternal Grandfather         meloma    Hypertension Father     Other (Other) Father     Cancer Maternal Grandmother     Cancer Paternal Grandmother        Review of Systems  General: Denies unintentional weight loss, fevers, or chills-negative except above  HEENT: Denies vision loss or double vision, denies hearing loss  Cardiovascular: Denies chest pain, palpitations, peripheral edema  Pulmonary: Denies cough, shortness of breath, or wheezing  Gastrointestinal: Denies abdominal pain, melena, or hematochezia  Genitourinary: Denies urinary frequency, urgency, and dysuria  Neurologic: Denies numbness, headaches, focal  weakness  Skin: Denies rashes, sores  Endocrine: Denies heat or cold intolerance, denies polydipsia  Hematologic: Denies abnormal bleeding or bruising     OBJECTIVE:  /83 (BP Location: Right arm)   Pulse 65   Temp 97.9 °F (36.6 °C) (Oral)   Resp 18   Ht 5' 7\" (1.702 m)   Wt 175 lb 7.8 oz (79.6 kg)   SpO2 95%   BMI 27.49 kg/m²     BP Readings from Last 3 Encounters:   03/08/24 123/83   02/11/24 117/69   02/06/24 130/90     Wt Readings from Last 3 Encounters:   03/08/24 175 lb 7.8 oz (79.6 kg)   02/10/24 170 lb (77.1 kg)   02/06/24 170 lb (77.1 kg)       Wt Readings from Last 6 Encounters:   03/08/24 175 lb 7.8 oz (79.6 kg)   02/10/24 170 lb (77.1 kg)   02/06/24 170 lb (77.1 kg)   11/06/23 160 lb (72.6 kg)   05/28/20 158 lb 11.7 oz (72 kg)   05/19/20 159 lb (72.1 kg)     Gen: No acute distress, alert and oriented x 3  Pulm: Lungs clear bilaterally, good inspiratory effort   CV:  nL S1/S2  Abd: soft, NT/ND, no hepatomegaly, +BS  MSK: moving all extremities, no edema  Neuro: no focal deficits  Skin: no rashes/lesions  Psych: normal mood/affect          Diagnostic Data:    CBC/Chem  Recent Labs   Lab 03/07/24  1112   WBC 5.2   HGB 15.0   MCV 91.2   .0       Recent Labs   Lab 03/07/24  1112   *   K 4.2      CO2 31.0   BUN 21   CREATSERUM 0.98   GLU 93   CA 9.7       Recent Labs   Lab 03/07/24  1112   ALT 38   AST 21   ALB 4.3       No results for input(s): \"TROP\" in the last 168 hours.        Radiology: CT ABDOMEN+PELVIS KIDNEYSTONE 2D RNDR(NO IV,NO ORAL)(CPT=74176)    Addendum Date: 3/8/2024    This report includes an Addendum and supersedes previous reports for this exam.    PROCEDURE:  CT ABDOMEN+PELVIS KIDNEYSTONE 2D RNDR(NO IV,NO ORAL)(CPT=74176)  COMPARISON:  [facility], CT, CT ABDOMEN+PELVIS KIDNEYSTONE 2D RNDR(NO IV,NO ORAL)(CPT=74176), 2/10/2024, 8:42 AM.  INDICATIONS:  right flank pain  TECHNIQUE:  Unenhanced multislice CT scanning from above the kidneys to below the urinary  bladder.  2D rendering are generated on the CT scanner workstation to localize potential stones in the cranio-caudal plane.  Dose reduction techniques were used. Dose information is transmitted to the ACR (American College of Radiology) NRDR (National Radiology Data Registry) which includes the Dose Index Registry.  PATIENT STATED HISTORY: (As transcribed by Technologist)  Patient has had right flank pain since 0700 hrs, hx of kidney stones.    FINDINGS: Evaluation of the visceral organs is limited due to the lack of IV contrast. LUNG BASE:  Unremarkable. LIVER:  Unremarkable. BILIARY:  Unremarkable. SPLEEN:  Unremarkable. PANCREAS:  Unremarkable. ADRENALS:  Unremarkable. KIDNEYS:  There is mild to moderate right hydronephrosis and mild right perinephric stranding due to a 9 mm obstructing stone in the right UPJ.  A stone is also seen within the right renal pelvis measuring up to 10 mm.  There are additional scattered nonobstructing stones in the mid to upper pole right kidney measuring up to 10 mm.  No left renal stone identified.  There has been interval fragmentation of the previously visualized stone within the left renal pelvis.  Multiple stone fragments are now seen along the course of the left ureter with elongated configuration.  One dominant stone fragment along the distal left ureter positioned within 2 cm of the left UVJ measures up to 15 x 4 mm.  A stone fragment within the mid to distal left ureter best seen on image 59 series 601 measures up to 15 x 5 mm.  There is persistent mild to moderate left hydroureteronephrosis noted.  Multiple nonobstructing stones are seen scattered in the mid and lower pole of the left kidney measuring up to 11 mm.  There is  mild left perinephric stranding.  A stone is also now seen within the proximal right ureter in the region of the right UPJ measuring up to 8 x 4 mm with mild right hydronephrosis noted.  There are other scattered nonobstructing stones in the right  kidney measuring up to 7mm.  There is minimal right perinephric stranding.  AORTA/VASCULAR:  Unremarkable. RETROPERITONEUM:  Unremarkable. BOWEL/MESENTERY:  Unremarkable appendix.  Scattered feces in the colon.  No large or small bowel dilatation.  No free air or free fluid. ABDOMINAL WALL:  Minimal fat containing umbilical hernia. PELVIC ORGANS:  Unremarkable urinary bladder and prostate gland.  Pelvic phleboliths. LYMPH NODES:  No lymphadenopathy in the abdomen or pelvis. BONES:  Unremarkable. OTHER:  None.  CONCLUSION:   1. There is mild to moderate right hydronephrosis and mild right perinephric stranding due to an 9 mm obstructing stone in the right UPJ.  A stone is also seen within the right renal pelvis measuring up to 10 mm.  2. Additional right nephrolithiasis.  Please see above for further details.   LOCATION:  [place]   Dictated by (CST): Arsenio Hook MD on 3/07/2024 at 11:29 AM     Finalized by (CST): Arsenio Hook MD on 3/07/2024 at 11:32 AM     ADDENDUM:  There was a transcription error in the body of the report in regards the comparison with prior exam within the kidney section.  The report should read as follows:  FINDINGS: Evaluation of the visceral organs is limited due to the lack of IV contrast. LUNG BASE:  Unremarkable. LIVER:  Unremarkable. BILIARY:  Unremarkable. SPLEEN:  Unremarkable. PANCREAS:  Unremarkable. ADRENALS:  Unremarkable. KIDNEYS:  There is mild to moderate right hydronephrosis and mild right perinephric stranding due to a 9 mm obstructing stone in the right UPJ.  A stone is also seen within the right renal pelvis measuring up to 10 mm.  There are additional scattered nonobstructing stones in the mid to upper pole right kidney measuring up to 10 mm.  No left renal stone identified.  AORTA/VASCULAR:  Unremarkable. RETROPERITONEUM:  Unremarkable. BOWEL/MESENTERY:  Unremarkable appendix.  Scattered feces in the colon.  No large or small bowel dilatation.  No free air or free  fluid. ABDOMINAL WALL:  Minimal fat containing umbilical hernia. PELVIC ORGANS:  Unremarkable urinary bladder and prostate gland.  Pelvic phleboliths. LYMPH NODES:  No lymphadenopathy in the abdomen or pelvis. BONES:  Unremarkable. OTHER:  None.  CONCLUSION:   1. There is mild to moderate right hydronephrosis and mild right perinephric stranding due to a 9 mm obstructing stone in the right UPJ.  A stone is also seen within the right renal pelvis measuring up to 10 mm.  2. Additional right nephrolithiasis.  Please see above for further details.   Dictated by (CST): Arsenio Hook MD on 3/08/2024 at 6:46 AM     Finalized by (CST): Arsenio Hook MD on 3/08/2024 at 6:50 AM             Result Date: 3/8/2024  PROCEDURE:  CT ABDOMEN+PELVIS KIDNEYSTONE 2D RNDR(NO IV,NO ORAL)(CPT=74176)  COMPARISON:  [facility], CT, CT ABDOMEN+PELVIS KIDNEYSTONE 2D RNDR(NO IV,NO ORAL)(CPT=74176), 2/10/2024, 8:42 AM.  INDICATIONS:  right flank pain  TECHNIQUE:  Unenhanced multislice CT scanning from above the kidneys to below the urinary bladder.  2D rendering are generated on the CT scanner workstation to localize potential stones in the cranio-caudal plane.  Dose reduction techniques were used. Dose information is transmitted to the ACR (American College of Radiology) NRDR (National Radiology Data Registry) which includes the Dose Index Registry.  PATIENT STATED HISTORY: (As transcribed by Technologist)  Patient has had right flank pain since 0700 hrs, hx of kidney stones.    FINDINGS: Evaluation of the visceral organs is limited due to the lack of IV contrast. LUNG BASE:  Unremarkable. LIVER:  Unremarkable. BILIARY:  Unremarkable. SPLEEN:  Unremarkable. PANCREAS:  Unremarkable. ADRENALS:  Unremarkable. KIDNEYS:  There is mild to moderate right hydronephrosis and mild right perinephric stranding due to an 9 mm obstructing stone in the right UPJ.  A stone is also seen within the right renal pelvis measuring up to 10 mm.  There are  additional scattered nonobstructing stones in the mid to upper pole right kidney measuring up to 10 mm.  No left renal stone identified.  There has been interval fragmentation of the previously visualized stone within the left renal pelvis.  Multiple stone fragments are now seen along the course of the left ureter with elongated configuration.  One dominant stone fragment along the distal left ureter positioned within 2 cm of the left UVJ measures up to 15 x 4 mm.  A stone fragment within the mid to distal left ureter best seen on image 59 series 601 measures up to 15 x 5 mm.  There is persistent mild to moderate left hydroureteronephrosis noted.  Multiple nonobstructing stones are seen scattered in the mid and lower pole of the left kidney measuring up to 11 mm.  There is  mild left perinephric stranding.  A stone is also now seen within the proximal right ureter in the region of the right UPJ measuring up to 8 x 4 mm with mild right hydronephrosis noted.  There are other scattered nonobstructing stones in the right kidney measuring up to 7mm.  There is minimal right perinephric stranding.  AORTA/VASCULAR:  Unremarkable. RETROPERITONEUM:  Unremarkable. BOWEL/MESENTERY:  Unremarkable appendix.  Scattered feces in the colon.  No large or small bowel dilatation.  No free air or free fluid. ABDOMINAL WALL:  Minimal fat containing umbilical hernia. PELVIC ORGANS:  Unremarkable urinary bladder and prostate gland.  Pelvic phleboliths. LYMPH NODES:  No lymphadenopathy in the abdomen or pelvis. BONES:  Unremarkable. OTHER:  None.            CONCLUSION:   1. There is mild to moderate right hydronephrosis and mild right perinephric stranding due to an 9 mm obstructing stone in the right UPJ.  A stone is also seen within the right renal pelvis measuring up to 10 mm.  2. Additional right nephrolithiasis.  Please see above for further details.   LOCATION:  [place]   Dictated by (CST): Arsenio Hook MD on 3/07/2024 at 11:29  AM     Finalized by (CST): Arsenio Hook MD on 3/07/2024 at 11:32 AM       XR OR - N/C    Result Date: 3/7/2024  PROCEDURE:  XR OR - N/C  COMPARISON:  [facility], CT, CT ABDOMEN+PELVIS KIDNEYSTONE 2D RNDR(NO IV,NO ORAL)(CPT=74176), 3/07/2024, 11:18 AM.  [facility] , XR, XR OR - N/C, 2/10/2024, 10:41 PM.  INDICATIONS:  CYSTOSCOPY, LEFT RETROGRADE PYELOGRAM, LEFT URETEROSCOPY WITH LASER LITHOTRIPSY, LEFT URETERAL STENT PLACEMENT (Left: Bladder)  TECHNIQUE:   FLUOROSCOPY IMAGES OBTAINED:  2 FLUOROSCOPY TIME:  27 seconds TECHNOLOGIST TIME:  1 hour and 30 minutes RADIATION DOSE (AIR KERMA PRODUCT):  236.0uGy/m2   FINDINGS:  2 images are submitted demonstrating a contrast within the right ureter with a filling defect in the proximal right ureter consistent within known ureteral calculus.  The 2nd image submitted demonstrates a right ureteral stent in position.  The distal portion of the stent is not included in the field of view.  The proximal portion of the stent is in the location of the right renal pelvis.            CONCLUSION:  As above. The images obtained and the fluoroscopy which was provided was for planning purposes at the time of the procedure.    LOCATION:  [place]    Dictated by (CST): Julio Benjamin MD on 3/07/2024 at 8:27 PM     Finalized by (CST): Julio Benjamin MD on 3/07/2024 at 8:28 PM                  # right hydronephrosis secondary to at obstructive nephrolithiasis  # Cystinuria  -Patient with a history of recurrent cystine stones status post recent left t lithotripsy and stent placement  -Recurrence of right nephrolithiasis with obstructive hydronephrosis as well as well as perinephric stranding on the right side  -UA negative for any infection  -Urology consulted, s/p ureteroscopy with stent placement and lithotripsy and then dc home   -Patient will need to follow-up with nephrology for management of cystinuria  -Continue fluids pain control  -     # History of major depression resume home  sertraline     # History of migraines  -Resume Imitrex    Prophy:  DVT: Hepari  Deconditioning prevention:     Dispo: admit to [facility] with telemetry, okay to discharge per urology    Outpatient records reviewed confirming patient's medical history and medications.     Further recommendations pending patient's clinical course.  [facility] hospitalist to continue to follow patient while in house    Time spent: greater than 95 minutes spent in d/w pt/family, coordination of care, and d/w staff.     Shakira SANTACRUZ  Internal Medicine  DM Hospitalist  Pager: [phone]

## 2024-03-15 LAB
Lab: 100 %
Lab: 100 %
WEIGHT-STONE: 2 MG

## 2024-03-29 ENCOUNTER — HOSPITAL ENCOUNTER (EMERGENCY)
Age: 29
Discharge: HOME | End: 2024-03-29
Attending: EMERGENCY MEDICINE
Payer: COMMERCIAL

## 2024-03-29 ENCOUNTER — APPOINTMENT (OUTPATIENT)
Dept: GENERAL RADIOLOGY | Age: 29
End: 2024-03-29
Attending: EMERGENCY MEDICINE
Payer: COMMERCIAL

## 2024-03-29 VITALS
RESPIRATION RATE: 13 BRPM | WEIGHT: 170 LBS | HEIGHT: 67 IN | BODY MASS INDEX: 26.68 KG/M2 | DIASTOLIC BLOOD PRESSURE: 96 MMHG | SYSTOLIC BLOOD PRESSURE: 136 MMHG | TEMPERATURE: 99 F | HEART RATE: 93 BPM | OXYGEN SATURATION: 99 %

## 2024-03-29 DIAGNOSIS — J06.9 VIRAL URI WITH COUGH: Primary | ICD-10-CM

## 2024-03-29 LAB
ALBUMIN SERPL-MCNC: 3.5 G/DL (ref 3.4–5)
ALBUMIN/GLOB SERPL: 0.9 {RATIO} (ref 1–2)
ALP LIVER SERPL-CCNC: 85 U/L
ALT SERPL-CCNC: 28 U/L
ANION GAP SERPL CALC-SCNC: 8 MMOL/L (ref 0–18)
AST SERPL-CCNC: 15 U/L (ref 15–37)
BASOPHILS # BLD AUTO: 0.01 X10(3) UL (ref 0–0.2)
BASOPHILS NFR BLD AUTO: 0.2 %
BILIRUB SERPL-MCNC: 0.2 MG/DL (ref 0.1–2)
BUN BLD-MCNC: 23 MG/DL (ref 9–23)
CALCIUM BLD-MCNC: 9 MG/DL (ref 8.5–10.1)
CHLORIDE SERPL-SCNC: 104 MMOL/L (ref 98–112)
CO2 SERPL-SCNC: 27 MMOL/L (ref 21–32)
CREAT BLD-MCNC: 0.86 MG/DL
D DIMER PPP FEU-MCNC: <0.27 UG/ML FEU (ref ?–0.5)
EGFRCR SERPLBLD CKD-EPI 2021: 121 ML/MIN/1.73M2 (ref 60–?)
EOSINOPHIL # BLD AUTO: 0.2 X10(3) UL (ref 0–0.7)
EOSINOPHIL NFR BLD AUTO: 4.9 %
ERYTHROCYTE [DISTWIDTH] IN BLOOD BY AUTOMATED COUNT: 12.3 %
GLOBULIN PLAS-MCNC: 3.7 G/DL (ref 2.8–4.4)
GLUCOSE BLD-MCNC: 97 MG/DL (ref 70–99)
HCT VFR BLD AUTO: 40 %
HGB BLD-MCNC: 14 G/DL
IMM GRANULOCYTES # BLD AUTO: 0.02 X10(3) UL (ref 0–1)
IMM GRANULOCYTES NFR BLD: 0.5 %
LYMPHOCYTES # BLD AUTO: 1.45 X10(3) UL (ref 1–4)
LYMPHOCYTES NFR BLD AUTO: 35.5 %
MCH RBC QN AUTO: 30.9 PG (ref 26–34)
MCHC RBC AUTO-ENTMCNC: 35 G/DL (ref 31–37)
MCV RBC AUTO: 88.3 FL
MONOCYTES # BLD AUTO: 0.48 X10(3) UL (ref 0.1–1)
MONOCYTES NFR BLD AUTO: 11.7 %
NEUTROPHILS # BLD AUTO: 1.93 X10 (3) UL (ref 1.5–7.7)
NEUTROPHILS # BLD AUTO: 1.93 X10(3) UL (ref 1.5–7.7)
NEUTROPHILS NFR BLD AUTO: 47.2 %
OSMOLALITY SERPL CALC.SUM OF ELEC: 292 MOSM/KG (ref 275–295)
PLATELET # BLD AUTO: 296 10(3)UL (ref 150–450)
POTASSIUM SERPL-SCNC: 4.1 MMOL/L (ref 3.5–5.1)
PROT SERPL-MCNC: 7.2 G/DL (ref 6.4–8.2)
RBC # BLD AUTO: 4.53 X10(6)UL
SODIUM SERPL-SCNC: 139 MMOL/L (ref 136–145)
TROPONIN I SERPL HS-MCNC: 5 NG/L
WBC # BLD AUTO: 4.1 X10(3) UL (ref 4–11)

## 2024-03-29 PROCEDURE — 93005 ELECTROCARDIOGRAM TRACING: CPT

## 2024-03-29 PROCEDURE — 85025 COMPLETE CBC W/AUTO DIFF WBC: CPT | Performed by: NURSE PRACTITIONER

## 2024-03-29 PROCEDURE — 71045 X-RAY EXAM CHEST 1 VIEW: CPT | Performed by: EMERGENCY MEDICINE

## 2024-03-29 PROCEDURE — 99285 EMERGENCY DEPT VISIT HI MDM: CPT

## 2024-03-29 PROCEDURE — 36415 COLL VENOUS BLD VENIPUNCTURE: CPT

## 2024-03-29 PROCEDURE — 84484 ASSAY OF TROPONIN QUANT: CPT | Performed by: NURSE PRACTITIONER

## 2024-03-29 PROCEDURE — 93010 ELECTROCARDIOGRAM REPORT: CPT

## 2024-03-29 PROCEDURE — 80053 COMPREHEN METABOLIC PANEL: CPT | Performed by: NURSE PRACTITIONER

## 2024-03-29 PROCEDURE — 99284 EMERGENCY DEPT VISIT MOD MDM: CPT

## 2024-03-29 PROCEDURE — 85379 FIBRIN DEGRADATION QUANT: CPT | Performed by: NURSE PRACTITIONER

## 2024-03-30 NOTE — ED QUICK NOTES
PT states that his nose has started draining while waiting in the [facility] and since then, his chest pain has been relieved.

## 2024-03-30 NOTE — ED QUICK NOTES
NP assessed patient at bedside. Patient reports not feeling safe at home \"emotionally\", denies any physical harm being done at home. Denies SI/HI thoughts. Patient requested a phone to make phone calls to friend for  when discharged.

## 2024-03-30 NOTE — ED PROVIDER NOTES
Patient Seen in: [facility] In [place]      History     Chief Complaint   Patient presents with    Chest Pain Angina     Stated Complaint: chest pain with cough. Dx with bronchitis. GEtting progressively worse    Subjective:   This a 28-year-old male with below stated medical history.  Presents to the emergency department for upper respiratory symptoms.  Reports he was seen in urgent care earlier today and diagnosed with bronchitis.  States after he took the prescribed medication he began to have severe left-sided chest pain that is worse with coughing.  No fevers.  He did have a cystoscopy with stent placement at the beginning of this month. Denies any urinary symptoms or abdominal pain.  Patient states he is also been having lots of anxiety because he is not getting along with family members at home.  States he believes this is worsening his symptoms.  He denies any suicidal or homicidal ideations.   No other treatment attempted prior to arrival.    The history is provided by the patient.           Objective:   Past Medical History:   Diagnosis Date    Anxiety state     Calculus of kidney     Cystine disease (HCC)     Depression     IBS (irritable bowel syndrome)     KIDNEY STONE     Migraines     OCD (obsessive compulsive disorder)               Past Surgical History:   Procedure Laterality Date    COLONOSCOPY      LITHOTRIPSY      OTHER SURGICAL HISTORY  2-17-11    Rt RPG, URS stone manipulation,laser litho,Rt stent, Dr. Mcdonough    OTHER SURGICAL HISTORY  2/21/11    Cysto stent removal- Dr. Mcdonough    OTHER SURGICAL HISTORY  11/21/15    Cysto, L URS, laser litho, stone extraction, stent placement, RPG Dr. Finn    OTHER SURGICAL HISTORY  12/1/15    cysto stent removal    UPPER GI ENDOSCOPY,EXAM                  Social History     Socioeconomic History    Marital status: Single   Tobacco Use    Smoking status: Never    Smokeless tobacco: Never   Vaping Use    Vaping Use: Never used   Substance  and Sexual Activity    Alcohol use: No    Drug use: No     Social Determinants of Health     Food Insecurity: No Food Insecurity (3/8/2024)    Food Insecurity     Food Insecurity: Never true   Transportation Needs: No Transportation Needs (3/8/2024)    Transportation Needs     Lack of Transportation: No   Housing Stability: Low Risk  (3/8/2024)    Housing Stability     Housing Instability: No              Review of Systems   Constitutional:  Negative for chills and fever.   HENT:  Positive for congestion. Negative for sore throat.    Respiratory:  Positive for cough and shortness of breath. Negative for wheezing and stridor.    Cardiovascular:  Positive for chest pain. Negative for palpitations and leg swelling.   Gastrointestinal:  Negative for abdominal pain, constipation, diarrhea, nausea and vomiting.   Genitourinary:  Negative for dysuria.   Musculoskeletal:  Negative for back pain, neck pain and neck stiffness.   Allergic/Immunologic: Negative for environmental allergies.   Neurological:  Negative for headaches.   Hematological:  Does not bruise/bleed easily.       Positive for stated complaint: chest pain with cough. Dx with bronchitis. GEtting progressively worse  Other systems are as noted in HPI.  Constitutional and vital signs reviewed.      All other systems reviewed and negative except as noted above.    Physical Exam     ED Triage Vitals   BP 03/29/24 1911 129/84   Pulse 03/29/24 1900 102   Resp 03/29/24 1900 20   Temp 03/29/24 1911 98.6 °F (37 °C)   Temp src 03/29/24 1911 Oral   SpO2 03/29/24 1900 97 %   O2 Device 03/29/24 1900 None (Room air)       Current:BP (!) 136/96   Pulse 93   Temp 98.6 °F (37 °C) (Oral)   Resp 13   Ht 170.2 cm (5' 7\")   Wt 77.1 kg   SpO2 99%   BMI 26.63 kg/m²         Physical Exam  Vitals and nursing note reviewed.   Constitutional:       General: He is not in acute distress.     Appearance: Normal appearance. He is well-developed and normal weight. He is not  ill-appearing, toxic-appearing or diaphoretic.   HENT:      Head: Normocephalic and atraumatic.      Right Ear: External ear normal.      Left Ear: External ear normal.      Nose: Nose normal.      Mouth/Throat:      Mouth: Mucous membranes are moist.      Pharynx: Oropharynx is clear.   Eyes:      General:         Right eye: No discharge.         Left eye: No discharge.      Extraocular Movements: Extraocular movements intact.      Conjunctiva/sclera: Conjunctivae normal.   Cardiovascular:      Rate and Rhythm: Normal rate and regular rhythm.      Heart sounds: Normal heart sounds. No murmur heard.  Pulmonary:      Effort: Pulmonary effort is normal.      Breath sounds: Normal breath sounds. No decreased breath sounds, wheezing, rhonchi or rales.   Musculoskeletal:      Cervical back: Neck supple.      Right lower leg: No edema.      Left lower leg: No edema.   Skin:     General: Skin is warm and dry.      Capillary Refill: Capillary refill takes less than 2 seconds.      Findings: No rash.   Neurological:      Mental Status: He is alert and oriented to person, place, and time.   Psychiatric:         Mood and Affect: Mood normal.         Behavior: Behavior normal.               ED Course     Labs Reviewed   COMP METABOLIC PANEL (14) - Abnormal; Notable for the following components:       Result Value    A/G Ratio 0.9 (*)     All other components within normal limits   TROPONIN I HIGH SENSITIVITY - Normal   D-DIMER - Normal   CBC WITH DIFFERENTIAL WITH PLATELET    Narrative:     The following orders were created for panel order CBC With Differential With Platelet.  Procedure                               Abnormality         Status                     ---------                               -----------         ------                     CBC W/ DIFFERENTIAL[[id number]]                              Final result                 Please view results for these tests on the individual orders.   CBC W/ DIFFERENTIAL      EKG    Rate, intervals and axes as noted on EKG Report.  Rate: 92  Rhythm: Sinus Rhythm  Reading: Normal intervals, no evidence of ST elevation or depression.                 Labs, chest x-ray, EKG         MDM      Vital signs stable.  Patient is well-appearing and nontoxic looking.  Presents to emergency for upper respiratory symptoms with left-sided chest pain.    Differential diagnosis includes but is not limited to bronchitis, pneumonia, ACS, anxiety, PE    Lung sounds are clear bilaterally.  No evidence of respiratory stress or hypoxia.  Patient peers anxious.  Reports he is negative along with family numbers at home.  Denies any SI/HI.    CBC and CMP unremarkable.  Chest x-ray films interpreted and viewed myself.  Results show no focal consolidation concerning for pneumonia.    EKG shows no evidence of acute ischemia.  Troponin is negative.  D-dimer is also negative.  This is reassuring patient's symptoms are not from a cardiac etiology or pulmonary embolism.    Clinical respiratory symptoms appear viral.  Likely anxiety is exacerbating chest discomfort.  Heart score is 0.    DC home.  Recommended to take the medications he was prescribed at his walk-in care visit.  Recommended close follow-up with his primary care provider.  Reasons to return reviewed.  He verbalized understanding, and agreed with plan of care.  All questions answered.    My attending physician Dr. NEERAJ Tidwell was directly involved in this case.                           Medical Decision Making      Disposition and Plan     Clinical Impression:  1. Viral URI with cough         Disposition:  Discharge  3/29/2024  9:40 pm    Follow-up:  Spenser Mccormick MD  [address]  [address] [place] [id number] [phone]    Follow up in 1 week(s)            Medications Prescribed:  Current Discharge Medication List

## 2024-03-30 NOTE — ED INITIAL ASSESSMENT (HPI)
Patient here with cough since Monday. Went to urgent care, diagnosed with bronchitis. Now complains of chest pain when coughing.

## 2024-03-31 LAB
ATRIAL RATE: 92 BPM
P AXIS: 58 DEGREES
P-R INTERVAL: 154 MS
Q-T INTERVAL: 338 MS
QRS DURATION: 76 MS
QTC CALCULATION (BEZET): 417 MS
R AXIS: 26 DEGREES
T AXIS: 20 DEGREES
VENTRICULAR RATE: 92 BPM

## 2024-04-29 ENCOUNTER — APPOINTMENT (OUTPATIENT)
Dept: CT IMAGING | Age: 29
End: 2024-04-29
Attending: PHYSICIAN ASSISTANT
Payer: COMMERCIAL

## 2024-04-29 ENCOUNTER — HOSPITAL ENCOUNTER (EMERGENCY)
Age: 29
Discharge: HOME | End: 2024-04-29
Attending: EMERGENCY MEDICINE
Payer: COMMERCIAL

## 2024-04-29 VITALS
BODY MASS INDEX: 26.84 KG/M2 | SYSTOLIC BLOOD PRESSURE: 138 MMHG | DIASTOLIC BLOOD PRESSURE: 95 MMHG | TEMPERATURE: 98 F | RESPIRATION RATE: 17 BRPM | HEART RATE: 92 BPM | OXYGEN SATURATION: 100 % | WEIGHT: 173 LBS | HEIGHT: 67.5 IN

## 2024-04-29 DIAGNOSIS — N23 RENAL COLIC: Primary | ICD-10-CM

## 2024-04-29 LAB
ALBUMIN SERPL-MCNC: 3.8 G/DL (ref 3.4–5)
ALBUMIN/GLOB SERPL: 1.1 {RATIO} (ref 1–2)
ALP LIVER SERPL-CCNC: 69 U/L
ALT SERPL-CCNC: 24 U/L
ANION GAP SERPL CALC-SCNC: 5 MMOL/L (ref 0–18)
AST SERPL-CCNC: 14 U/L (ref 15–37)
BASOPHILS # BLD AUTO: 0.03 X10(3) UL (ref 0–0.2)
BASOPHILS NFR BLD AUTO: 0.6 %
BILIRUB SERPL-MCNC: 0.4 MG/DL (ref 0.1–2)
BILIRUB UR QL STRIP.AUTO: NEGATIVE
BUN BLD-MCNC: 16 MG/DL (ref 9–23)
CALCIUM BLD-MCNC: 9.1 MG/DL (ref 8.5–10.1)
CHLORIDE SERPL-SCNC: 104 MMOL/L (ref 98–112)
CLARITY UR REFRACT.AUTO: CLEAR
CO2 SERPL-SCNC: 27 MMOL/L (ref 21–32)
COLOR UR AUTO: YELLOW
CREAT BLD-MCNC: 0.85 MG/DL
EGFRCR SERPLBLD CKD-EPI 2021: 121 ML/MIN/1.73M2 (ref 60–?)
EOSINOPHIL # BLD AUTO: 0.07 X10(3) UL (ref 0–0.7)
EOSINOPHIL NFR BLD AUTO: 1.4 %
ERYTHROCYTE [DISTWIDTH] IN BLOOD BY AUTOMATED COUNT: 12.5 %
GLOBULIN PLAS-MCNC: 3.5 G/DL (ref 2.8–4.4)
GLUCOSE BLD-MCNC: 83 MG/DL (ref 70–99)
GLUCOSE UR STRIP.AUTO-MCNC: NEGATIVE MG/DL
HCT VFR BLD AUTO: 40.9 %
HGB BLD-MCNC: 14 G/DL
IMM GRANULOCYTES # BLD AUTO: 0.01 X10(3) UL (ref 0–1)
IMM GRANULOCYTES NFR BLD: 0.2 %
KETONES UR STRIP.AUTO-MCNC: NEGATIVE MG/DL
LEUKOCYTE ESTERASE UR QL STRIP.AUTO: NEGATIVE
LIPASE SERPL-CCNC: 28 U/L (ref 13–75)
LYMPHOCYTES # BLD AUTO: 1.42 X10(3) UL (ref 1–4)
LYMPHOCYTES NFR BLD AUTO: 28.5 %
MCH RBC QN AUTO: 30.6 PG (ref 26–34)
MCHC RBC AUTO-ENTMCNC: 34.2 G/DL (ref 31–37)
MCV RBC AUTO: 89.5 FL
MONOCYTES # BLD AUTO: 0.27 X10(3) UL (ref 0.1–1)
MONOCYTES NFR BLD AUTO: 5.4 %
NEUTROPHILS # BLD AUTO: 3.19 X10 (3) UL (ref 1.5–7.7)
NEUTROPHILS # BLD AUTO: 3.19 X10(3) UL (ref 1.5–7.7)
NEUTROPHILS NFR BLD AUTO: 63.9 %
NITRITE UR QL STRIP.AUTO: NEGATIVE
OSMOLALITY SERPL CALC.SUM OF ELEC: 282 MOSM/KG (ref 275–295)
PH UR STRIP.AUTO: 6.5 [PH] (ref 5–8)
PLATELET # BLD AUTO: 284 10(3)UL (ref 150–450)
POTASSIUM SERPL-SCNC: 3.8 MMOL/L (ref 3.5–5.1)
PROT SERPL-MCNC: 7.3 G/DL (ref 6.4–8.2)
PROT UR STRIP.AUTO-MCNC: NEGATIVE MG/DL
RBC # BLD AUTO: 4.57 X10(6)UL
SODIUM SERPL-SCNC: 136 MMOL/L (ref 136–145)
SP GR UR STRIP.AUTO: 1.01 (ref 1–1.03)
UROBILINOGEN UR STRIP.AUTO-MCNC: 0.2 MG/DL
WBC # BLD AUTO: 5 X10(3) UL (ref 4–11)

## 2024-04-29 PROCEDURE — 81015 MICROSCOPIC EXAM OF URINE: CPT | Performed by: PHYSICIAN ASSISTANT

## 2024-04-29 PROCEDURE — 81001 URINALYSIS AUTO W/SCOPE: CPT | Performed by: PHYSICIAN ASSISTANT

## 2024-04-29 PROCEDURE — 96376 TX/PRO/DX INJ SAME DRUG ADON: CPT

## 2024-04-29 PROCEDURE — 85025 COMPLETE CBC W/AUTO DIFF WBC: CPT | Performed by: PHYSICIAN ASSISTANT

## 2024-04-29 PROCEDURE — 80053 COMPREHEN METABOLIC PANEL: CPT | Performed by: PHYSICIAN ASSISTANT

## 2024-04-29 PROCEDURE — 99285 EMERGENCY DEPT VISIT HI MDM: CPT

## 2024-04-29 PROCEDURE — 83690 ASSAY OF LIPASE: CPT | Performed by: PHYSICIAN ASSISTANT

## 2024-04-29 PROCEDURE — 96375 TX/PRO/DX INJ NEW DRUG ADDON: CPT

## 2024-04-29 PROCEDURE — 99284 EMERGENCY DEPT VISIT MOD MDM: CPT

## 2024-04-29 PROCEDURE — 74176 CT ABD & PELVIS W/O CONTRAST: CPT | Performed by: PHYSICIAN ASSISTANT

## 2024-04-29 PROCEDURE — 96374 THER/PROPH/DIAG INJ IV PUSH: CPT

## 2024-04-29 PROCEDURE — 96361 HYDRATE IV INFUSION ADD-ON: CPT

## 2024-04-29 RX ORDER — ONDANSETRON 4 MG/1
4 TABLET, ORALLY DISINTEGRATING ORAL EVERY 4 HOURS PRN
Qty: 10 TABLET | Refills: 0 | Status: SHIPPED | OUTPATIENT
Start: 2024-04-29 | End: 2024-05-06

## 2024-04-29 RX ORDER — DIPHENHYDRAMINE HYDROCHLORIDE 50 MG/ML
25 INJECTION INTRAMUSCULAR; INTRAVENOUS ONCE
Status: COMPLETED | OUTPATIENT
Start: 2024-04-29 | End: 2024-04-29

## 2024-04-29 RX ORDER — HYDROMORPHONE HYDROCHLORIDE 1 MG/ML
1 INJECTION, SOLUTION INTRAMUSCULAR; INTRAVENOUS; SUBCUTANEOUS ONCE
Status: COMPLETED | OUTPATIENT
Start: 2024-04-29 | End: 2024-04-29

## 2024-04-29 RX ORDER — KETOROLAC TROMETHAMINE 30 MG/ML
30 INJECTION, SOLUTION INTRAMUSCULAR; INTRAVENOUS ONCE
Status: COMPLETED | OUTPATIENT
Start: 2024-04-29 | End: 2024-04-29

## 2024-04-29 RX ORDER — METOCLOPRAMIDE HYDROCHLORIDE 5 MG/ML
10 INJECTION INTRAMUSCULAR; INTRAVENOUS ONCE
Status: COMPLETED | OUTPATIENT
Start: 2024-04-29 | End: 2024-04-29

## 2024-04-29 RX ORDER — ONDANSETRON 2 MG/ML
4 INJECTION INTRAMUSCULAR; INTRAVENOUS ONCE
Status: COMPLETED | OUTPATIENT
Start: 2024-04-29 | End: 2024-04-29

## 2024-04-29 NOTE — ED PROVIDER NOTES
Patient Seen in: [facility] In [place]      History     Chief Complaint   Patient presents with    Abdomen/Flank Pain     Stated Complaint: Left side back pain, possible kidney stone    Subjective:   HPI    28-year-old male.  Medical history of cystinuria.  Multiple recent stones.  Multiple recent procedures.  CT scan 11 days prior to arrival showing multiple 6 to 7 mm stones.  Over the past 3 to 4 days, patient has developed severe left flank pain.  Upon arrival to the ER he feels he is actively passing a large stone.  Currently rates his pain as a 9 out of 10.  Difficulty urinating.  Afebrile.    Objective:   Past Medical History:    Anxiety state    Calculus of kidney    Cystine disease (HCC)    Depression    IBS (irritable bowel syndrome)    KIDNEY STONE    Migraines    OCD (obsessive compulsive disorder)              Past Surgical History:   Procedure Laterality Date    Colonoscopy      Lithotripsy      Other surgical history  2-17-11    Rt RPG, URS stone manipulation,laser litho,Rt stent, Dr. Mcdonough    Other surgical history  2/21/11    Cysto stent removal- Dr. Mcdonough    Other surgical history  11/21/15    Cysto, L URS, laser litho, stone extraction, stent placement, RPG Dr. Finn    Other surgical history  12/1/15    cysto stent removal    Upper gi endoscopy,exam                  Social History     Socioeconomic History    Marital status: Single   Tobacco Use    Smoking status: Never    Smokeless tobacco: Never   Vaping Use    Vaping status: Never Used   Substance and Sexual Activity    Alcohol use: No    Drug use: No     Social Determinants of Health     Food Insecurity: No Food Insecurity (3/8/2024)    Food Insecurity     Food Insecurity: Never true   Transportation Needs: No Transportation Needs (3/8/2024)    Transportation Needs     Lack of Transportation: No   Housing Stability: Low Risk  (3/8/2024)    Housing Stability     Housing Instability: No              Review of  Systems    Positive for stated complaint: Left side back pain, possible kidney stone  Other systems are as noted in HPI.  Constitutional and vital signs reviewed.      All other systems reviewed and negative except as noted above.    Physical Exam     ED Triage Vitals [04/29/24 1354]   /85   Pulse 109   Resp 16   Temp 97.5 °F (36.4 °C)   Temp src Temporal   SpO2 96 %   O2 Device None (Room air)       Current:BP (!) 138/95   Pulse 92   Temp 98.3 °F (36.8 °C) (Oral)   Resp 17   Ht 171.5 cm (5' 7.5\")   Wt 78.5 kg   SpO2 100%   BMI 26.70 kg/m²         Physical Exam    Gen:  in pain, diaphoretic, well groomed, alert and aware x 3  Neck: Supple, full range of motion, no thyromegaly or lymphadenopathy.  Abdominal: Moderate left CVA tenderness.  Back: Full active range of motion.  No CVA tenderness bilaterally.  Lung: No distress, RR, no retraction, breath sounds are clear bilaterally  Cardio: Regular rate and rhythm, normal S1-S2, no murmur appreciable    ED Course     Labs Reviewed   COMP METABOLIC PANEL (14) - Abnormal; Notable for the following components:       Result Value    AST 14 (*)     All other components within normal limits   URINALYSIS, ROUTINE - Abnormal; Notable for the following components:    Blood Urine Large (*)     All other components within normal limits   LIPASE - Normal   UA MICROSCOPIC ONLY, URINE - Normal   CBC WITH DIFFERENTIAL WITH PLATELET    Narrative:     The following orders were created for panel order CBC With Differential With Platelet.  Procedure                               Abnormality         Status                     ---------                               -----------         ------                     CBC W/ DIFFERENTIAL[[id number]]                              Final result                 Please view results for these tests on the individual orders.   CBC W/ DIFFERENTIAL           CT ABDOMEN+PELVIS KIDNEYSTONE 2D RNDR(NO IV,NO ORAL)(CPT=74176)    Result Date:  4/29/2024  PROCEDURE:  CT ABDOMEN+PELVIS KIDNEYSTONE 2D RNDR(NO IV,NO ORAL)(CPT=74176)  COMPARISON:  [facility], CT, CT ABDOMEN+PELVIS KIDNEYSTONE 2D RNDR(NO IV,NO ORAL)(CPT=74176), 3/07/2024, 11:18 AM.  INDICATIONS:  Left side back pain, possible kidney stone  TECHNIQUE:  Unenhanced multislice CT scanning from above the kidneys to below the urinary bladder.  2D rendering are generated on the CT scanner workstation to localize potential stones in the cranio-caudal plane.  Dose reduction techniques were used. Dose information is transmitted to the ACR (American College of Radiology) NRDR (National Radiology Data Registry) which includes the Dose Index Registry.  PATIENT STATED HISTORY: (As transcribed by Technologist)  Left flank pain since Thursday - worse today    FINDINGS:  KIDNEYS:  The right kidney reveals upper pole renal caliceal stones, the largest of which measures 6 mm.  No right-sided hydronephrosis or hydroureter.  The left kidney reveals a prominent stone within the renal pelvis measuring up to 1.3 cm in maximal dimension.  There is mild surrounding periureteral fat stranding involving the proximal ureter and renal pelvis.  No discrete evidence of hydronephrosis. BLADDER:  No mass, calculus or significant wall thickening. ADRENALS:  No mass or enlargement.  LIVER:  No enlargement, atrophy, abnormal density, or significant focal lesion.  BILIARY:  No visible dilatation or calcification.  PANCREAS:  No lesion, fluid collection, ductal dilatation, or atrophy.  SPLEEN:  No enlargement or focal lesion.  AORTA/VASCULAR:  No aneurysm.  RETROPERITONEUM:  No mass or adenopathy.  BOWEL/MESENTERY:  No visible mass, obstruction, or bowel wall thickening.  Normal appendix.  No free intraperitoneal air, fluid or inflammatory changes of the peritoneal cavity identified. ABDOMINAL WALL:  No mass or hernia.  BONES:  No bony lesion or fracture. PELVIC ORGANS:  Normal for age.  LUNG BASES:  No visible pulmonary or pleural  disease.             CONCLUSION:  1. There is a 1.3 cm calcified stone within the left renal pelvis with mild surrounding fat stranding of the renal pelvis and proximal ureter.  There is no discrete evidence of hydronephrosis.  However the fat stranding is concerning for potential UTI. 2. Nonobstructing right superior pole renal caliceal stones as described above. 3. Normal appearance of the bladder.    LOCATION:  [facility]   Dictated by (CST): Phil Wynn DO on 4/29/2024 at 5:42 PM     Finalized by (CST): Phil Wynn DO on 4/29/2024 at 5:45 PM                 MDM          My supervising physician was involved in the management of this patient.    Patient in moderate pain.  Actively diaphoretic.  Moderate left CVA tenderness.  IV placed.  Fluid bolus.  Dilaudid.  CBC, CMP, urinalysis.    CBC demonstrates no leukocytosis.  Stable hemoglobin    Urine demonstrates large blood, no other evidence of acute infection    Lipase within normal limits    CMP demonstrates normal renal function    Patient reassessed.  Pain is now in the more suprapubic region.  Advancing from the left CVA.  He is currently on Flomax          CONCLUSION:  1. There is a 1.3 cm calcified stone within the left renal pelvis with mild surrounding fat stranding of the renal pelvis and proximal ureter.  There is no discrete evidence of hydronephrosis.  However the fat stranding is concerning for potential UTI. 2. Nonobstructing right superior pole renal caliceal stones as described above. 3. Normal appearance of the bladder.    LOCATION:  [facility]   Dictated by (CST): Phil Wynn DO on 4/29/2024 at 5:42 PM     Finalized by (CST): Phil Wynn DO on 4/29/2024 at 5:45 PM        Patient reevaluated.  Much improved.  Sent home with further Zofran.  Has Norco.  Continue Flomax.  Follow-up with urology as soon as possible.  Return to ER for any acute worsening                   Medical Decision Making      Disposition and Plan     Clinical Impression:  1.  Renal colic         Disposition:  There is no disposition on file for this visit.  There is no disposition time on file for this visit.    Follow-up:  Crow Plascencia MD  [address] [place] [address]  [address] [place] [address] [id number] [phone]    Follow up            Medications Prescribed:  Current Discharge Medication List        START taking these medications    Details   ondansetron 4 MG Oral Tablet Dispersible Take 1 tablet (4 mg total) by mouth every 4 (four) hours as needed for Nausea.  Qty: 10 tablet, Refills: 0

## 2024-05-01 ENCOUNTER — ANESTHESIA (OUTPATIENT)
Dept: SURGERY | Facility: HOSPITAL | Age: 29
End: 2024-05-01
Payer: COMMERCIAL

## 2024-05-01 ENCOUNTER — HOSPITAL ENCOUNTER (OUTPATIENT)
Facility: HOSPITAL | Age: 29
Discharge: HOME | End: 2024-05-02
Attending: UROLOGY | Admitting: UROLOGY
Payer: COMMERCIAL

## 2024-05-01 ENCOUNTER — ANESTHESIA EVENT (OUTPATIENT)
Dept: SURGERY | Facility: HOSPITAL | Age: 29
End: 2024-05-01
Payer: COMMERCIAL

## 2024-05-01 ENCOUNTER — APPOINTMENT (OUTPATIENT)
Dept: GENERAL RADIOLOGY | Facility: HOSPITAL | Age: 29
End: 2024-05-01
Attending: UROLOGY
Payer: COMMERCIAL

## 2024-05-01 DIAGNOSIS — N13.2 URETERAL STONE WITH HYDRONEPHROSIS: ICD-10-CM

## 2024-05-01 PROCEDURE — 0TC08ZZ EXTIRPATION OF MATTER FROM RIGHT KIDNEY, VIA NATURAL OR ARTIFICIAL OPENING ENDOSCOPIC: ICD-10-PCS | Performed by: UROLOGY

## 2024-05-01 PROCEDURE — 0TC78ZZ EXTIRPATION OF MATTER FROM LEFT URETER, VIA NATURAL OR ARTIFICIAL OPENING ENDOSCOPIC: ICD-10-PCS | Performed by: UROLOGY

## 2024-05-01 RX ORDER — ENEMA 19; 7 G/133ML; G/133ML
1 ENEMA RECTAL ONCE AS NEEDED
Status: DISCONTINUED | OUTPATIENT
Start: 2024-05-01 | End: 2024-05-02

## 2024-05-01 RX ORDER — CEFAZOLIN SODIUM/WATER 2 G/20 ML
SYRINGE (ML) INTRAVENOUS
Status: DISPENSED
Start: 2024-05-01 | End: 2024-05-02

## 2024-05-01 RX ORDER — DEXAMETHASONE SODIUM PHOSPHATE 4 MG/ML
VIAL (ML) INJECTION AS NEEDED
Status: DISCONTINUED | OUTPATIENT
Start: 2024-05-01 | End: 2024-05-01 | Stop reason: SURG

## 2024-05-01 RX ORDER — HYDROMORPHONE HYDROCHLORIDE 1 MG/ML
0.6 INJECTION, SOLUTION INTRAMUSCULAR; INTRAVENOUS; SUBCUTANEOUS EVERY 5 MIN PRN
Status: DISCONTINUED | OUTPATIENT
Start: 2024-05-01 | End: 2024-05-01 | Stop reason: HOSPADM

## 2024-05-01 RX ORDER — ENOXAPARIN SODIUM 100 MG/ML
40 INJECTION SUBCUTANEOUS DAILY
Status: DISCONTINUED | OUTPATIENT
Start: 2024-05-02 | End: 2024-05-02

## 2024-05-01 RX ORDER — ACETAMINOPHEN 500 MG
1000 TABLET ORAL ONCE
Status: DISCONTINUED | OUTPATIENT
Start: 2024-05-01 | End: 2024-05-01 | Stop reason: HOSPADM

## 2024-05-01 RX ORDER — ACETAMINOPHEN AND CODEINE PHOSPHATE 300; 30 MG/1; MG/1
1 TABLET ORAL EVERY 4 HOURS PRN
Status: DISCONTINUED | OUTPATIENT
Start: 2024-05-01 | End: 2024-05-02

## 2024-05-01 RX ORDER — HYDROCODONE BITARTRATE AND ACETAMINOPHEN 10; 325 MG/1; MG/1
1 TABLET ORAL EVERY 4 HOURS PRN
Status: DISCONTINUED | OUTPATIENT
Start: 2024-05-01 | End: 2024-05-02

## 2024-05-01 RX ORDER — HYDROMORPHONE HYDROCHLORIDE 1 MG/ML
0.4 INJECTION, SOLUTION INTRAMUSCULAR; INTRAVENOUS; SUBCUTANEOUS EVERY 5 MIN PRN
Status: DISCONTINUED | OUTPATIENT
Start: 2024-05-01 | End: 2024-05-01 | Stop reason: HOSPADM

## 2024-05-01 RX ORDER — SULFAMETHOXAZOLE AND TRIMETHOPRIM 800; 160 MG/1; MG/1
1 TABLET ORAL 2 TIMES DAILY
COMMUNITY
Start: 2024-03-12 | End: 2024-05-02

## 2024-05-01 RX ORDER — POLYETHYLENE GLYCOL 3350 17 G/17G
17 POWDER, FOR SOLUTION ORAL DAILY PRN
Status: DISCONTINUED | OUTPATIENT
Start: 2024-05-01 | End: 2024-05-02

## 2024-05-01 RX ORDER — ONDANSETRON 2 MG/ML
4 INJECTION INTRAMUSCULAR; INTRAVENOUS EVERY 6 HOURS PRN
Status: DISCONTINUED | OUTPATIENT
Start: 2024-05-01 | End: 2024-05-02

## 2024-05-01 RX ORDER — SODIUM CHLORIDE, SODIUM LACTATE, POTASSIUM CHLORIDE, CALCIUM CHLORIDE 600; 310; 30; 20 MG/100ML; MG/100ML; MG/100ML; MG/100ML
INJECTION, SOLUTION INTRAVENOUS CONTINUOUS
Status: DISCONTINUED | OUTPATIENT
Start: 2024-05-01 | End: 2024-05-02

## 2024-05-01 RX ORDER — PROCHLORPERAZINE EDISYLATE 5 MG/ML
5 INJECTION INTRAMUSCULAR; INTRAVENOUS EVERY 8 HOURS PRN
Status: DISCONTINUED | OUTPATIENT
Start: 2024-05-01 | End: 2024-05-01 | Stop reason: HOSPADM

## 2024-05-01 RX ORDER — HYDROMORPHONE HYDROCHLORIDE 1 MG/ML
INJECTION, SOLUTION INTRAMUSCULAR; INTRAVENOUS; SUBCUTANEOUS
Status: COMPLETED
Start: 2024-05-01 | End: 2024-05-01

## 2024-05-01 RX ORDER — HYDROMORPHONE HYDROCHLORIDE 1 MG/ML
0.2 INJECTION, SOLUTION INTRAMUSCULAR; INTRAVENOUS; SUBCUTANEOUS EVERY 5 MIN PRN
Status: DISCONTINUED | OUTPATIENT
Start: 2024-05-01 | End: 2024-05-01 | Stop reason: HOSPADM

## 2024-05-01 RX ORDER — CODEINE PHOSPHATE AND GUAIFENESIN 10; 100 MG/5ML; MG/5ML
5 SOLUTION ORAL EVERY 6 HOURS PRN
COMMUNITY
Start: 2024-03-29

## 2024-05-01 RX ORDER — ONDANSETRON 2 MG/ML
INJECTION INTRAMUSCULAR; INTRAVENOUS AS NEEDED
Status: DISCONTINUED | OUTPATIENT
Start: 2024-05-01 | End: 2024-05-01 | Stop reason: SURG

## 2024-05-01 RX ORDER — NALOXONE HYDROCHLORIDE 0.4 MG/ML
80 INJECTION, SOLUTION INTRAMUSCULAR; INTRAVENOUS; SUBCUTANEOUS AS NEEDED
Status: DISCONTINUED | OUTPATIENT
Start: 2024-05-01 | End: 2024-05-01 | Stop reason: HOSPADM

## 2024-05-01 RX ORDER — IBUPROFEN 400 MG/1
400 TABLET ORAL EVERY 4 HOURS PRN
Status: DISCONTINUED | OUTPATIENT
Start: 2024-05-01 | End: 2024-05-02

## 2024-05-01 RX ORDER — BISACODYL 10 MG
10 SUPPOSITORY, RECTAL RECTAL
Status: DISCONTINUED | OUTPATIENT
Start: 2024-05-01 | End: 2024-05-02

## 2024-05-01 RX ORDER — MORPHINE SULFATE 4 MG/ML
2 INJECTION, SOLUTION INTRAMUSCULAR; INTRAVENOUS
Status: DISCONTINUED | OUTPATIENT
Start: 2024-05-01 | End: 2024-05-02

## 2024-05-01 RX ORDER — METOCLOPRAMIDE HYDROCHLORIDE 5 MG/ML
INJECTION INTRAMUSCULAR; INTRAVENOUS AS NEEDED
Status: DISCONTINUED | OUTPATIENT
Start: 2024-05-01 | End: 2024-05-01 | Stop reason: SURG

## 2024-05-01 RX ORDER — HYDROCODONE BITARTRATE AND ACETAMINOPHEN 5; 325 MG/1; MG/1
1-2 TABLET ORAL EVERY 4 HOURS PRN
Qty: 20 TABLET | Refills: 0 | Status: SHIPPED | OUTPATIENT
Start: 2024-05-01

## 2024-05-01 RX ORDER — SODIUM CHLORIDE, SODIUM LACTATE, POTASSIUM CHLORIDE, CALCIUM CHLORIDE 600; 310; 30; 20 MG/100ML; MG/100ML; MG/100ML; MG/100ML
INJECTION, SOLUTION INTRAVENOUS CONTINUOUS
Status: DISCONTINUED | OUTPATIENT
Start: 2024-05-01 | End: 2024-05-01 | Stop reason: HOSPADM

## 2024-05-01 RX ORDER — GLYCOPYRROLATE 0.2 MG/ML
INJECTION, SOLUTION INTRAMUSCULAR; INTRAVENOUS AS NEEDED
Status: DISCONTINUED | OUTPATIENT
Start: 2024-05-01 | End: 2024-05-01 | Stop reason: SURG

## 2024-05-01 RX ORDER — ONDANSETRON 2 MG/ML
4 INJECTION INTRAMUSCULAR; INTRAVENOUS EVERY 6 HOURS PRN
Status: DISCONTINUED | OUTPATIENT
Start: 2024-05-01 | End: 2024-05-01 | Stop reason: HOSPADM

## 2024-05-01 RX ORDER — ONDANSETRON 4 MG/1
4 TABLET, FILM COATED ORAL EVERY 6 HOURS PRN
Status: DISCONTINUED | OUTPATIENT
Start: 2024-05-01 | End: 2024-05-02

## 2024-05-01 RX ORDER — NEOSTIGMINE METHYLSULFATE 1 MG/ML
INJECTION, SOLUTION INTRAVENOUS AS NEEDED
Status: DISCONTINUED | OUTPATIENT
Start: 2024-05-01 | End: 2024-05-01 | Stop reason: SURG

## 2024-05-01 RX ORDER — BENZONATATE 200 MG/1
200 CAPSULE ORAL 3 TIMES DAILY PRN
COMMUNITY
Start: 2024-03-29

## 2024-05-01 RX ORDER — MEPERIDINE HYDROCHLORIDE 25 MG/ML
12.5 INJECTION INTRAMUSCULAR; INTRAVENOUS; SUBCUTANEOUS AS NEEDED
Status: DISCONTINUED | OUTPATIENT
Start: 2024-05-01 | End: 2024-05-01 | Stop reason: HOSPADM

## 2024-05-01 RX ORDER — ACETAMINOPHEN 500 MG
1000 TABLET ORAL ONCE AS NEEDED
Status: DISCONTINUED | OUTPATIENT
Start: 2024-05-01 | End: 2024-05-01 | Stop reason: HOSPADM

## 2024-05-01 RX ORDER — MIDAZOLAM HYDROCHLORIDE 1 MG/ML
INJECTION INTRAMUSCULAR; INTRAVENOUS AS NEEDED
Status: DISCONTINUED | OUTPATIENT
Start: 2024-05-01 | End: 2024-05-01 | Stop reason: SURG

## 2024-05-01 RX ORDER — SENNOSIDES 8.6 MG
17.2 TABLET ORAL NIGHTLY PRN
Status: DISCONTINUED | OUTPATIENT
Start: 2024-05-01 | End: 2024-05-02

## 2024-05-01 RX ORDER — ACETAMINOPHEN 10 MG/ML
INJECTION, SOLUTION INTRAVENOUS AS NEEDED
Status: DISCONTINUED | OUTPATIENT
Start: 2024-05-01 | End: 2024-05-01 | Stop reason: SURG

## 2024-05-01 RX ORDER — ROCURONIUM BROMIDE 10 MG/ML
INJECTION, SOLUTION INTRAVENOUS AS NEEDED
Status: DISCONTINUED | OUTPATIENT
Start: 2024-05-01 | End: 2024-05-01 | Stop reason: SURG

## 2024-05-01 RX ORDER — SCOLOPAMINE TRANSDERMAL SYSTEM 1 MG/1
1 PATCH, EXTENDED RELEASE TRANSDERMAL ONCE
Status: DISCONTINUED | OUTPATIENT
Start: 2024-05-01 | End: 2024-05-01 | Stop reason: HOSPADM

## 2024-05-01 RX ORDER — KETOROLAC TROMETHAMINE 30 MG/ML
INJECTION, SOLUTION INTRAMUSCULAR; INTRAVENOUS AS NEEDED
Status: DISCONTINUED | OUTPATIENT
Start: 2024-05-01 | End: 2024-05-01 | Stop reason: SURG

## 2024-05-01 RX ORDER — HYDROCODONE BITARTRATE AND ACETAMINOPHEN 5; 325 MG/1; MG/1
2 TABLET ORAL ONCE AS NEEDED
Status: DISCONTINUED | OUTPATIENT
Start: 2024-05-01 | End: 2024-05-01 | Stop reason: HOSPADM

## 2024-05-01 RX ORDER — ACETAMINOPHEN AND CODEINE PHOSPHATE 300; 30 MG/1; MG/1
2 TABLET ORAL EVERY 4 HOURS PRN
Status: DISCONTINUED | OUTPATIENT
Start: 2024-05-01 | End: 2024-05-02

## 2024-05-01 RX ORDER — CEFAZOLIN SODIUM/WATER 2 G/20 ML
2 SYRINGE (ML) INTRAVENOUS ONCE
Status: COMPLETED | OUTPATIENT
Start: 2024-05-01 | End: 2024-05-01

## 2024-05-01 RX ORDER — ALBUTEROL SULFATE 90 UG/1
2 AEROSOL, METERED RESPIRATORY (INHALATION) EVERY 6 HOURS PRN
COMMUNITY
Start: 2024-03-29

## 2024-05-01 RX ORDER — HYDROCODONE BITARTRATE AND ACETAMINOPHEN 5; 325 MG/1; MG/1
1 TABLET ORAL ONCE AS NEEDED
Status: DISCONTINUED | OUTPATIENT
Start: 2024-05-01 | End: 2024-05-01 | Stop reason: HOSPADM

## 2024-05-01 RX ORDER — LABETALOL HYDROCHLORIDE 5 MG/ML
5 INJECTION, SOLUTION INTRAVENOUS EVERY 5 MIN PRN
Status: DISCONTINUED | OUTPATIENT
Start: 2024-05-01 | End: 2024-05-01 | Stop reason: HOSPADM

## 2024-05-01 RX ADMIN — NEOSTIGMINE METHYLSULFATE 5 MG: 1 INJECTION, SOLUTION INTRAVENOUS at 19:43:00

## 2024-05-01 RX ADMIN — ONDANSETRON 4 MG: 2 INJECTION INTRAMUSCULAR; INTRAVENOUS at 18:44:00

## 2024-05-01 RX ADMIN — ACETAMINOPHEN 1000 MG: 10 INJECTION, SOLUTION INTRAVENOUS at 17:52:00

## 2024-05-01 RX ADMIN — MIDAZOLAM HYDROCHLORIDE 2 MG: 1 INJECTION INTRAMUSCULAR; INTRAVENOUS at 18:40:00

## 2024-05-01 RX ADMIN — METOCLOPRAMIDE HYDROCHLORIDE 10 MG: 5 INJECTION INTRAMUSCULAR; INTRAVENOUS at 18:44:00

## 2024-05-01 RX ADMIN — ROCURONIUM BROMIDE 40 MG: 10 INJECTION, SOLUTION INTRAVENOUS at 18:44:00

## 2024-05-01 RX ADMIN — GLYCOPYRROLATE 0.2 MG: 0.2 INJECTION, SOLUTION INTRAMUSCULAR; INTRAVENOUS at 18:44:00

## 2024-05-01 RX ADMIN — SODIUM CHLORIDE, SODIUM LACTATE, POTASSIUM CHLORIDE, CALCIUM CHLORIDE: 600; 310; 30; 20 INJECTION, SOLUTION INTRAVENOUS at 18:42:00

## 2024-05-01 RX ADMIN — CEFAZOLIN SODIUM/WATER 2 G: 2 G/20 ML SYRINGE (ML) INTRAVENOUS at 18:40:00

## 2024-05-01 RX ADMIN — DEXAMETHASONE SODIUM PHOSPHATE 4 MG: 4 MG/ML VIAL (ML) INJECTION at 18:44:00

## 2024-05-01 RX ADMIN — KETOROLAC TROMETHAMINE 30 MG: 30 INJECTION, SOLUTION INTRAMUSCULAR; INTRAVENOUS at 19:43:00

## 2024-05-01 RX ADMIN — SODIUM CHLORIDE, SODIUM LACTATE, POTASSIUM CHLORIDE, CALCIUM CHLORIDE: 600; 310; 30; 20 INJECTION, SOLUTION INTRAVENOUS at 18:36:00

## 2024-05-01 RX ADMIN — GLYCOPYRROLATE 0.8 MG: 0.2 INJECTION, SOLUTION INTRAMUSCULAR; INTRAVENOUS at 19:43:00

## 2024-05-01 NOTE — INTERVAL H&P NOTE
Pre-op Diagnosis: LEFT RENAL CALCULUS    The above referenced H&P was reviewed by Valdemar Hilliard MD on 5/1/2024, the patient was examined and no significant changes have occurred in the patient's condition since the H&P was performed.  I discussed with the patient and/or legal representative the potential benefits, risks and side effects of this procedure; the likelihood of the patient achieving goals; and potential problems that might occur during recuperation.  I discussed reasonable alternatives to the procedure, including risks, benefits and side effects related to the alternatives and risks related to not receiving this procedure.  We will proceed with procedure as planned. Left ureteroscopy, possible bilateral.

## 2024-05-01 NOTE — ANESTHESIA PROCEDURE NOTES
Airway  Date/Time: 5/1/2024 6:46 PM  Urgency: elective      General Information and Staff    Patient location during procedure: OR  Anesthesiologist: Nii Morrow MD  Performed: anesthesiologist   Performed by: Nii Morrow MD  Authorized by: Nii Morrow MD      Indications and Patient Condition  Indications for airway management: anesthesia  Spontaneous Ventilation: absent  Sedation level: deep  Preoxygenated: yes  Patient position: sniffing  Mask difficulty assessment: 2 - vent by mask + OA or adjuvant +/- NMBA    Final Airway Details  Final airway type: endotracheal airway      Successful airway: ETT  Cuffed: yes   Successful intubation technique: direct laryngoscopy  Facilitating devices/methods: intubating stylet and cricoid pressure  Endotracheal tube insertion site: oral  Blade: Criss  ETT size (mm): 7.5    Cormack-Lehane Classification: grade IIA - partial view of glottis  Placement verified by: capnometry   Cuff volume (mL): 10  Measured from: lips  ETT to lips (cm): 23  Number of attempts at approach: 1  Ventilation between attempts: none  Number of other approaches attempted: 0    Additional Comments  PreO2.  IV induction as noted.  Eyes taped.  Easy mask ventilation.  DL x 1 with MAC 4, grade 2A view, atraumatic oral intubation ETT 7.5, +ETCO2, +BBS.  Taped and secured at 23 cm.

## 2024-05-01 NOTE — H&P
Chief Complaint: Follow - Up (Discuss surgery, ref'd by Dr. Plascencia)     Cystinuria and sees  nephrolgist. Most recent nephrology F/U Jan 2024.   3 stone procedures in 4 months.  Now left flank.CT at [facility] 4/29/20 shows 1.3cm  left renal pelvis stone        History/Other:          Current Outpatient Medications   Medication Sig Dispense Refill    fexofenadine 180 MG Oral Tab Take 180 mg by mouth daily.        ondansetron 4 MG Oral Tablet Dispersible Take 1 tablet by mouth every 4 (four) hours as needed.        HYDROcodone-acetaminophen 5-325 MG Oral Tab Take 1-2 tablets by mouth every 6 (six) hours as needed.        azelastine 0.1 % Nasal Solution 2 sprays by Nasal route 2 (two) times daily. 30 mL 2    albuterol 108 (90 Base) MCG/ACT Inhalation Aero Soln Inhale 2 puffs into the lungs every 6 (six) hours as needed for Wheezing. 6.7 g 0    fluticasone propionate 50 MCG/ACT Nasal Suspension 1 spray by Each Nare route daily. 9.9 mL 0    sertraline 100 MG Oral Tab Take 1 tablet (100 mg total) by mouth daily. 90 tablet 3    tamsulosin (Flomax) cap Take 1 capsule (0.4 mg total) by mouth daily. 30 capsule 1    potassium citrate 10 MEQ (1080 MG) Oral Tab CR Take 1 tablet by mouth 3 (three) times daily.        SUMAtriptan Succinate (IMITREX) 50 MG Oral Tab Take 1 tablet (50 mg total) by mouth every 2 (two) hours as needed for Migraine (max 3 in 24 hrs). (Patient not taking: Reported on 6/29/2023) 12 tablet 3           Past Medical History:   Diagnosis Date    Anxiety state      Calculus of kidney      Cystine disease (HCC)      Depression      IBS (irritable bowel syndrome)      KIDNEY STONE      Migraines      OCD (obsessive compulsive disorder)                Past Surgical History:   Procedure Laterality Date    COLONOSCOPY        LITHOTRIPSY        OTHER SURGICAL HISTORY   02/17/2011     Rt RPG, URS stone manipulation,laser litho,Rt stent, Dr. Mcdonough    OTHER SURGICAL HISTORY   02/21/2011     Cysto stent removal- Dr. Mcdonough     OTHER SURGICAL HISTORY   11/21/2015     Cysto, L URS, laser litho, stone extraction, stent placement, RPG Dr. Finn    OTHER SURGICAL HISTORY   12/01/2015     cysto stent removal    OTHER SURGICAL HISTORY   02/20/2024     Cystoscopy Stent Removal Dr. Hilliard    OTHER SURGICAL HISTORY   03/18/2024     Cystoscopy - Dr. Plascencia    UPPER GI ENDOSCOPY,EXAM                 REVIEW OF SYSTEMS:   GENERAL HEALTH: feels well otherwise  RESPIRATORY: No new symptoms  CARDIOVASCULAR: No new sypmtoms  Marian new sypmtoms  : As above  NEURO: No new sypmtoms     No results found for: \"PSA\"     No results found for: \"PERCENTPSA\"     EXAM:   There were no vitals taken for this visit.        Objective:     GENERAL: well developed, well nourished, in no apparent distress  HEENT: atraumatic, normocephalic,ears and throat are clear  NECK: supple  LUNGS: normal respiratory motion without distress  CARDIO:NA  GI: mild right tend  :No left flank tend  MUSCULOSKELETAL: FROM, Normal gait  NEURO: Alert   EXTREMITIES: No edema or cyanosis   ASSESSMENT AND PLAN:   Rapidly recurring cystine stones.  Left Ureteroscopy, possible bilateral  ASAP. See nephrologist in next 1-2 months        Diagnoses and all orders for this visit:     Renal calculus, left  -     [facility] SCHEDULING UROLOGY; Future  -     OFFICE/OUTPT VISIT,CHRISTIANO CHRIS IV

## 2024-05-01 NOTE — ANESTHESIA PREPROCEDURE EVALUATION
PRE-OP EVALUATION    Patient Name: Devon East    Admit Diagnosis: LEFT RENAL CALCULUS    Pre-op Diagnosis: LEFT RENAL CALCULUS    CYSTOSCOPY, LEFT URETEROSCOPIC STONE EXTRACTION, WITH POSSIBLE HOLMIUM LASER LITHOTRIPSY, LEFT RETROGRADE PYELOGRAM, LEFT URETERAL STENT PLACEMENT, POSSIBLE  BILATERAL    URETEROSCOPY    Anesthesia Procedure: CYSTOSCOPY, LEFT URETEROSCOPIC STONE EXTRACTION, WITH POSSIBLE HOLMIUM LASER LITHOTRIPSY, LEFT RETROGRADE PYELOGRAM, LEFT URETERAL STENT PLACEMENT, POSSIBLE  BILATERAL   URETEROSCOPY (Left)    Surgeons and Role:     * Valdemar Hilliard MD - Primary    Pre-op vitals reviewed.  Temp: 97.8 °F (36.6 °C)  Pulse: 85  Resp: 16  BP: 130/93  SpO2: 99 %  Body mass index is 26.54 kg/m².    Current medications reviewed.  Hospital Medications:   [Transfer Hold] acetaminophen (Tylenol Extra Strength) tab 1,000 mg  1,000 mg Oral Once    [Transfer Hold] scopolamine (Transderm-Scop) 1 MG/3DAYS patch 1 patch  1 patch Transdermal Once    lactated ringers infusion   Intravenous Continuous    ceFAZolin (Ancef) 2 g in 20mL IV syringe premix  2 g Intravenous Once    ceFAZolin (Ancef) 2 g/20mL IV syringe premix           Outpatient Medications:     Medications Prior to Admission   Medication Sig Dispense Refill Last Dose    albuterol 108 (90 Base) MCG/ACT Inhalation Aero Soln Inhale 2 puffs into the lungs every 6 (six) hours as needed.   4/17/2024    sulfamethoxazole-trimethoprim -160 MG Oral Tab per tablet Take 1 tablet by mouth 2 (two) times daily.       ondansetron 4 MG Oral Tablet Dispersible Take 1 tablet (4 mg total) by mouth every 4 (four) hours as needed for Nausea. 10 tablet 0  at post op    HYDROcodone-acetaminophen 5-325 MG Oral Tab Take 1-2 tablets by mouth every 4 (four) hours as needed for Pain. 10 tablet 0 4/30/2024    potassium citrate 10 MEQ (1080 MG) Oral Tab CR Take 2 tablets (20 mEq total) by mouth 3 (three) times daily with meals.   5/1/2024 at 1300    sertraline 100 MG Oral Tab  Take 1 tablet (100 mg total) by mouth daily.   2024 at 0600    tamsulosin (FLOMAX) cap Take 1 capsule (0.4 mg total) by mouth daily. 30 capsule 3 2024 at 0600    benzonatate 200 MG Oral Cap Take 1 capsule (200 mg total) by mouth 3 (three) times daily as needed.   More than a month    guaiFENesin-codeine 100-10 MG/5ML Oral Solution Take 5 mL by mouth every 6 (six) hours as needed for cough.   More than a month    [] tamsulosin 0.4 MG Oral Cap Take 1 capsule (0.4 mg total) by mouth daily for 14 days. 14 capsule 0     fexofenadine 180 MG Oral Tab Take 1 tablet (180 mg total) by mouth daily. (Patient not taking: Reported on 3/29/2024)   More than a month       Allergies: Ciprofloxacin, Seroquel [quetiapine], and Tiopronin      Anesthesia Evaluation        Anesthetic Complications           GI/Hepatic/Renal    Negative GI/hepatic/renal ROS.                             Cardiovascular    Negative cardiovascular ROS.    Exercise tolerance: good     MET: >4                                           Endo/Other    Negative endo/other ROS.                              Pulmonary    Negative pulmonary ROS.                       Neuro/Psych      (+) depression  (+) anxiety                      OCD        Past Surgical History:   Procedure Laterality Date    Colonoscopy      Cystoscopy,insert ureteral stent  2024    Lithotripsy      Other surgical history  2011    Rt RPG, URS stone manipulation,laser litho,Rt stent, Dr. Mcdonough    Other surgical history  2011    Cysto stent removal- Dr. Mcdonough    Other surgical history  2015    Cysto, L URS, laser litho, stone extraction, stent placement, RPG Dr. Finn    Other surgical history  2015    cysto stent removal    Upper gi endoscopy,exam       Social History     Socioeconomic History    Marital status: Single   Tobacco Use    Smoking status: Never    Smokeless tobacco: Never   Vaping Use    Vaping status: Never Used   Substance and Sexual  Activity    Alcohol use: No    Drug use: No     History   Drug Use No     Available pre-op labs reviewed.  Lab Results   Component Value Date    WBC 5.0 04/29/2024    RBC 4.57 04/29/2024    HGB 14.0 04/29/2024    HCT 40.9 04/29/2024    MCV 89.5 04/29/2024    MCH 30.6 04/29/2024    MCHC 34.2 04/29/2024    RDW 12.5 04/29/2024    .0 04/29/2024     Lab Results   Component Value Date     04/29/2024    K 3.8 04/29/2024     04/29/2024    CO2 27.0 04/29/2024    BUN 16 04/29/2024    CREATSERUM 0.85 04/29/2024    GLU 83 04/29/2024    CA 9.1 04/29/2024            Airway      Mallampati: II  Mouth opening: >3 FB  TM distance: > 6 cm  Neck ROM: full Cardiovascular    Cardiovascular exam normal.         Dental             Pulmonary    Pulmonary exam normal.                 Other findings  Dentition grossly normal.            ASA: 2   Plan: general  NPO status verified and patient meets guidelines.          Plan/risks discussed with: patient                Present on Admission:  **None**

## 2024-05-02 VITALS
HEIGHT: 67.5 IN | OXYGEN SATURATION: 97 % | WEIGHT: 172 LBS | SYSTOLIC BLOOD PRESSURE: 114 MMHG | BODY MASS INDEX: 26.68 KG/M2 | TEMPERATURE: 97 F | DIASTOLIC BLOOD PRESSURE: 60 MMHG | RESPIRATION RATE: 16 BRPM | HEART RATE: 77 BPM

## 2024-05-02 RX ORDER — PHENAZOPYRIDINE HYDROCHLORIDE 100 MG/1
100 TABLET, FILM COATED ORAL 3 TIMES DAILY PRN
Status: DISCONTINUED | OUTPATIENT
Start: 2024-05-02 | End: 2024-05-02

## 2024-05-02 RX ORDER — KETOROLAC TROMETHAMINE 15 MG/ML
30 INJECTION, SOLUTION INTRAMUSCULAR; INTRAVENOUS EVERY 6 HOURS PRN
Status: DISCONTINUED | OUTPATIENT
Start: 2024-05-02 | End: 2024-05-02

## 2024-05-02 NOTE — PROGRESS NOTES
NURSING ADMISSION NOTE      Patient admitted via Cart  Oriented to room.  Safety precautions initiated.  Bed in low position.  Call light in reach.  
    NURSING DISCHARGE NOTE    Discharged Home via Wheelchair.  Accompanied by Support staff  Belongings Taken by patient/family.       Discharge instructions reviewed with patient and patient shows understanding. Rx for norco given. Pt shows understanding and discharged in stable condition.   
A&Ox4. VSS. RA.  GI: Abdomen soft, nondistended. Passing gas.  Denies nausea.  : Voids, pain with urination  Pain controlled with PRN pain medications  Up ad josé miguel  Diet: Regular  IVF running per order.  All appropriate safety measures in place. Patient updated on plan of care. All questions and concerns addressed.    
[facility]  Urology Progress Note    Devon East Patient Status:  Inpatient    1995 MRN [id number]   Location [facility] 3NW-A Attending Valdemar Hilliard MD   Hosp Day # 1 PCP Spenser Mccormick MD     Subjective:  Devon East is a(n) 28 year old male.    S/P bilateral URS, stone extraction with laser lithotripsy, cystoscopy, bilateral RGPG 24 with Dr. Hilliard.    Current complaints: Pain improved.  Slight nausea - reports likely due to pain meds.  No vomiting, fever, or chills.  Slight dysuria.  +hematuria.  Voiding ok.     Objective:  General appearance: alert, appears stated age, and cooperative  Blood pressure 114/74, pulse 62, temperature 97.6 °F (36.4 °C), temperature source Oral, resp. rate 16, height 5' 7.5\" (1.715 m), weight 172 lb (78 kg), SpO2 96%.  Lungs: non-labored respirations  Abdomen: soft, non-tender            XR OR - N/C    Result Date: 2024  CONCLUSION:  Correlation with findings during the procedure is necessary.   LOCATION:  [facility]    Dictated by (CST): Crow Scherer MD on 2024 at 8:48 PM     Finalized by (CST): Crow Scherer MD on 2024 at 8:48 PM         Assessment:    BILATERAL RENAL CALCULI  S/P bilateral URS, stone extraction with laser lithotripsy, cystoscopy, bilateral RGPG 24 with Dr. Hilliard.  Afebrile, VSS  Pain improved    Plan:    Advance diet  Transition to PO pain medication  Hydration    Anticipate discharge home today pending no change in clinical course.    F/U with Dr. Hilliard as scheduled for post-op visit.    Above discussed with patient, nurse, Dr. Hilliard.    Angeles Shea PA-C  [facility]  Department of Urology  2024  5:58 AM    
Urology  Pt having left flank pain in recovery.   No stent.  Will keep overnight for pain pain management.   Valdemar Hilliard MD    
35.9

## 2024-05-02 NOTE — ANESTHESIA POSTPROCEDURE EVALUATION
[facility]    Devon East Patient Status:  Hospital Outpatient Surgery   Age/Gender 28 year old male MRN [id number]   Location [facility] POST ANESTHESIA CARE UNIT Attending Valdemar Hilliard MD   Hosp Day # 0 PCP Spenser Mccormick MD       Anesthesia Post-op Note    CYSTOSCOPY, BILATERAL RETROGRADE PYELOGRAM, BILATERAL URETEROSCOPIC STONE EXTRACTION, HOLMIUM LASER LITHOTRIPSY    Procedure Summary       Date: 05/01/24 Room / Location:  MAIN OR 07 /  MAIN OR    Anesthesia Start: 1836 Anesthesia Stop: 1959    Procedure: CYSTOSCOPY, BILATERAL RETROGRADE PYELOGRAM, BILATERAL URETEROSCOPIC STONE EXTRACTION, HOLMIUM LASER LITHOTRIPSY (Bilateral: Ureter) Diagnosis: (BILATERAL RENAL CALCULUS)    Surgeons: Valdemar Hilliard MD Anesthesiologist: Nii Morrwo MD    Anesthesia Type: general ASA Status: 2            Anesthesia Type: general    Vitals Value Taken Time   /76 05/01/24 1958   Temp 97.4 05/01/24 2000   Pulse 89 05/01/24 2000   Resp 16 05/01/24 2000   SpO2 95 % 05/01/24 2000   Vitals shown include unfiled device data.    Patient Location: PACU    Anesthesia Type: general    Airway Patency: patent    Postop Pain Control: adequate    Mental Status: mildly sedated but able to meaningfully participate in the post-anesthesia evaluation    Nausea/Vomiting: none    Cardiopulmonary/Hydration status: stable euvolemic    Complications: no apparent anesthesia related complications    Postop vital signs: stable    Dental Exam: Unchanged from Preop    Patient to be discharged from PACU when criteria met.

## 2024-05-02 NOTE — DISCHARGE INSTRUCTIONS
Home Care Instructions Following Your Ureteroscopy     Devon-  We hope you were pleased with your care at [facility].  We wish you the best outcome and overall experience with your operation.  These instructions will help to minimize pain and improve the likelihood of a successful result.    You Should Expect:  Bloody urine AND Burning with urination after procedure    Azo is an over-the-counter medication to control pain or bladder spasms or to prevent infection may be prescribed. Take this as directed. Do not take Azo, if you have kidney failure  Drink plenty of fluids to help flush out your urinary tract.   Your urine may be slightly pink or red.     Bathing/Showers  You can resume showering tomorrow    Over-The-Counter (OTC) Medication:  Azo (phenazopyridine hydrochloride 97.5 mg) can help to alleviate burning with urination and bladder spasms  Take 2 tablets 3 times daily with meals as needed for up to two days  Take with a full glass of water  Do not take Azo, if you have kidney failure  You can also take OTC Tylenol or ibuprofen as needed for pain. Take medication as directed on the bottle.    Home Medication  Resume your home medications as instructed    Diet  Resume your normal diet    Activity  Resume activity as tolerated.    Return to Work or School  You can return to work/school tomorrow as tolerated.  Contact Dr. Hilliard's office, if you need a medical note.      Follow-up Appointment with Dr. Lyon  Call Dr. Hilliard's office tomorrow for an appointment as discussed.  Verify the appointment date, time, and office location when you call.    Questions or Concerns  Call Dr. Hilliard immediately if you have any of the following:   Chills or fever above 100.4°F (38°C)   Persistent Nausea and vomiting   Unable to urinate   If your call is made after office hours, a physician's assistant or nurse practitioner will be available to help you.  There is always a surgeon covering Dr. Hilliard's patients, if he is  gordon Osorio  Thank you for coming to [facility] for your operation.  The nurses and the anesthesiologist try very hard to make sure you receive the best care possible.  Your trust in them is greatly appreciated.    Thanks so much,  Dr. Hilliard

## 2024-05-02 NOTE — OPERATIVE REPORT
[facility]   OPERATIVE REPORT    Devon East Patient Status:  Hospital Outpatient Surgery    1995 MRN [id number]   Location [facility] POST ANESTHESIA CARE UNIT Attending Valdemar Hilliard MD   Hosp Day # 0 PCP Spenser Mccormick MD        DATE of OPERATION:  2024      PREOPERATIVE DIAGNOSIS: Bilateral Renal Calculi    POSTOPERATIVE DIAGNOSIS: Same    PROCEDURE PERFORMED: Bilateral Ureteroscopic Stone Extraction, with Laser Lithotripsy, Cystoscopy, Bilateral Retrograde Pyelogram     ANESTHESIA:  General.     INDICATIONS:  Flank pain related to 1.3 cm left UPJ calculus.  Also has 7 mm right upper pole renal calculus.     FINDINGS: Stones fragmented easily.  Ureters were wide open and there did not seem to be any need for postoperative ureteral stent.  I did not send the stone for analysis since he had a stone analysis in March also showing cystine stones.    EBL: None    COMPLICATIONS: None     TECHNIQUE:  A general anesthesia was induced.  A time-out was  reviewed.  Preoperative antibiotics were administered.  The patient  was prepped and draped in the dorsal lithotomy position.  Sequential  compression devices were in place on the lower legs.     The cystoscope was passed into the bladder and the bladder was  examined.  The ureteral orifices were in normal position.  Prostate was small and nonobstructing.  The ureteral orifice ease appeared generous in size.  A guidewire was passed into the left ureteral orifice and then a ureteral catheter was passed over it.  Water-soluble contrast was injected retrograde.  I could not see the stone on the plain film and I did not see it on the retrograde pyelogram.  I then passed an 11/13 Maori ureteral sheath over the guidewire and then passed the [employer] disposable ureteroscope through the ureteral sheath.  The stone was encountered in the renal pelvis and pushed up towards an upper pole calyx.  The 365 µm holmium laser fiber was used  to fragment the stone on fragmentation settings of 1.5 J and 15 Hz and dusting settings of 0.3 J and 50 Hz.  The largest fragments were extracted with a basket until the fragments were simply too small to basket and then I irrigated out the calyx.  Contrast was injected as I removed the ureteroscope and the ureteral sheath and it rapidly drained from the ureter.  I subsequently watched this drain as I worked on the other side and there did not appear to be any need for postoperative ureteral stent, since the contrast drained very well.      The right ureter was easily entered with the ureteroscope, without the need for dilation.  I advanced the scope up into the intrarenal collecting system and examined the intrarenal collecting system.  The only stones which were actually in the renal collecting system were too, 4 mm stones in an upper pole calyx.  I grasped 1 and extracted it intact.  The other 1 broke into 2 fragments and I decided to dusted with the laser.  The fragments were then too small to basket.  Contrast was injected as I removed the ureteroscope.  There is no extravasation.  The contrast drained rapidly.  There did not appear to be any need for postoperative ureteral stent.  A stent had been opened but was not placed.  The patient was awakened, and taken to the recovery area in good condition.     Valdemar Hilliard MD  [facility]  [phone]  5/1/2024  8:03 PM

## 2024-06-05 ENCOUNTER — HOSPITAL ENCOUNTER (OUTPATIENT)
Facility: HOSPITAL | Age: 29
Setting detail: OBSERVATION
Discharge: HOME | End: 2024-06-07
Attending: EMERGENCY MEDICINE | Admitting: INTERNAL MEDICINE
Payer: COMMERCIAL

## 2024-06-05 ENCOUNTER — APPOINTMENT (OUTPATIENT)
Dept: CT IMAGING | Age: 29
End: 2024-06-05
Payer: COMMERCIAL

## 2024-06-05 DIAGNOSIS — Q62.11 HYDRONEPHROSIS WITH URETEROPELVIC JUNCTION (UPJ) OBSTRUCTION: ICD-10-CM

## 2024-06-05 DIAGNOSIS — N20.0 KIDNEY STONE: ICD-10-CM

## 2024-06-05 DIAGNOSIS — N13.30 HYDROURETERONEPHROSIS: ICD-10-CM

## 2024-06-05 DIAGNOSIS — N23 RENAL COLIC: Primary | ICD-10-CM

## 2024-06-05 LAB
ANION GAP SERPL CALC-SCNC: 5 MMOL/L (ref 0–18)
BASOPHILS # BLD AUTO: 0.04 X10(3) UL (ref 0–0.2)
BASOPHILS NFR BLD AUTO: 0.5 %
BUN BLD-MCNC: 17 MG/DL (ref 9–23)
CALCIUM BLD-MCNC: 9.2 MG/DL (ref 8.5–10.1)
CHLORIDE SERPL-SCNC: 102 MMOL/L (ref 98–112)
CO2 SERPL-SCNC: 28 MMOL/L (ref 21–32)
CREAT BLD-MCNC: 0.99 MG/DL
EGFRCR SERPLBLD CKD-EPI 2021: 106 ML/MIN/1.73M2 (ref 60–?)
EOSINOPHIL # BLD AUTO: 0.16 X10(3) UL (ref 0–0.7)
EOSINOPHIL NFR BLD AUTO: 1.9 %
ERYTHROCYTE [DISTWIDTH] IN BLOOD BY AUTOMATED COUNT: 12.3 %
GLUCOSE BLD-MCNC: 102 MG/DL (ref 70–99)
HCT VFR BLD AUTO: 43 %
HGB BLD-MCNC: 15.2 G/DL
IMM GRANULOCYTES # BLD AUTO: 0.05 X10(3) UL (ref 0–1)
IMM GRANULOCYTES NFR BLD: 0.6 %
LYMPHOCYTES # BLD AUTO: 2.71 X10(3) UL (ref 1–4)
LYMPHOCYTES NFR BLD AUTO: 32.9 %
MCH RBC QN AUTO: 31.1 PG (ref 26–34)
MCHC RBC AUTO-ENTMCNC: 35.3 G/DL (ref 31–37)
MCV RBC AUTO: 87.9 FL
MONOCYTES # BLD AUTO: 0.68 X10(3) UL (ref 0.1–1)
MONOCYTES NFR BLD AUTO: 8.3 %
NEUTROPHILS # BLD AUTO: 4.6 X10 (3) UL (ref 1.5–7.7)
NEUTROPHILS # BLD AUTO: 4.6 X10(3) UL (ref 1.5–7.7)
NEUTROPHILS NFR BLD AUTO: 55.8 %
OSMOLALITY SERPL CALC.SUM OF ELEC: 282 MOSM/KG (ref 275–295)
PLATELET # BLD AUTO: 342 10(3)UL (ref 150–450)
POTASSIUM SERPL-SCNC: 3.9 MMOL/L (ref 3.5–5.1)
RBC # BLD AUTO: 4.89 X10(6)UL
SODIUM SERPL-SCNC: 135 MMOL/L (ref 136–145)
WBC # BLD AUTO: 8.2 X10(3) UL (ref 4–11)

## 2024-06-05 PROCEDURE — 96374 THER/PROPH/DIAG INJ IV PUSH: CPT

## 2024-06-05 PROCEDURE — 74176 CT ABD & PELVIS W/O CONTRAST: CPT

## 2024-06-05 PROCEDURE — 85025 COMPLETE CBC W/AUTO DIFF WBC: CPT

## 2024-06-05 PROCEDURE — 96375 TX/PRO/DX INJ NEW DRUG ADDON: CPT

## 2024-06-05 PROCEDURE — 99285 EMERGENCY DEPT VISIT HI MDM: CPT

## 2024-06-05 PROCEDURE — 96361 HYDRATE IV INFUSION ADD-ON: CPT

## 2024-06-05 PROCEDURE — 80048 BASIC METABOLIC PNL TOTAL CA: CPT

## 2024-06-05 RX ORDER — KETOROLAC TROMETHAMINE 30 MG/ML
30 INJECTION, SOLUTION INTRAMUSCULAR; INTRAVENOUS ONCE
Status: COMPLETED | OUTPATIENT
Start: 2024-06-05 | End: 2024-06-05

## 2024-06-05 RX ORDER — MORPHINE SULFATE 4 MG/ML
4 INJECTION, SOLUTION INTRAMUSCULAR; INTRAVENOUS ONCE
Status: DISCONTINUED | OUTPATIENT
Start: 2024-06-05 | End: 2024-06-05

## 2024-06-05 RX ORDER — HYDROMORPHONE HYDROCHLORIDE 1 MG/ML
0.5 INJECTION, SOLUTION INTRAMUSCULAR; INTRAVENOUS; SUBCUTANEOUS EVERY 30 MIN PRN
Status: DISCONTINUED | OUTPATIENT
Start: 2024-06-05 | End: 2024-06-07

## 2024-06-05 RX ORDER — HYDROMORPHONE HYDROCHLORIDE 1 MG/ML
1 INJECTION, SOLUTION INTRAMUSCULAR; INTRAVENOUS; SUBCUTANEOUS ONCE
Status: COMPLETED | OUTPATIENT
Start: 2024-06-05 | End: 2024-06-06

## 2024-06-06 ENCOUNTER — ANESTHESIA (OUTPATIENT)
Dept: SURGERY | Facility: HOSPITAL | Age: 29
End: 2024-06-06
Payer: COMMERCIAL

## 2024-06-06 ENCOUNTER — ANESTHESIA EVENT (OUTPATIENT)
Dept: SURGERY | Facility: HOSPITAL | Age: 29
End: 2024-06-06
Payer: COMMERCIAL

## 2024-06-06 ENCOUNTER — APPOINTMENT (OUTPATIENT)
Dept: GENERAL RADIOLOGY | Facility: HOSPITAL | Age: 29
End: 2024-06-06
Attending: INTERNAL MEDICINE
Payer: COMMERCIAL

## 2024-06-06 PROBLEM — N13.30 HYDROURETERONEPHROSIS: Status: ACTIVE | Noted: 2024-06-06

## 2024-06-06 LAB
BILIRUB UR QL STRIP.AUTO: NEGATIVE
CLARITY UR REFRACT.AUTO: CLEAR
COLOR UR AUTO: YELLOW
GLUCOSE UR STRIP.AUTO-MCNC: NEGATIVE MG/DL
KETONES UR STRIP.AUTO-MCNC: NEGATIVE MG/DL
LEUKOCYTE ESTERASE UR QL STRIP.AUTO: NEGATIVE
NITRITE UR QL STRIP.AUTO: NEGATIVE
PH UR STRIP.AUTO: 6.5 [PH] (ref 5–8)
SP GR UR STRIP.AUTO: 1.02 (ref 1–1.03)
UROBILINOGEN UR STRIP.AUTO-MCNC: 0.2 MG/DL

## 2024-06-06 PROCEDURE — 0T778DZ DILATION OF LEFT URETER WITH INTRALUMINAL DEVICE, VIA NATURAL OR ARTIFICIAL OPENING ENDOSCOPIC: ICD-10-PCS | Performed by: UROLOGY

## 2024-06-06 PROCEDURE — 0TC78ZZ EXTIRPATION OF MATTER FROM LEFT URETER, VIA NATURAL OR ARTIFICIAL OPENING ENDOSCOPIC: ICD-10-PCS | Performed by: UROLOGY

## 2024-06-06 PROCEDURE — 96375 TX/PRO/DX INJ NEW DRUG ADDON: CPT

## 2024-06-06 PROCEDURE — 81001 URINALYSIS AUTO W/SCOPE: CPT

## 2024-06-06 PROCEDURE — 96376 TX/PRO/DX INJ SAME DRUG ADON: CPT

## 2024-06-06 PROCEDURE — 81015 MICROSCOPIC EXAM OF URINE: CPT

## 2024-06-06 DEVICE — URETERAL STENT
Type: IMPLANTABLE DEVICE | Site: URETER | Status: FUNCTIONAL
Brand: ASCERTA™

## 2024-06-06 RX ORDER — HYDROMORPHONE HYDROCHLORIDE 1 MG/ML
1.2 INJECTION, SOLUTION INTRAMUSCULAR; INTRAVENOUS; SUBCUTANEOUS EVERY 2 HOUR PRN
Status: DISCONTINUED | OUTPATIENT
Start: 2024-06-06 | End: 2024-06-07

## 2024-06-06 RX ORDER — HYDROMORPHONE HYDROCHLORIDE 1 MG/ML
0.4 INJECTION, SOLUTION INTRAMUSCULAR; INTRAVENOUS; SUBCUTANEOUS EVERY 2 HOUR PRN
Status: DISCONTINUED | OUTPATIENT
Start: 2024-06-06 | End: 2024-06-07

## 2024-06-06 RX ORDER — SERTRALINE HYDROCHLORIDE 100 MG/1
100 TABLET, FILM COATED ORAL DAILY
Status: DISCONTINUED | OUTPATIENT
Start: 2024-06-06 | End: 2024-06-07

## 2024-06-06 RX ORDER — LIDOCAINE HYDROCHLORIDE 20 MG/ML
JELLY TOPICAL AS NEEDED
Status: DISCONTINUED | OUTPATIENT
Start: 2024-06-06 | End: 2024-06-06 | Stop reason: HOSPADM

## 2024-06-06 RX ORDER — LIDOCAINE HYDROCHLORIDE 10 MG/ML
INJECTION, SOLUTION EPIDURAL; INFILTRATION; INTRACAUDAL; PERINEURAL AS NEEDED
Status: DISCONTINUED | OUTPATIENT
Start: 2024-06-06 | End: 2024-06-06 | Stop reason: SURG

## 2024-06-06 RX ORDER — HYDROMORPHONE HYDROCHLORIDE 1 MG/ML
0.6 INJECTION, SOLUTION INTRAMUSCULAR; INTRAVENOUS; SUBCUTANEOUS EVERY 5 MIN PRN
Status: DISCONTINUED | OUTPATIENT
Start: 2024-06-06 | End: 2024-06-06 | Stop reason: HOSPADM

## 2024-06-06 RX ORDER — KETOROLAC TROMETHAMINE 30 MG/ML
30 INJECTION, SOLUTION INTRAMUSCULAR; INTRAVENOUS EVERY 6 HOURS PRN
Status: DISCONTINUED | OUTPATIENT
Start: 2024-06-06 | End: 2024-06-07

## 2024-06-06 RX ORDER — ONDANSETRON 2 MG/ML
4 INJECTION INTRAMUSCULAR; INTRAVENOUS EVERY 6 HOURS PRN
Status: DISCONTINUED | OUTPATIENT
Start: 2024-06-06 | End: 2024-06-07

## 2024-06-06 RX ORDER — ONDANSETRON 2 MG/ML
4 INJECTION INTRAMUSCULAR; INTRAVENOUS EVERY 6 HOURS PRN
Status: DISCONTINUED | OUTPATIENT
Start: 2024-06-06 | End: 2024-06-06 | Stop reason: HOSPADM

## 2024-06-06 RX ORDER — SODIUM CHLORIDE, SODIUM LACTATE, POTASSIUM CHLORIDE, CALCIUM CHLORIDE 600; 310; 30; 20 MG/100ML; MG/100ML; MG/100ML; MG/100ML
INJECTION, SOLUTION INTRAVENOUS CONTINUOUS
Status: DISCONTINUED | OUTPATIENT
Start: 2024-06-06 | End: 2024-06-07

## 2024-06-06 RX ORDER — HYDROMORPHONE HYDROCHLORIDE 1 MG/ML
0.8 INJECTION, SOLUTION INTRAMUSCULAR; INTRAVENOUS; SUBCUTANEOUS EVERY 2 HOUR PRN
Status: DISCONTINUED | OUTPATIENT
Start: 2024-06-06 | End: 2024-06-07

## 2024-06-06 RX ORDER — MEPERIDINE HYDROCHLORIDE 25 MG/ML
12.5 INJECTION INTRAMUSCULAR; INTRAVENOUS; SUBCUTANEOUS AS NEEDED
Status: DISCONTINUED | OUTPATIENT
Start: 2024-06-06 | End: 2024-06-06 | Stop reason: HOSPADM

## 2024-06-06 RX ORDER — ONDANSETRON 2 MG/ML
4 INJECTION INTRAMUSCULAR; INTRAVENOUS ONCE
Status: COMPLETED | OUTPATIENT
Start: 2024-06-06 | End: 2024-06-06

## 2024-06-06 RX ORDER — HYDROMORPHONE HYDROCHLORIDE 1 MG/ML
INJECTION, SOLUTION INTRAMUSCULAR; INTRAVENOUS; SUBCUTANEOUS
Status: COMPLETED
Start: 2024-06-06 | End: 2024-06-06

## 2024-06-06 RX ORDER — OXYBUTYNIN CHLORIDE 5 MG/1
5 TABLET ORAL EVERY 8 HOURS PRN
Status: DISCONTINUED | OUTPATIENT
Start: 2024-06-06 | End: 2024-06-07

## 2024-06-06 RX ORDER — HYDROCODONE BITARTRATE AND ACETAMINOPHEN 5; 325 MG/1; MG/1
1-2 TABLET ORAL EVERY 4 HOURS PRN
Qty: 12 TABLET | Refills: 0 | Status: SHIPPED | OUTPATIENT
Start: 2024-06-06

## 2024-06-06 RX ORDER — MIDAZOLAM HYDROCHLORIDE 1 MG/ML
1 INJECTION INTRAMUSCULAR; INTRAVENOUS EVERY 5 MIN PRN
Status: DISCONTINUED | OUTPATIENT
Start: 2024-06-06 | End: 2024-06-06 | Stop reason: HOSPADM

## 2024-06-06 RX ORDER — TAMSULOSIN HYDROCHLORIDE 0.4 MG/1
0.4 CAPSULE ORAL
COMMUNITY

## 2024-06-06 RX ORDER — HYDROMORPHONE HYDROCHLORIDE 1 MG/ML
0.2 INJECTION, SOLUTION INTRAMUSCULAR; INTRAVENOUS; SUBCUTANEOUS EVERY 5 MIN PRN
Status: DISCONTINUED | OUTPATIENT
Start: 2024-06-06 | End: 2024-06-06 | Stop reason: HOSPADM

## 2024-06-06 RX ORDER — NALOXONE HYDROCHLORIDE 0.4 MG/ML
0.08 INJECTION, SOLUTION INTRAMUSCULAR; INTRAVENOUS; SUBCUTANEOUS AS NEEDED
Status: DISCONTINUED | OUTPATIENT
Start: 2024-06-06 | End: 2024-06-06 | Stop reason: HOSPADM

## 2024-06-06 RX ORDER — ALBUTEROL SULFATE 90 UG/1
2 AEROSOL, METERED RESPIRATORY (INHALATION) EVERY 6 HOURS PRN
Status: DISCONTINUED | OUTPATIENT
Start: 2024-06-06 | End: 2024-06-07

## 2024-06-06 RX ORDER — HYDROMORPHONE HYDROCHLORIDE 1 MG/ML
0.4 INJECTION, SOLUTION INTRAMUSCULAR; INTRAVENOUS; SUBCUTANEOUS EVERY 5 MIN PRN
Status: DISCONTINUED | OUTPATIENT
Start: 2024-06-06 | End: 2024-06-06 | Stop reason: HOSPADM

## 2024-06-06 RX ORDER — PROCHLORPERAZINE EDISYLATE 5 MG/ML
5 INJECTION INTRAMUSCULAR; INTRAVENOUS EVERY 8 HOURS PRN
Status: DISCONTINUED | OUTPATIENT
Start: 2024-06-06 | End: 2024-06-07

## 2024-06-06 RX ORDER — PROCHLORPERAZINE EDISYLATE 5 MG/ML
5 INJECTION INTRAMUSCULAR; INTRAVENOUS EVERY 8 HOURS PRN
Status: DISCONTINUED | OUTPATIENT
Start: 2024-06-06 | End: 2024-06-06 | Stop reason: HOSPADM

## 2024-06-06 RX ORDER — DEXAMETHASONE SODIUM PHOSPHATE 4 MG/ML
VIAL (ML) INJECTION AS NEEDED
Status: DISCONTINUED | OUTPATIENT
Start: 2024-06-06 | End: 2024-06-06 | Stop reason: SURG

## 2024-06-06 RX ORDER — GLYCOPYRROLATE 0.2 MG/ML
INJECTION, SOLUTION INTRAMUSCULAR; INTRAVENOUS AS NEEDED
Status: DISCONTINUED | OUTPATIENT
Start: 2024-06-06 | End: 2024-06-06 | Stop reason: SURG

## 2024-06-06 RX ORDER — NEOSTIGMINE METHYLSULFATE 1 MG/ML
INJECTION, SOLUTION INTRAVENOUS AS NEEDED
Status: DISCONTINUED | OUTPATIENT
Start: 2024-06-06 | End: 2024-06-06 | Stop reason: SURG

## 2024-06-06 RX ORDER — SODIUM CHLORIDE, SODIUM LACTATE, POTASSIUM CHLORIDE, CALCIUM CHLORIDE 600; 310; 30; 20 MG/100ML; MG/100ML; MG/100ML; MG/100ML
INJECTION, SOLUTION INTRAVENOUS CONTINUOUS
Status: DISCONTINUED | OUTPATIENT
Start: 2024-06-06 | End: 2024-06-06 | Stop reason: HOSPADM

## 2024-06-06 RX ORDER — ROCURONIUM BROMIDE 10 MG/ML
INJECTION, SOLUTION INTRAVENOUS AS NEEDED
Status: DISCONTINUED | OUTPATIENT
Start: 2024-06-06 | End: 2024-06-06 | Stop reason: SURG

## 2024-06-06 RX ADMIN — ROCURONIUM BROMIDE 50 MG: 10 INJECTION, SOLUTION INTRAVENOUS at 17:17:00

## 2024-06-06 RX ADMIN — GLYCOPYRROLATE 0.6 MG: 0.2 INJECTION, SOLUTION INTRAMUSCULAR; INTRAVENOUS at 18:09:00

## 2024-06-06 RX ADMIN — DEXAMETHASONE SODIUM PHOSPHATE 4 MG: 4 MG/ML VIAL (ML) INJECTION at 17:30:00

## 2024-06-06 RX ADMIN — SODIUM CHLORIDE, SODIUM LACTATE, POTASSIUM CHLORIDE, CALCIUM CHLORIDE: 600; 310; 30; 20 INJECTION, SOLUTION INTRAVENOUS at 18:24:00

## 2024-06-06 RX ADMIN — ONDANSETRON 4 MG: 2 INJECTION INTRAMUSCULAR; INTRAVENOUS at 17:37:00

## 2024-06-06 RX ADMIN — NEOSTIGMINE METHYLSULFATE 3 MG: 1 INJECTION, SOLUTION INTRAVENOUS at 18:09:00

## 2024-06-06 RX ADMIN — LIDOCAINE HYDROCHLORIDE 50 MG: 10 INJECTION, SOLUTION EPIDURAL; INFILTRATION; INTRACAUDAL; PERINEURAL at 17:15:00

## 2024-06-06 RX ADMIN — SODIUM CHLORIDE, SODIUM LACTATE, POTASSIUM CHLORIDE, CALCIUM CHLORIDE: 600; 310; 30; 20 INJECTION, SOLUTION INTRAVENOUS at 17:12:00

## 2024-06-06 NOTE — PLAN OF CARE
Pt A&Ox4. VSS on RA. . Up ad josé miguel. NPO. Pt c/o muscle spasms radiating from legs to lower back, pain managed with prn dilaudid. Plan for cystoscopy today at 1700 with Dr. Plascencia. POC updated with pt. Call light within reach. Will continue to monitor.

## 2024-06-06 NOTE — OPERATIVE REPORT
OPERATIVE NOTE    PATIENT NAME: Devon East  YOB: 1995  DATE OF SERVICE: 6/6/2024    SURGEON:  Crow Plascencia MD    ASSISTANT:  None    PREOPERATIVE DIAGNOSIS:  1.5cm left UPJ calculus with hydronephrosis, Left renal calculus    POSTOPERATIVE DIAGNOSIS:  Same    PROCEDURE PERFORMED:  Cystoscopy  Urethral dilation  Left Retrograde Pyelogram  Left Ureteroscopy with Laser Lithotripsy and Stone Basket Extraction  Left Ureteral Stent Placement  Intraoperative interpretation of fluoroscopic images    ASSISTANTS:  None    ANESTHESIA:  General    ANTIBIOTIC PROPHYLAXIS:  Ancef    SPECIMENS:  None    DRAINS:  6F x 26 cm JJ left ureteral stent    ESTIMATED BLOOD LOSS:  Minimal    COMPLICATIONS:  No immediate complications     INDICATIONS FOR PROCEDURE:  Mr. East is a 28 year old gentleman with a history of cystinuria and recurrent nephrolithiasis. He has undergone multiple stone procedures in the last 6 months bilaterally.  He has formed a new left sided calculus which is ~1.5cm in diameter and is at the level of the UPJ.  There is mild hydronephrosis and he is having pain.  He has elected to proceed with surgical intervention.     We discussed the risks of the procedure including bleeding, infection, or damage to the urologic structures.  The patient understands that in ~10% of patients, the ureter is too narrow to accommodate a ureteroscope.  If this occurs, a ureteral stent will be placed and the patient will need to return to the OR in a delayed fashion for definitive stone treatment after the stent has allowed passive dilation of the ureter to occur. He also understands that given his larger stone size, there is a possible need for a second look procedure if the stone cannot be adequately cleared in a single setting.  Furthermore, they understand that ureteral stents can cause flank pain and/or urinary symptoms.     DESCRIPTION OF PROCEDURE:  After reviewing the indications for the procedure,  informed consent was reviewed and signed by the patient.    The patient was brought to the operating room and placed in the supine position on the OR table.  SCD's were applied and all pressure points were carefully padded.  At this point, anesthesia was successfully induced.  The patient was then given the perioperative antibiotics listed above prior to the procedure.  The patient was transferred to the dorsal lithotomy position and prepped and draped in the normal sterile fashion using betadine.  We then performed an operative time out to confirm the correct patient, procedure, and laterality.  Everyone in the room was in agreement.     He had a stenotic urethral meatus that required dilation with Rausch sounds.  I sequentially dilated from 18F up to 26F.     I began by inserting a 22F rigid cystoscope into the bladder per urethra.  The urethra was without lesions or strictures.  Upon entering the bladder I performed a thorough cystoscopy which did not reveal any bladder lesions, stones, or other pathology.  I identified the bilateral ureteral orifices in their orthotopic locations.      I first began by performing a left retrograde pyelogram using a 5F ureteral catheter and contrast dye.  There were no distal or mid ureteral filling defects noted.    I next cannulated the left ureteral orifice using a 0.035 wire. I followed this up into the renal pelvis under fluoroscopic guidance.      I then advanced an 11/13 x 36 cm ureteral access sheath over the wire and into the proximal left ureter under fluoroscopic guidance. I then inserted a flexible ureteroscope and inspected the proximal ureter.    I identified his UPJ calculus.  I moved it into an upper pole calyx.  I then used a 365 micron laser fiber to fragment the stone into fine sediment.  Given the larger size of the stone, I spent ~1 hour doing this.  I irrigated out as much of the stone debris as possible.  I identified a cluster of stones in a lower pole  calyx which I also fragmented into a fine sediment.      Once I was satisfied that all stones had been adequately fragmented, I backed the scope and sheath down the ureter after replacing a 0.035 wire.  I then reinserted a cystoscope and advanced a 6F x 26 cm JJ left ureteral stent over the wire and into the renal pelvis.  I deployed the stent with a good proximal curl on fluoroscopy and a good distal curl visually within the bladder lumen.     I then emptied the bladder of any stone debris and irrigant. 2% viscous lidocaine was instilled into the urethra for post-operative analgesia.    This completed the procedure.  They tolerated it well without complications.    Please note that I was present for, and completed the entirety of this operation myself.    PLAN:  Monitor overnight for pain control and hydration. Anticipate discharge to home tomorrow. Follow up in 7-10 days for cystoscopy and stent removal in the office.       Crow Plascencia MD  [facility]  Department of Urology  Office: [phone]

## 2024-06-06 NOTE — ANESTHESIA PROCEDURE NOTES
Airway  Date/Time: 6/6/2024 5:17 PM  Urgency: elective    Airway not difficult    General Information and Staff    Patient location during procedure: OR  Anesthesiologist: Dillan Wray MD  Performed: anesthesiologist   Performed by: Dillan Wray MD  Authorized by: Dillan Wray MD      Indications and Patient Condition  Indications for airway management: anesthesia  Sedation level: deep  Preoxygenated: yes  Patient position: sniffing  Mask difficulty assessment: 1 - vent by mask  Planned trial extubation    Final Airway Details  Final airway type: endotracheal airway      Successful airway: ETT  Cuffed: yes   Successful intubation technique: direct laryngoscopy  Endotracheal tube insertion site: oral  Blade: Criss  Blade size: #3  ETT size (mm): 7.5    Cormack-Lehane Classification: grade IIA - partial view of glottis  Placement verified by: capnometry   Measured from: lips  ETT to lips (cm): 20  Number of attempts at approach: 1

## 2024-06-06 NOTE — CONSULTS
[facility]  Department of Urology   Consultation Note    Devon East Patient Status:  Observation    1995 MRN [id number]   Location [facility] 2SW-S Attending Alfredo Manley MD   Hosp Day # 0 PCP Spenser Mccormick MD     Reason for Consultation:  Acute left ureteral colic    History of Present Illness:  Devon East is a a(n) 28 year old male 28 year old gentleman, known to our service for a history of cystinuria and recurrent nephrolithiasis s/p stone procedures.    Underwent cystoscopy, bilateral URS stone extraction with laser lithotripsy, cystoscopy, bilateral RGPG on 24 with Dr. Hilliard.      Patient presented with left flank/abdominal pain.  Reports minor discomfort on Friday.  Increasing pain yesterday.  +nausea.  No vomiting, fever.  +chills with pain.  No dysuria. +gross hematuria.  Bladder pressure, hard to initiate a stream.      Labs:  WBC 8.2  Hgb 15.2  Serum creat 0.99  Serum calcium level 9.2    UA 24: 1-5 WBC/hpf, 6-10 RBC/hpf, no bacteria    Abdominal/pelvic CT scan without contrast 24:   Nonobstructing stone in the upper pole right kidney measuring up to 8 mm.  Nonobstructing stone in the lower pole left kidney measuring up to 8 mm.  There is an obstructing stone in the left UPJ measuring up to 14 x 10 x 12 mm resulting in mild left hydronephrosis and mild left perinephric stranding.     Urology was consulted.    History:  Past Medical History:    Anxiety state    Calculus of kidney    Cystine disease (HCC)    Depression    IBS (irritable bowel syndrome)    KIDNEY STONE    Migraines    OCD (obsessive compulsive disorder)     Past Surgical History:   Procedure Laterality Date    Colonoscopy      Cystoscopy,insert ureteral stent  2024    Lithotripsy      Other surgical history  2011    Rt RPG, URS stone manipulation,laser litho,Rt stent, Dr. Mcdonough    Other surgical history  2011    Cysto stent removal- Dr. Mcdonough     Other surgical history  11/21/2015    Cysto, L URS, laser litho, stone extraction, stent placement, RPG Dr. Finn    Other surgical history  12/01/2015    cysto stent removal    Upper gi endoscopy,exam       Family History   Problem Relation Age of Onset    Heart Disorder Maternal Grandfather     Diabetes Paternal Grandfather     Cancer Paternal Grandfather         meloma    Hypertension Father     Other (Other) Father     Cancer Maternal Grandmother     Cancer Paternal Grandmother       reports that he has never smoked. He has never been exposed to tobacco smoke. He has never used smokeless tobacco. He reports that he does not drink alcohol and does not use drugs.    Allergies:  Allergies   Allergen Reactions    Ciprofloxacin DIZZINESS     Dizziness, difficulty with walking    Seroquel [Quetiapine] RESTLESSNESS     Shaking, neurological side effects.    Tiopronin OTHER (SEE COMMENTS)     WEIGHT LOSS AND HAIR LOSS PER PT REPORT and protein excretion       Medications:    Current Facility-Administered Medications:     albuterol (Ventolin HFA) 108 (90 Base) MCG/ACT inhaler 2 puff, 2 puff, Inhalation, Q6H PRN    ondansetron (Zofran) 4 MG/2ML injection 4 mg, 4 mg, Intravenous, Q6H PRN    prochlorperazine (Compazine) 10 MG/2ML injection 5 mg, 5 mg, Intravenous, Q8H PRN    HYDROmorphone (Dilaudid) 1 MG/ML injection 0.4 mg, 0.4 mg, Intravenous, Q2H PRN **OR** HYDROmorphone (Dilaudid) 1 MG/ML injection 0.8 mg, 0.8 mg, Intravenous, Q2H PRN **OR** HYDROmorphone (Dilaudid) 1 MG/ML injection 1.2 mg, 1.2 mg, Intravenous, Q2H PRN    sertraline (Zoloft) tab 100 mg, 100 mg, Oral, Daily    ketorolac (Toradol) 30 MG/ML injection 30 mg, 30 mg, Intravenous, Q6H PRN    HYDROmorphone (Dilaudid) 1 MG/ML injection 0.5 mg, 0.5 mg, Intravenous, Q30 Min PRN    Review of Systems:  Pertinent items are noted in HPI.  10 point review of systems completed.   Physical Exam:  /75 (BP Location: Right arm)   Pulse 87   Temp 98.6 °F (37 °C)  (Oral)   Resp 18   Wt 171 lb 15.3 oz (78 kg)   SpO2 98%   BMI 26.54 kg/m²   GENERAL: the patient is resting in bed in no acute distress.    HEENT: Unremarkable.  NECK: Supple.    LUNGS: non-labored respirations.    ABDOMEN: The abdomen is soft with left abdominal/flank tenderness.   SKIN: Without stigmata.   NEUROLOGIC: Grossly intact.  EXTREMITIES: Without edema.    Laboratory Data:  Lab Results   Component Value Date    WBC 8.2 06/05/2024    HGB 15.2 06/05/2024    HCT 43.0 06/05/2024    .0 06/05/2024    CREATSERUM 0.99 06/05/2024    BUN 17 06/05/2024     06/05/2024    K 3.9 06/05/2024     06/05/2024    CO2 28.0 06/05/2024     06/05/2024    CA 9.2 06/05/2024       UA 6/6/24: 1-5 WBC/hpf, 6-10 RBC/hpf, no bacteria    Imaging:  CT ABDOMEN+PELVIS KIDNEYSTONE 2D RNDR(NO IV,NO ORAL)(CPT=74176)    Result Date: 6/5/2024  CONCLUSION:   1. There is an obstructing stone in the left UPJ measuring up to 14 x 10 x 12 mm resulting in mild left hydronephrosis and mild left perinephric stranding.  2. Findings concerning for cystitis.  Clinical correlation recommended.  3. Additional bilateral nephrolithiasis.  Please see above for further details.   LOCATION:  [facility]   Dictated by (CST): Arsenio Hook MD on 6/05/2024 at 11:18 PM     Finalized by (CST): Arsenio Hook MD on 6/05/2024 at 11:20 PM       Impression:  Patient Active Problem List   Diagnosis    Cystinuria (HCC)    Cystine stones (HCC)    Proteinuria    IBS (irritable bowel syndrome)    Kidney stone    Major depression with psychotic features (HCC)    Major depressive disorder, recurrent episode (HCC)    Migraine without aura and with status migrainosus, not intractable    Obstructive uropathy    Hypokalemia    Nephrolithiasis    Ureteral stone with hydronephrosis    Intractable pain    Calculus of left ureter    Renal colic    Hydronephrosis with ureteropelvic junction (UPJ) obstruction    Hydroureteronephrosis       HISTORY OF  CYSTINURIA  LEFT FLANK PAIN, LEFT UPJ STONE (14 X 10 X 12 MM), BILATERAL UROLITHIASIS  Afebrile, VSS  WBC 8.2  Hgb 15.2  Serum creat 0.99  Serum calcium level 9.2  UA 6/6/24: 1-5 WBC/hpf, 6-10 RBC/hpf, no bacteria    Recommendations:  We discussed ureteroscopy as a minimally invasive surgical procedure whereby a small scope is placed through the urethra, through the bladder, up the ureter and potentially into the kidney to allow treatment of disease. A balloon or dilator is often used to stretch the ureter to ease scope passage or treat a scar or stricture. Contrast is often placed into the ureter and kidney to evaluate the anatomy with fluoroscopic x-ray. Lasers are often used to treat any obstruction such as stone or stricture. In most cases, the goal is complete removal of stone, occasionally this is not possible or indicated and in those cases there may be need for future procedures to remove remaining stones. Typically, the procedure causes some swelling in the ureter which can cause obstruction and pain, for that reason a ureteral stent is often left in place afterwards to alleviate those symptoms. Also, the stent allows easy access back into the kidney should there be residual stones to treat. The stent must be removed within 3 months or otherwise risk that the stent may become encrusted making removal difficult and risk permanent renal damage. As with any procedure there are risks of bleeding, anesthesia, and infection. Also, the scope and laser are capable of perforating the ureter or kidney which could result in a scar or stricture requiring future procedures. These considerations were thoroughly discussed with the patient.  The patient is aware that any blood-thinning medications must be avoided for 7 days preoperatively and 5 days postoperatively.     Recommend cystourethroscopy, left retrograde pyelogram, left ureteroscopic stone extraction with laser lithotripsy, and insertion of a left ureteral stent.  Reviewed risks, benefits, alternatives, and complications of the procedure including but not limited to risk of anesthesia, bladder/ureteral injury, use of nephrostomy tube, bleeding, infection, inability to reach the stone which would necessitate a subsequent procedure, possibility of more than one treatment, and ureteral stent related symptoms with the patient who understand and wishes to proceed. Patient informed if there is any  concern for infection intraoperatively, he would only have a stent placed and require a treatment of stone an outpatient.  Patient also informed the ureteral stent is not a permament implant and would eventually need to be removed (timing of stent  removal per urologist). Patient agrees and understands.    NPO  Consent to be signed  Ancef on call to the OR    In the interim, continue with hydration, straining all urine, pain management.     Above discussed with patient, nurse  Will update Dr. Plascencia.    Thank you for allowing me to participate in the care of your patient.    Angeles Shea PA-C  [facility]  Department of Urology  6/6/2024  11:09 AM

## 2024-06-06 NOTE — ANESTHESIA POSTPROCEDURE EVALUATION
[facility]    Devon East Patient Status:  Observation   Age/Gender 28 year old male MRN [id number]   Location [facility] SURGERY Attending Alfredo Manley MD   Hosp Day # 0 PCP Spenser Mccormick MD       Anesthesia Post-op Note    CYSTOSCOPY, LEFT RETROGRADE PYELOGRAM, LEFT URETEROSCOPY WITH LASER LITHOTRIPSY, INSERTION OF LEFT URETERAL STENT    Procedure Summary       Date: 06/06/24 Room / Location:  MAIN OR 07 /  MAIN OR    Anesthesia Start: 1709 Anesthesia Stop: 1824    Procedure: CYSTOSCOPY, LEFT RETROGRADE PYELOGRAM, LEFT URETEROSCOPY WITH LASER LITHOTRIPSY, INSERTION OF LEFT URETERAL STENT (Left: Ureter) Diagnosis:       Ureteral stone      (Ureteral stone [N20.1])    Surgeons: Crow Plascencia MD Anesthesiologist: Dillan Wray MD    Anesthesia Type: general ASA Status: 2            Anesthesia Type: general    Vitals Value Taken Time   /78 06/06/24 1824   Temp 98.1 °F (36.7 °C) 06/06/24 1824   Pulse 115 06/06/24 1824   Resp 16 06/06/24 1824   SpO2 98 % 06/06/24 1824       Patient Location: PACU    Anesthesia Type: general    Airway Patency: patent and extubated    Postop Pain Control: adequate    Mental Status: mildly sedated but able to meaningfully participate in the post-anesthesia evaluation    Nausea/Vomiting: none    Cardiopulmonary/Hydration status: stable euvolemic    Complications: no apparent anesthesia related complications    Postop vital signs: stable    Dental Exam: Unchanged from Preop    Patient to be discharged from PACU when criteria met.

## 2024-06-06 NOTE — ANESTHESIA PREPROCEDURE EVALUATION
PRE-OP EVALUATION    Patient Name: Devon East    Admit Diagnosis: Renal colic [N23]  Kidney stone [N20.0]  Hydroureteronephrosis [N13.30]    Pre-op Diagnosis: Ureteral stone [N20.1]    CYSTOSCOPY, LEFT RETROGRADE PYELOGRAM, LEFT URETEROSCOPY WITH LASER LITHOTRIPSY, INSERTION OF LEFT URETERAL STENT    Anesthesia Procedure: CYSTOSCOPY, LEFT RETROGRADE PYELOGRAM, LEFT URETEROSCOPY WITH LASER LITHOTRIPSY, INSERTION OF LEFT URETERAL STENT (Left)    Surgeons and Role:     * Crow Plascencia MD - Primary    Pre-op vitals reviewed.  Temp: 98.6 °F (37 °C)  Pulse: 87  Resp: 18  BP: 124/75  SpO2: 98 %  Body mass index is 26.54 kg/m².    Current medications reviewed.  Hospital Medications:  • albuterol (Ventolin HFA) 108 (90 Base) MCG/ACT inhaler 2 puff  2 puff Inhalation Q6H PRN   • ondansetron (Zofran) 4 MG/2ML injection 4 mg  4 mg Intravenous Q6H PRN   • prochlorperazine (Compazine) 10 MG/2ML injection 5 mg  5 mg Intravenous Q8H PRN   • HYDROmorphone (Dilaudid) 1 MG/ML injection 0.4 mg  0.4 mg Intravenous Q2H PRN    Or   • HYDROmorphone (Dilaudid) 1 MG/ML injection 0.8 mg  0.8 mg Intravenous Q2H PRN    Or   • HYDROmorphone (Dilaudid) 1 MG/ML injection 1.2 mg  1.2 mg Intravenous Q2H PRN   • [COMPLETED] ondansetron (Zofran) 4 MG/2ML injection 4 mg  4 mg Intravenous Once   • sertraline (Zoloft) tab 100 mg  100 mg Oral Daily   • ketorolac (Toradol) 30 MG/ML injection 30 mg  30 mg Intravenous Q6H PRN   • ceFAZolin (Ancef) 2g in 10mL IV syringe premix  2 g Intravenous On Call to OR   • lactated ringers infusion   Intravenous Continuous   • [COMPLETED] ketorolac (Toradol) 30 MG/ML injection 30 mg  30 mg Intravenous Once   • [COMPLETED] sodium chloride 0.9 % IV bolus 1,000 mL  1,000 mL Intravenous Once   • HYDROmorphone (Dilaudid) 1 MG/ML injection 0.5 mg  0.5 mg Intravenous Q30 Min PRN   • [COMPLETED] HYDROmorphone (Dilaudid) 1 MG/ML injection 1 mg  1 mg Intravenous Once       Outpatient Medications:     Medications Prior to  Admission   Medication Sig Dispense Refill Last Dose   • tamsulosin 0.4 MG Oral Cap Take 1 capsule (0.4 mg total) by mouth.   2024   • HYDROcodone-acetaminophen 5-325 MG Oral Tab Take 1-2 tablets by mouth every 4 (four) hours as needed for Pain. 20 tablet 0 2024   • potassium citrate 10 MEQ (1080 MG) Oral Tab CR Take 2 tablets (20 mEq total) by mouth 3 (three) times daily with meals.   2024   • sertraline 100 MG Oral Tab Take 1 tablet (100 mg total) by mouth daily.   2024   • albuterol 108 (90 Base) MCG/ACT Inhalation Aero Soln Inhale 2 puffs into the lungs every 6 (six) hours as needed.   Unknown   • benzonatate 200 MG Oral Cap Take 1 capsule (200 mg total) by mouth 3 (three) times daily as needed.   Unknown   • guaiFENesin-codeine 100-10 MG/5ML Oral Solution Take 5 mL by mouth every 6 (six) hours as needed for cough.   Unknown   • [] ondansetron 4 MG Oral Tablet Dispersible Take 1 tablet (4 mg total) by mouth every 4 (four) hours as needed for Nausea. 10 tablet 0    • fexofenadine 180 MG Oral Tab Take 1 tablet (180 mg total) by mouth daily. (Patient not taking: Reported on 3/29/2024)          Allergies: Ciprofloxacin, Seroquel [quetiapine], and Tiopronin      Anesthesia Evaluation    Patient summary reviewed.    Anesthetic Complications           GI/Hepatic/Renal                                 Cardiovascular                                                       Endo/Other                                  Pulmonary                           Neuro/Psych      (+) depression                              Past Surgical History:   Procedure Laterality Date   • Colonoscopy     • Cystoscopy,insert ureteral stent  2024   • Lithotripsy     • Other surgical history  2011    Rt RPG, URS stone manipulation,laser litho,Rt stent, Dr. Mcdonough   • Other surgical history  2011    Cysto stent removal- Dr. Mcdonough   • Other surgical history  2015    Cysto, L URS, laser litho, stone  extraction, stent placement, RPG Dr. Finn   • Other surgical history  12/01/2015    cysto stent removal   • Upper gi endoscopy,exam       Social History     Socioeconomic History   • Marital status: Single   Tobacco Use   • Smoking status: Never     Passive exposure: Never   • Smokeless tobacco: Never   Vaping Use   • Vaping status: Never Used   Substance and Sexual Activity   • Alcohol use: No   • Drug use: No     History   Drug Use No     Available pre-op labs reviewed.  Lab Results   Component Value Date    WBC 8.2 06/05/2024    RBC 4.89 06/05/2024    HGB 15.2 06/05/2024    HCT 43.0 06/05/2024    MCV 87.9 06/05/2024    MCH 31.1 06/05/2024    MCHC 35.3 06/05/2024    RDW 12.3 06/05/2024    .0 06/05/2024     Lab Results   Component Value Date     (L) 06/05/2024    K 3.9 06/05/2024     06/05/2024    CO2 28.0 06/05/2024    BUN 17 06/05/2024    CREATSERUM 0.99 06/05/2024     (H) 06/05/2024    CA 9.2 06/05/2024            Airway      Mallampati: I  Mouth opening: >3 FB  TM distance: > 6 cm  Neck ROM: full Cardiovascular    Cardiovascular exam normal.  Rhythm: regular  Rate: normal     Dental             Pulmonary    Pulmonary exam normal.                 Other findings          ASA: 2   Plan: general  NPO status verified and patient meets guidelines.          Plan/risks discussed with: patient            Present on Admission:  **None**

## 2024-06-06 NOTE — ED QUICK NOTES
Orders for admission, patient is aware of plan and ready to go upstairs. Any questions, please call ED RN Josh at extension [phone].     Patient Covid vaccination status: Fully vaccinated     COVID Test Ordered in ED: None    COVID Suspicion at Admission: N/A    Running Infusions:  None    Mental Status/LOC at time of transport: awake, alert and oriented    Other pertinent information:   CIWA score: N/A   NIH score:  N/A

## 2024-06-06 NOTE — PROGRESS NOTES
NURSING ADMISSION NOTE      Patient admitted via Cart  Oriented to room.  Safety precautions initiated.  Bed in low position.  Call light in reach.    Pt transferred from [facility]. Family at bedside.

## 2024-06-06 NOTE — PLAN OF CARE
Pt A&Ox4 vital signs stable on RA. Moderate c/o pain to left flank, declining pain medication at this time. Pt c/o of nausea arriving to floor, PRN compazine given with adequate relief. Ambulating well with no assistance needed. NPO. Urology to see today. POC discussed, pt verbalized understanding. No further questions at this time. Call light within reach.

## 2024-06-06 NOTE — H&P
[facility] Hospitalist History and Physical      Chief Complaint   Patient presents with    Abdomen/Flank Pain        PCP: Spenser Mccormick MD      History of Present Illness: Patient is a 28 year old male with PMH sig for cystinuria, multiple kidney stones and interventions, anxiety/depression/OCD presented with left-sided flank pain.  Pain started yesterday and was consistent with his prior renal colic's.  Reporting some nausea and dry heaving.  Pain is 10 out of 10.  In the ED he was tachycardic and hypertensive.  Labs unremarkable and he had blood in his urine.  Imaging showed very large stone at the left UPJ along with bilateral nephrolithiasis.  Urology was consulted and admitted for pain control and likely stone removal/stent placement.    Past Medical History:    Anxiety state    Calculus of kidney    Cystine disease (HCC)    Depression    IBS (irritable bowel syndrome)    KIDNEY STONE    Migraines    OCD (obsessive compulsive disorder)      Past Surgical History:   Procedure Laterality Date    Colonoscopy      Cystoscopy,insert ureteral stent  03/07/2024    Lithotripsy      Other surgical history  02/17/2011    Rt RPG, URS stone manipulation,laser litho,Rt stent, Dr. Mcdonough    Other surgical history  02/21/2011    Cysto stent removal- Dr. Mcdonough    Other surgical history  11/21/2015    Cysto, L URS, laser litho, stone extraction, stent placement, RPG Dr. Finn    Other surgical history  12/01/2015    cysto stent removal    Upper gi endoscopy,exam          ALL:  Allergies   Allergen Reactions    Ciprofloxacin DIZZINESS     Dizziness, difficulty with walking    Seroquel [Quetiapine] RESTLESSNESS     Shaking, neurological side effects.    Tiopronin OTHER (SEE COMMENTS)     WEIGHT LOSS AND HAIR LOSS PER PT REPORT and protein excretion        No current outpatient medications on file.       Social History     Tobacco Use    Smoking status: Never     Passive exposure: Never    Smokeless tobacco: Never   Substance Use  Topics    Alcohol use: No        Fam Hx  Family History   Problem Relation Age of Onset    Heart Disorder Maternal Grandfather     Diabetes Paternal Grandfather     Cancer Paternal Grandfather         meloma    Hypertension Father     Other (Other) Father     Cancer Maternal Grandmother     Cancer Paternal Grandmother        Review of Systems  Comprehensive ROS reviewed and negative except for what is stated in HPI.      OBJECTIVE:  /75 (BP Location: Right arm)   Pulse 87   Temp 98.6 °F (37 °C) (Oral)   Resp 18   Wt 171 lb 15.3 oz (78 kg)   SpO2 98%   BMI 26.54 kg/m²   General:  Alert, no distress, appears stated age.    Head:  Normocephalic, without obvious abnormality, atraumatic.   Eyes:  Sclera anicteric, No conjunctival pallor, EOMs intact.    Nose: Nares normal. Septum midline. Mucosa normal. No drainage.   Throat: Lips, mucosa, and tongue normal. Teeth and gums normal.   Neck: Supple, symmetrical, trachea midline, no cervical or supraclavicular lymph adenopathy, thyroid: no enlargment/tenderness/nodules appreciated   Lungs:   Clear to auscultation bilaterally. Normal effort   Chest wall:  No tenderness or deformity.   Heart:  Regular rate and rhythm, S1, S2 normal, no murmur, rub or gallop appreciated   Abdomen:   Soft, non-tender. Bowel sounds normal. No masses,  No organomegaly. Non distended   Extremities: Extremities normal, atraumatic, no cyanosis or edema.   Skin: Skin color, texture, turgor normal. No rashes or lesions.    Neurologic: Normal strength, no focal deficit appreciated     Data Review:    LABS:   Lab Results   Component Value Date    WBC 8.2 06/05/2024    HGB 15.2 06/05/2024    HCT 43.0 06/05/2024    .0 06/05/2024    CREATSERUM 0.99 06/05/2024    BUN 17 06/05/2024     06/05/2024    K 3.9 06/05/2024     06/05/2024    CO2 28.0 06/05/2024     06/05/2024    CA 9.2 06/05/2024       CXR: All imaging personally reviewed.      Radiology: CT ABDOMEN+PELVIS  KIDNEYSTONE 2D RNDR(NO IV,NO ORAL)(CPT=74176)    Result Date: 6/5/2024  PROCEDURE:  CT ABDOMEN+PELVIS KIDNEYSTONE 2D RNDR(NO IV,NO ORAL)(CPT=74176)  COMPARISON:  [facility], CT, CT ABDOMEN+PELVIS KIDNEYSTONE 2D RNDR(NO IV,NO ORAL)(CPT=74176), 4/29/2024, 4:49 PM.  INDICATIONS:  left flank pain  TECHNIQUE:  Unenhanced multislice CT scanning from above the kidneys to below the urinary bladder.  2D rendering are generated on the CT scanner workstation to localize potential stones in the cranio-caudal plane.  Dose reduction techniques were used. Dose information is transmitted to the ACR (American College of Radiology) NRDR (National Radiology Data Registry) which includes the Dose Index Registry.  PATIENT STATED HISTORY: (As transcribed by Technologist)  Patient having left flank pain with a history of kidney stones.   FINDINGS: Evaluation of the visceral organs is limited due to the lack of IV contrast. LUNG BASE:  Unremarkable. LIVER:  Unremarkable. BILIARY:  Unremarkable. SPLEEN:  Unremarkable. PANCREAS:  Unremarkable. ADRENALS:  Unremarkable. KIDNEYS:  Nonobstructing stone in the upper pole right kidney measuring up to 8 mm.  Nonobstructing stone in the lower pole left kidney measuring up to 8 mm.  There is an obstructing stone in the left UPJ measuring up to 14 x 10 x 12 mm resulting in mild  left hydronephrosis and mild left perinephric stranding. AORTA/VASCULAR:  Unremarkable. RETROPERITONEUM:  Unremarkable. BOWEL/MESENTERY:  Unremarkable appendix.  Scattered feces in the colon.  No large or small bowel dilatation.  No free air or free fluid. ABDOMINAL WALL:  Minimal fat containing umbilical hernia. PELVIC ORGANS:  Urinary bladder wall thickening and fatty induration is concerning for cystitis.  Clinical correlation recommended.  Unremarkable prostate gland.  Pelvic phleboliths. LYMPH NODES:  No lymphadenopathy in the abdomen or pelvis. BONES:  Unremarkable. OTHER:  None.            CONCLUSION:   1. There is an  obstructing stone in the left UPJ measuring up to 14 x 10 x 12 mm resulting in mild left hydronephrosis and mild left perinephric stranding.  2. Findings concerning for cystitis.  Clinical correlation recommended.  3. Additional bilateral nephrolithiasis.  Please see above for further details.   LOCATION:  [facility]   Dictated by (CST): Arsenio Hook MD on 6/05/2024 at 11:18 PM     Finalized by (CST): Arsenio Hook MD on 6/05/2024 at 11:20 PM          Assessment/Plan:     28 year old male with PMH sig for cystinuria, multiple kidney stones and interventions, anxiety/depression/OCD presented with left-sided flank pain.    L UPJ stone  Nephrolithiasis  Cystinuria   - NPO, IVF  - prn analgesics  - urology o/c - timing of cystoscopy/stent placement TBD - this evening     Anxiety/depression/OCD  - zoloft       FEN: NPO, IVF  Proph: SCDs  Code status: Full code     Outpatient records or previous hospital records reviewed.   [facility] hospitalist to continue to follow patient while in house      Celine Ibrahim MD  [facility]  Hospitalist  Message over [email or web address]  Pager: [phone]

## 2024-06-06 NOTE — DISCHARGE INSTRUCTIONS
Recommendations to Help Prevent Kidney Stones     1. Drink enough water to produce 2-3 liters of clear urine daily. You may need to use a container at first to measure how much you are actually producing and increase your intake accordingly. Ask the office staff if you would like a collection container.  Try to start the day off with a large glass of water, as we all wake up dehydrated in the morning.  Increasing  the amount of fluids that you drink is the #1 thing that patients can do to help prevent future kidney stone formation, or growth of current stones.     2. Add lemon or lime juice to your water a few times a day. These juices contain citrate which naturally inhibits stone formation. An easy way to do this is using sugar free lemonade mix (ie Crystal Light or True Lemon (if you prefer to avoid aspartame)).     3. Avoid salty foods such as prepackaged and fast foods, and do not add salt to foods.     4. Limit intake of the following group of foods to one serving daily: spinach, tea, chocolate, potatoes, and nuts.  This is particularly true for patients who form calcium oxalate stones.  Ask your urologist if you form these types of stones.     5. Limit intake of Vitamin C supplements to less than 1000 mg daily.     6. Limit intake of animal protein (beef, chicken, turkey, fish, pork) to one serving daily.     7. Maintain 1-2 servings of dairy products daily. Try to decrease cheese intake as it may increase kidney stone risk compared to other dairy products. Do not eliminate calcium from your diet as it is necessary for bone health.     8. Eat a low fat diet and exercise at least 30 minutes 3 times per week to try and maintain your ideal body weight. Being overweight is a risk factor for kidney stones too!    9. For patients with diabetes, careful control of your blood glucose (sugar) levels can be helpful in preventing certain types of stones.    Unfortunately, even with these dietary changes, some patients  will continue to form stones.  For patients who form multiple stones over their lifetime, there is additional testing your urologist can perform to help determine the exact reasons you form stones.  In some cases, medications can be used to help further prevent future stones from forming. Ask your urologist if they feel this testing is needed.    A good in-depth reference for the diagnosis, treatment, and prevention of kidney stones can be found at: [email or web address]/Documents/[email or web address]/Stones_PatientGuide-web.[email or web address]    Please call the office at [phone] with any questions regarding these recommendations.        Individual Psychotherapy/Psychiatry In-Network with [facility]    [facility]    [address] [place] [address]  Phone: [phone]  [email or web address]  Accepts: Medicaid, self pay, private insurance  Hours of operation: 24 hours a day/7 days a week   Services include:   Mental health services   Inpatient   Outpatient   Substance abuse   Other treatment programs   Eating disorders  Gambling   Depression/Anxiety  Obsessive compulsive disorders       [facility]  [address], [place] -or- [address], [address], [place], [place] -or- [address], [address], [place], [place]  [phone]    [facility]  [address], [address], [place], [place]  [phone]    [facility]  [address], [address], [place], [place]  [phone]    [facility]   [address] [place], [place] and [address] [facility] [address] [place], [place] [address] [phone]    [facility]  [address] [facility], [address] [place], [place] [id number] [phone]    [facility]  2 locations: [address] [facility] [address], [place]  - or - [address], [place], [place]   [phone]    [facility], [place], [place], [place], and [place]   [phone]    [facility], [facility]  [address], [address], [place], [place]  [phone]    [facility]  [address] [facility], [address], [place], [place]  [phone]    [facility]  [address] [facility], [address], [place], [place]  [phone]    [facility]  [address], [address] [place] [address]  [phone]    [facility]  [address], [address], [place], [place]  [phone]

## 2024-06-06 NOTE — ED PROVIDER NOTES
Patient Seen in: [facility] [place]      History     Chief Complaint   Patient presents with    Abdomen/Flank Pain     Stated Complaint: left flank pain    Subjective:   HPI    Patient is a 28-year-old male who presents emergency department with severe left flank pain.  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  He has had 5 surgeries and his last 1 was in May.  Started having pain that was consistent with a kidney stone with some nausea vomiting and intense pain to the flank.  Rates the pain a 10 out of 10.  Does appear very uncomfortable.    Objective:   Past Medical History:    Anxiety state    Calculus of kidney    Cystine disease (HCC)    Depression    IBS (irritable bowel syndrome)    KIDNEY STONE    Migraines    OCD (obsessive compulsive disorder)              Past Surgical History:   Procedure Laterality Date    Colonoscopy      Cystoscopy,insert ureteral stent  03/07/2024    Lithotripsy      Other surgical history  02/17/2011    Rt RPG, URS stone manipulation,laser litho,Rt stent, Dr. Mcdonough    Other surgical history  02/21/2011    Cysto stent removal- Dr. Mcdonough    Other surgical history  11/21/2015    Cysto, L URS, laser litho, stone extraction, stent placement, RPG Dr. Finn    Other surgical history  12/01/2015    cysto stent removal    Upper gi endoscopy,exam                  Social History     Socioeconomic History    Marital status: Single   Tobacco Use    Smoking status: Never     Passive exposure: Never    Smokeless tobacco: Never   Vaping Use    Vaping status: Never Used   Substance and Sexual Activity    Alcohol use: No    Drug use: No     Social Determinants of Health     Food Insecurity: No Food Insecurity (6/6/2024)    Food Insecurity     Food Insecurity: Never true   Transportation Needs: No Transportation Needs (6/6/2024)    Transportation Needs     Lack of Transportation: No   Housing Stability: Low Risk  (6/6/2024)     Housing Stability     Housing Instability: No              Review of Systems    Positive for stated complaint: left flank pain  Other systems are as noted in HPI.  Constitutional and vital signs reviewed.      All other systems reviewed and negative except as noted above.    Physical Exam     ED Triage Vitals [06/05/24 2248]   BP (!) 154/97   Pulse 111   Resp 22   Temp 98.4 °F (36.9 °C)   Temp src Oral   SpO2 99 %   O2 Device None (Room air)       Current Vitals:   Vital Signs  BP: (!) 133/93  Pulse: 90  Resp: 20  Temp: 98.2 °F (36.8 °C)  Temp src: Oral    Oxygen Therapy  SpO2: 95 %  O2 Device: None (Room air)            Physical Exam      Vital signs reviewed  General appearance: Patient is alert and in severe pain distress  HEENT: Pupils equal react to light extraocular muscles intact no scleral icterus, mucous membranes are moist, there is no erythema or exudate in the posterior pharynx  Neck: Supple no JVD no lymphadenopathy no meningismus no carotid bruit  CV: Regular rate and rhythm no murmur rub  Respiratory: Clear to auscultation bilaterally no crackles no wheezes no accessory muscle use  Abdomen: Left-sided CVA tenderness to percussion, no rebound no guarding  no hepatosplenomegaly bowel sounds are present , no pulsatile mass  Extremities: No clubbing cyanosis or edema 2+ distal pulses.  Neuro: Cranial nerves II through XII intact with no gross focal sensory or motor abnormality.  Severe pain distress    ED Course     Labs Reviewed   URINALYSIS WITH CULTURE REFLEX - Abnormal; Notable for the following components:       Result Value    Blood Urine Moderate (*)     Protein Urine Trace (*)     RBC Urine 6-10 (*)     All other components within normal limits   BASIC METABOLIC PANEL (8) - Abnormal; Notable for the following components:    Glucose 102 (*)     Sodium 135 (*)     All other components within normal limits   UA MICROSCOPIC ONLY, URINE - Abnormal; Notable for the following components:    RBC Urine  6-10 (*)     All other components within normal limits   CBC WITH DIFFERENTIAL WITH PLATELET    Narrative:     The following orders were created for panel order CBC With Differential With Platelet.  Procedure                               Abnormality         Status                     ---------                               -----------         ------                     CBC W/ DIFFERENTIAL[[id number]]                              Final result                 Please view results for these tests on the individual orders.   CBC W/ DIFFERENTIAL             Patient was evaluated does appear very uncomfortable.  Had a CBC chemistry UA.  Patient was given Dilaudid and IV fluids along with some Zofran.  CT scan was done.       CT ABDOMEN+PELVIS KIDNEYSTONE 2D RNDR(NO IV,NO ORAL)(CPT=74176)    Result Date: 6/5/2024  CONCLUSION:   1. There is an obstructing stone in the left UPJ measuring up to 14 x 10 x 12 mm resulting in mild left hydronephrosis and mild left perinephric stranding.  2. Findings concerning for cystitis.  Clinical correlation recommended.  3. Additional bilateral nephrolithiasis.  Please see above for further details.   LOCATION:  [facility]   Dictated by (CST): Arsenio Hook MD on 6/05/2024 at 11:18 PM     Finalized by (CST): Arsenio Hook MD on 6/05/2024 at 11:20 PM        Patient unfortunately does have a very large stone at the left UPJ along with bilateral nephrolithiasis.  There is also a large stone in the left calyx that just looks like its left the kidney.  Causing obstruction I discussed case with urology and the hospitalist.  Patient will be admitted for pain control and stone removal or stent placement.  MDM      Differential diagnosis reflecting the complexity of care include: Renal colic, kidney stone, obstruction    Comorbidities that add complexity to management include: Sistine disease and numerous kidney stones    External chart review was done and was noted: Notes from urology on 4/30/2024  was reviewed.  Patient had a 1.3 cm stone removed with ureteroscopically.      Discussions of management was done with: Urology and the hospitalist were contacted and agree with admission.  Patient will be kept n.p.o.    My independent interpretation of studies of: Patient unfortunately has a very large stone at the left distal UPJ that is causing obstruction and hydro there is some stranding noted.      Shared decision making was done by myself and patient and mother.  Patient will get a bed at [facility] and be admitted for pain control and further management.    Patient was evaluated in the emergency department and at this point patient will need admission for further management of medical condition.  Patient was stable in the emergency department and will be transferred to floor for further definitive care.  Patient's questions were answered.    This note was prepared using Dragon Medical voice recognition dictation software. As a result errors may occur. When identified these errors have been corrected. While every attempt is made to correct errors during dictation discrepancies may still exist      Admission disposition: 6/5/2024 11:38 PM                                        Medical Decision Making      Disposition and Plan     Clinical Impression:  1. Renal colic    2. Kidney stone    3. Hydroureteronephrosis         Disposition:  Admit  6/5/2024 11:38 pm    Follow-up:  No follow-up provider specified.        Medications Prescribed:  Current Discharge Medication List                            Hospital Problems       Present on Admission  Date Reviewed: 4/30/2024            ICD-10-CM Noted POA    * (Principal) Renal colic N23 3/7/2024 Unknown    Hydroureteronephrosis N13.30 6/6/2024 Unknown    Kidney stone N20.0 11/21/2015 Unknown

## 2024-06-07 ENCOUNTER — APPOINTMENT (OUTPATIENT)
Dept: ULTRASOUND IMAGING | Facility: HOSPITAL | Age: 29
End: 2024-06-07
Attending: PHYSICIAN ASSISTANT
Payer: COMMERCIAL

## 2024-06-07 VITALS
DIASTOLIC BLOOD PRESSURE: 71 MMHG | TEMPERATURE: 98 F | RESPIRATION RATE: 17 BRPM | BODY MASS INDEX: 27 KG/M2 | WEIGHT: 171.94 LBS | SYSTOLIC BLOOD PRESSURE: 117 MMHG | OXYGEN SATURATION: 100 % | HEART RATE: 65 BPM

## 2024-06-07 LAB
ANION GAP SERPL CALC-SCNC: 6 MMOL/L (ref 0–18)
BASOPHILS # BLD AUTO: 0.01 X10(3) UL (ref 0–0.2)
BASOPHILS NFR BLD AUTO: 0.2 %
BUN BLD-MCNC: 12 MG/DL (ref 9–23)
CALCIUM BLD-MCNC: 8.9 MG/DL (ref 8.5–10.1)
CHLORIDE SERPL-SCNC: 101 MMOL/L (ref 98–112)
CO2 SERPL-SCNC: 28 MMOL/L (ref 21–32)
CREAT BLD-MCNC: 0.86 MG/DL
EGFRCR SERPLBLD CKD-EPI 2021: 121 ML/MIN/1.73M2 (ref 60–?)
EOSINOPHIL # BLD AUTO: 0 X10(3) UL (ref 0–0.7)
EOSINOPHIL NFR BLD AUTO: 0 %
ERYTHROCYTE [DISTWIDTH] IN BLOOD BY AUTOMATED COUNT: 12.1 %
GLUCOSE BLD-MCNC: 115 MG/DL (ref 70–99)
HCT VFR BLD AUTO: 39.2 %
HGB BLD-MCNC: 13.3 G/DL
IMM GRANULOCYTES # BLD AUTO: 0.02 X10(3) UL (ref 0–1)
IMM GRANULOCYTES NFR BLD: 0.3 %
LYMPHOCYTES # BLD AUTO: 0.81 X10(3) UL (ref 1–4)
LYMPHOCYTES NFR BLD AUTO: 12.7 %
MCH RBC QN AUTO: 30.7 PG (ref 26–34)
MCHC RBC AUTO-ENTMCNC: 33.9 G/DL (ref 31–37)
MCV RBC AUTO: 90.5 FL
MONOCYTES # BLD AUTO: 0.41 X10(3) UL (ref 0.1–1)
MONOCYTES NFR BLD AUTO: 6.4 %
NEUTROPHILS # BLD AUTO: 5.12 X10 (3) UL (ref 1.5–7.7)
NEUTROPHILS # BLD AUTO: 5.12 X10(3) UL (ref 1.5–7.7)
NEUTROPHILS NFR BLD AUTO: 80.4 %
OSMOLALITY SERPL CALC.SUM OF ELEC: 281 MOSM/KG (ref 275–295)
PLATELET # BLD AUTO: 253 10(3)UL (ref 150–450)
POTASSIUM SERPL-SCNC: 4.3 MMOL/L (ref 3.5–5.1)
RBC # BLD AUTO: 4.33 X10(6)UL
SODIUM SERPL-SCNC: 135 MMOL/L (ref 136–145)
WBC # BLD AUTO: 6.4 X10(3) UL (ref 4–11)

## 2024-06-07 PROCEDURE — 76775 US EXAM ABDO BACK WALL LIM: CPT | Performed by: PHYSICIAN ASSISTANT

## 2024-06-07 PROCEDURE — 85025 COMPLETE CBC W/AUTO DIFF WBC: CPT | Performed by: UROLOGY

## 2024-06-07 PROCEDURE — 80048 BASIC METABOLIC PNL TOTAL CA: CPT | Performed by: UROLOGY

## 2024-06-07 RX ORDER — TRAMADOL HYDROCHLORIDE 50 MG/1
TABLET ORAL EVERY 4 HOURS PRN
Qty: 6 TABLET | Refills: 0 | Status: SHIPPED | OUTPATIENT
Start: 2024-06-07

## 2024-06-07 RX ORDER — PHENAZOPYRIDINE HYDROCHLORIDE 100 MG/1
100 TABLET, FILM COATED ORAL
Status: DISCONTINUED | OUTPATIENT
Start: 2024-06-07 | End: 2024-06-07

## 2024-06-07 RX ORDER — KETOROLAC TROMETHAMINE 30 MG/ML
30 INJECTION, SOLUTION INTRAMUSCULAR; INTRAVENOUS EVERY 6 HOURS PRN
Status: DISCONTINUED | OUTPATIENT
Start: 2024-06-07 | End: 2024-06-07

## 2024-06-07 NOTE — PROGRESS NOTES
Received patient @ 2130    A&Ox4. VSS. RA.   GI: Abdomen soft, nondistended. Passing gas.  Denies nausea.  : Voids.  Pain controlled with PRN pain medications  Up with standby assist.  Diet: Tolerating regular diet   IVF running per order.  All appropriate safety measures in place. All questions and concerns addressed.        8847- Dr. Plascencia paged d/t patient reporting bladder spasms. Oxybutynin ordered per MAR.

## 2024-06-07 NOTE — PROGRESS NOTES
[facility]  Urology Progress Note    Devon East Patient Status:  Observation    1995 MRN [id number]   Location [facility] 3NW-A Attending Alfredo Manley MD   Hosp Day # 0 PCP Spenser Mccormick MD     Subjective:  Devon East is a(n) 28 year old male.    S/P cystoscopy, urethral dilation, left RGPG, left URS with laser lithotripsy and stone basket extraction, left ureteral stent placement 24 with Dr. Plascencia.      Current complaints: Stent related discomfort.  +dysuria, irritative voiding symptoms.  No nausea, vomiting, fever, or chills.      Objective:  General appearance: alert, appears stated age, and cooperative  Blood pressure 122/80, pulse 66, temperature 97.5 °F (36.4 °C), temperature source Oral, resp. rate 16, weight 171 lb 15.3 oz (78 kg), SpO2 95%.  Lungs: non-labored respirations  Abdomen: soft, non-tender            XR OR - N/C    Result Date: 2024  CONCLUSION:   Single fluoroscopic image demonstrates retrograde cannulation and opacification of the left collecting system with possible obstructing stone of the proximal ureter.  Please refer to dedicated procedure report for full detail.   LOCATION:  [facility]    Dictated by (CST): Josiane Villeda MD on 2024 at 7:55 PM     Finalized by (CST): Josiane Villeda MD on 2024 at 7:56 PM       CT ABDOMEN+PELVIS KIDNEYSTONE 2D RNDR(NO IV,NO ORAL)(CPT=74176)    Result Date: 2024  CONCLUSION:   1. There is an obstructing stone in the left UPJ measuring up to 14 x 10 x 12 mm resulting in mild left hydronephrosis and mild left perinephric stranding.  2. Findings concerning for cystitis.  Clinical correlation recommended.  3. Additional bilateral nephrolithiasis.  Please see above for further details.   LOCATION:  [facility]   Dictated by (CST): Arsenio Hook MD on 2024 at 11:18 PM     Finalized by (CST): Arsenio Hook MD on 2024 at 11:20 PM         Assessment:    HISTORY OF CYSTINURIA  LEFT FLANK PAIN, LEFT UPJ  STONE (14 X 10 X 12 MM), BILATERAL UROLITHIASIS  S/P cystoscopy, urethral dilation, left RGPG, left URS with laser lithotripsy and stone basket extraction, left ureteral stent placement 6/6/24 with Dr. Plascencia.  Afebrile, VSS  WBC 8.2  Hgb 15.2  Serum creat 0.99  Serum calcium level 9.2  Repeat labs to be collected  UA 6/6/24: 1-5 WBC/hpf, 6-10 RBC/hpf, no bacteria    Plan:    Ureteral stent related symptoms reviewed with patient.  Pain management  F/U in 7-10 days for cystoscopy/stent removal - TE sent through ERPLY system to help arrange appt.      Above discussed with nurse, patient, Dr. Plascencia.      Angeles Shea PA-C  [facility]  Department of Urology  6/7/2024  5:51 AM    Addendum:  Spoke with nurse - patient now having right sided pain in addition to left sided pain.    Check renal US    Above discussed with nurse, Dr. Plascencia.    IVANNA Piper  Urology

## 2024-06-07 NOTE — DISCHARGE SUMMARY
[facility] Hospitalist Discharge Summary     Patient ID:  Devon East  28 year old  7/7/1995    Admit date: 6/5/2024    Discharge date and time: 06/07/24     Attending Physician: Alfredo Manley MD     Primary Care Physician: Spenser Mccormick MD     Discharge Diagnoses: Renal colic [N23]  Kidney stone [N20.0]  Hydroureteronephrosis [N13.30]    Please note that only IHP DMG and EMG patients enrolled in the Medicare ACO, BCBS ACO and [facility] HMOs will be handled by the \[facility]\"" Care Management team.  For all other patients, please follow usual protocol for discharge care transition.    Discharge Condition: stable    Disposition:  home    Important Follow up:  - PCP within 2 weeks       Follow-up Information       Crow Plascencia MD Follow up in 1 week(s).    Specialty: UROLOGY  Why: For stent removal.  Contact information:  [address] [place] [address]  [phone]                                 Hospital Course:        28 year old male with PMH sig for cystinuria, multiple kidney stones and interventions, anxiety/depression/OCD presented with left-sided flank pain.  Pain started yesterday and was consistent with his prior renal colic's.  Reporting some nausea and dry heaving.  Pain is 10 out of 10.  In the ED he was tachycardic and hypertensive.  Labs unremarkable and he had blood in his urine.  Imaging showed very large stone at the left UPJ along with bilateral nephrolithiasis.  Urology was consulted and admitted for pain control and likely stone removal/stent placement.    L UPJ stone  Nephrolithiasis  Cystinuria   - NPO, IVF  - prn analgesics  - urology o/c - timing of cystoscopy/stent placement TBD - this evening - see report copied below - OP urology f/u      Anxiety/depression/OCD  - zoloft        Consults: IP CONSULT TO HOSPITALIST  IP CONSULT TO UROLOGY    Operative Procedures: Procedure(s) (LRB):  CYSTOSCOPY, LEFT RETROGRADE  PYELOGRAM, LEFT URETEROSCOPY WITH LASER LITHOTRIPSY, INSERTION OF LEFT URETERAL STENT (Left)   POSTOPERATIVE DIAGNOSIS:  Same     PROCEDURE PERFORMED:  Cystoscopy  Urethral dilation  Left Retrograde Pyelogram  Left Ureteroscopy with Laser Lithotripsy and Stone Basket Extraction  Left Ureteral Stent Placement  Intraoperative interpretation of fluoroscopic images    Patient instructions:      I as the attending physician reconciled the current and discharge medications on day of discharge.     Current Discharge Medication List        START taking these medications    Details   traMADol 50 MG Oral Tab Take 1-2 tablets ( mg total) by mouth every 4 (four) hours as needed for Pain.      !! HYDROcodone-acetaminophen 5-325 MG Oral Tab Take 1-2 tablets by mouth every 4 (four) hours as needed for Pain.       !! - Potential duplicate medications found. Please discuss with provider.        CONTINUE these medications which have NOT CHANGED    Details   tamsulosin 0.4 MG Oral Cap Take 1 capsule (0.4 mg total) by mouth.      !! HYDROcodone-acetaminophen 5-325 MG Oral Tab Take 1-2 tablets by mouth every 4 (four) hours as needed for Pain.      potassium citrate 10 MEQ (1080 MG) Oral Tab CR Take 2 tablets (20 mEq total) by mouth 3 (three) times daily with meals.      sertraline 100 MG Oral Tab Take 1 tablet (100 mg total) by mouth daily.      albuterol 108 (90 Base) MCG/ACT Inhalation Aero Soln Inhale 2 puffs into the lungs every 6 (six) hours as needed.      benzonatate 200 MG Oral Cap Take 1 capsule (200 mg total) by mouth 3 (three) times daily as needed.      guaiFENesin-codeine 100-10 MG/5ML Oral Solution Take 5 mL by mouth every 6 (six) hours as needed for cough.      fexofenadine 180 MG Oral Tab Take 1 tablet (180 mg total) by mouth daily.       !! - Potential duplicate medications found. Please discuss with provider.        STOP taking these medications       ondansetron 4 MG Oral Tablet Dispersible               Activity: activity as tolerated  Diet: regular diet  Wound Care: as directed  Code Status: Full Code      Discharge Exam:     General: no acute distress, alert and oriented x 3  Heart: RRR  Lungs: clear bilaterally, no active wheezing  Abdomen: nontender, nondistended, intact BS  Extremities: no pedal edema   Neuro: CN inact, no focal deficits      Total time coordinating care for discharge: Greater than 30 minutes    Celine Ibrahim MD  [facility]

## 2024-06-07 NOTE — PLAN OF CARE
NURSING DISCHARGE NOTE    Discharged Home via Ambulatory.  Accompanied by Family member  Belongings Taken by patient/family.    VSS, tolerating diet, voiding adequately, pain controlled, tolerating ambulation. Discharge education provided. Reviewed medications and follow up appts. All questions answered and concerns addressed, pt verbalized understanding. IV removed. Pt dc in stable condition. Patient declined wheelchair and left unit with mom and 1600

## 2024-06-07 NOTE — PROGRESS NOTES
Psych Liaison placed referrals in discharge summary for psychiatry and psychotherapy in-network with pt's insurance.

## 2024-06-07 NOTE — PROGRESS NOTES
Pt A&Ox4 on 2L post op, complaining of 5/10 pain, prn pain medication given as needed. Voided in post op, VSS. Call light within reach, frequent checks made, needs met.

## 2024-06-24 ENCOUNTER — APPOINTMENT (OUTPATIENT)
Dept: CT IMAGING | Age: 29
DRG: 661 | End: 2024-06-24
Attending: EMERGENCY MEDICINE
Payer: COMMERCIAL

## 2024-06-24 ENCOUNTER — HOSPITAL ENCOUNTER (EMERGENCY)
Age: 29
Discharge: HOME | DRG: 661 | End: 2024-06-25
Attending: EMERGENCY MEDICINE
Payer: COMMERCIAL

## 2024-06-24 ENCOUNTER — HOSPITAL ENCOUNTER (EMERGENCY)
Age: 29
Discharge: HOME | End: 2024-06-25
Attending: EMERGENCY MEDICINE
Payer: COMMERCIAL

## 2024-06-24 ENCOUNTER — APPOINTMENT (OUTPATIENT)
Dept: CT IMAGING | Age: 29
End: 2024-06-24
Attending: EMERGENCY MEDICINE
Payer: COMMERCIAL

## 2024-06-24 DIAGNOSIS — N20.0 KIDNEY STONE: Primary | ICD-10-CM

## 2024-06-24 LAB
ALBUMIN SERPL-MCNC: 4 G/DL (ref 3.4–5)
ALBUMIN/GLOB SERPL: 1.1 {RATIO} (ref 1–2)
ALP LIVER SERPL-CCNC: 85 U/L
ALT SERPL-CCNC: 36 U/L
ANION GAP SERPL CALC-SCNC: 5 MMOL/L (ref 0–18)
AST SERPL-CCNC: 16 U/L (ref 15–37)
BASOPHILS # BLD AUTO: 0.06 X10(3) UL (ref 0–0.2)
BASOPHILS NFR BLD AUTO: 0.9 %
BILIRUB SERPL-MCNC: 0.3 MG/DL (ref 0.1–2)
BILIRUB UR QL STRIP.AUTO: NEGATIVE
BUN BLD-MCNC: 15 MG/DL (ref 9–23)
CALCIUM BLD-MCNC: 9.5 MG/DL (ref 8.5–10.1)
CHLORIDE SERPL-SCNC: 101 MMOL/L (ref 98–112)
CLARITY UR REFRACT.AUTO: CLEAR
CO2 SERPL-SCNC: 29 MMOL/L (ref 21–32)
COLOR UR AUTO: YELLOW
CREAT BLD-MCNC: 0.96 MG/DL
EGFRCR SERPLBLD CKD-EPI 2021: 110 ML/MIN/1.73M2 (ref 60–?)
EOSINOPHIL # BLD AUTO: 0.15 X10(3) UL (ref 0–0.7)
EOSINOPHIL NFR BLD AUTO: 2.3 %
ERYTHROCYTE [DISTWIDTH] IN BLOOD BY AUTOMATED COUNT: 12.2 %
GLOBULIN PLAS-MCNC: 3.6 G/DL (ref 2.8–4.4)
GLUCOSE BLD-MCNC: 91 MG/DL (ref 70–99)
GLUCOSE UR STRIP.AUTO-MCNC: NEGATIVE MG/DL
HCT VFR BLD AUTO: 42.4 %
HGB BLD-MCNC: 14.6 G/DL
IMM GRANULOCYTES # BLD AUTO: 0.03 X10(3) UL (ref 0–1)
IMM GRANULOCYTES NFR BLD: 0.5 %
KETONES UR STRIP.AUTO-MCNC: NEGATIVE MG/DL
LEUKOCYTE ESTERASE UR QL STRIP.AUTO: NEGATIVE
LYMPHOCYTES # BLD AUTO: 2.47 X10(3) UL (ref 1–4)
LYMPHOCYTES NFR BLD AUTO: 38.2 %
MCH RBC QN AUTO: 30.5 PG (ref 26–34)
MCHC RBC AUTO-ENTMCNC: 34.4 G/DL (ref 31–37)
MCV RBC AUTO: 88.7 FL
MONOCYTES # BLD AUTO: 0.48 X10(3) UL (ref 0.1–1)
MONOCYTES NFR BLD AUTO: 7.4 %
NEUTROPHILS # BLD AUTO: 3.27 X10 (3) UL (ref 1.5–7.7)
NEUTROPHILS # BLD AUTO: 3.27 X10(3) UL (ref 1.5–7.7)
NEUTROPHILS NFR BLD AUTO: 50.7 %
NITRITE UR QL STRIP.AUTO: NEGATIVE
OSMOLALITY SERPL CALC.SUM OF ELEC: 280 MOSM/KG (ref 275–295)
PH UR STRIP.AUTO: 7 [PH] (ref 5–8)
PLATELET # BLD AUTO: 338 10(3)UL (ref 150–450)
POTASSIUM SERPL-SCNC: 3.8 MMOL/L (ref 3.5–5.1)
PROT SERPL-MCNC: 7.6 G/DL (ref 6.4–8.2)
PROT UR STRIP.AUTO-MCNC: NEGATIVE MG/DL
RBC # BLD AUTO: 4.78 X10(6)UL
SODIUM SERPL-SCNC: 135 MMOL/L (ref 136–145)
SP GR UR STRIP.AUTO: 1.01 (ref 1–1.03)
UROBILINOGEN UR STRIP.AUTO-MCNC: 0.2 MG/DL
WBC # BLD AUTO: 6.5 X10(3) UL (ref 4–11)

## 2024-06-24 PROCEDURE — 96374 THER/PROPH/DIAG INJ IV PUSH: CPT

## 2024-06-24 PROCEDURE — 81001 URINALYSIS AUTO W/SCOPE: CPT | Performed by: EMERGENCY MEDICINE

## 2024-06-24 PROCEDURE — 74176 CT ABD & PELVIS W/O CONTRAST: CPT | Performed by: EMERGENCY MEDICINE

## 2024-06-24 PROCEDURE — 96361 HYDRATE IV INFUSION ADD-ON: CPT

## 2024-06-24 PROCEDURE — 81015 MICROSCOPIC EXAM OF URINE: CPT | Performed by: EMERGENCY MEDICINE

## 2024-06-24 PROCEDURE — 85025 COMPLETE CBC W/AUTO DIFF WBC: CPT | Performed by: EMERGENCY MEDICINE

## 2024-06-24 PROCEDURE — 80053 COMPREHEN METABOLIC PANEL: CPT | Performed by: EMERGENCY MEDICINE

## 2024-06-24 PROCEDURE — 99284 EMERGENCY DEPT VISIT MOD MDM: CPT

## 2024-06-24 PROCEDURE — 96375 TX/PRO/DX INJ NEW DRUG ADDON: CPT

## 2024-06-24 PROCEDURE — 99285 EMERGENCY DEPT VISIT HI MDM: CPT

## 2024-06-24 RX ORDER — TAMSULOSIN HYDROCHLORIDE 0.4 MG/1
0.4 CAPSULE ORAL ONCE
Status: DISCONTINUED | OUTPATIENT
Start: 2024-06-24 | End: 2024-06-24

## 2024-06-24 RX ORDER — ONDANSETRON 2 MG/ML
4 INJECTION INTRAMUSCULAR; INTRAVENOUS ONCE
Status: COMPLETED | OUTPATIENT
Start: 2024-06-24 | End: 2024-06-24

## 2024-06-24 RX ORDER — HYDROMORPHONE HYDROCHLORIDE 1 MG/ML
1 INJECTION, SOLUTION INTRAMUSCULAR; INTRAVENOUS; SUBCUTANEOUS ONCE
Status: COMPLETED | OUTPATIENT
Start: 2024-06-24 | End: 2024-06-24

## 2024-06-24 RX ORDER — KETOROLAC TROMETHAMINE 15 MG/ML
15 INJECTION, SOLUTION INTRAMUSCULAR; INTRAVENOUS ONCE
Status: COMPLETED | OUTPATIENT
Start: 2024-06-24 | End: 2024-06-24

## 2024-06-25 ENCOUNTER — HOSPITAL ENCOUNTER (INPATIENT)
Facility: HOSPITAL | Age: 29
LOS: 1 days | Discharge: HOME | DRG: 661 | End: 2024-06-26
Attending: EMERGENCY MEDICINE | Admitting: INTERNAL MEDICINE
Payer: COMMERCIAL

## 2024-06-25 ENCOUNTER — ANESTHESIA (OUTPATIENT)
Dept: SURGERY | Facility: HOSPITAL | Age: 29
DRG: 661 | End: 2024-06-25
Payer: COMMERCIAL

## 2024-06-25 ENCOUNTER — APPOINTMENT (OUTPATIENT)
Dept: GENERAL RADIOLOGY | Facility: HOSPITAL | Age: 29
DRG: 661 | End: 2024-06-25
Attending: UROLOGY
Payer: COMMERCIAL

## 2024-06-25 ENCOUNTER — ANESTHESIA EVENT (OUTPATIENT)
Dept: SURGERY | Facility: HOSPITAL | Age: 29
DRG: 661 | End: 2024-06-25
Payer: COMMERCIAL

## 2024-06-25 VITALS
BODY MASS INDEX: 25.34 KG/M2 | OXYGEN SATURATION: 100 % | TEMPERATURE: 98 F | RESPIRATION RATE: 18 BRPM | SYSTOLIC BLOOD PRESSURE: 133 MMHG | DIASTOLIC BLOOD PRESSURE: 93 MMHG | HEIGHT: 71 IN | WEIGHT: 181 LBS | HEART RATE: 96 BPM

## 2024-06-25 DIAGNOSIS — N20.1 CALCULUS OF PROXIMAL RIGHT URETER: Primary | ICD-10-CM

## 2024-06-25 PROBLEM — N20.0 NEPHROLITHIASIS: Status: RESOLVED | Noted: 2020-05-28 | Resolved: 2024-06-25

## 2024-06-25 PROBLEM — R52 INTRACTABLE PAIN: Status: RESOLVED | Noted: 2024-02-06 | Resolved: 2024-06-25

## 2024-06-25 PROBLEM — E87.6 HYPOKALEMIA: Status: RESOLVED | Noted: 2020-05-12 | Resolved: 2024-06-25

## 2024-06-25 LAB
ALBUMIN SERPL-MCNC: 3.6 G/DL (ref 3.4–5)
ALBUMIN/GLOB SERPL: 1.1 {RATIO} (ref 1–2)
ALP LIVER SERPL-CCNC: 77 U/L
ALT SERPL-CCNC: 32 U/L
ANION GAP SERPL CALC-SCNC: 6 MMOL/L (ref 0–18)
AST SERPL-CCNC: 18 U/L (ref 15–37)
BASOPHILS # BLD AUTO: 0.05 X10(3) UL (ref 0–0.2)
BASOPHILS NFR BLD AUTO: 0.8 %
BILIRUB SERPL-MCNC: 0.4 MG/DL (ref 0.1–2)
BILIRUB UR QL STRIP.AUTO: NEGATIVE
BUN BLD-MCNC: 11 MG/DL (ref 9–23)
CALCIUM BLD-MCNC: 8.9 MG/DL (ref 8.5–10.1)
CHLORIDE SERPL-SCNC: 104 MMOL/L (ref 98–112)
CLARITY UR REFRACT.AUTO: CLEAR
CO2 SERPL-SCNC: 27 MMOL/L (ref 21–32)
COLOR UR AUTO: COLORLESS
CREAT BLD-MCNC: 0.96 MG/DL
EGFRCR SERPLBLD CKD-EPI 2021: 110 ML/MIN/1.73M2 (ref 60–?)
EOSINOPHIL # BLD AUTO: 0.18 X10(3) UL (ref 0–0.7)
EOSINOPHIL NFR BLD AUTO: 2.7 %
ERYTHROCYTE [DISTWIDTH] IN BLOOD BY AUTOMATED COUNT: 12.1 %
GLOBULIN PLAS-MCNC: 3.4 G/DL (ref 2.8–4.4)
GLUCOSE BLD-MCNC: 104 MG/DL (ref 70–99)
GLUCOSE UR STRIP.AUTO-MCNC: NORMAL MG/DL
HCT VFR BLD AUTO: 40.3 %
HGB BLD-MCNC: 14 G/DL
IMM GRANULOCYTES # BLD AUTO: 0.04 X10(3) UL (ref 0–1)
IMM GRANULOCYTES NFR BLD: 0.6 %
KETONES UR STRIP.AUTO-MCNC: NEGATIVE MG/DL
LEUKOCYTE ESTERASE UR QL STRIP.AUTO: NEGATIVE
LYMPHOCYTES # BLD AUTO: 2.66 X10(3) UL (ref 1–4)
LYMPHOCYTES NFR BLD AUTO: 40.4 %
MCH RBC QN AUTO: 30.8 PG (ref 26–34)
MCHC RBC AUTO-ENTMCNC: 34.7 G/DL (ref 31–37)
MCV RBC AUTO: 88.8 FL
MONOCYTES # BLD AUTO: 0.5 X10(3) UL (ref 0.1–1)
MONOCYTES NFR BLD AUTO: 7.6 %
NEUTROPHILS # BLD AUTO: 3.15 X10 (3) UL (ref 1.5–7.7)
NEUTROPHILS # BLD AUTO: 3.15 X10(3) UL (ref 1.5–7.7)
NEUTROPHILS NFR BLD AUTO: 47.9 %
NITRITE UR QL STRIP.AUTO: NEGATIVE
OSMOLALITY SERPL CALC.SUM OF ELEC: 284 MOSM/KG (ref 275–295)
PH UR STRIP.AUTO: 7 [PH] (ref 5–8)
PLATELET # BLD AUTO: 281 10(3)UL (ref 150–450)
POTASSIUM SERPL-SCNC: 3.9 MMOL/L (ref 3.5–5.1)
PROT SERPL-MCNC: 7 G/DL (ref 6.4–8.2)
PROT UR STRIP.AUTO-MCNC: NEGATIVE MG/DL
RBC # BLD AUTO: 4.54 X10(6)UL
RBC #/AREA URNS AUTO: >10 /HPF
SODIUM SERPL-SCNC: 137 MMOL/L (ref 136–145)
SP GR UR STRIP.AUTO: 1.01 (ref 1–1.03)
UROBILINOGEN UR STRIP.AUTO-MCNC: NORMAL MG/DL
WBC # BLD AUTO: 6.6 X10(3) UL (ref 4–11)

## 2024-06-25 PROCEDURE — 96361 HYDRATE IV INFUSION ADD-ON: CPT

## 2024-06-25 PROCEDURE — 96374 THER/PROPH/DIAG INJ IV PUSH: CPT

## 2024-06-25 PROCEDURE — 0TC68ZZ EXTIRPATION OF MATTER FROM RIGHT URETER, VIA NATURAL OR ARTIFICIAL OPENING ENDOSCOPIC: ICD-10-PCS | Performed by: UROLOGY

## 2024-06-25 PROCEDURE — 99285 EMERGENCY DEPT VISIT HI MDM: CPT

## 2024-06-25 PROCEDURE — 80053 COMPREHEN METABOLIC PANEL: CPT | Performed by: EMERGENCY MEDICINE

## 2024-06-25 PROCEDURE — 96375 TX/PRO/DX INJ NEW DRUG ADDON: CPT

## 2024-06-25 PROCEDURE — 85025 COMPLETE CBC W/AUTO DIFF WBC: CPT | Performed by: EMERGENCY MEDICINE

## 2024-06-25 PROCEDURE — 0T768DZ DILATION OF RIGHT URETER WITH INTRALUMINAL DEVICE, VIA NATURAL OR ARTIFICIAL OPENING ENDOSCOPIC: ICD-10-PCS | Performed by: UROLOGY

## 2024-06-25 PROCEDURE — BT1D1ZZ FLUOROSCOPY OF RIGHT KIDNEY, URETER AND BLADDER USING LOW OSMOLAR CONTRAST: ICD-10-PCS | Performed by: UROLOGY

## 2024-06-25 PROCEDURE — 81001 URINALYSIS AUTO W/SCOPE: CPT | Performed by: EMERGENCY MEDICINE

## 2024-06-25 DEVICE — URETERAL STENT
Type: IMPLANTABLE DEVICE | Site: URETER | Status: FUNCTIONAL
Brand: ASCERTA™

## 2024-06-25 RX ORDER — KETOROLAC TROMETHAMINE 30 MG/ML
INJECTION, SOLUTION INTRAMUSCULAR; INTRAVENOUS AS NEEDED
Status: DISCONTINUED | OUTPATIENT
Start: 2024-06-25 | End: 2024-06-25 | Stop reason: SURG

## 2024-06-25 RX ORDER — HYDROMORPHONE HYDROCHLORIDE 1 MG/ML
0.2 INJECTION, SOLUTION INTRAMUSCULAR; INTRAVENOUS; SUBCUTANEOUS EVERY 5 MIN PRN
Status: DISCONTINUED | OUTPATIENT
Start: 2024-06-25 | End: 2024-06-25 | Stop reason: HOSPADM

## 2024-06-25 RX ORDER — KETOROLAC TROMETHAMINE 15 MG/ML
15 INJECTION, SOLUTION INTRAMUSCULAR; INTRAVENOUS EVERY 6 HOURS PRN
Status: DISCONTINUED | OUTPATIENT
Start: 2024-06-25 | End: 2024-06-26

## 2024-06-25 RX ORDER — ONDANSETRON 2 MG/ML
INJECTION INTRAMUSCULAR; INTRAVENOUS AS NEEDED
Status: DISCONTINUED | OUTPATIENT
Start: 2024-06-25 | End: 2024-06-25 | Stop reason: SURG

## 2024-06-25 RX ORDER — LABETALOL HYDROCHLORIDE 5 MG/ML
5 INJECTION, SOLUTION INTRAVENOUS EVERY 5 MIN PRN
Status: DISCONTINUED | OUTPATIENT
Start: 2024-06-25 | End: 2024-06-25 | Stop reason: HOSPADM

## 2024-06-25 RX ORDER — TAMSULOSIN HYDROCHLORIDE 0.4 MG/1
0.4 CAPSULE ORAL NIGHTLY
Status: DISCONTINUED | OUTPATIENT
Start: 2024-06-25 | End: 2024-06-26

## 2024-06-25 RX ORDER — SODIUM CHLORIDE, SODIUM LACTATE, POTASSIUM CHLORIDE, CALCIUM CHLORIDE 600; 310; 30; 20 MG/100ML; MG/100ML; MG/100ML; MG/100ML
INJECTION, SOLUTION INTRAVENOUS CONTINUOUS
Status: DISCONTINUED | OUTPATIENT
Start: 2024-06-26 | End: 2024-06-26

## 2024-06-25 RX ORDER — SODIUM CHLORIDE 9 MG/ML
INJECTION, SOLUTION INTRAVENOUS CONTINUOUS
Status: DISCONTINUED | OUTPATIENT
Start: 2024-06-25 | End: 2024-06-26

## 2024-06-25 RX ORDER — NALOXONE HYDROCHLORIDE 0.4 MG/ML
80 INJECTION, SOLUTION INTRAMUSCULAR; INTRAVENOUS; SUBCUTANEOUS AS NEEDED
Status: DISCONTINUED | OUTPATIENT
Start: 2024-06-25 | End: 2024-06-25 | Stop reason: HOSPADM

## 2024-06-25 RX ORDER — KETOROLAC TROMETHAMINE 15 MG/ML
15 INJECTION, SOLUTION INTRAMUSCULAR; INTRAVENOUS ONCE
Status: COMPLETED | OUTPATIENT
Start: 2024-06-25 | End: 2024-06-25

## 2024-06-25 RX ORDER — ACETAMINOPHEN 500 MG
1000 TABLET ORAL ONCE AS NEEDED
Status: DISCONTINUED | OUTPATIENT
Start: 2024-06-25 | End: 2024-06-25 | Stop reason: HOSPADM

## 2024-06-25 RX ORDER — ONDANSETRON 2 MG/ML
4 INJECTION INTRAMUSCULAR; INTRAVENOUS EVERY 6 HOURS PRN
Status: DISCONTINUED | OUTPATIENT
Start: 2024-06-25 | End: 2024-06-25 | Stop reason: HOSPADM

## 2024-06-25 RX ORDER — HEPARIN SODIUM 5000 [USP'U]/ML
5000 INJECTION, SOLUTION INTRAVENOUS; SUBCUTANEOUS EVERY 12 HOURS SCHEDULED
Status: DISCONTINUED | OUTPATIENT
Start: 2024-06-26 | End: 2024-06-26

## 2024-06-25 RX ORDER — LIDOCAINE HYDROCHLORIDE 20 MG/ML
JELLY TOPICAL AS NEEDED
Status: DISCONTINUED | OUTPATIENT
Start: 2024-06-25 | End: 2024-06-25 | Stop reason: HOSPADM

## 2024-06-25 RX ORDER — MORPHINE SULFATE 4 MG/ML
4 INJECTION, SOLUTION INTRAMUSCULAR; INTRAVENOUS ONCE
Status: DISCONTINUED | OUTPATIENT
Start: 2024-06-25 | End: 2024-06-25

## 2024-06-25 RX ORDER — POTASSIUM CITRATE 5 MEQ/1
20 TABLET, EXTENDED RELEASE ORAL
Status: DISCONTINUED | OUTPATIENT
Start: 2024-06-25 | End: 2024-06-26

## 2024-06-25 RX ORDER — OXYBUTYNIN CHLORIDE 5 MG/1
5 TABLET ORAL EVERY 6 HOURS PRN
Status: DISCONTINUED | OUTPATIENT
Start: 2024-06-25 | End: 2024-06-26

## 2024-06-25 RX ORDER — ACETAMINOPHEN 325 MG/1
650 TABLET ORAL ONCE
Status: DISCONTINUED | OUTPATIENT
Start: 2024-06-25 | End: 2024-06-25 | Stop reason: HOSPADM

## 2024-06-25 RX ORDER — CODEINE PHOSPHATE AND GUAIFENESIN 10; 100 MG/5ML; MG/5ML
5 SOLUTION ORAL EVERY 6 HOURS PRN
Status: DISCONTINUED | OUTPATIENT
Start: 2024-06-25 | End: 2024-06-25

## 2024-06-25 RX ORDER — MIDAZOLAM HYDROCHLORIDE 1 MG/ML
INJECTION INTRAMUSCULAR; INTRAVENOUS AS NEEDED
Status: DISCONTINUED | OUTPATIENT
Start: 2024-06-25 | End: 2024-06-25 | Stop reason: SURG

## 2024-06-25 RX ORDER — ONDANSETRON 2 MG/ML
4 INJECTION INTRAMUSCULAR; INTRAVENOUS EVERY 6 HOURS PRN
Status: DISCONTINUED | OUTPATIENT
Start: 2024-06-25 | End: 2024-06-26

## 2024-06-25 RX ORDER — HYDROMORPHONE HYDROCHLORIDE 1 MG/ML
0.4 INJECTION, SOLUTION INTRAMUSCULAR; INTRAVENOUS; SUBCUTANEOUS EVERY 2 HOUR PRN
Status: DISCONTINUED | OUTPATIENT
Start: 2024-06-25 | End: 2024-06-26

## 2024-06-25 RX ORDER — HYDROCODONE BITARTRATE AND ACETAMINOPHEN 5; 325 MG/1; MG/1
2 TABLET ORAL ONCE AS NEEDED
Status: DISCONTINUED | OUTPATIENT
Start: 2024-06-25 | End: 2024-06-25 | Stop reason: HOSPADM

## 2024-06-25 RX ORDER — HYDROMORPHONE HYDROCHLORIDE 1 MG/ML
0.8 INJECTION, SOLUTION INTRAMUSCULAR; INTRAVENOUS; SUBCUTANEOUS EVERY 2 HOUR PRN
Status: DISCONTINUED | OUTPATIENT
Start: 2024-06-25 | End: 2024-06-26

## 2024-06-25 RX ORDER — ALBUTEROL SULFATE 90 UG/1
2 AEROSOL, METERED RESPIRATORY (INHALATION) EVERY 6 HOURS PRN
Status: DISCONTINUED | OUTPATIENT
Start: 2024-06-25 | End: 2024-06-26

## 2024-06-25 RX ORDER — ONDANSETRON 2 MG/ML
INJECTION INTRAMUSCULAR; INTRAVENOUS
Status: COMPLETED
Start: 2024-06-25 | End: 2024-06-25

## 2024-06-25 RX ORDER — ACETAMINOPHEN 500 MG
500 TABLET ORAL EVERY 4 HOURS PRN
Status: DISCONTINUED | OUTPATIENT
Start: 2024-06-25 | End: 2024-06-26

## 2024-06-25 RX ORDER — SODIUM CHLORIDE, SODIUM LACTATE, POTASSIUM CHLORIDE, CALCIUM CHLORIDE 600; 310; 30; 20 MG/100ML; MG/100ML; MG/100ML; MG/100ML
INJECTION, SOLUTION INTRAVENOUS CONTINUOUS PRN
Status: DISCONTINUED | OUTPATIENT
Start: 2024-06-25 | End: 2024-06-25 | Stop reason: SURG

## 2024-06-25 RX ORDER — HYDROMORPHONE HYDROCHLORIDE 1 MG/ML
1 INJECTION, SOLUTION INTRAMUSCULAR; INTRAVENOUS; SUBCUTANEOUS ONCE
Status: COMPLETED | OUTPATIENT
Start: 2024-06-25 | End: 2024-06-25

## 2024-06-25 RX ORDER — LIDOCAINE HYDROCHLORIDE 10 MG/ML
INJECTION, SOLUTION EPIDURAL; INFILTRATION; INTRACAUDAL; PERINEURAL AS NEEDED
Status: DISCONTINUED | OUTPATIENT
Start: 2024-06-25 | End: 2024-06-25 | Stop reason: SURG

## 2024-06-25 RX ORDER — CETIRIZINE HYDROCHLORIDE 10 MG/1
10 TABLET ORAL DAILY
Status: DISCONTINUED | OUTPATIENT
Start: 2024-06-25 | End: 2024-06-25

## 2024-06-25 RX ORDER — HYDROMORPHONE HYDROCHLORIDE 1 MG/ML
0.2 INJECTION, SOLUTION INTRAMUSCULAR; INTRAVENOUS; SUBCUTANEOUS EVERY 2 HOUR PRN
Status: DISCONTINUED | OUTPATIENT
Start: 2024-06-25 | End: 2024-06-26

## 2024-06-25 RX ORDER — PROCHLORPERAZINE EDISYLATE 5 MG/ML
5 INJECTION INTRAMUSCULAR; INTRAVENOUS EVERY 8 HOURS PRN
Status: DISCONTINUED | OUTPATIENT
Start: 2024-06-25 | End: 2024-06-26

## 2024-06-25 RX ORDER — PHENAZOPYRIDINE HYDROCHLORIDE 200 MG/1
200 TABLET, FILM COATED ORAL 3 TIMES DAILY PRN
Status: DISCONTINUED | OUTPATIENT
Start: 2024-06-25 | End: 2024-06-26

## 2024-06-25 RX ORDER — HYDROMORPHONE HYDROCHLORIDE 1 MG/ML
0.6 INJECTION, SOLUTION INTRAMUSCULAR; INTRAVENOUS; SUBCUTANEOUS EVERY 5 MIN PRN
Status: DISCONTINUED | OUTPATIENT
Start: 2024-06-25 | End: 2024-06-25 | Stop reason: HOSPADM

## 2024-06-25 RX ORDER — PROCHLORPERAZINE EDISYLATE 5 MG/ML
5 INJECTION INTRAMUSCULAR; INTRAVENOUS EVERY 8 HOURS PRN
Status: DISCONTINUED | OUTPATIENT
Start: 2024-06-25 | End: 2024-06-25 | Stop reason: HOSPADM

## 2024-06-25 RX ORDER — HYDROMORPHONE HYDROCHLORIDE 1 MG/ML
0.4 INJECTION, SOLUTION INTRAMUSCULAR; INTRAVENOUS; SUBCUTANEOUS EVERY 5 MIN PRN
Status: DISCONTINUED | OUTPATIENT
Start: 2024-06-25 | End: 2024-06-25 | Stop reason: HOSPADM

## 2024-06-25 RX ORDER — SODIUM CHLORIDE, SODIUM LACTATE, POTASSIUM CHLORIDE, CALCIUM CHLORIDE 600; 310; 30; 20 MG/100ML; MG/100ML; MG/100ML; MG/100ML
INJECTION, SOLUTION INTRAVENOUS CONTINUOUS
Status: DISCONTINUED | OUTPATIENT
Start: 2024-06-25 | End: 2024-06-25 | Stop reason: HOSPADM

## 2024-06-25 RX ORDER — DIPHENHYDRAMINE HYDROCHLORIDE 50 MG/ML
12.5 INJECTION INTRAMUSCULAR; INTRAVENOUS AS NEEDED
Status: DISCONTINUED | OUTPATIENT
Start: 2024-06-25 | End: 2024-06-25 | Stop reason: HOSPADM

## 2024-06-25 RX ORDER — BENZONATATE 200 MG/1
200 CAPSULE ORAL 3 TIMES DAILY PRN
Status: DISCONTINUED | OUTPATIENT
Start: 2024-06-25 | End: 2024-06-26

## 2024-06-25 RX ORDER — METOCLOPRAMIDE HYDROCHLORIDE 5 MG/ML
INJECTION INTRAMUSCULAR; INTRAVENOUS AS NEEDED
Status: DISCONTINUED | OUTPATIENT
Start: 2024-06-25 | End: 2024-06-25 | Stop reason: SURG

## 2024-06-25 RX ORDER — PROCHLORPERAZINE EDISYLATE 5 MG/ML
INJECTION INTRAMUSCULAR; INTRAVENOUS
Status: COMPLETED
Start: 2024-06-25 | End: 2024-06-25

## 2024-06-25 RX ORDER — HYDROCODONE BITARTRATE AND ACETAMINOPHEN 5; 325 MG/1; MG/1
1 TABLET ORAL ONCE AS NEEDED
Status: DISCONTINUED | OUTPATIENT
Start: 2024-06-25 | End: 2024-06-25 | Stop reason: HOSPADM

## 2024-06-25 RX ORDER — DIAZEPAM 2 MG/1
5 TABLET ORAL EVERY 8 HOURS PRN
Status: DISCONTINUED | OUTPATIENT
Start: 2024-06-25 | End: 2024-06-26

## 2024-06-25 RX ORDER — MEPERIDINE HYDROCHLORIDE 25 MG/ML
12.5 INJECTION INTRAMUSCULAR; INTRAVENOUS; SUBCUTANEOUS AS NEEDED
Status: DISCONTINUED | OUTPATIENT
Start: 2024-06-25 | End: 2024-06-25 | Stop reason: HOSPADM

## 2024-06-25 RX ADMIN — SODIUM CHLORIDE, SODIUM LACTATE, POTASSIUM CHLORIDE, CALCIUM CHLORIDE: 600; 310; 30; 20 INJECTION, SOLUTION INTRAVENOUS at 21:47:00

## 2024-06-25 RX ADMIN — SODIUM CHLORIDE, SODIUM LACTATE, POTASSIUM CHLORIDE, CALCIUM CHLORIDE: 600; 310; 30; 20 INJECTION, SOLUTION INTRAVENOUS at 21:08:00

## 2024-06-25 RX ADMIN — MIDAZOLAM HYDROCHLORIDE 2 MG: 1 INJECTION INTRAMUSCULAR; INTRAVENOUS at 20:47:00

## 2024-06-25 RX ADMIN — KETOROLAC TROMETHAMINE 30 MG: 30 INJECTION, SOLUTION INTRAMUSCULAR; INTRAVENOUS at 21:47:00

## 2024-06-25 RX ADMIN — SODIUM CHLORIDE, SODIUM LACTATE, POTASSIUM CHLORIDE, CALCIUM CHLORIDE: 600; 310; 30; 20 INJECTION, SOLUTION INTRAVENOUS at 20:45:00

## 2024-06-25 RX ADMIN — LIDOCAINE HYDROCHLORIDE 100 MG: 10 INJECTION, SOLUTION EPIDURAL; INFILTRATION; INTRACAUDAL; PERINEURAL at 20:51:00

## 2024-06-25 RX ADMIN — ONDANSETRON 4 MG: 2 INJECTION INTRAMUSCULAR; INTRAVENOUS at 20:47:00

## 2024-06-25 RX ADMIN — METOCLOPRAMIDE HYDROCHLORIDE 10 MG: 5 INJECTION INTRAMUSCULAR; INTRAVENOUS at 20:47:00

## 2024-06-25 NOTE — CONSULTS
[facility]  Department of Urology   Consultation Note    Devon East Patient Status:  Inpatient    1995 MRN [id number]   Location [facility] 0SW-A Attending Eric Rojas MD   Hosp Day # 0 PCP Spenser Mccormick MD     Reason for Consultation:  Acute right ureteral colic    History of Present Illness:  Devon East is a a(n) 28 year old male with history of anxiety, depression, migraines, OCD, IBS, cystinuria, kidney stones presenting with right flank pain.     Seen in ER yesterday with right flank pain.   Diagnosed with 7 X 5 mm proximal right ureteral stone. Discharged home.      Patient returned to ER due to exacerbation of pain.+nausea.  No vomiting, fever or chills. Some dysuria, bladder pressure.  No gross hematuria.      Labs:  WBC 6.5 > 6.6  Hgb 14.6 > 14  Serum creat 0.96 > 0.96  Serum calcium level 9.5 > 8.9    UA 24: No WBC/RBC    UA 24: 1-5 WBC/hpf, >10 RBC/hpf, no bacteria, few squamous cells    Abdominal/pelvic CT scan without contrast 24:  7 x 5 mm proximal right ureteral calculus with mild right hydronephrosis.     Urology was consulted.    Patient is known to our service for a history of cystinuria and recurrent nephrolithiasis s/p stone procedures.    Last stone procedure (cystoscopy, urethral dilation, left RGPG, left URS with LL and stone basket extraction, left ureteral stent placement) on 24.   Left ureteral stent removal on 24           History:  Past Medical History:    Anxiety state    Calculus of kidney    Cystine disease (HCC)    Depression    IBS (irritable bowel syndrome)    KIDNEY STONE    Migraines    OCD (obsessive compulsive disorder)     Past Surgical History:   Procedure Laterality Date    Colonoscopy      Cystoscopy,insert ureteral stent  2024    Lithotripsy      Other surgical history  2011    Rt RPG, URS stone manipulation,laser litho,Rt stent, Dr. Mcdonough    Other surgical history   02/21/2011    Cysto stent removal- Dr. Mcdonough    Other surgical history  11/21/2015    Cysto, L URS, laser litho, stone extraction, stent placement, RPG Dr. Finn    Other surgical history  12/01/2015    cysto stent removal    Upper gi endoscopy,exam       Family History   Problem Relation Age of Onset    Heart Disorder Maternal Grandfather     Diabetes Paternal Grandfather     Cancer Paternal Grandfather         meloma    Hypertension Father     Other (Other) Father     Cancer Maternal Grandmother     Cancer Paternal Grandmother       reports that he has never smoked. He has never been exposed to tobacco smoke. He has never used smokeless tobacco. He reports that he does not drink alcohol and does not use drugs.    Allergies:  Allergies   Allergen Reactions    Ciprofloxacin DIZZINESS     Dizziness, difficulty with walking    Seroquel [Quetiapine] RESTLESSNESS     Shaking, neurological side effects.    Tiopronin OTHER (SEE COMMENTS)     WEIGHT LOSS AND HAIR LOSS PER PT REPORT and protein excretion       Medications:    Current Facility-Administered Medications:     albuterol (Ventolin HFA) 108 (90 Base) MCG/ACT inhaler 2 puff, 2 puff, Inhalation, Q6H PRN    benzonatate (Tessalon) cap 200 mg, 200 mg, Oral, TID PRN    cetirizine (ZyrTEC) tab 10 mg, 10 mg, Oral, Daily    guaiFENesin-codeine (Robitussin AC) 100-10 MG/5ML oral solution 5 mL, 5 mL, Oral, Q6H PRN    potassium citrate (Urocit-K) tab 20 mEq, 20 mEq, Oral, TID CC    sertraline (Zoloft) tab 100 mg, 100 mg, Oral, Daily    tamsulosin (Flomax) cap 0.4 mg, 0.4 mg, Oral, Nightly    sodium chloride 0.9% infusion, , Intravenous, Continuous    acetaminophen (Tylenol Extra Strength) tab 500 mg, 500 mg, Oral, Q4H PRN    HYDROmorphone (Dilaudid) 1 MG/ML injection 0.2 mg, 0.2 mg, Intravenous, Q2H PRN **OR** HYDROmorphone (Dilaudid) 1 MG/ML injection 0.4 mg, 0.4 mg, Intravenous, Q2H PRN **OR** HYDROmorphone (Dilaudid) 1 MG/ML injection 0.8 mg, 0.8 mg, Intravenous, Q2H PRN     ondansetron (Zofran) 4 MG/2ML injection 4 mg, 4 mg, Intravenous, Q6H PRN    prochlorperazine (Compazine) 10 MG/2ML injection 5 mg, 5 mg, Intravenous, Q8H PRN    ketorolac (Toradol) 15 MG/ML injection 15 mg, 15 mg, Intravenous, Q6H PRN    Review of Systems:  Pertinent items are noted in HPI.  10 point review of systems completed.    Physical Exam:  /82 (BP Location: Left arm)   Pulse 79   Temp 97.5 °F (36.4 °C) (Oral)   Resp 19   Ht 5' 7\" (1.702 m)   Wt 181 lb (82.1 kg)   SpO2 96%   BMI 28.35 kg/m²   GENERAL: the patient is resting in bed in no acute distress.    HEENT: Unremarkable.  NECK: Supple.  LUNGS: non-labored respirations.    ABDOMEN:  The abdomen is soft with right flank/abdominal tenderness without rebound or guarding.  The bladder is non-palpable.    SKIN: Without stigmata.   NEUROLOGIC: Grossly intact.  EXTREMITIES: Without edema.    Laboratory Data:  Lab Results   Component Value Date    WBC 6.6 06/25/2024    HGB 14.0 06/25/2024    HCT 40.3 06/25/2024    .0 06/25/2024    CREATSERUM 0.96 06/25/2024    BUN 11 06/25/2024     06/25/2024    K 3.9 06/25/2024     06/25/2024    CO2 27.0 06/25/2024     06/25/2024    CA 8.9 06/25/2024    ALB 3.6 06/25/2024    ALKPHO 77 06/25/2024    BILT 0.4 06/25/2024    TP 7.0 06/25/2024    AST 18 06/25/2024    ALT 32 06/25/2024     WBC 6.5 > 6.6  Hgb 14.6 > 14  Serum creat 0.96 > 0.96  Serum calcium level 9.5 > 8.9    UA 6/24/24: No WBC/RBC    UA 6/25/24: 1-5 WBC/hpf, >10 RBC/hpf, no bacteria, few squamous cells       Imaging:  CT ABDOMEN+PELVIS KIDNEYSTONE 2D RNDR(NO IV,NO ORAL)(CPT=74176)    Result Date: 6/24/2024  CONCLUSION:  7 mm proximal right ureteral calculus with mild hydronephrosis.    LOCATION:  [facility]   Dictated by (CST): Valdemar Chi MD on 6/24/2024 at 11:49 PM     Finalized by (CST): Valdemar Chi MD on 6/24/2024 at 11:52 PM       Impression:  Patient Active Problem List   Diagnosis    Cystinuria (HCC)    Cystine stones  (HCC)    Proteinuria    IBS (irritable bowel syndrome)    Kidney stone    Major depression with psychotic features (HCC)    Major depressive disorder, recurrent episode (HCC)    Migraine without aura and with status migrainosus, not intractable    Obstructive uropathy    Hypokalemia    Nephrolithiasis    Ureteral stone with hydronephrosis    Intractable pain    Calculus of left ureter    Renal colic    Hydronephrosis with ureteropelvic junction (UPJ) obstruction    Hydroureteronephrosis    Calculus of proximal right ureter     HISTORY OF CYSTINURIA  RIGHT FLANK PAIN, OBSTRUCTING PROXIMAL RIGHT URETERAL CALCULUS (7 X 5 MM)  Afebrile, VSS  UA does not appear infectious  WBC 6.5 > 6.6  Hgb 14.6 > 14  Serum creat 0.96 > 0.96  Serum calcium level 9.5 > 8.9    Recommendations:  We discussed ureteroscopy as a minimally invasive surgical procedure whereby a small scope is placed through the urethra, through the bladder, up the ureter and potentially into the kidney to allow treatment of disease. A balloon or dilator is often used to stretch the ureter to ease scope passage or treat a scar or stricture. Contrast is often placed into the ureter and kidney to evaluate the anatomy with fluoroscopic x-ray. Lasers are often used to treat any obstruction such as stone or stricture. In most cases, the goal is complete removal of stone, occasionally this is not possible or indicated and in those cases there may be need for future procedures to remove remaining stones. Typically, the procedure causes some swelling in the ureter which can cause obstruction and pain, for that reason a ureteral stent is often left in place afterwards to alleviate those symptoms. Also, the stent allows easy access back into the kidney should there be residual stones to treat. The stent must be removed within 3 months or otherwise risk that the stent may become encrusted making removal difficult and risk permanent renal damage. As with any procedure there  are risks of bleeding, anesthesia, and infection. Also, the scope and laser are capable of perforating the ureter or kidney which could result in a scar or stricture requiring future procedures. These considerations were thoroughly discussed with the patient.  The patient is aware that any blood-thinning medications must be avoided for 7 days preoperatively and 5 days postoperatively.     In light of clinically significant right ureteral calculus and return ER visit, recommend cystourethroscopy, right retrograde pyelogram, right ureteroscopic stone extraction with laser lithotripsy, and insertion of a right ureteral stent. Reviewed risks, benefits, alternatives, and complication of the procedure including but not limited to risk of anesthesia, bladder.ureteral injury, use of nephrotomy tube, bleeding, infection, inability to reach the stone necessitating a subsequent procedure, and ureteral stent related symptoms with the patient who understands and wishes to proceed.  Patient informed the ureteral stent is not a permanent implant and would eventually need to be removed (timing of stent removal per urologist).  Patient agrees and understand    Spoke with OR - will see if able to proceed with procedure today pending schedule vs tomorrow.    NPO  Consent to be signed  Ancef on call to OR    In the interim, continue with hydration, straining all urine, pain management.      Above discussed with patient, nurse  Will update Dr. Mcdonough.      Thank you for allowing me to participate in the care of your patient.    Angeles Shea PA-C  [facility]  Department of Urology  6/25/2024  11:32 AM

## 2024-06-25 NOTE — ED PROVIDER NOTES
Patient Seen in: [facility] In [place]      History     Chief Complaint   Patient presents with    Abdomen/Flank Pain     Stated Complaint: kidney stone in right kidney, urinated and had sudden onset of pain    Subjective:   Patient is a 28-year-old male with history of kidney stones who just had a stent placed on the left side approximately 2 weeks ago that was recently removed.  He has a known kidney stone on the right side which tonight started being increasingly painful.  Symptoms are similar to prior kidney stones.  He denies any hematuria.  Patient appears quite uncomfortable he is accompanied by family at bedside.  He has a known history of Sistine kidney stones.  He states he is unable to take the medications due to an allergy an it is difficult for him to get the recommended 80 L of water daily that his urologist recommended.    The history is provided by the patient and a parent.           Objective:   Past Medical History:    Anxiety state    Calculus of kidney    Cystine disease (HCC)    Depression    IBS (irritable bowel syndrome)    KIDNEY STONE    Migraines    OCD (obsessive compulsive disorder)              Past Surgical History:   Procedure Laterality Date    Colonoscopy      Cystoscopy,insert ureteral stent  03/07/2024    Lithotripsy      Other surgical history  02/17/2011    Rt RPG, URS stone manipulation,laser litho,Rt stent, Dr. Mcdonough    Other surgical history  02/21/2011    Cysto stent removal- Dr. Mcdonough    Other surgical history  11/21/2015    Cysto, L URS, laser litho, stone extraction, stent placement, RPG Dr. Finn    Other surgical history  12/01/2015    cysto stent removal    Upper gi endoscopy,exam                  Social History     Socioeconomic History    Marital status: Single   Tobacco Use    Smoking status: Never     Passive exposure: Never    Smokeless tobacco: Never   Vaping Use    Vaping status: Never Used   Substance and Sexual Activity    Alcohol use: No     Drug use: No     Social Determinants of Health     Food Insecurity: No Food Insecurity (6/6/2024)    Food Insecurity     Food Insecurity: Never true   Transportation Needs: No Transportation Needs (6/6/2024)    Transportation Needs     Lack of Transportation: No   Housing Stability: Low Risk  (6/6/2024)    Housing Stability     Housing Instability: No              Review of Systems   Genitourinary:  Positive for flank pain. Negative for hematuria.       Positive for stated complaint: kidney stone in right kidney, urinated and had sudden onset of pain  Other systems are as noted in HPI.  Constitutional and vital signs reviewed.      All other systems reviewed and negative except as noted above.    Physical Exam     ED Triage Vitals [06/24/24 2219]   /82   Pulse 111   Resp 24   Temp    Temp src    SpO2 95 %   O2 Device None (Room air)       Current Vitals:   Vital Signs  BP: 149/82  Pulse: 111  Resp: 24    Oxygen Therapy  SpO2: 95 %  O2 Device: None (Room air)            Physical Exam  Vitals and nursing note reviewed.   Constitutional:       General: He is not in acute distress.     Appearance: He is well-developed and normal weight. He is diaphoretic. He is not ill-appearing or toxic-appearing.      Comments: Adult male appears uncomfortable   HENT:      Head: Normocephalic and atraumatic.      Mouth/Throat:      Mouth: Mucous membranes are moist.   Eyes:      Extraocular Movements: Extraocular movements intact.      Pupils: Pupils are equal, round, and reactive to light.   Cardiovascular:      Rate and Rhythm: Regular rhythm. Tachycardia present.      Heart sounds: Normal heart sounds.   Pulmonary:      Breath sounds: Normal breath sounds. No wheezing.      Comments: Hyperventilating  Abdominal:      General: Bowel sounds are normal. There is no distension.      Palpations: Abdomen is soft.      Tenderness: There is abdominal tenderness. There is right CVA tenderness.   Skin:     General: Skin is warm.       Capillary Refill: Capillary refill takes less than 2 seconds.      Coloration: Skin is pale.   Neurological:      General: No focal deficit present.      Mental Status: He is alert.   Psychiatric:         Mood and Affect: Mood is anxious.         Behavior: Behavior normal.               ED Course     Labs Reviewed   COMP METABOLIC PANEL (14) - Abnormal; Notable for the following components:       Result Value    Sodium 135 (*)     All other components within normal limits   URINALYSIS WITH CULTURE REFLEX - Abnormal; Notable for the following components:    Blood Urine Trace-lysed (*)     All other components within normal limits   UA MICROSCOPIC ONLY, URINE - Normal   CBC WITH DIFFERENTIAL WITH PLATELET    Narrative:     The following orders were created for panel order CBC With Differential With Platelet.  Procedure                               Abnormality         Status                     ---------                               -----------         ------                     CBC W/ DIFFERENTIAL[[id number]]                              Final result                 Please view results for these tests on the individual orders.   CBC W/ DIFFERENTIAL             CT ABDOMEN+PELVIS KIDNEYSTONE 2D RNDR(NO IV,NO ORAL)(CPT=74176)    Result Date: 6/24/2024  PROCEDURE:  CT ABDOMEN+PELVIS KIDNEYSTONE 2D RNDR(NO IV,NO ORAL)(CPT=74176)  COMPARISON:  [facility], CT, CT ABDOMEN+PELVIS KIDNEYSTONE 2D RNDR(NO IV,NO ORAL)(CPT=74176), 6/05/2024, 10:59 PM.  INDICATIONS:  kidney stone in right kidney, urinated and had sudden onset of pain  TECHNIQUE:  Unenhanced multislice CT scanning from above the kidneys to below the urinary bladder.  2D rendering are generated on the CT scanner workstation to localize potential stones in the cranio-caudal plane.  Dose reduction techniques were used. Dose information is transmitted to the ACR (American College of Radiology) NRDR (National Radiology Data Registry) which includes the Dose Index  Registry.  PATIENT STATED HISTORY: (As transcribed by Technologist)  kidney stone in right kidney, urinated and had sudden onset of pain.    FINDINGS:  KIDNEYS:  7 x 5 mm proximal right ureteral calculus with mild right hydronephrosis. BLADDER:  No mass, calculus or significant wall thickening. ADRENALS:  No mass or enlargement.  LIVER:  No enlargement, atrophy, abnormal density, or significant focal lesion.  BILIARY:  No visible dilatation or calcification.  PANCREAS:  No lesion, fluid collection, ductal dilatation, or atrophy.  SPLEEN:  No enlargement or focal lesion.  AORTA/VASCULAR:  No aneurysm.  RETROPERITONEUM:  No mass or adenopathy.  BOWEL/MESENTERY:  No visible mass, obstruction, or bowel wall thickening.  Normal appendix. ABDOMINAL WALL:  Stable tiny fat containing umbilical hernia. BONES:  No bony lesion or fracture. PELVIC ORGANS:  Normal for age.  LUNG BASES:  No visible pulmonary or pleural disease.              CONCLUSION:  7 mm proximal right ureteral calculus with mild hydronephrosis.    LOCATION:  [facility]   Dictated by (CST): Valdemar Chi MD on 6/24/2024 at 11:49 PM     Finalized by (CST): Valdemar Chi MD on 6/24/2024 at 11:52 PM       US KIDNEYS (CPT=76775)    Result Date: 6/7/2024  PROCEDURE:  US KIDNEYS (GIT=[id number])  COMPARISON:  [facility], CT, CT ABDOMEN+PELVIS KIDNEYSTONE 2D RNDR(NO IV,NO ORAL)(CPT=74176), 6/05/2024, 10:59 PM.  [facility] , XR, XR OR - N/C, 6/06/2024, 5:25 PM.  [facility], US, US KIDNEY/BLADDER (RGC=[id number]), 3/21/2020, 12:02 PM.  [facility], US, US KIDNEYS (CPT=76775), 1/06/2017, 8:27 PM.  INDICATIONS:  Pain  TECHNIQUE:  Transabdominal gray scale ultrasound imaging of the bilateral kidneys and bladder was performed.  Routine technique was utilized.   PATIENT STATED HISTORY: (As transcribed by Technologist)     FINDINGS:   RIGHT KIDNEY MEASUREMENTS:  11.0 x 4.2 x 5.3 cm ECHOGENICITY:  Normal. HYDRONEPHROSIS:  None. CYSTS/STONES/MASSES:  Nonobstructing right renal calculi.  Largest  measures 8 mm.  LEFT KIDNEY MEASUREMENTS:  10.8 x 5.5 x 5.0 cm ECHOGENICITY:  Normal. HYDRONEPHROSIS:  None. CYSTS/STONES/MASSES:  Small cyst measuring 7 mm.  Resolution of previously identified hydronephrosis.   BLADDER:  No bladder wall thickening. OTHER:  Negative.            CONCLUSION:  1. No hydronephrosis. 2. Nonobstructing right renal calculi.    LOCATION:  [facility]     Dictated by (CST): Sina Lentz MD on 6/07/2024 at 1:58 PM     Finalized by (CST): Sina Lentz MD on 6/07/2024 at 1:59 PM       XR OR - N/C    Result Date: 6/6/2024  PROCEDURE:  XR OR - N/C  COMPARISON:  [facility] , XR, XR OR - N/C, 5/01/2024, 7:03 PM.  INDICATIONS:  Cystogram, retrograde pyelogram  TECHNIQUE:   FLUOROSCOPY IMAGES OBTAINED:  1 FLUOROSCOPY TIME:  28 seconds TECHNOLOGIST TIME:  1 hour 10 minutes RADIATION DOSE (AIR KERMA PRODUCT):  243.8uGy/m2                CONCLUSION:   Single fluoroscopic image demonstrates retrograde cannulation and opacification of the left collecting system with possible obstructing stone of the proximal ureter.  Please refer to dedicated procedure report for full detail.   LOCATION:  [facility]    Dictated by ([facility]): Josiane Villeda MD on 6/06/2024 at 7:55 PM     Finalized by (CST): Josiane Villeda MD on 6/06/2024 at 7:56 PM       CT ABDOMEN+PELVIS KIDNEYSTONE 2D RNDR(NO IV,NO ORAL)(CPT=74176)    Result Date: 6/5/2024  PROCEDURE:  CT ABDOMEN+PELVIS KIDNEYSTONE 2D RNDR(NO IV,NO ORAL)(CPT=74176)  COMPARISON:  [facility], CT, CT ABDOMEN+PELVIS KIDNEYSTONE 2D RNDR(NO IV,NO ORAL)(CPT=74176), 4/29/2024, 4:49 PM.  INDICATIONS:  left flank pain  TECHNIQUE:  Unenhanced multislice CT scanning from above the kidneys to below the urinary bladder.  2D rendering are generated on the CT scanner workstation to localize potential stones in the cranio-caudal plane.  Dose reduction techniques were used. Dose information is transmitted to the ACR (American College of Radiology) NRDR (National Radiology Data Registry) which includes  the Dose Index Registry.  PATIENT STATED HISTORY: (As transcribed by Technologist)  Patient having left flank pain with a history of kidney stones.   FINDINGS: Evaluation of the visceral organs is limited due to the lack of IV contrast. LUNG BASE:  Unremarkable. LIVER:  Unremarkable. BILIARY:  Unremarkable. SPLEEN:  Unremarkable. PANCREAS:  Unremarkable. ADRENALS:  Unremarkable. KIDNEYS:  Nonobstructing stone in the upper pole right kidney measuring up to 8 mm.  Nonobstructing stone in the lower pole left kidney measuring up to 8 mm.  There is an obstructing stone in the left UPJ measuring up to 14 x 10 x 12 mm resulting in mild  left hydronephrosis and mild left perinephric stranding. AORTA/VASCULAR:  Unremarkable. RETROPERITONEUM:  Unremarkable. BOWEL/MESENTERY:  Unremarkable appendix.  Scattered feces in the colon.  No large or small bowel dilatation.  No free air or free fluid. ABDOMINAL WALL:  Minimal fat containing umbilical hernia. PELVIC ORGANS:  Urinary bladder wall thickening and fatty induration is concerning for cystitis.  Clinical correlation recommended.  Unremarkable prostate gland.  Pelvic phleboliths. LYMPH NODES:  No lymphadenopathy in the abdomen or pelvis. BONES:  Unremarkable. OTHER:  None.            CONCLUSION:   1. There is an obstructing stone in the left UPJ measuring up to 14 x 10 x 12 mm resulting in mild left hydronephrosis and mild left perinephric stranding.  2. Findings concerning for cystitis.  Clinical correlation recommended.  3. Additional bilateral nephrolithiasis.  Please see above for further details.   LOCATION:  [facility]   Dictated by (CST): Arsenio Hook MD on 6/05/2024 at 11:18 PM     Finalized by (CST): Arsenio Hook MD on 6/05/2024 at 11:20 PM               MDM      Social -negative tobacco, negative etoh, negative drugs  Family History-noncontributory, no family history of kidney stones  Past Medical History-OCD, depression, migraines, kidney stones, anxiety,  depression, cystine disease    Differential diagnosis before testing included kidney stone, appendicitis, UTI, viral syndrome    Co-morbidities that add to the complexity of management include: History of recent kidney stone known kidney stone on the right side remaining patient to be have scheduled for surgery    Testing ordered during this visit included urinalysis baseline labs CT scan of the abdomen    Radiographic images  I personally reviewed the radiographs and my individual interpretation shows kidney stone  I also reviewed the official reports that showed CT ABDOMEN+PELVIS KIDNEYSTONE 2D RNDR(NO IV,NO ORAL)(CPT=74176)    Result Date: 6/24/2024  PROCEDURE:  CT ABDOMEN+PELVIS KIDNEYSTONE 2D RNDR(NO IV,NO ORAL)(CPT=74176)  COMPARISON:  [facility], CT, CT ABDOMEN+PELVIS KIDNEYSTONE 2D RNDR(NO IV,NO ORAL)(CPT=74176), 6/05/2024, 10:59 PM.  INDICATIONS:  kidney stone in right kidney, urinated and had sudden onset of pain  TECHNIQUE:  Unenhanced multislice CT scanning from above the kidneys to below the urinary bladder.  2D rendering are generated on the CT scanner workstation to localize potential stones in the cranio-caudal plane.  Dose reduction techniques were used. Dose information is transmitted to the ACR (American College of Radiology) NRDR (National Radiology Data Registry) which includes the Dose Index Registry.  PATIENT STATED HISTORY: (As transcribed by Technologist)  kidney stone in right kidney, urinated and had sudden onset of pain.    FINDINGS:  KIDNEYS:  7 x 5 mm proximal right ureteral calculus with mild right hydronephrosis. BLADDER:  No mass, calculus or significant wall thickening. ADRENALS:  No mass or enlargement.  LIVER:  No enlargement, atrophy, abnormal density, or significant focal lesion.  BILIARY:  No visible dilatation or calcification.  PANCREAS:  No lesion, fluid collection, ductal dilatation, or atrophy.  SPLEEN:  No enlargement or focal lesion.  AORTA/VASCULAR:  No aneurysm.   RETROPERITONEUM:  No mass or adenopathy.  BOWEL/MESENTERY:  No visible mass, obstruction, or bowel wall thickening.  Normal appendix. ABDOMINAL WALL:  Stable tiny fat containing umbilical hernia. BONES:  No bony lesion or fracture. PELVIC ORGANS:  Normal for age.  LUNG BASES:  No visible pulmonary or pleural disease.              CONCLUSION:  7 mm proximal right ureteral calculus with mild hydronephrosis.    LOCATION:  [facility]   Dictated by (CST): Valdemar Chi MD on 6/24/2024 at 11:49 PM     Finalized by (CST): Valdemar Chi MD on 6/24/2024 at 11:52 PM       US KIDNEYS (CPT=76775)    Result Date: 6/7/2024  PROCEDURE:  US KIDNEYS (CND=81175)  COMPARISON:  [facility], CT, CT ABDOMEN+PELVIS KIDNEYSTONE 2D RNDR(NO IV,NO ORAL)(CPT=74176), 6/05/2024, 10:59 PM.  [facility] , XR, XR OR - N/C, 6/06/2024, 5:25 PM.  [facility], US, US KIDNEY/BLADDER (LSU=[id number]), 3/21/2020, 12:02 PM.  [facility], US, US KIDNEYS (CPT=76775), 1/06/2017, 8:27 PM.  INDICATIONS:  Pain  TECHNIQUE:  Transabdominal gray scale ultrasound imaging of the bilateral kidneys and bladder was performed.  Routine technique was utilized.   PATIENT STATED HISTORY: (As transcribed by Technologist)     FINDINGS:   RIGHT KIDNEY MEASUREMENTS:  11.0 x 4.2 x 5.3 cm ECHOGENICITY:  Normal. HYDRONEPHROSIS:  None. CYSTS/STONES/MASSES:  Nonobstructing right renal calculi.  Largest measures 8 mm.  LEFT KIDNEY MEASUREMENTS:  10.8 x 5.5 x 5.0 cm ECHOGENICITY:  Normal. HYDRONEPHROSIS:  None. CYSTS/STONES/MASSES:  Small cyst measuring 7 mm.  Resolution of previously identified hydronephrosis.   BLADDER:  No bladder wall thickening. OTHER:  Negative.            CONCLUSION:  1. No hydronephrosis. 2. Nonobstructing right renal calculi.    LOCATION:  [facility]     Dictated by (CST): Sina Lentz MD on 6/07/2024 at 1:58 PM     Finalized by (CST): Sina Lentz MD on 6/07/2024 at 1:59 PM       XR OR - N/C    Result Date: 6/6/2024  PROCEDURE:  XR OR - N/C  COMPARISON:  [facility], XR  OR - N/C, 5/01/2024, 7:03 PM.  INDICATIONS:  Cystogram, retrograde pyelogram  TECHNIQUE:   FLUOROSCOPY IMAGES OBTAINED:  1 FLUOROSCOPY TIME:  28 seconds TECHNOLOGIST TIME:  1 hour 10 minutes RADIATION DOSE (AIR KERMA PRODUCT):  243.8uGy/m2                CONCLUSION:   Single fluoroscopic image demonstrates retrograde cannulation and opacification of the left collecting system with possible obstructing stone of the proximal ureter.  Please refer to dedicated procedure report for full detail.   LOCATION:  [facility]    Dictated by (CST): Josiane Villeda MD on 6/06/2024 at 7:55 PM     Finalized by (CST): Josiane Villeda MD on 6/06/2024 at 7:56 PM       CT ABDOMEN+PELVIS KIDNEYSTONE 2D RNDR(NO IV,NO ORAL)(CPT=74176)    Result Date: 6/5/2024  PROCEDURE:  CT ABDOMEN+PELVIS KIDNEYSTONE 2D RNDR(NO IV,NO ORAL)(CPT=74176)  COMPARISON:  [facility], CT, CT ABDOMEN+PELVIS KIDNEYSTONE 2D RNDR(NO IV,NO ORAL)(CPT=74176), 4/29/2024, 4:49 PM.  INDICATIONS:  left flank pain  TECHNIQUE:  Unenhanced multislice CT scanning from above the kidneys to below the urinary bladder.  2D rendering are generated on the CT scanner workstation to localize potential stones in the cranio-caudal plane.  Dose reduction techniques were used. Dose information is transmitted to the ACR (American College of Radiology) NRDR (National Radiology Data Registry) which includes the Dose Index Registry.  PATIENT STATED HISTORY: (As transcribed by Technologist)  Patient having left flank pain with a history of kidney stones.   FINDINGS: Evaluation of the visceral organs is limited due to the lack of IV contrast. LUNG BASE:  Unremarkable. LIVER:  Unremarkable. BILIARY:  Unremarkable. SPLEEN:  Unremarkable. PANCREAS:  Unremarkable. ADRENALS:  Unremarkable. KIDNEYS:  Nonobstructing stone in the upper pole right kidney measuring up to 8 mm.  Nonobstructing stone in the lower pole left kidney measuring up to 8 mm.  There is an obstructing stone in the left UPJ measuring up to 14 x  10 x 12 mm resulting in mild  left hydronephrosis and mild left perinephric stranding. AORTA/VASCULAR:  Unremarkable. RETROPERITONEUM:  Unremarkable. BOWEL/MESENTERY:  Unremarkable appendix.  Scattered feces in the colon.  No large or small bowel dilatation.  No free air or free fluid. ABDOMINAL WALL:  Minimal fat containing umbilical hernia. PELVIC ORGANS:  Urinary bladder wall thickening and fatty induration is concerning for cystitis.  Clinical correlation recommended.  Unremarkable prostate gland.  Pelvic phleboliths. LYMPH NODES:  No lymphadenopathy in the abdomen or pelvis. BONES:  Unremarkable. OTHER:  None.            CONCLUSION:   1. There is an obstructing stone in the left UPJ measuring up to 14 x 10 x 12 mm resulting in mild left hydronephrosis and mild left perinephric stranding.  2. Findings concerning for cystitis.  Clinical correlation recommended.  3. Additional bilateral nephrolithiasis.  Please see above for further details.   LOCATION:  [facility]   Dictated by (CST): Arsenio Hook MD on 6/05/2024 at 11:18 PM     Finalized by (CST): Arsenio Hook MD on 6/05/2024 at 11:20 PM          External chart review showed review of care everywhere in [facility] system shows patient is status post surgery on the left with stent placement on the fifth of this month    History obtained by an independent source included from patient, family    Discussion of management with patient, family    Social determinants of health that affect care include not applicable      Medications Provided: Dilaudid, Toradol, Zofran, IV fluids.  Patient already took Flomax at home    Course of Events during Emergency Room Visit include 28-year-old male with history of frequent kidney stones Sistine in nature who presents to the emergency room for flank pain tonight.  Will give IV pain medications anti-inflammatories and nausea medicines IV fluids will get a CT scan to confirm location of kidney stone that is suspected and will have  patient follow-up with urology as outpatient if we can get his pain under control.      On recheck the patient is greatly improved with his pain.  Patient does have a kidney stone on the right side there is none seen on the left.  Patient to follow-up with his urologist.  Patient reports he already has pain medication at home.  Patient take this medication along with anti-inflammatory and Flomax at home.    Disposition:    Discharge  I have discussed with the patient the results of test, differential diagnosis, treatment plan, warning signs and symptoms which should prompt immediate return.  They expressed understanding of these instructions and agrees to the following plan provided.  They were given written discharge instructions and agrees to return for any concerns and voiced understanding and all questions were answered.             Addendum, patient is requesting that a second radiologist reread his port as he feels it is \"not complete\".  Patient is informed that there is not a second radiologist at this hour available.  He would [facility] if they were be reread at this time.  He asked that I have a note put in the chart that he is requesting radiology to reread his imaging.                         Medical Decision Making      Disposition and Plan     Clinical Impression:  1. Kidney stone         Disposition:  Discharge  6/25/2024 12:02 am    Follow-up:  Spenser Mccormick MD  [address]  [address]  [facility] [address] [phone]    Schedule an appointment as soon as possible for a visit      Crow Plascencia MD  [address]  [address]  [facility] [address]  [phone]    Schedule an appointment as soon as possible for a visit            Medications Prescribed:  Current Discharge Medication List

## 2024-06-25 NOTE — H&P
[facility]   part of [facility]    History and Physical     Devon East Patient Status:  Emergency    1995 MRN [id number]   Location [facility] EMERGENCY DEPARTMENT Attending Jeanine Tidwell MD   Hosp Day # 0 PCP Spenser Mccormick MD     Chief Complaint: Right flank pain    History of Present Illness: Devon East is a 28 year old male with history of anxiety, depression, migraines, OCD, IBS, cystinuria, kidney stones presenting with right flank pain.  Patient says starting  he started having some minor abdominal pain.  He went to the [facility] emergency room at which point he was given fluids and medications and discharged to follow-up with urology as an outpatient given the fact that he had no RAGHU UTI or significant obstructing stone present on imaging.  Patient did have a 7 mm stone which was thought to be able to be passed on its own.  Patient attempted conservative therapy at home without improvement.  Patient's symptoms worsened so he came back to the emergency room for further evaluation and treatment.  Patient denies any fevers or chills.  Patient denies any other positive review of systems.    Past Medical History:  Past Medical History:    Anxiety state    Calculus of kidney    Cystine disease (HCC)    Depression    IBS (irritable bowel syndrome)    KIDNEY STONE    Migraines    OCD (obsessive compulsive disorder)        Past Surgical History:   Past Surgical History:   Procedure Laterality Date    Colonoscopy      Cystoscopy,insert ureteral stent  2024    Lithotripsy      Other surgical history  2011    Rt RPG, URS stone manipulation,laser litho,Rt stent, Dr. Mcdonough    Other surgical history  2011    Cysto stent removal- Dr. Mcdonough    Other surgical history  2015    Cysto, L URS, laser litho, stone extraction, stent placement, RPG Dr. Finn    Other surgical history  2015    cysto stent removal    Upper gi endoscopy,exam         Social  History:  reports that he has never smoked. He has never been exposed to tobacco smoke. He has never used smokeless tobacco. He reports that he does not drink alcohol and does not use drugs.no illegal drugs, no children, not     Family History:   Family History   Problem Relation Age of Onset    Heart Disorder Maternal Grandfather     Diabetes Paternal Grandfather     Cancer Paternal Grandfather         meloma    Hypertension Father     Other (Other) Father     Cancer Maternal Grandmother     Cancer Paternal Grandmother    Mother living  Father living    Allergies:   Allergies   Allergen Reactions    Ciprofloxacin DIZZINESS     Dizziness, difficulty with walking    Seroquel [Quetiapine] RESTLESSNESS     Shaking, neurological side effects.    Tiopronin OTHER (SEE COMMENTS)     WEIGHT LOSS AND HAIR LOSS PER PT REPORT and protein excretion       Medications:    Current Facility-Administered Medications on File Prior to Encounter   Medication Dose Route Frequency Provider Last Rate Last Admin    [COMPLETED] sodium chloride 0.9 % IV bolus 1,000 mL  1,000 mL Intravenous Once Renetta Bauer DO   Stopped at 06/25/24 0014    [COMPLETED] ketorolac (Toradol) 15 MG/ML injection 15 mg  15 mg Intravenous Once Renetta Bauer DO   15 mg at 06/24/24 2251    [COMPLETED] ondansetron (Zofran) 4 MG/2ML injection 4 mg  4 mg Intravenous Once Renetta Bauer DO   4 mg at 06/24/24 2251    [COMPLETED] HYDROmorphone (Dilaudid) 1 MG/ML injection 1 mg  1 mg Intravenous Once Renetta Bauer DO   1 mg at 06/24/24 2252    [COMPLETED] ondansetron (Zofran) 4 MG/2ML injection 4 mg  4 mg Intravenous Once Gil Ochoa MD   4 mg at 06/06/24 0213    [COMPLETED] ceFAZolin (Ancef) 2g in 10mL IV syringe premix  2 g Intravenous On Call to OR Angeles Shea PA   2 g at 06/06/24 1715    [COMPLETED] lactated ringers IV bolus 500 mL  500 mL Intravenous Once PRN Dillan Wray MD   Stopped at 06/06/24 1855    [COMPLETED] ketorolac (Toradol) 30 MG/ML  injection 30 mg  30 mg Intravenous Once Gil Ochoa MD   30 mg at 24 2259    [COMPLETED] sodium chloride 0.9 % IV bolus 1,000 mL  1,000 mL Intravenous Once Gil Ochoa MD   Stopped at 24 2359    [COMPLETED] HYDROmorphone (Dilaudid) 1 MG/ML injection 1 mg  1 mg Intravenous Once Gil Ochoa MD   1 mg at 24 0019    [COMPLETED] ceFAZolin (Ancef) 2 g in 20mL IV syringe premix  2 g Intravenous Once Valdemar Hilliard MD   2 g at 24 1840    [] ceFAZolin (Ancef) 2 g/20mL IV syringe premix             [COMPLETED] sodium chloride 0.9 % IV bolus 1,000 mL  1,000 mL Intravenous Once Joce Sweeney PA-C   Stopped at 24 1637    [COMPLETED] HYDROmorphone (Dilaudid) 1 MG/ML injection 1 mg  1 mg Intravenous Once Joce Sweeney PA-C   1 mg at 24 1443    [COMPLETED] ondansetron (Zofran) 4 MG/2ML injection 4 mg  4 mg Intravenous Once Veda Galvan MD   4 mg at 24 1443    [COMPLETED] ketorolac (Toradol) 30 MG/ML injection 30 mg  30 mg Intravenous Once Joce Sweeney PA-C   30 mg at 24 1532    [COMPLETED] HYDROmorphone (Dilaudid) 1 MG/ML injection 1 mg  1 mg Intravenous Once Joce Sweeney PA-C   1 mg at 24 1636    [COMPLETED] metoclopramide (Reglan) 5 mg/mL injection 10 mg  10 mg Intravenous Once Joce Sweeney PA-C   10 mg at 24 1747    [COMPLETED] diphenhydrAMINE (Benadryl) 50 mg/mL  injection 25 mg  25 mg Intravenous Once Joce Sweeney PA-C   25 mg at 24 1747     Current Outpatient Medications on File Prior to Encounter   Medication Sig Dispense Refill    traMADol 50 MG Oral Tab Take 1-2 tablets ( mg total) by mouth every 4 (four) hours as needed for Pain. (Patient not taking: Reported on 2024) 6 tablet 0    tamsulosin 0.4 MG Oral Cap Take 1 capsule (0.4 mg total) by mouth.      HYDROcodone-acetaminophen 5-325 MG Oral Tab Take 1-2 tablets by mouth every 4 (four) hours as needed for Pain. 12 tablet 0    albuterol 108 (90  Base) MCG/ACT Inhalation Aero Soln Inhale 2 puffs into the lungs every 6 (six) hours as needed.      benzonatate 200 MG Oral Cap Take 1 capsule (200 mg total) by mouth 3 (three) times daily as needed.      guaiFENesin-codeine 100-10 MG/5ML Oral Solution Take 5 mL by mouth every 6 (six) hours as needed for cough.      HYDROcodone-acetaminophen 5-325 MG Oral Tab Take 1-2 tablets by mouth every 4 (four) hours as needed for Pain. 20 tablet 0    fexofenadine 180 MG Oral Tab Take 1 tablet (180 mg total) by mouth daily.      potassium citrate 10 MEQ (1080 MG) Oral Tab CR Take 2 tablets (20 mEq total) by mouth 3 (three) times daily with meals.      sertraline 100 MG Oral Tab Take 1 tablet (100 mg total) by mouth daily.         Review of Systems:   A comprehensive 14 point review of systems was completed.    Pertinent positives and negatives noted in the HPI.    Physical Exam:    /83   Pulse 89   Temp 97.1 °F (36.2 °C) (Temporal)   Resp 21   Ht 5' 7\" (1.702 m)   Wt 181 lb (82.1 kg)   SpO2 97%   BMI 28.35 kg/m²   General:  Alert and oriented x 3.  HEENT: Normocephalic atraumatic. Moist mucous membranes. EOM-I.  Neck: No JVD. No carotid bruits.  Respiratory: Clear to auscultation bilaterally. No wheezes. No crackles  Cardiovascular: S1, S2. Regular rate and rhythm. No murmurs  Chest and Back: No tenderness or deformity.  Abdomen: Soft, tender right flank and right lateral abdomen, nondistended.  Positive bowel sounds. No rebound, guarding  Neurologic: No focal neurological deficits. CNII-XII grossly intact. Sensation and strength intact  Musculoskeletal: Moves all extremities.  Extremities: No edema or tenderness of the LE  Integument: No new rashes or lesions.   Psychiatric: Appropriate mood and affect.      Diagnostic Data:      Labs:  Recent Labs   Lab 06/24/24 2242 06/25/24  0914   WBC 6.5 6.6   HGB 14.6 14.0   MCV 88.7 88.8   .0 281.0       Recent Labs   Lab 06/24/24 2242   GLU 91   BUN 15    CREATSERUM 0.96   CA 9.5   ALB 4.0   *   K 3.8      CO2 29.0   ALKPHO 85   AST 16   ALT 36   BILT 0.3   TP 7.6       Estimated Creatinine Clearance: 107.1 mL/min (based on SCr of 0.96 mg/dL).    No results for input(s): \"PTP\", \"INR\" in the last 168 hours.    No results for input(s): \"TROP\", \"CK\" in the last 168 hours.    Imaging: Imaging data reviewed in Epic.      ASSESSMENT / PLAN:   28 year old male with history of anxiety, depression, migraines, OCD, IBS, cystinuria, kidney stones presenting with right flank pain.      Right Flank Pain  -etiology obstructing stone with mild hydro on imaging  -ivf  -iv pain meds  -tamsulosin  -continue potassium citrate  -urology consulted  -npo    Anxiety  Depression  OCD  -sertraline     Preoperative evaluation  -pt denies any acute cardiac or pulmonary issues  -Patient with good activity status  -Patient with no history of cardiac or pulmonary pathology  -Patient clinically stable, vital stable  -No further inpatient pulmonary cardiac evaluation needed prior to surgical intervention if needed for obstructive kidney stone    Quality:  DVT Prophylaxis: scd  CODE status: Full per chart  [place]: none    Plan of care discussed with patient and staff    Dispo: no discharge    Eder Swann MD  [facility] Hospitalist  [phone]

## 2024-06-25 NOTE — ED INITIAL ASSESSMENT (HPI)
Pt was seen at PED yesterday and was dx with a 7mm kidney stone. Pt reports the scan said it was in the right ureter,. Pt followed up with his urologist and was trying to schedule surgery for it. Pt reports increased pain and nausea.

## 2024-06-25 NOTE — ED PROVIDER NOTES
Patient Seen in: [facility] Emergency Department      History   No chief complaint on file.    Stated Complaint: Kidney Stone    Subjective:   HPI    28-year-old male with history of kidney stones who ureteral stent placed about 2 weeks ago which was subsequently removed presents with right flank pain.  Was seen in the [facility] ER last night.  He was worked up yesterday, no evidence of UTI, renal function was normal and CT showed a 7 mm proximal right ureteral calculus with hydronephrosis.  He was medicated and discharged.  However returns this morning with increased pain and nausea    Objective:   Past Medical History:    Anxiety state    Calculus of kidney    Cystine disease (HCC)    Depression    IBS (irritable bowel syndrome)    KIDNEY STONE    Migraines    OCD (obsessive compulsive disorder)              Past Surgical History:   Procedure Laterality Date    Colonoscopy      Cystoscopy,insert ureteral stent  03/07/2024    Lithotripsy      Other surgical history  02/17/2011    Rt RPG, URS stone manipulation,laser litho,Rt stent, Dr. Mcdonough    Other surgical history  02/21/2011    Cysto stent removal- Dr. Mcdonough    Other surgical history  11/21/2015    Cysto, L URS, laser litho, stone extraction, stent placement, RPG Dr. Finn    Other surgical history  12/01/2015    cysto stent removal    Upper gi endoscopy,exam                  Social History     Socioeconomic History    Marital status: Single   Tobacco Use    Smoking status: Never     Passive exposure: Never    Smokeless tobacco: Never   Vaping Use    Vaping status: Never Used   Substance and Sexual Activity    Alcohol use: No    Drug use: No     Social Determinants of Health     Food Insecurity: No Food Insecurity (6/6/2024)    Food Insecurity     Food Insecurity: Never true   Transportation Needs: No Transportation Needs (6/6/2024)    Transportation Needs     Lack of Transportation: No   Housing Stability: Low Risk  (6/6/2024)    Housing Stability      Housing Instability: No              Review of Systems    Positive for stated complaint: Kidney Stone  Other systems are as noted in HPI.  Constitutional and vital signs reviewed.      All other systems reviewed and negative except as noted above.    Physical Exam     ED Triage Vitals [06/25/24 0912]   BP (!) 140/91   Pulse 91   Resp 20   Temp 97.1 °F (36.2 °C)   Temp src Temporal   SpO2 94 %   O2 Device None (Room air)       Current Vitals:   Vital Signs  BP: 124/83  Pulse: 89  Resp: 21  Temp: 97.1 °F (36.2 °C)  Temp src: Temporal    Oxygen Therapy  SpO2: 97 %  O2 Device: None (Room air)            Physical Exam  Vitals and nursing note reviewed.   Constitutional:       General: He is not in acute distress.     Appearance: He is well-developed. He is not toxic-appearing.   HENT:      Head: Normocephalic and atraumatic.   Eyes:      General: No scleral icterus.     Conjunctiva/sclera: Conjunctivae normal.   Cardiovascular:      Rate and Rhythm: Normal rate.   Pulmonary:      Effort: Pulmonary effort is normal. No respiratory distress.   Abdominal:      General: There is no distension.   Musculoskeletal:         General: No tenderness. Normal range of motion.      Cervical back: Normal range of motion and neck supple.   Skin:     General: Skin is warm and dry.      Findings: No rash.   Neurological:      Mental Status: He is alert and oriented to person, place, and time.      Motor: No abnormal muscle tone.      Coordination: Coordination normal.   Psychiatric:         Behavior: Behavior normal.              ED Course     Labs Reviewed   COMP METABOLIC PANEL (14) - Abnormal; Notable for the following components:       Result Value    Glucose 104 (*)     All other components within normal limits   CBC WITH DIFFERENTIAL WITH PLATELET    Narrative:     The following orders were created for panel order CBC With Differential With Platelet.  Procedure                               Abnormality         Status                      ---------                               -----------         ------                     CBC W/ DIFFERENTIAL[[id number]]                              Final result                 Please view results for these tests on the individual orders.   URINALYSIS WITH CULTURE REFLEX   RAINBOW DRAW LAVENDER   RAINBOW DRAW LIGHT GREEN   RAINBOW DRAW BLUE   RAINBOW DRAW GOLD   CBC W/ DIFFERENTIAL                       MDM         -Pulse oximetry was interpreted by me and was normal.  Pulse oximeter was ordered to monitor patient for hypoxia.        -History source other than patient -relative        -Comorbidities did add complexity to the management are mentioned in the HPI above        -I personally reviewed the prior external notes and the medical record to obtain additional history  -CT from yesterday's ED visit, 7 mm right proximal ureteral stone        -DDX: Includes but not limited to -UTI sepsis obstructive uropathy from stone         Labs Reviewed   COMP METABOLIC PANEL (14) - Abnormal; Notable for the following components:       Result Value    Glucose 104 (*)     All other components within normal limits   CBC WITH DIFFERENTIAL WITH PLATELET    Narrative:     The following orders were created for panel order CBC With Differential With Platelet.  Procedure                               Abnormality         Status                     ---------                               -----------         ------                     CBC W/ DIFFERENTIAL[[id number]]                              Final result                 Please view results for these tests on the individual orders.   URINALYSIS WITH CULTURE REFLEX   RAINBOW DRAW LAVENDER   RAINBOW DRAW LIGHT GREEN   RAINBOW DRAW BLUE   RAINBOW DRAW GOLD   CBC W/ DIFFERENTIAL     Chemistries unremarkable.  Urinalysis pending at time of this dictation.  Patient was provided pain control IV fluids.  Discussed with urology and Hospitalists, patient will be admitted for further  care.        Admission disposition: 6/25/2024  9:36 AM                                        Medical Decision Making      Disposition and Plan     Clinical Impression:  1. Calculus of proximal right ureter         Disposition:  Admit  6/25/2024  9:36 am    Follow-up:  No follow-up provider specified.        Medications Prescribed:  Current Discharge Medication List                            Hospital Problems       Present on Admission  Date Reviewed: 4/30/2024            ICD-10-CM Noted POA    * (Principal) Calculus of proximal right ureter N20.1 6/25/2024 Unknown

## 2024-06-25 NOTE — ED INITIAL ASSESSMENT (HPI)
Patient to ER with c/o right abdominal pain, hx of kidney stones. Patient was dx with kidney stone to both right and left abdominal, surgery on left side three weeks ago, was waiting for surgery on the right side. Severe pain to the R abdomen for the past hour. Hydrocodone last 2130. Denies any N/V.

## 2024-06-25 NOTE — ED QUICK NOTES
Orders for admission, patient is aware of plan and ready to go upstairs. Any questions, please call ED RN Yeyo at extension [phone].     Patient Covid vaccination status: Fully vaccinated     COVID Test Ordered in ED: None    COVID Suspicion at Admission: N/A    Running Infusions:  Normal saline    Mental Status/LOC at time of transport: A&Ox4    Other pertinent information:   CIWA score: N/A   NIH score:  N/A

## 2024-06-26 VITALS
OXYGEN SATURATION: 98 % | SYSTOLIC BLOOD PRESSURE: 112 MMHG | HEART RATE: 80 BPM | WEIGHT: 181 LBS | DIASTOLIC BLOOD PRESSURE: 68 MMHG | RESPIRATION RATE: 16 BRPM | TEMPERATURE: 98 F | BODY MASS INDEX: 28.41 KG/M2 | HEIGHT: 67 IN

## 2024-06-26 LAB
ALBUMIN SERPL-MCNC: 3.5 G/DL (ref 3.4–5)
ALBUMIN/GLOB SERPL: 1.1 {RATIO} (ref 1–2)
ALP LIVER SERPL-CCNC: 66 U/L
ALT SERPL-CCNC: 30 U/L
ANION GAP SERPL CALC-SCNC: 7 MMOL/L (ref 0–18)
AST SERPL-CCNC: 19 U/L (ref 15–37)
BASOPHILS # BLD AUTO: 0.04 X10(3) UL (ref 0–0.2)
BASOPHILS NFR BLD AUTO: 0.7 %
BILIRUB SERPL-MCNC: 0.5 MG/DL (ref 0.1–2)
BUN BLD-MCNC: 8 MG/DL (ref 9–23)
CALCIUM BLD-MCNC: 8.3 MG/DL (ref 8.5–10.1)
CHLORIDE SERPL-SCNC: 104 MMOL/L (ref 98–112)
CO2 SERPL-SCNC: 27 MMOL/L (ref 21–32)
CREAT BLD-MCNC: 0.97 MG/DL
EGFRCR SERPLBLD CKD-EPI 2021: 109 ML/MIN/1.73M2 (ref 60–?)
EOSINOPHIL # BLD AUTO: 0.11 X10(3) UL (ref 0–0.7)
EOSINOPHIL NFR BLD AUTO: 2 %
ERYTHROCYTE [DISTWIDTH] IN BLOOD BY AUTOMATED COUNT: 12.1 %
GLOBULIN PLAS-MCNC: 3.1 G/DL (ref 2.8–4.4)
GLUCOSE BLD-MCNC: 100 MG/DL (ref 70–99)
HCT VFR BLD AUTO: 40.9 %
HGB BLD-MCNC: 13.8 G/DL
IMM GRANULOCYTES # BLD AUTO: 0.02 X10(3) UL (ref 0–1)
IMM GRANULOCYTES NFR BLD: 0.4 %
LYMPHOCYTES # BLD AUTO: 1.14 X10(3) UL (ref 1–4)
LYMPHOCYTES NFR BLD AUTO: 21.2 %
MAGNESIUM SERPL-MCNC: 2 MG/DL (ref 1.6–2.6)
MCH RBC QN AUTO: 30.5 PG (ref 26–34)
MCHC RBC AUTO-ENTMCNC: 33.7 G/DL (ref 31–37)
MCV RBC AUTO: 90.3 FL
MONOCYTES # BLD AUTO: 0.38 X10(3) UL (ref 0.1–1)
MONOCYTES NFR BLD AUTO: 7.1 %
NEUTROPHILS # BLD AUTO: 3.7 X10 (3) UL (ref 1.5–7.7)
NEUTROPHILS # BLD AUTO: 3.7 X10(3) UL (ref 1.5–7.7)
NEUTROPHILS NFR BLD AUTO: 68.6 %
OSMOLALITY SERPL CALC.SUM OF ELEC: 284 MOSM/KG (ref 275–295)
PLATELET # BLD AUTO: 248 10(3)UL (ref 150–450)
POTASSIUM SERPL-SCNC: 3.8 MMOL/L (ref 3.5–5.1)
PROT SERPL-MCNC: 6.6 G/DL (ref 6.4–8.2)
RBC # BLD AUTO: 4.53 X10(6)UL
SODIUM SERPL-SCNC: 138 MMOL/L (ref 136–145)
WBC # BLD AUTO: 5.4 X10(3) UL (ref 4–11)

## 2024-06-26 PROCEDURE — 85025 COMPLETE CBC W/AUTO DIFF WBC: CPT | Performed by: UROLOGY

## 2024-06-26 PROCEDURE — 80053 COMPREHEN METABOLIC PANEL: CPT | Performed by: UROLOGY

## 2024-06-26 PROCEDURE — 83735 ASSAY OF MAGNESIUM: CPT | Performed by: UROLOGY

## 2024-06-26 NOTE — PLAN OF CARE
Patient A&O x4, room air, c/o pain to penis/lower abdomin.  Patient doing well through the night, requested pain medication and nausea medication upon returning to the unit, but has slept since then.  Patient does have dangler in place from stent placement.

## 2024-06-26 NOTE — BRIEF OP NOTE
Pre-Operative Diagnosis: Right ureteral stone [N20.1]     Post-Operative Diagnosis: Right ureteral stone [N20.1]      Procedure Performed:   CYSTOSCOPY, RIGHT RETROGRADE PYLEOGRAM, RIGHT URETEROSCOPY and nephroscopy with LASER LITHOTRIPSY, RIGHT STENT PLACEMENT, fluoroscopy    Surgeons and Role:     * Jaden Mcdonough MD - Primary    Assistant(s):        Surgical Findings: successful procedure     Specimen: none     Estimated Blood Loss: None        Jaden Mcdonough MD  6/25/2024  8:41 PM

## 2024-06-26 NOTE — DISCHARGE INSTRUCTIONS
Having a Ureteral Stent  A ureteral stent is a soft plastic tube with holes in it. It’s temporarily inserted into a ureter to help drain urine into the bladder. One end goes in the kidney. The other end goes in the bladder. A coil on each end holds the stent in place. The stent can’t be seen from outside the body. It shouldn’t interfere with your normal routine. Your stent will be put in by a doctor trained in treating the urinary tract (a urologist) or another specialist. The procedure is done in a hospital or surgery center. You’ll likely go home the same day.     THE URETERAL STENT IS NOT A PERMAMENT IMPLANT AND DOES NEED TO BE REMOVED.  REMOVE YOUR DANGLER STENT REMOVAL IN 3-5 DAYS.    When is a ureteral stent used?  A ureteral stent may be used:  To bypass a blockage in a kidney or ureter.  During kidney stone removal.  To let a ureter heal after surgery.    Before the procedure  Your healthcare provider will give you instructions to prepare for the procedure. X-rays or other imaging tests of your kidneys and ureters may be done beforehand.   During the procedure  You receive medicine to prevent pain and help you relax or sleep during the procedure. Once this takes effect, the procedure starts.  The doctor inserts a cystoscope (lighted instrument) through the urethra and into the bladder. This shows the opening to the ureter.  A thin wire is carefully threaded through the cystoscope, up the ureter, and into the kidney. The stent is inserted over the wire.  A fluoroscope (special X-ray machine) is used to help position the stent. When the stent is in place, the wire and cystoscope are removed.     While you have a stent  Some discomfort is normal. Certain movements may trigger pain or a feeling that you need to urinate. You may also feel mild soreness or pressure before or during urination. These symptoms will go away a few days after the stent is removed.  Medicine to control pain or bladder spasms or to  prevent infection may be prescribed. Take this as directed.  Drink plenty of fluids to help flush out your urinary tract.  Your urine may be slightly pink or red. This is due to bleeding caused by minor irritation from the stent. This may happen on and off while you have the stent.  As with any synthetic device placed in the body, there is a risk of infection. The stent may have to be removed if this happens.      How long will you need a stent?  The stent is often taken out after the blockage in the ureter is treated or the ureter has healed. This may take 1 week to 2 weeks, or longer. If a stent is needed for a long time, it may need to be changed every few months.   When to call your healthcare provider  Contact your healthcare provider right away if:  Your urine contains blood clots or you see a large amount of blood-tinged urine  You have symptoms similar to those you had before the stent was placed  You constantly leak urine  You have a fever over 100.4°F (38°C), or as advised by your health care provider  You have chills  You experience nausea or vomiting  Your pain is not relieved with medicine  The end of the stent comes out of the urethra  You experience new or worsening symptoms  Jose last reviewed this educational content on 4/1/2020  © 3177-3651 [employer]. [address] [place] [address], [place] [address]. All rights reserved. This information is not intended as a substitute for professional medical care. Always follow your healthcare professional's instructions.

## 2024-06-26 NOTE — PLAN OF CARE
Received pt alert x 4.   On room air.   On tele, VSS  Fluids infusing per Dr order  Antibiodics per order  Pt able to discharge home with instructins & follow up in place  Bed in low position,  Call lightl within reach  Family at bedside.

## 2024-06-26 NOTE — OPERATIVE REPORT
[facility]   part of [facility]    Operative Note         Devon East Location: OR   [facility] [id number] MRN [id number]   Admission Date 6/25/2024 Operation Date 6/25/2024   Attending Physician Eric Rojas MD       Patient Name: Devon East     Preoperative Diagnosis: Right ureteral stone [N20.1], right hydronephrosis, right flank pain    Postoperative Diagnosis: Right ureteral stone [N20.1] , right hydronephrosis, right flank pain    Procedure(s):  CYSTOSCOPY, RIGHT RETROGRADE PYLEOGRAM, RIGHT URETEROSCOPY with stone manipulation, and pyeloscopy with LASER LITHOTRIPSY, RIGHT STENT PLACEMENT, fluoroscopy    Primary Surgeon: Jaden Mcdonough MD           Anesthesia: General     Specimen: * No specimens in log *     Estimated Blood Loss: No data recorded     Complications: none       Indications for procedure: 8 ml stone with obstruction     Surgical Findings: successful laser lithotripsy     Operative Summary:  The patient was prepped and draped in a sterile fashion after undergoing successful administration of general anesthesia while supine.  2% anesthetic lubricant was placed into the urethra.  The 21 Greenlandic cystoscope was advanced to the urethra into the bladder.  The bladder mucosa was inspected and was normal.  The right ureteral orifice was catheterized with a 5 Greenlandic open-ended catheter and a retrograde pyelogram was performed.  The obstructing stone was seen with significant hydronephrosis proximal to the stone.  An angled 0.38 Glidewire was introduced through the open-ended catheter and advanced up to the level of the stone.  Careful manipulation of the Glidewire allowed for advancement of the Glidewire proximal to the stone into the intrarenal collecting system seen under fluoroscopic guidance.  The open-ended catheter and cystoscope were then withdrawn.  The ureter was then dilated using an 8/10 ureteral dilator.  The ureteral dilator was then withdrawn.  The flexible digital  ureteroscope was then advanced over the Glidewire through the urethra into the bladder into the ureter successfully up to the level of the stone.  The Glidewire was then withdrawn and a 365 µm holmium laser was placed. Laser lithotripsy was then performed fragmenting the stone carefully with attention to the ureteral wall.  Once fragmented the stone could then be manipulated into the intrarenal collecting system successfully.  Nephroscopy was performed with manipulation of the sizable stone fragments into calyces of the upper pole collecting system for further laser lithotripsy. Laser lithotripsy was then performed of the stones until all fragments were less than 1/2 mm in size.  The laser was then withdrawn and a 0.38 Glidewire was introduced through the ureteroscope into the intrarenal collecting system.  The ureteroscope was then withdrawn and a 6 Ukrainian 24 centimeter double-J stent was then advanced into position over the Glidewire with a good coil seen in the intrarenal collecting system as well as the bladder upon removal of the Glidewire through fluoroscopic and cystoscopic guidance.  The bladder was then evacuated of urine the string attached to the distal end of the stent was exited out through the urethra.  The patient was then transferred to the recovery room in good condition.    Estimated blood loss: None    Drains: 6 Ukrainian 24 centimeter double-J stent    Condition: Good       Implants:   Implant Name Type Inv. Item Serial No.  Lot No. LRB No. Used Action   STENT URET [id number] ASCERTA - SNA  STENT URET [id number] ASCERTA NA [employer]  [id number] Right 1 Implanted          Jaden Mcdonough MD

## 2024-06-26 NOTE — ANESTHESIA POSTPROCEDURE EVALUATION
[facility]    Devon East Patient Status:  Inpatient   Age/Gender 28 year old male MRN [id number]   Location [facility] SURGERY Attending Eric Rojas MD   Hosp Day # 0 PCP Spenser Mccormick MD       Anesthesia Post-op Note    CYSTOSCOPY, RIGHT RETROGRADE PYLEOGRAM, RIGHT URETEROSCOPY, LASER LITHOTRIPSY, RIGHT STENT PLACEMENT    Procedure Summary       Date: 06/25/24 Room / Location:  MAIN OR 07 / [facility] MAIN OR    Anesthesia Start: 2045 Anesthesia Stop: 2207    Procedure: CYSTOSCOPY, RIGHT RETROGRADE PYLEOGRAM, RIGHT URETEROSCOPY, LASER LITHOTRIPSY, RIGHT STENT PLACEMENT (Right: Ureter) Diagnosis:       Right ureteral stone      (Right ureteral stone [N20.1])    Surgeons: Jaden Mcdonough MD Responsible Provider: Dillon Wolff MD    Anesthesia Type: general ASA Status: 2            Anesthesia Type: general    Vitals Value Taken Time   /81 06/25/24 2217   Temp 98 06/25/24 2219   Pulse 81 06/25/24 2218   Resp 15 06/25/24 2218   SpO2 97 % 06/25/24 2218   Vitals shown include unfiled device data.    Patient Location: PACU    Anesthesia Type: general    Airway Patency: patent    Postop Pain Control: adequate    Mental Status: mildly sedated but able to meaningfully participate in the post-anesthesia evaluation    Nausea/Vomiting: none    Cardiopulmonary/Hydration status: stable euvolemic    Complications: no apparent anesthesia related complications    Postop vital signs: stable    Dental Exam: Unchanged from Preop    Patient to be discharged from PACU when criteria met.

## 2024-06-26 NOTE — PROGRESS NOTES
[facility]  Urology Progress Note    Devon East Patient Status:  Inpatient    1995 MRN [id number]   Location [facility] 0SW-A Attending Eder Swann MD   Hosp Day # 1 PCP Spenser Mccormick MD     Subjective:  Devon East is a(n) 28 year old male.    S/P cystoscopy, right retrograde pyelogram, right ureteroscopy with stone manipulation, pyeloscopy with laser lithotripsy, right ureteral stent placement 24 with Dr. Jaden Mcdonough    Current complaints: Pain controlled. No fever.  No dysuria.  Hematuria earlier, but urine now yellow.  Voiding ok.      Objective:  General appearance: alert, appears stated age, and cooperative  Blood pressure 130/84, pulse 74, temperature 98 °F (36.7 °C), temperature source Oral, resp. rate 16, height 5' 7\" (1.702 m), weight 181 lb (82.1 kg), SpO2 100%.  Lungs: non-labored respirations  Abdomen: soft, non-tender  No flank tenderness.    Lab Results   Component Value Date    WBC 5.4 2024    HGB 13.8 2024    HCT 40.9 2024    .0 2024    CREATSERUM 0.97 2024    BUN 8 2024     2024    K 3.8 2024     2024    CO2 27.0 2024     2024    CA 8.3 2024    ALB 3.5 2024    ALKPHO 66 2024    BILT 0.5 2024    TP 6.6 2024    AST 19 2024    ALT 30 2024    MG 2.0 2024       No results found for this visit on 24.     XR OR - N/C    Result Date: 2024  CONCLUSION:  Fluoroscopy and spot image for surgical planning.   LOCATION:  [place]    Dictated by (CST): Dillon Ewing MD on 2024 at 0:18 AM     Finalized by (CST): Dillon Ewing MD on 2024 at 0:18 AM       CT ABDOMEN+PELVIS KIDNEYSTONE 2D RNDR(NO IV,NO ORAL)(CPT=74176)    Result Date: 2024  CONCLUSION:  7 mm proximal right ureteral calculus with mild hydronephrosis.    LOCATION:  [place]   Dictated by (CST): Valdemar Chi MD on 2024 at 11:49 PM     Finalized by  (CST): Valdemar Chi MD on 6/24/2024 at 11:52 PM         Assessment:    HISTORY OF CYSTINURIA  RIGHT FLANK PAIN, OBSTRUCTING PROXIMAL RIGHT URETERAL CALCULUS (7 X 5 MM)  S/P cystoscopy, right retrograde pyelogram, right ureteroscopy with stone manipulation, pyeloscopy with laser lithotripsy, right ureteral stent placement 6/25/24 with Dr. Jaden Mcdonough  Afebrile, VSS  UA does not appear infectious  WBC 6.5 > 6.6 > 5.4  Hgb 14.6 > 14 > 13.8  Serum creat 0.96 > 0.96 > 0.97  Serum calcium level 9.5 > 8.9 > 8.3    Plan:    Ureteral stent related symptoms reviewed with patient.  Dangler stent removal in 3-5 days per Dr. Mcdonough. Patient will removed dangler stent at home -instructions reviewed.    -Pain management  -Follow-up with established nephrologist as outpatient.    Ok with discharge from urology standpoint    Above discussed with patient, nurse, Dr. Mcdonough.    MALATHI Regan [facility]  Department of Urology  6/26/2024  9:47 AM

## 2024-06-26 NOTE — ANESTHESIA PROCEDURE NOTES
Airway  Date/Time: 6/25/2024 8:51 PM  Urgency: elective    Airway not difficult    General Information and Staff    Patient location during procedure: OR  Anesthesiologist: Dillon Wolff MD  Performed: anesthesiologist   Performed by: Dillon Wolff MD  Authorized by: Dillon Wolff MD      Indications and Patient Condition  Indications for airway management: anesthesia  Spontaneous Ventilation: absent  Sedation level: deep  Preoxygenated: yes  Patient position: sniffing  Mask difficulty assessment: 1 - vent by mask    Final Airway Details  Final airway type: supraglottic airway      Successful airway: classic  Size 4       Number of attempts at approach: 1  Number of other approaches attempted: 0

## 2024-06-26 NOTE — ANESTHESIA PREPROCEDURE EVALUATION
PRE-OP EVALUATION    Patient Name: Devon East    Admit Diagnosis: Renal colic [N23]  Kidney stone [N20.0]  Hydroureteronephrosis [N13.30]    Pre-op Diagnosis: Ureteral stone [N20.1]    CYSTOSCOPY, RIGHT RETROGRADE PYLEOGRAM, RIGHT URETEROSCOPY, LASER LITHOTRIPSY, RIGHT STENT PLACEMENT    Anesthesia Procedure: CYSTOSCOPY, RIGHT RETROGRADE PYLEOGRAM, RIGHT URETEROSCOPY, LASER LITHOTRIPSY, RIGHT STENT PLACEMENT (Right: Ureter)    Surgeons and Role:     * Crow Plascencia MD - Primary    Pre-op vitals reviewed.  Temp: 97.5 °F (36.4 °C)  Pulse: 67  Resp: 18  BP: 121/88  SpO2: 97 %  Body mass index is 28.35 kg/m².    Current medications reviewed.  Hospital Medications:   [COMPLETED] sodium chloride 0.9 % IV bolus 1,000 mL  1,000 mL Intravenous Once    [COMPLETED] ketorolac (Toradol) 15 MG/ML injection 15 mg  15 mg Intravenous Once    [COMPLETED] HYDROmorphone (Dilaudid) 1 MG/ML injection 1 mg  1 mg Intravenous Once    [Transfer Hold] albuterol (Ventolin HFA) 108 (90 Base) MCG/ACT inhaler 2 puff  2 puff Inhalation Q6H PRN    [Transfer Hold] benzonatate (Tessalon) cap 200 mg  200 mg Oral TID PRN    [Transfer Hold] potassium citrate (Urocit-K) tab 20 mEq  20 mEq Oral TID CC    [Transfer Hold] sertraline (Zoloft) tab 100 mg  100 mg Oral Daily    [Transfer Hold] tamsulosin (Flomax) cap 0.4 mg  0.4 mg Oral Nightly    sodium chloride 0.9% infusion   Intravenous Continuous    [Transfer Hold] acetaminophen (Tylenol Extra Strength) tab 500 mg  500 mg Oral Q4H PRN    [Transfer Hold] HYDROmorphone (Dilaudid) 1 MG/ML injection 0.2 mg  0.2 mg Intravenous Q2H PRN    Or    [Transfer Hold] HYDROmorphone (Dilaudid) 1 MG/ML injection 0.4 mg  0.4 mg Intravenous Q2H PRN    Or    [Transfer Hold] HYDROmorphone (Dilaudid) 1 MG/ML injection 0.8 mg  0.8 mg Intravenous Q2H PRN    [Transfer Hold] ondansetron (Zofran) 4 MG/2ML injection 4 mg  4 mg Intravenous Q6H PRN    [Transfer Hold] prochlorperazine (Compazine) 10 MG/2ML injection 5 mg  5 mg  Intravenous Q8H PRN    [Transfer Hold] ketorolac (Toradol) 15 MG/ML injection 15 mg  15 mg Intravenous Q6H PRN    [COMPLETED] ceFAZolin (Ancef) 2g in 10mL IV syringe premix  2 g Intravenous On Call to OR    lactated ringers infusion   Intravenous Continuous PRN    midazolam (Versed) 2 MG/2ML injection   Intravenous PRN    metoclopramide (Reglan) 5 mg/mL injection   Intravenous PRN    ondansetron (Zofran) 4 MG/2ML injection   Intravenous PRN    propofol (Diprivan) 10 MG/ML injection   Intravenous PRN    lidocaine PF (Xylocaine-MPF) 1% injection   Injection PRN    fentaNYL (Sublimaze) 50 mcg/mL injection   Intravenous PRN    [COMPLETED] sodium chloride 0.9 % IV bolus 1,000 mL  1,000 mL Intravenous Once       Outpatient Medications:     Medications Prior to Admission   Medication Sig Dispense Refill Last Dose    tamsulosin 0.4 MG Oral Cap Take 1 capsule (0.4 mg total) by mouth.   6/24/2024 at 2100    albuterol 108 (90 Base) MCG/ACT Inhalation Aero Soln Inhale 2 puffs into the lungs every 6 (six) hours as needed.       HYDROcodone-acetaminophen 5-325 MG Oral Tab Take 1-2 tablets by mouth every 4 (four) hours as needed for Pain. 20 tablet 0 6/25/2024 at 0600    potassium citrate 10 MEQ (1080 MG) Oral Tab CR Take 2 tablets (20 mEq total) by mouth 3 (three) times daily with meals.   6/24/2024    sertraline 100 MG Oral Tab Take 1 tablet (100 mg total) by mouth daily.   Past Week    HYDROcodone-acetaminophen 5-325 MG Oral Tab Take 1-2 tablets by mouth every 4 (four) hours as needed for Pain. 12 tablet 0        Allergies: Ciprofloxacin, Seroquel [quetiapine], and Tiopronin      Anesthesia Evaluation    Patient summary reviewed.    Anesthetic Complications           GI/Hepatic/Renal             (+) chronic renal disease (stones)                    Cardiovascular    Negative cardiovascular ROS.    Exercise tolerance: good     MET: >4                             (-) angina     (-) DELGADO  (-) orthopnea  (-) PND      Endo/Other    Negative endo/other ROS.  (-) diabetes         (-) anemia                   Pulmonary    Negative pulmonary ROS.           (-) shortness of breath  (-) recent URI          Neuro/Psych      (+) depression                                Past Surgical History:   Procedure Laterality Date    Colonoscopy      Cystoscopy,insert ureteral stent  03/07/2024    Lithotripsy      Other surgical history  02/17/2011    Rt RPG, URS stone manipulation,laser litho,Rt stent, Dr. Mcdonough    Other surgical history  02/21/2011    Cysto stent removal- Dr. Mcdonough    Other surgical history  11/21/2015    Cysto, L URS, laser litho, stone extraction, stent placement, RPG Dr. Finn    Other surgical history  12/01/2015    cysto stent removal    Upper gi endoscopy,exam       Social History     Socioeconomic History    Marital status: Single   Tobacco Use    Smoking status: Never     Passive exposure: Never    Smokeless tobacco: Never   Vaping Use    Vaping status: Never Used   Substance and Sexual Activity    Alcohol use: No    Drug use: No     History   Drug Use No     Available pre-op labs reviewed.  Lab Results   Component Value Date    WBC 6.6 06/25/2024    RBC 4.54 06/25/2024    HGB 14.0 06/25/2024    HCT 40.3 06/25/2024    MCV 88.8 06/25/2024    MCH 30.8 06/25/2024    MCHC 34.7 06/25/2024    RDW 12.1 06/25/2024    .0 06/25/2024     Lab Results   Component Value Date     06/25/2024    K 3.9 06/25/2024     06/25/2024    CO2 27.0 06/25/2024    BUN 11 06/25/2024    CREATSERUM 0.96 06/25/2024     (H) 06/25/2024    CA 8.9 06/25/2024            Airway      Mallampati: I  Mouth opening: >3 FB  TM distance: > 6 cm  Neck ROM: full Cardiovascular    Cardiovascular exam normal.  Rhythm: regular  Rate: normal     Dental    Dentition appears grossly intact         Pulmonary    Pulmonary exam normal.  Breath sounds clear to auscultation bilaterally.        (-) wheezes       Other findings              ASA: 2   Plan:  general  NPO status verified and patient meets guidelines.          Plan/risks discussed with: patient            We discussed GA w/LMA or ETT and possible scratchy throat and rarely dental damage.  We discussed analgesic plan and PONV prophylaxis.  The patient's questions were answered and consent was attained.

## 2024-06-28 NOTE — PAYOR COMM NOTE
--------------  DISCHARGE REVIEW    Payor: MAVIS CHAVIS  Subscriber #:  [id number]  Authorization Number: [id number]    Admit date: 6/25/24  Admit time:  10:49 AM  Discharge Date: 6/26/2024 12:53 PM       DISCHARGE SUMMARY     Date of Admission: 6/25/2024  Date of Discharge: 6/26/2024  Discharge Disposition: Home or Self Care    Discharge Diagnosis: Right ureteral stone with right hydronephrosis    History of Present Illness: 28 year old male with history of migraines, OCD, IBS, cystinuria, kidney stones presenting with right flank pain.  Patient was seen by urology in consultation after imaging showed right ureteral stone with right hydronephrosis.  Patient underwent cystoscopy with right retrograde great pyelogram, right ureteroscopy with stone manipulation, pyeloscopy with laser lithotripsy, right stent placement, fluoroscopy.  Patient was clinically doing well afterwards.  Patient right flank and improved.  Patient was clinically stable, vital stable, labs stable for discharge.  Patient agreed with discharge.  Urology agreed with discharge.    OBJECTIVE:  Temp:  [97 °F (36.1 °C)-98.2 °F (36.8 °C)] 98.2 °F (36.8 °C)  Pulse:  [] 68  Resp:  [5-23] 16  BP: (105-140)/(80-95) 128/85  SpO2:  [91 %-100 %] 98 %  Exam  Gen: No acute distress, alert and oriented x3, no focal neurologic deficits  Pulm: Lungs clear bilaterally, normal respiratory effort  CV: Heart with regular rate and rhythm, no murmur.  Normal PMI.    Abd: Abdomen soft, nontender, nondistended, no organomegaly, bowel sounds present  MSK: Full range of motion in extremities, no clubbing, no cyanosis  Skin: no rashes or lesions    ASSESSMENT / PLAN:   28 year old male with history of migraines, OCD, IBS, cystinuria, kidney stones presenting with right flank pain.       Right Flank Pain--> resolved  -etiology obstructing stone with mild hydro on imaging  -ivf given  -iv pain meds as inpt if needed  -tamsulosin  -continue potassium citrate  -urology  consulted--> POD # 1 s/p cystoscopy with right retrograde great pyelogram, right ureteroscopy with stone manipulation, pyeloscopy with laser lithotripsy, right stent placement, fluoroscopy.   -urology --> ok for dc

## 2024-06-28 NOTE — DISCHARGE SUMMARY
[facility]   part of [facility]    DISCHARGE SUMMARY     Devon East Patient Status:  Inpatient    1995 MRN [id number]   Location [facility] 0SW-A Attending No att. providers found   Hosp Day # 1 PCP Spenser Mccormick MD     Date of Admission: 2024  Date of Discharge: 2024  Discharge Disposition: Home or Self Care    Discharge Diagnosis: Right ureteral stone with right hydronephrosis    History of Present Illness: 28 year old male with history of anxiety, depression, migraines, OCD, IBS, cystinuria, kidney stones presenting with right flank pain.  Patient was seen by urology in consultation after imaging showed right ureteral stone with right hydronephrosis.  Patient underwent cystoscopy with right retrograde great pyelogram, right ureteroscopy with stone manipulation, pyeloscopy with laser lithotripsy, right stent placement, fluoroscopy.  Patient was clinically doing well afterwards.  Patient right flank and improved.  Patient was clinically stable, vital stable, labs stable for discharge.  Patient agreed with discharge.  Urology agreed with discharge.      Discharge Medication List:     Discharge Medications        CONTINUE taking these medications        Instructions Prescription details   albuterol 108 (90 Base) MCG/ACT Aers  Commonly known as: Ventolin HFA      Inhale 2 puffs into the lungs every 6 (six) hours as needed.   Refills: 0     HYDROcodone-acetaminophen 5-325 MG Tabs  Commonly known as: Norco      Take 1-2 tablets by mouth every 4 (four) hours as needed for Pain.   Quantity: 20 tablet  Refills: 0     HYDROcodone-acetaminophen 5-325 MG Tabs  Commonly known as: Norco      Take 1-2 tablets by mouth every 4 (four) hours as needed for Pain.   Quantity: 12 tablet  Refills: 0     potassium citrate 10 MEQ (1080 MG) Tbcr  Commonly known as: Urocit-K      Take 2 tablets (20 mEq total) by mouth 3 (three) times daily with meals.   Refills: 0     sertraline 100 MG  Tabs  Commonly known as: Zoloft      Take 1 tablet (100 mg total) by mouth daily.   Refills: 0     tamsulosin 0.4 MG Caps  Commonly known as: Flomax      Take 1 capsule (0.4 mg total) by mouth.   Refills: 0              Follow-up appointment:   Jaden Mcdonough MD  [address]  [address]  [place] [address] [phone]    Schedule an appointment as soon as possible for a visit in 1 month(s)  urology follow-up    Spenser Mccormick MD  [address]  [address]  [place] [address] [phone]    Follow up      Appointments for Next 30 Days 6/28/2024 - 7/28/2024      None            OBJECTIVE:  Temp:  [97 °F (36.1 °C)-98.2 °F (36.8 °C)] 98.2 °F (36.8 °C)  Pulse:  [] 68  Resp:  [5-23] 16  BP: (105-140)/(80-95) 128/85  SpO2:  [91 %-100 %] 98 %  Exam  Gen: No acute distress, alert and oriented x3, no focal neurologic deficits  Pulm: Lungs clear bilaterally, normal respiratory effort  CV: Heart with regular rate and rhythm, no murmur.  Normal PMI.    Abd: Abdomen soft, nontender, nondistended, no organomegaly, bowel sounds present  MSK: Full range of motion in extremities, no clubbing, no cyanosis  Skin: no rashes or lesions  -----------------------------------------------------------------------------------------------  PATIENT DISCHARGE INSTRUCTIONS: See electronic chart    ASSESSMENT / PLAN:   28 year old male with history of anxiety, depression, migraines, OCD, IBS, cystinuria, kidney stones presenting with right flank pain.       Right Flank Pain--> resolved  -etiology obstructing stone with mild hydro on imaging  -ivf given  -iv pain meds as inpt if needed  -tamsulosin  -continue potassium citrate  -urology consulted--> POD # 1 s/p cystoscopy with right retrograde great pyelogram, right ureteroscopy with stone manipulation, pyeloscopy with laser lithotripsy, right stent placement, fluoroscopy.   -urology --> ok for dc     Anxiety  Depression  OCD  -sertraline      Plan of care discussed  with patient and staff     Dispo:  discharge     Eder Swann MD  [facility]  [phone]    Time spent:  > 35 minutes

## 2024-06-28 NOTE — PAYOR COMM NOTE
--------------  ADMISSION REVIEW     Payor: MAVIS CHAVIS  Subscriber #:  [id number]  Authorization Number: [id number]    Admit date: 6/25/24  Admit time: 10:49 AM       History   HPI  28-year-old male with history of kidney stones who ureteral stent placed about 2 weeks ago which was subsequently removed presents with right flank pain.  Was seen in the [place] ER last night.  He was worked up yesterday, no evidence of UTI, renal function was normal and CT showed a 7 mm proximal right ureteral calculus with hydronephrosis.  He was medicated and discharged.  However returns this morning with increased pain and nausea  ED Triage Vitals [06/25/24 0912]   BP (!) 140/91   Pulse 91   Resp 20   Temp 97.1 °F (36.2 °C)   Temp src Temporal   SpO2 94 %   O2 Device None (Room air)   HENT:      Head: Normocephalic and atraumatic.   Eyes:      General: No scleral icterus.     Conjunctiva/sclera: Conjunctivae normal.   Cardiovascular:      Rate and Rhythm: Normal rate.   Pulmonary:      Effort: Pulmonary effort is normal. No respiratory distress.   Abdominal:      General: There is no distension.   Musculoskeletal:         General: No tenderness. Normal range of motion.      Cervical back: Normal range of motion and neck supple.   Skin:     General: Skin is warm and dry.      Findings: No rash.   Neurological:      Mental Status: He is alert and oriented to person, place, and time.      Motor: No abnormal muscle tone.      Coordination: Coordination normal.   Psychiatric:         Behavior: Behavior normal.     Labs Reviewed   COMP METABOLIC PANEL (14) - Abnormal; Notable for the following components:       Result Value    Glucose 104 (*)      Disposition and Plan   Clinical Impression:  1. Calculus of proximal right ureter       Disposition:  Admit  6/25/2024  9:36 am       History and Physical   History of Present Illness: Devon East is a 28 year old male with history of  migraines, OCD, IBS, cystinuria, kidney stones  presenting with right flank pain.  Patient says starting Sunday he started having some minor abdominal pain.  He went to the [facility] emergency room at which point he was given fluids and medications and discharged to follow-up with urology as an outpatient given the fact that he had no RAGHU UTI or significant obstructing stone present on imaging.  Patient did have a 7 mm stone which was thought to be able to be passed on its own.  Patient attempted conservative therapy at home without improvement.  Patient's symptoms worsened so he came back to the emergency room for further evaluation and treatment.  Patient denies any fevers or chills.  Patient denies any other positive review of systems.    /83   Pulse 89   Temp 97.1 °F (36.2 °C) (Temporal)   Resp 21   Ht 5' 7\" (1.702 m)   Wt 181 lb (82.1 kg)   SpO2 97%   BMI 28.35 kg/m²   General:  Alert and oriented x 3.  HEENT: Normocephalic atraumatic. Moist mucous membranes. EOM-I.  Neck: No JVD. No carotid bruits.  Respiratory: Clear to auscultation bilaterally. No wheezes. No crackles  Cardiovascular: S1, S2. Regular rate and rhythm. No murmurs  Chest and Back: No tenderness or deformity.  Abdomen: Soft, tender right flank and right lateral abdomen, nondistended.  Positive bowel sounds. No rebound, guarding  Neurologic: No focal neurological deficits. CNII-XII grossly intact. Sensation and strength intact  Musculoskeletal: Moves all extremities.  Extremities: No edema or tenderness of the LE  Integument: No new rashes or lesions.   Psychiatric: Appropriate mood and affect.  Lab 06/24/24 2242 06/25/24  0914   WBC 6.5 6.6   HGB 14.6 14.0   MCV 88.7 88.8   .0 281.0      Lab 06/24/24 2242   GLU 91   BUN 15   CREATSERUM 0.96   CA 9.5   ALB 4.0   *   K 3.8      CO2 29.0   ALKPHO 85   AST 16   ALT 36   BILT 0.3   TP 7.6       ASSESSMENT / PLAN:   28 year old male with history of anxiety, depression, migraines, OCD, IBS, cystinuria, kidney  stones presenting with right flank pain.       Right Flank Pain  -etiology obstructing stone with mild hydro on imaging  -ivf  -iv pain meds  -tamsulosin  -continue potassium citrate  -urology consulted  -npo      Preoperative evaluation  -pt denies any acute cardiac or pulmonary issues  -Patient with good activity status  -Patient with no history of cardiac or pulmonary pathology  -Patient clinically stable, vital stable  -No further inpatient pulmonary cardiac evaluation needed prior to surgical intervention if needed for obstructive kidney stone     Quality:  DVT Prophylaxis: scd        Operative Note           Preoperative Diagnosis: Right ureteral stone [N20.1], right hydronephrosis, right flank pain     Postoperative Diagnosis: Right ureteral stone [N20.1] , right hydronephrosis, right flank pain     Procedure(s):  CYSTOSCOPY, RIGHT RETROGRADE PYLEOGRAM, RIGHT URETEROSCOPY with stone manipulation, and pyeloscopy with LASER LITHOTRIPSY, RIGHT STENT PLACEMENT, fluoroscopy      Indications for procedure: 8 ml stone with obstruction      Surgical Findings: successful laser lithotripsy       Vitals (last day) before discharge       Date/Time Temp Pulse Resp BP SpO2 Weight O2 Device O2 Flow Rate (L/min) [facility]    06/26/24 1150 97.8 °F (36.6 °C) 80 16 112/68 98 % -- None (Room air) -- DN    06/26/24 0630 98 °F (36.7 °C) 74 16 130/84 100 % -- None (Room air) -- LP    06/26/24 0000 98.2 °F (36.8 °C) 68 16 128/85 98 % -- None (Room air) -- LP    06/25/24 2253 -- 77 10 117/92 99 % -- -- -- GO    06/25/24 2240 -- 76 15 123/92 98 % -- Nasal cannula 2 L/min     06/25/24 2208 97 °F (36.1 °C) 81 12 125/80 96 % -- None (Room air) -- GO    06/25/24 0912 97.1 °F (36.2 °C) 91 20 140/91 94 % 181 lb None (Room air) -- AP

## 2024-07-01 ENCOUNTER — HOSPITAL ENCOUNTER (EMERGENCY)
Age: 29
Discharge: HOME | End: 2024-07-01
Attending: EMERGENCY MEDICINE
Payer: COMMERCIAL

## 2024-07-01 ENCOUNTER — HOSPITAL ENCOUNTER (EMERGENCY)
Facility: HOSPITAL | Age: 29
Discharge: LEFT BEFORE DISCHARGE | End: 2024-07-01
Payer: COMMERCIAL

## 2024-07-01 ENCOUNTER — APPOINTMENT (OUTPATIENT)
Dept: CT IMAGING | Age: 29
End: 2024-07-01
Attending: EMERGENCY MEDICINE
Payer: COMMERCIAL

## 2024-07-01 VITALS
HEIGHT: 67 IN | HEART RATE: 76 BPM | OXYGEN SATURATION: 95 % | WEIGHT: 181 LBS | BODY MASS INDEX: 28.41 KG/M2 | DIASTOLIC BLOOD PRESSURE: 88 MMHG | SYSTOLIC BLOOD PRESSURE: 125 MMHG | TEMPERATURE: 99 F | RESPIRATION RATE: 16 BRPM

## 2024-07-01 DIAGNOSIS — N20.1 URETERAL STONE: Primary | ICD-10-CM

## 2024-07-01 LAB
ALBUMIN SERPL-MCNC: 4 G/DL (ref 3.4–5)
ALBUMIN/GLOB SERPL: 1.1 {RATIO} (ref 1–2)
ALP LIVER SERPL-CCNC: 73 U/L
ALT SERPL-CCNC: 30 U/L
ANION GAP SERPL CALC-SCNC: 7 MMOL/L (ref 0–18)
AST SERPL-CCNC: 18 U/L (ref 15–37)
BASOPHILS # BLD AUTO: 0.04 X10(3) UL (ref 0–0.2)
BASOPHILS NFR BLD AUTO: 0.6 %
BILIRUB SERPL-MCNC: 0.3 MG/DL (ref 0.1–2)
BILIRUB UR QL STRIP.AUTO: NEGATIVE
BUN BLD-MCNC: 17 MG/DL (ref 9–23)
CALCIUM BLD-MCNC: 9.4 MG/DL (ref 8.5–10.1)
CHLORIDE SERPL-SCNC: 99 MMOL/L (ref 98–112)
CLARITY UR REFRACT.AUTO: CLEAR
CO2 SERPL-SCNC: 27 MMOL/L (ref 21–32)
COLOR UR AUTO: YELLOW
CREAT BLD-MCNC: 1.08 MG/DL
EGFRCR SERPLBLD CKD-EPI 2021: 96 ML/MIN/1.73M2 (ref 60–?)
EOSINOPHIL # BLD AUTO: 0.11 X10(3) UL (ref 0–0.7)
EOSINOPHIL NFR BLD AUTO: 1.6 %
ERYTHROCYTE [DISTWIDTH] IN BLOOD BY AUTOMATED COUNT: 12.1 %
GLOBULIN PLAS-MCNC: 3.6 G/DL (ref 2.8–4.4)
GLUCOSE BLD-MCNC: 91 MG/DL (ref 70–99)
GLUCOSE UR STRIP.AUTO-MCNC: NEGATIVE MG/DL
HCT VFR BLD AUTO: 42.7 %
HGB BLD-MCNC: 14.7 G/DL
IMM GRANULOCYTES # BLD AUTO: 0.04 X10(3) UL (ref 0–1)
IMM GRANULOCYTES NFR BLD: 0.6 %
KETONES UR STRIP.AUTO-MCNC: NEGATIVE MG/DL
LEUKOCYTE ESTERASE UR QL STRIP.AUTO: NEGATIVE
LYMPHOCYTES # BLD AUTO: 1.76 X10(3) UL (ref 1–4)
LYMPHOCYTES NFR BLD AUTO: 25.4 %
MCH RBC QN AUTO: 30.5 PG (ref 26–34)
MCHC RBC AUTO-ENTMCNC: 34.4 G/DL (ref 31–37)
MCV RBC AUTO: 88.6 FL
MONOCYTES # BLD AUTO: 0.42 X10(3) UL (ref 0.1–1)
MONOCYTES NFR BLD AUTO: 6.1 %
NEUTROPHILS # BLD AUTO: 4.55 X10 (3) UL (ref 1.5–7.7)
NEUTROPHILS # BLD AUTO: 4.55 X10(3) UL (ref 1.5–7.7)
NEUTROPHILS NFR BLD AUTO: 65.7 %
NITRITE UR QL STRIP.AUTO: NEGATIVE
OSMOLALITY SERPL CALC.SUM OF ELEC: 277 MOSM/KG (ref 275–295)
PH UR STRIP.AUTO: 7.5 [PH] (ref 5–8)
PLATELET # BLD AUTO: 344 10(3)UL (ref 150–450)
POTASSIUM SERPL-SCNC: 4.2 MMOL/L (ref 3.5–5.1)
PROT SERPL-MCNC: 7.6 G/DL (ref 6.4–8.2)
RBC # BLD AUTO: 4.82 X10(6)UL
RBC #/AREA URNS AUTO: >10 /HPF
SODIUM SERPL-SCNC: 133 MMOL/L (ref 136–145)
SP GR UR STRIP.AUTO: 1.02 (ref 1–1.03)
UROBILINOGEN UR STRIP.AUTO-MCNC: 0.2 MG/DL
WBC # BLD AUTO: 6.9 X10(3) UL (ref 4–11)

## 2024-07-01 PROCEDURE — 96361 HYDRATE IV INFUSION ADD-ON: CPT

## 2024-07-01 PROCEDURE — 81001 URINALYSIS AUTO W/SCOPE: CPT | Performed by: EMERGENCY MEDICINE

## 2024-07-01 PROCEDURE — 99285 EMERGENCY DEPT VISIT HI MDM: CPT

## 2024-07-01 PROCEDURE — 81015 MICROSCOPIC EXAM OF URINE: CPT | Performed by: EMERGENCY MEDICINE

## 2024-07-01 PROCEDURE — 80053 COMPREHEN METABOLIC PANEL: CPT | Performed by: EMERGENCY MEDICINE

## 2024-07-01 PROCEDURE — 96376 TX/PRO/DX INJ SAME DRUG ADON: CPT

## 2024-07-01 PROCEDURE — 85025 COMPLETE CBC W/AUTO DIFF WBC: CPT | Performed by: EMERGENCY MEDICINE

## 2024-07-01 PROCEDURE — 96374 THER/PROPH/DIAG INJ IV PUSH: CPT

## 2024-07-01 PROCEDURE — 74176 CT ABD & PELVIS W/O CONTRAST: CPT | Performed by: EMERGENCY MEDICINE

## 2024-07-01 PROCEDURE — 96375 TX/PRO/DX INJ NEW DRUG ADDON: CPT

## 2024-07-01 RX ORDER — ONDANSETRON 2 MG/ML
4 INJECTION INTRAMUSCULAR; INTRAVENOUS ONCE
Status: COMPLETED | OUTPATIENT
Start: 2024-07-01 | End: 2024-07-01

## 2024-07-01 RX ORDER — HYDROCODONE BITARTRATE AND ACETAMINOPHEN 5; 325 MG/1; MG/1
1 TABLET ORAL EVERY 4 HOURS PRN
Qty: 8 TABLET | Refills: 0 | Status: SHIPPED | OUTPATIENT
Start: 2024-07-01

## 2024-07-01 RX ORDER — NAPROXEN 500 MG/1
500 TABLET ORAL 2 TIMES DAILY PRN
Qty: 20 TABLET | Refills: 0 | Status: SHIPPED | OUTPATIENT
Start: 2024-07-01 | End: 2024-07-15

## 2024-07-01 RX ORDER — KETOROLAC TROMETHAMINE 30 MG/ML
30 INJECTION, SOLUTION INTRAMUSCULAR; INTRAVENOUS ONCE
Status: COMPLETED | OUTPATIENT
Start: 2024-07-01 | End: 2024-07-01

## 2024-07-01 RX ORDER — HYDROMORPHONE HYDROCHLORIDE 1 MG/ML
0.5 INJECTION, SOLUTION INTRAMUSCULAR; INTRAVENOUS; SUBCUTANEOUS ONCE
Status: COMPLETED | OUTPATIENT
Start: 2024-07-01 | End: 2024-07-01

## 2024-07-01 RX ORDER — ONDANSETRON 4 MG/1
4 TABLET, ORALLY DISINTEGRATING ORAL EVERY 4 HOURS PRN
Qty: 5 TABLET | Refills: 0 | Status: SHIPPED | OUTPATIENT
Start: 2024-07-01 | End: 2024-07-06

## 2024-07-01 RX ORDER — HYDROMORPHONE HYDROCHLORIDE 1 MG/ML
0.2 INJECTION, SOLUTION INTRAMUSCULAR; INTRAVENOUS; SUBCUTANEOUS ONCE
Status: COMPLETED | OUTPATIENT
Start: 2024-07-01 | End: 2024-07-01

## 2024-07-01 NOTE — ED PROVIDER NOTES
Patient Seen in: [facility] [place]      History     Chief Complaint   Patient presents with    Abdomen/Flank Pain     Stated Complaint: Pt with recent renal stent removal (self removed at day 5), presents with sever*    Subjective:   HPI  Patient is a 28-year-old male with a history of cystinuria and recurrent kidney stones who presents to the ED for evaluation of severe abdominal pain and right flank pain after falling out ureteral stent which was placed 5 days ago.  Patient pulled out send around noon and immediately developed severe pain.  He has not been able to urinate since then.  He denies any fevers, chills.  Patient does have nausea but no vomiting.  Patient was otherwise doing well earlier today.    Chart reviewed from last week.  Patient had right ureteral stone with hydronephrosis diagnosed on 6/24.  Patient underwent cystoscopy with right retrograde pyelogram, right ureteroscopy with stone manipulation, pyeloscopy with laser lithotripsy, right stent placement and source uroscopy. Patient was discharged home with plan for ureteral stent self removal in 3-5 days.     Objective:   Past Medical History:    Anxiety state    Calculus of kidney    Cystine disease (HCC)    Depression    IBS (irritable bowel syndrome)    Migraines    OCD (obsessive compulsive disorder)              Past Surgical History:   Procedure Laterality Date    Colonoscopy      Cystoscopy,insert ureteral stent  03/07/2024    Lithotripsy      Other surgical history  02/17/2011    Rt RPG, URS stone manipulation,laser litho,Rt stent, Dr. Mcdonough    Other surgical history  02/21/2011    Cysto stent removal- Dr. Mcdonough    Other surgical history  11/21/2015    Cysto, L URS, laser litho, stone extraction, stent placement, RPG Dr. Finn    Other surgical history  12/01/2015    cysto stent removal    Upper gi endoscopy,exam                  Social History     Socioeconomic History    Marital status: Single   Tobacco Use     Smoking status: Never     Passive exposure: Never    Smokeless tobacco: Never   Vaping Use    Vaping status: Never Used   Substance and Sexual Activity    Alcohol use: No    Drug use: No     Social Determinants of Health     Food Insecurity: No Food Insecurity (6/25/2024)    Food Insecurity     Food Insecurity: Never true   Transportation Needs: No Transportation Needs (6/25/2024)    Transportation Needs     Lack of Transportation: No   Housing Stability: Low Risk  (6/25/2024)    Housing Stability     Housing Instability: No              Review of Systems    Positive for stated Chief Complaint: Abdomen/Flank Pain    Other systems are as noted in HPI.  Constitutional and vital signs reviewed.      All other systems reviewed and negative except as noted above.    Physical Exam     ED Triage Vitals [07/01/24 1542]   /73   Pulse (!) 123   Resp 22   Temp 99.3 °F (37.4 °C)   Temp src Temporal   SpO2 94 %   O2 Device None (Room air)       Current Vitals:   Vital Signs  BP: 125/88  Pulse: 76  Resp: 16  Temp: 99.3 °F (37.4 °C)  Temp src: Temporal    Oxygen Therapy  SpO2: 95 %  O2 Device: None (Room air)            Physical Exam  Vitals and nursing note reviewed.   Constitutional:       General: He is not in acute distress.  HENT:      Head: Normocephalic and atraumatic.      Nose: Nose normal.      Mouth/Throat:      Mouth: Mucous membranes are moist.   Eyes:      Extraocular Movements: Extraocular movements intact.      Pupils: Pupils are equal, round, and reactive to light.   Cardiovascular:      Rate and Rhythm: Normal rate and regular rhythm.      Pulses: Normal pulses.   Pulmonary:      Effort: Pulmonary effort is normal. No respiratory distress.      Breath sounds: No wheezing.   Abdominal:      General: There is no distension.      Palpations: Abdomen is soft.      Tenderness: There is abdominal tenderness in the right lower quadrant and suprapubic area.   Musculoskeletal:         General: No swelling. Normal  range of motion.      Cervical back: Normal range of motion.   Skin:     General: Skin is warm and dry.      Capillary Refill: Capillary refill takes less than 2 seconds.   Neurological:      General: No focal deficit present.      Mental Status: He is alert.   Psychiatric:         Mood and Affect: Mood normal.               ED Course     Labs Reviewed   COMP METABOLIC PANEL (14) - Abnormal; Notable for the following components:       Result Value    Sodium 133 (*)     All other components within normal limits   URINALYSIS, ROUTINE - Abnormal; Notable for the following components:    Blood Urine Moderate (*)     Protein Urine 100 mg/dL (*)     All other components within normal limits   UA MICROSCOPIC ONLY, URINE - Abnormal; Notable for the following components:    RBC Urine >10 (*)     Bacteria Urine Rare (*)     All other components within normal limits   CBC WITH DIFFERENTIAL WITH PLATELET    Narrative:     The following orders were created for panel order CBC With Differential With Platelet.  Procedure                               Abnormality         Status                     ---------                               -----------         ------                     CBC W/ DIFFERENTIAL[[id number]]                              Final result                 Please view results for these tests on the individual orders.   CBC W/ DIFFERENTIAL          ED Course as of 07/02/24 0239  ------------------------------------------------------------  Time: 07/01 1805  Comment: Patient re-evaluated. Pain improved. I discussed with urology. If pt's pain is controlled, ok to DC with outpatient f/u and pain meds. If uncontrolled, recommends admission. Will plan to re-evaluate.               MDM      Patient is a 28-year-old male with a history of cystinuria and recurrent kidney stones who presents to the ED for evaluation of severe abdominal pain and right flank pain after falling out ureteral stent which was placed 5 days ago. Pt is  afebrile, tachycardic but HDS, satting well on RA.  On exam patient has right-sided and subpubic abdominal tenderness, no peritoneal signs.    Initial differential diagnosis includes but not limited to obstructing ureteral calculus, complication from ureteral stent removal, UTI/pyelonephritis.    Labs reviewed and show no leukocytosis, normal hemoglobin.  CMP unremarkable.  CT abdomen pelvis independently reviewed and showed previously noted stone of the right proximal ureter no longer visualized, small stone fragments measuring up to 3 mm with 1 noted in the right ureteropelvic junction with mild right hydronephrosis. UA shows no evidence of UTI.     Discussed with urology on call. If pt's pain is improved, urology is ok with pt following up as outpatient given no fevers, urinary retention resolved and no vomiting. I observed patient in ED and re-evaluated after pain meds. Offered admission if pain uncontrolled but pt preferred to trial outpatient PO meds at home with close uro f/u. Prescribed short course of norco, naproxen. Patient is stable for discharge home at this time. Discussed strict return precautions and supportive care. Patient verbalized understanding and agreement of plan.                                Medical Decision Making      Disposition and Plan     Clinical Impression:  1. Ureteral stone         Disposition:  Discharge  7/1/2024  7:05 pm    Follow-up:  Spenser Mccormick MD  [address]  [address]  [facility] [address] [phone]    Schedule an appointment as soon as possible for a visit in 1 day(s)      Jaden Mcdonough MD  [address]  [address]  [facility] [id number] [phone]    Schedule an appointment as soon as possible for a visit in 1 day(s)            Medications Prescribed:  Discharge Medication List as of 7/1/2024  7:53 PM        START taking these medications    Details   !! HYDROcodone-acetaminophen 5-325 MG Oral Tab Take 1 tablet by mouth every 4 (four)  hours as needed for Pain., Normal, Disp-8 tablet, R-0      ondansetron 4 MG Oral Tablet Dispersible Take 1 tablet (4 mg total) by mouth every 4 (four) hours as needed for Nausea., Normal, Disp-5 tablet, R-0      naproxen 500 MG Oral Tab Take 1 tablet (500 mg total) by mouth 2 (two) times daily as needed., Normal, Disp-20 tablet, R-0       !! - Potential duplicate medications found. Please discuss with provider.

## 2024-07-02 NOTE — DISCHARGE INSTRUCTIONS
You were seen in the emergency department for pain after a kidney stone.     -Take Norco as needed for severe pain.  You can also take Tylenol but be careful with dosing with this.  -Take naproxen twice daily.  Do not take ibuprofen with this medication.  -You can take Zofran as needed for nausea.    Return to the emergency department if you have uncontrolled pain, fevers, vomiting, if you are unable to keep anything down, or any new concerns or worsening symptoms. Please follow up closely with urology.

## 2024-09-19 ENCOUNTER — HOSPITAL ENCOUNTER (EMERGENCY)
Age: 29
Discharge: HOME | End: 2024-09-19
Attending: EMERGENCY MEDICINE
Payer: COMMERCIAL

## 2024-09-19 ENCOUNTER — APPOINTMENT (OUTPATIENT)
Dept: CT IMAGING | Age: 29
End: 2024-09-19
Attending: EMERGENCY MEDICINE
Payer: COMMERCIAL

## 2024-09-19 VITALS
SYSTOLIC BLOOD PRESSURE: 121 MMHG | HEIGHT: 67 IN | WEIGHT: 184 LBS | OXYGEN SATURATION: 96 % | TEMPERATURE: 98 F | DIASTOLIC BLOOD PRESSURE: 87 MMHG | HEART RATE: 72 BPM | RESPIRATION RATE: 16 BRPM | BODY MASS INDEX: 28.88 KG/M2

## 2024-09-19 DIAGNOSIS — R10.9 FLANK PAIN: Primary | ICD-10-CM

## 2024-09-19 LAB
ALBUMIN SERPL-MCNC: 4.3 G/DL (ref 3.4–5)
ALBUMIN/GLOB SERPL: 1.1 {RATIO} (ref 1–2)
ALP LIVER SERPL-CCNC: 73 U/L
ALT SERPL-CCNC: 38 U/L
ANION GAP SERPL CALC-SCNC: 2 MMOL/L (ref 0–18)
AST SERPL-CCNC: 18 U/L (ref 15–37)
BASOPHILS # BLD AUTO: 0.03 X10(3) UL (ref 0–0.2)
BASOPHILS NFR BLD AUTO: 0.7 %
BILIRUB SERPL-MCNC: 0.4 MG/DL (ref 0.1–2)
BILIRUB UR QL STRIP.AUTO: NEGATIVE
BUN BLD-MCNC: 19 MG/DL (ref 9–23)
CALCIUM BLD-MCNC: 9.8 MG/DL (ref 8.5–10.1)
CHLORIDE SERPL-SCNC: 102 MMOL/L (ref 98–112)
CLARITY UR REFRACT.AUTO: CLEAR
CO2 SERPL-SCNC: 30 MMOL/L (ref 21–32)
COLOR UR AUTO: YELLOW
CREAT BLD-MCNC: 0.97 MG/DL
EGFRCR SERPLBLD CKD-EPI 2021: 108 ML/MIN/1.73M2 (ref 60–?)
EOSINOPHIL # BLD AUTO: 0.13 X10(3) UL (ref 0–0.7)
EOSINOPHIL NFR BLD AUTO: 2.9 %
ERYTHROCYTE [DISTWIDTH] IN BLOOD BY AUTOMATED COUNT: 12.3 %
GLOBULIN PLAS-MCNC: 3.9 G/DL (ref 2.8–4.4)
GLUCOSE BLD-MCNC: 98 MG/DL (ref 70–99)
GLUCOSE UR STRIP.AUTO-MCNC: NEGATIVE MG/DL
HCT VFR BLD AUTO: 46.6 %
HGB BLD-MCNC: 15.8 G/DL
IMM GRANULOCYTES # BLD AUTO: 0.02 X10(3) UL (ref 0–1)
IMM GRANULOCYTES NFR BLD: 0.4 %
KETONES UR STRIP.AUTO-MCNC: NEGATIVE MG/DL
LEUKOCYTE ESTERASE UR QL STRIP.AUTO: NEGATIVE
LYMPHOCYTES # BLD AUTO: 1.73 X10(3) UL (ref 1–4)
LYMPHOCYTES NFR BLD AUTO: 38.3 %
MCH RBC QN AUTO: 30.5 PG (ref 26–34)
MCHC RBC AUTO-ENTMCNC: 33.9 G/DL (ref 31–37)
MCV RBC AUTO: 90 FL
MONOCYTES # BLD AUTO: 0.35 X10(3) UL (ref 0.1–1)
MONOCYTES NFR BLD AUTO: 7.7 %
NEUTROPHILS # BLD AUTO: 2.26 X10 (3) UL (ref 1.5–7.7)
NEUTROPHILS # BLD AUTO: 2.26 X10(3) UL (ref 1.5–7.7)
NEUTROPHILS NFR BLD AUTO: 50 %
NITRITE UR QL STRIP.AUTO: NEGATIVE
OSMOLALITY SERPL CALC.SUM OF ELEC: 280 MOSM/KG (ref 275–295)
PH UR STRIP.AUTO: 8.5 [PH] (ref 5–8)
PLATELET # BLD AUTO: 331 10(3)UL (ref 150–450)
POTASSIUM SERPL-SCNC: 4.7 MMOL/L (ref 3.5–5.1)
PROT SERPL-MCNC: 8.2 G/DL (ref 6.4–8.2)
PROT UR STRIP.AUTO-MCNC: NEGATIVE MG/DL
RBC # BLD AUTO: 5.18 X10(6)UL
RBC UR QL AUTO: NEGATIVE
SODIUM SERPL-SCNC: 134 MMOL/L (ref 136–145)
SP GR UR STRIP.AUTO: 1.02 (ref 1–1.03)
UROBILINOGEN UR STRIP.AUTO-MCNC: 0.2 MG/DL
WBC # BLD AUTO: 4.5 X10(3) UL (ref 4–11)

## 2024-09-19 PROCEDURE — 81003 URINALYSIS AUTO W/O SCOPE: CPT | Performed by: EMERGENCY MEDICINE

## 2024-09-19 PROCEDURE — 74176 CT ABD & PELVIS W/O CONTRAST: CPT | Performed by: EMERGENCY MEDICINE

## 2024-09-19 PROCEDURE — 96375 TX/PRO/DX INJ NEW DRUG ADDON: CPT

## 2024-09-19 PROCEDURE — 99285 EMERGENCY DEPT VISIT HI MDM: CPT

## 2024-09-19 PROCEDURE — 80053 COMPREHEN METABOLIC PANEL: CPT | Performed by: EMERGENCY MEDICINE

## 2024-09-19 PROCEDURE — 99284 EMERGENCY DEPT VISIT MOD MDM: CPT

## 2024-09-19 PROCEDURE — 85025 COMPLETE CBC W/AUTO DIFF WBC: CPT | Performed by: EMERGENCY MEDICINE

## 2024-09-19 PROCEDURE — 96374 THER/PROPH/DIAG INJ IV PUSH: CPT

## 2024-09-19 PROCEDURE — 96361 HYDRATE IV INFUSION ADD-ON: CPT

## 2024-09-19 RX ORDER — CYCLOBENZAPRINE HCL 10 MG
10 TABLET ORAL 3 TIMES DAILY PRN
Qty: 30 TABLET | Refills: 0 | Status: SHIPPED | OUTPATIENT
Start: 2024-09-19 | End: 2024-09-29

## 2024-09-19 RX ORDER — ONDANSETRON 2 MG/ML
4 INJECTION INTRAMUSCULAR; INTRAVENOUS ONCE
Status: COMPLETED | OUTPATIENT
Start: 2024-09-19 | End: 2024-09-19

## 2024-09-19 RX ORDER — KETOROLAC TROMETHAMINE 30 MG/ML
30 INJECTION, SOLUTION INTRAMUSCULAR; INTRAVENOUS ONCE
Status: COMPLETED | OUTPATIENT
Start: 2024-09-19 | End: 2024-09-19

## 2024-09-19 RX ORDER — HYDROMORPHONE HYDROCHLORIDE 1 MG/ML
0.5 INJECTION, SOLUTION INTRAMUSCULAR; INTRAVENOUS; SUBCUTANEOUS ONCE
Status: COMPLETED | OUTPATIENT
Start: 2024-09-19 | End: 2024-09-19

## 2024-09-19 NOTE — ED INITIAL ASSESSMENT (HPI)
Right sided flank pain - radiates to right abd x2 week int - worse last night - (+) nausea deneis emesis

## 2024-09-19 NOTE — ED PROVIDER NOTES
Patient Seen in: [facility] In [place]      History     Chief Complaint   Patient presents with    Abdomen/Flank Pain     Stated Complaint: kidney stones    Subjective:   HPI    Patient with past medical history of cystinuria and frequent kidney stones presenting with intermittent flank pain over the last 2 weeks that has progressively worsened since last night.  Primarily the pain is to the right flank radiating to the right lower abdomen but states that he has had intermittent left flank pain as well 2.  He complains of associated nausea, no vomiting.  Denies fevers, chills.  Mom states he has had 6 surgeries in the last year for kidney stones, most recently with a stent placed at the end of June 2024.  Denies any other complaints or concerns at this time.    Objective:   Past Medical History:    Anxiety state    Calculus of kidney    Cystine disease (HCC)    Depression    IBS (irritable bowel syndrome)    Migraines    OCD (obsessive compulsive disorder)              Past Surgical History:   Procedure Laterality Date    Colonoscopy      Cystoscopy,insert ureteral stent  03/07/2024    Lithotripsy      Other surgical history  02/17/2011    Rt RPG, URS stone manipulation,laser litho,Rt stent, Dr. Mcdonough    Other surgical history  02/21/2011    Cysto stent removal- Dr. Mcdonough    Other surgical history  11/21/2015    Cysto, L URS, laser litho, stone extraction, stent placement, RPG Dr. Finn    Other surgical history  12/01/2015    cysto stent removal    Upper gi endoscopy,exam                  Social History     Socioeconomic History    Marital status: Single   Tobacco Use    Smoking status: Never     Passive exposure: Never    Smokeless tobacco: Never   Vaping Use    Vaping status: Never Used   Substance and Sexual Activity    Alcohol use: No    Drug use: No     Social Determinants of Health     Food Insecurity: No Food Insecurity (6/25/2024)    Food Insecurity     Food Insecurity: Never true    Transportation Needs: No Transportation Needs (6/25/2024)    Transportation Needs     Lack of Transportation: No   Housing Stability: Low Risk  (6/25/2024)    Housing Stability     Housing Instability: No              Review of Systems    Positive for stated Chief Complaint: Abdomen/Flank Pain    Other systems are as noted in HPI.  Constitutional and vital signs reviewed.      All other systems reviewed and negative except as noted above.    Physical Exam     ED Triage Vitals [09/19/24 0808]   BP (!) 145/103   Pulse 84   Resp 18   Temp 97.5 °F (36.4 °C)   Temp src Temporal   SpO2 96 %   O2 Device None (Room air)       Current Vitals:   Vital Signs  BP: 121/87  Pulse: 72  Resp: 16  Temp: 97.5 °F (36.4 °C)  Temp src: Temporal    Oxygen Therapy  SpO2: 96 %  O2 Device: None (Room air)            Physical Exam  Vitals and nursing note reviewed.   Constitutional:       Appearance: He is well-developed.   HENT:      Head: Normocephalic.   Cardiovascular:      Rate and Rhythm: Normal rate and regular rhythm.   Pulmonary:      Effort: Pulmonary effort is normal.      Breath sounds: Normal breath sounds.   Abdominal:      General: Abdomen is flat. Bowel sounds are normal.      Palpations: Abdomen is soft.      Tenderness: There is abdominal tenderness in the right lower quadrant.   Skin:     General: Skin is warm and dry.   Neurological:      General: No focal deficit present.      Mental Status: He is alert.   Psychiatric:         Mood and Affect: Mood normal.               ED Course     Labs Reviewed   URINALYSIS, ROUTINE - Abnormal; Notable for the following components:       Result Value    pH Urine 8.5 (*)     All other components within normal limits   COMP METABOLIC PANEL (14) - Abnormal; Notable for the following components:    Sodium 134 (*)     All other components within normal limits   CBC WITH DIFFERENTIAL WITH PLATELET   RAINBOW DRAW LAVENDER   RAINBOW DRAW LIGHT GREEN          ED Course as of 09/19/24  1410  ------------------------------------------------------------  Time: 09/19 1020  Comment: Dr Owusu spoke with Dr Vo from urology who agrees with plan for discharge home at this time and outpatient follow-up.  Patient and mom updated on the plan for discharge home.  Flexeril for home as needed.  Advised they call urology today to schedule follow-up.       CT ABDOMEN+PELVIS KIDNEYSTONE 2D RNDR(NO IV,NO ORAL)(CPT=74176)    Result Date: 9/19/2024  PROCEDURE:  CT ABDOMEN+PELVIS KIDNEYSTONE 2D RNDR(NO IV,NO ORAL)(CPT=74176)  COMPARISON:  [facility], CT, CT ABDOMEN+PELVIS KIDNEYSTONE 2D RNDR(NO IV,NO ORAL)(CPT=74176), 7/01/2024, 4:36 PM.  INDICATIONS:  kidney stones  TECHNIQUE:  Unenhanced multislice CT scanning from above the kidneys to below the urinary bladder.  2D rendering are generated on the CT scanner workstation to localize potential stones in the cranio-caudal plane.  Dose reduction techniques were used. Dose information is transmitted to the ACR (American College of Radiology) NRDR (National Radiology Data Registry) which includes the Dose Index Registry.  PATIENT STATED HISTORY: (As transcribed by Technologist)  Right flank pain for 2 weeks, assess for calculus.    FINDINGS:  KIDNEYS:  No mass, obstruction, or calcification. BLADDER:  No mass, calculus or significant wall thickening. ADRENALS:  No mass or enlargement.  LIVER:  No enlargement, atrophy, abnormal density, or significant focal lesion.  BILIARY:  No visible dilatation or calcification.  PANCREAS:  No lesion, fluid collection, ductal dilatation, or atrophy.  SPLEEN:  No enlargement or focal lesion.  AORTA/VASCULAR:  No aneurysm.  RETROPERITONEUM:  No mass or adenopathy.  BOWEL/MESENTERY:  No dilated bowel or wall thickening.  Normal appendix. ABDOMINAL WALL:  No mass or hernia.  BONES:  No bony lesion or fracture. PELVIC ORGANS:  Normal for age.  LUNG BASES:  No visible pulmonary or pleural disease.  OTHER:  Negative.             CONCLUSION:   No acute abnormality in the abdomen or pelvis.    LOCATION:  [facility]   Dictated by (CST): Jonathan Sánchez MD on 9/19/2024 at 9:23 AM     Finalized by (CST): Jonathan Sánchez MD on 9/19/2024 at 9:27 AM               MDM      Differential diagnosis includes but is not limited to kidney stone, renal colic, UTI, pyelonephritis.    Patient well-appearing, nontoxic.  Abdominal exam is benign.  Labs, UA and CT abdomen pelvis performed today.  Patient received IV fluids and pain medication.  Discussed case with Dr. Owusu who has seen and evaluated patient personally.  CT abdomen pelvis shows no obstructing stones or other acute findings.  Dr. Owusu advised patient is stable for discharge home and advised treating with Flexeril as needed.  He spoke with Dr. Vo from urology who agrees no further imaging or workup at this time and they will follow-up with patient outpatient.  Discussed this plan with patient and mom.  Provided return precautions.  They verbalized understanding agreement of plan.                                   Medical Decision Making  Risk  Prescription drug management.        Disposition and Plan     Clinical Impression:  1. Flank pain         Disposition:  Discharge  9/19/2024 10:14 am    Follow-up:  Spenser Mccormick MD  [address]  [address] [place] [address] [phone]    Follow up in 1 day(s)      Jerome Vo MD  [address] [address]  [phone]    Call today            Medications Prescribed:  Discharge Medication List as of 9/19/2024 10:20 AM        START taking these medications    Details   cyclobenzaprine 10 MG Oral Tab Take 1 tablet (10 mg total) by mouth 3 (three) times daily as needed for Muscle spasms. DO NOT TAKE WHILE DRIVING/WORKING/DRINKING ALCOHOL, Normal, Disp-30 tablet, R-0

## 2024-11-01 ENCOUNTER — APPOINTMENT (OUTPATIENT)
Dept: CT IMAGING | Age: 29
End: 2024-11-01
Attending: EMERGENCY MEDICINE
Payer: COMMERCIAL

## 2024-11-01 ENCOUNTER — HOSPITAL ENCOUNTER (EMERGENCY)
Age: 29
Discharge: HOME | End: 2024-11-01
Attending: EMERGENCY MEDICINE
Payer: COMMERCIAL

## 2024-11-01 VITALS
WEIGHT: 184 LBS | HEIGHT: 67 IN | TEMPERATURE: 98 F | SYSTOLIC BLOOD PRESSURE: 133 MMHG | HEART RATE: 107 BPM | BODY MASS INDEX: 28.88 KG/M2 | RESPIRATION RATE: 18 BRPM | DIASTOLIC BLOOD PRESSURE: 91 MMHG | OXYGEN SATURATION: 95 %

## 2024-11-01 DIAGNOSIS — N20.0 KIDNEY STONE: Primary | ICD-10-CM

## 2024-11-01 LAB
ALBUMIN SERPL-MCNC: 4.2 G/DL (ref 3.4–5)
ALBUMIN/GLOB SERPL: 1.2 {RATIO} (ref 1–2)
ALP LIVER SERPL-CCNC: 80 U/L
ALT SERPL-CCNC: 43 U/L
ANION GAP SERPL CALC-SCNC: 4 MMOL/L (ref 0–18)
AST SERPL-CCNC: 22 U/L (ref 15–37)
BASOPHILS # BLD AUTO: 0.04 X10(3) UL (ref 0–0.2)
BASOPHILS NFR BLD AUTO: 0.6 %
BILIRUB SERPL-MCNC: 0.5 MG/DL (ref 0.1–2)
BILIRUB UR QL STRIP.AUTO: NEGATIVE
BUN BLD-MCNC: 21 MG/DL (ref 9–23)
CALCIUM BLD-MCNC: 8.9 MG/DL (ref 8.5–10.1)
CHLORIDE SERPL-SCNC: 104 MMOL/L (ref 98–112)
CLARITY UR REFRACT.AUTO: CLEAR
CO2 SERPL-SCNC: 28 MMOL/L (ref 21–32)
COLOR UR AUTO: YELLOW
CREAT BLD-MCNC: 1 MG/DL
EGFRCR SERPLBLD CKD-EPI 2021: 104 ML/MIN/1.73M2 (ref 60–?)
EOSINOPHIL # BLD AUTO: 0.15 X10(3) UL (ref 0–0.7)
EOSINOPHIL NFR BLD AUTO: 2.1 %
ERYTHROCYTE [DISTWIDTH] IN BLOOD BY AUTOMATED COUNT: 12.4 %
GLOBULIN PLAS-MCNC: 3.4 G/DL (ref 2.8–4.4)
GLUCOSE BLD-MCNC: 104 MG/DL (ref 70–99)
GLUCOSE UR STRIP.AUTO-MCNC: NEGATIVE MG/DL
HCT VFR BLD AUTO: 44.4 %
HGB BLD-MCNC: 15.5 G/DL
IMM GRANULOCYTES # BLD AUTO: 0.02 X10(3) UL (ref 0–1)
IMM GRANULOCYTES NFR BLD: 0.3 %
KETONES UR STRIP.AUTO-MCNC: NEGATIVE MG/DL
LEUKOCYTE ESTERASE UR QL STRIP.AUTO: NEGATIVE
LYMPHOCYTES # BLD AUTO: 2.67 X10(3) UL (ref 1–4)
LYMPHOCYTES NFR BLD AUTO: 37.5 %
MCH RBC QN AUTO: 31.4 PG (ref 26–34)
MCHC RBC AUTO-ENTMCNC: 34.9 G/DL (ref 31–37)
MCV RBC AUTO: 89.9 FL
MONOCYTES # BLD AUTO: 0.57 X10(3) UL (ref 0.1–1)
MONOCYTES NFR BLD AUTO: 8 %
NEUTROPHILS # BLD AUTO: 3.67 X10 (3) UL (ref 1.5–7.7)
NEUTROPHILS # BLD AUTO: 3.67 X10(3) UL (ref 1.5–7.7)
NEUTROPHILS NFR BLD AUTO: 51.5 %
NITRITE UR QL STRIP.AUTO: NEGATIVE
OSMOLALITY SERPL CALC.SUM OF ELEC: 285 MOSM/KG (ref 275–295)
PH UR STRIP.AUTO: 8.5 [PH] (ref 5–8)
PLATELET # BLD AUTO: 376 10(3)UL (ref 150–450)
POTASSIUM SERPL-SCNC: 4.1 MMOL/L (ref 3.5–5.1)
PROT SERPL-MCNC: 7.6 G/DL (ref 6.4–8.2)
RBC # BLD AUTO: 4.94 X10(6)UL
RBC #/AREA URNS AUTO: >10 /HPF
SODIUM SERPL-SCNC: 136 MMOL/L (ref 136–145)
SP GR UR STRIP.AUTO: 1.02 (ref 1–1.03)
UROBILINOGEN UR STRIP.AUTO-MCNC: 0.2 MG/DL
WBC # BLD AUTO: 7.1 X10(3) UL (ref 4–11)

## 2024-11-01 PROCEDURE — 74176 CT ABD & PELVIS W/O CONTRAST: CPT | Performed by: EMERGENCY MEDICINE

## 2024-11-01 PROCEDURE — 80053 COMPREHEN METABOLIC PANEL: CPT | Performed by: EMERGENCY MEDICINE

## 2024-11-01 PROCEDURE — 81015 MICROSCOPIC EXAM OF URINE: CPT | Performed by: EMERGENCY MEDICINE

## 2024-11-01 PROCEDURE — 99284 EMERGENCY DEPT VISIT MOD MDM: CPT

## 2024-11-01 PROCEDURE — 96376 TX/PRO/DX INJ SAME DRUG ADON: CPT

## 2024-11-01 PROCEDURE — 81001 URINALYSIS AUTO W/SCOPE: CPT | Performed by: EMERGENCY MEDICINE

## 2024-11-01 PROCEDURE — 99285 EMERGENCY DEPT VISIT HI MDM: CPT

## 2024-11-01 PROCEDURE — 85025 COMPLETE CBC W/AUTO DIFF WBC: CPT | Performed by: EMERGENCY MEDICINE

## 2024-11-01 PROCEDURE — 96361 HYDRATE IV INFUSION ADD-ON: CPT

## 2024-11-01 PROCEDURE — 96375 TX/PRO/DX INJ NEW DRUG ADDON: CPT

## 2024-11-01 PROCEDURE — 96374 THER/PROPH/DIAG INJ IV PUSH: CPT

## 2024-11-01 RX ORDER — HYDROMORPHONE HYDROCHLORIDE 1 MG/ML
1 INJECTION, SOLUTION INTRAMUSCULAR; INTRAVENOUS; SUBCUTANEOUS EVERY 30 MIN PRN
Status: DISCONTINUED | OUTPATIENT
Start: 2024-11-01 | End: 2024-11-02

## 2024-11-01 RX ORDER — HYDROCODONE BITARTRATE AND ACETAMINOPHEN 10; 325 MG/1; MG/1
1-2 TABLET ORAL EVERY 6 HOURS PRN
Qty: 15 TABLET | Refills: 0 | Status: SHIPPED | OUTPATIENT
Start: 2024-11-01 | End: 2024-11-06

## 2024-11-01 RX ORDER — KETOROLAC TROMETHAMINE 15 MG/ML
15 INJECTION, SOLUTION INTRAMUSCULAR; INTRAVENOUS ONCE
Status: COMPLETED | OUTPATIENT
Start: 2024-11-01 | End: 2024-11-01

## 2024-11-01 RX ORDER — ONDANSETRON 2 MG/ML
4 INJECTION INTRAMUSCULAR; INTRAVENOUS ONCE
Status: COMPLETED | OUTPATIENT
Start: 2024-11-01 | End: 2024-11-01

## 2024-11-01 RX ORDER — KETOROLAC TROMETHAMINE 10 MG/1
10 TABLET, FILM COATED ORAL EVERY 6 HOURS PRN
Qty: 20 TABLET | Refills: 0 | Status: SHIPPED | OUTPATIENT
Start: 2024-11-01 | End: 2024-11-08

## 2024-11-01 RX ORDER — TAMSULOSIN HYDROCHLORIDE 0.4 MG/1
0.4 CAPSULE ORAL DAILY
Qty: 7 CAPSULE | Refills: 0 | Status: SHIPPED | OUTPATIENT
Start: 2024-11-01 | End: 2024-11-08

## 2024-11-02 NOTE — ED PROVIDER NOTES
Patient Seen in: [facility] In [place]      History     Chief Complaint   Patient presents with    Abdomen/Flank Pain     Stated Complaint: kidney stone    Subjective:   HPI      29-year-old male with history of recurrent cystine kidney stones presents for evaluation of right flank pain starting suddenly today.  Pain radiates to his right groin.  It is associated with nausea and vomiting.  No recent fever or shaking chills.  No dysuria.    Objective:     Past Medical History:    Anxiety state    Calculus of kidney    Cystine disease (HCC)    Depression    IBS (irritable bowel syndrome)    Migraines    OCD (obsessive compulsive disorder)              Past Surgical History:   Procedure Laterality Date    Colonoscopy      Cystoscopy,insert ureteral stent  03/07/2024    Lithotripsy      Other surgical history  02/17/2011    Rt RPG, URS stone manipulation,laser litho,Rt stent, Dr. Mcdonough    Other surgical history  02/21/2011    Cysto stent removal- Dr. Mcdonough    Other surgical history  11/21/2015    Cysto, L URS, laser litho, stone extraction, stent placement, RPG Dr. Finn    Other surgical history  12/01/2015    cysto stent removal    Upper gi endoscopy,exam                  Social History     Socioeconomic History    Marital status: Single   Tobacco Use    Smoking status: Never     Passive exposure: Never    Smokeless tobacco: Never   Vaping Use    Vaping status: Never Used   Substance and Sexual Activity    Alcohol use: No    Drug use: No     Social Drivers of Health     Food Insecurity: No Food Insecurity (6/25/2024)    Food Insecurity     Food Insecurity: Never true   Transportation Needs: No Transportation Needs (6/25/2024)    Transportation Needs     Lack of Transportation: No   Housing Stability: Low Risk  (6/25/2024)    Housing Stability     Housing Instability: No                  Physical Exam     ED Triage Vitals [11/01/24 2019]   BP (!) 140/119   Pulse (!) 121   Resp 20   Temp 98 °F  (36.7 °C)   Temp src Oral   SpO2 95 %   O2 Device None (Room air)       Current Vitals:   Vital Signs  BP: (!) 133/91  Pulse: 107  Resp: 18  Temp: 98 °F (36.7 °C)  Temp src: Oral    Oxygen Therapy  SpO2: 95 %  O2 Device: None (Room air)        Physical Exam     General: Alert, oriented, writhing in pain  HEENT:  Pupils equal reactive.  Extraocular motions intact. MMM.  Neck: Supple  Lungs: Clear to auscultation bilaterally.  Heart: Regular rate and rhythm.  Abdomen: Soft, nontender.   Skin: No rash.  No edema.  Neurologic: No focal neurologic deficits.  Normal speech pattern  Musculoskeletal: No tenderness or deformity noted.  Full range of motion throughout.      ED Course     Labs Reviewed   COMP METABOLIC PANEL (14) - Abnormal; Notable for the following components:       Result Value    Glucose 104 (*)     All other components within normal limits   URINALYSIS WITH CULTURE REFLEX - Abnormal; Notable for the following components:    Blood Urine Moderate (*)     pH Urine 8.5 (*)     Protein Urine 30 mg/dL (*)     All other components within normal limits   CBC WITH DIFFERENTIAL WITH PLATELET   UA MICROSCOPIC ONLY, URINE                   MDM      Differential diagnosis includes renal colic, UTI, gallstones      Medications   HYDROmorphone (Dilaudid) 1 MG/ML injection 1 mg (1 mg Intravenous Given 11/1/24 2058)   ketorolac (Toradol) 15 MG/ML injection 15 mg (15 mg Intravenous Given 11/1/24 2025)   ondansetron (Zofran) 4 MG/2ML injection 4 mg (4 mg Intravenous Given 11/1/24 2025)   sodium chloride 0.9 % IV bolus 1,000 mL (0 mL Intravenous Stopped 11/1/24 2128)     Better after medications.    CT ABDOMEN+PELVIS KIDNEYSTONE 2D RNDR(NO IV,NO ORAL)(CPT=74176)    Result Date: 11/1/2024  PROCEDURE:  CT ABDOMEN+PELVIS KIDNEYSTONE 2D RNDR(NO IV,NO ORAL)(CPT=74176)  COMPARISON:  [facility], CT, CT ABDOMEN+PELVIS KIDNEYSTONE 2D RNDR(NO IV,NO ORAL)(CPT=74176), 7/01/2024, 4:36 PM.  [facility], CT, CT ABDOMEN+PELVIS KIDNEYSTONE 2D  RNDR(NO IV,NO ORAL)(CPT=74176), 6/24/2024, 11:03 PM.  [facility] , US, US KIDNEYS (CPT=76775),  6/07/2024, 12:21 PM.  [facility], CT, CT ABDOMEN+PELVIS KIDNEYSTONE 2D RNDR(NO IV,NO ORAL)(CPT=74176), 6/05/2024, 10:59 PM.  [facility], CT, CT ABDOMEN+PELVIS KIDNEYSTONE 2D RNDR(NO IV,NO ORAL)(CPT=74176), 4/29/2024, 4:49 PM.  [facility], CT, CT ABDOMEN+PELVIS KIDNEYSTONE 2D RNDR(NO IV,NO ORAL)(CPT=74176), 3/07/2024, 11:18 AM.  CT, CT ABDOMEN+PELVIS KIDNEYSTONE 2D RNDR(NO IV,NO ORAL)(CPT=74176), 2/10/2024, 8:42 AM.  CT, CT ABDOMEN+PELVIS KIDNEYSTONE 2D RNDR(NO IV,NO ORAL)(CPT=74176), 2/06/2024, 5:52 AM.  CT, CT ABDOMEN+PELVIS KIDNEYSTONE 2D RNDR(NO IV,NO ORAL)(CPT=74176), 11/06/2023, 10:31 AM.  [facility], CT, CT ABDOMEN+PELVIS KIDNEYSTONE 2D RNDR(NO IV,NO ORAL)(CPT=74176), 9/19/2024, 9:01 AM.  INDICATIONS:  Severe right flank pain  TECHNIQUE:  Unenhanced multislice CT scanning from above the kidneys to below the urinary bladder.  2D rendering are generated on the CT scanner workstation to localize potential stones in the cranio-caudal plane.  Dose reduction techniques were used. Dose information is transmitted to the ACR (American College of Radiology) NRDR (National Radiology Data Registry) which includes the Dose Index Registry.  PATIENT STATED HISTORY: (As transcribed by Technologist)  Pt has hx of kidney stones and renal colic. Presents with severe R flank/rlq pain since 1345 today. Pt is writhing in pain.    FINDINGS:  KIDNEYS:  There is a 2.5 mm mildly obstructing calculus right UPJ.  There is mild enlargement of the right kidney compared to the left.  There is a 2 mm nonobstructing calculus upper pole right kidney and midpole left kidney. BLADDER:  No mass, calculus or significant wall thickening. ADRENALS:  No mass or enlargement.  LIVER:  No enlargement, atrophy, abnormal density, or significant focal lesion.  BILIARY:  No visible dilatation or calcification.  PANCREAS:  No lesion, fluid collection, ductal  dilatation, or atrophy.  SPLEEN:  No enlargement or focal lesion.  AORTA/VASCULAR:  No aneurysm.  RETROPERITONEUM:  No mass or adenopathy.  BOWEL/MESENTERY:  No obstruction.  The appendix is normal.  There is some fluid and air within nondistended small bowel suggesting mild ileus.  Moderate amount of fluid and air within the stomach. ABDOMINAL WALL:  No mass or hernia.  BONES:  No bony lesion or fracture. PELVIC ORGANS:  Normal for age.  LUNG BASES:  No visible pulmonary or pleural disease.  OTHER:  Negative.             CONCLUSION:  There is a 2.5 mm mildly obstructing calculus right UPJ.  There is mild enlargement of the right kidney compared to the left.  There is a 2 mm nonobstructing calculus upper pole right kidney and midpole left kidney.   LOCATION:  [facility]   Dictated by (CST): Fernando Vargas MD on 11/01/2024 at 9:14 PM     Finalized by (CST): Fernando Vargas MD on 11/01/2024 at 9:21 PM        UA negative for infection    Prescription for Norco and Toradol and Flomax called to pharmacy.  Patient will touch base with Dr. Plascencia tomorrow.    Medical Decision Making  Amount and/or Complexity of Data Reviewed  Independent Historian: parent        Disposition and Plan     Clinical Impression:  1. Kidney stone         Disposition:  Discharge  11/1/2024  9:57 pm    Follow-up:  Crow Plascencia MD  [address] [place] [address]  [address] [place] [address]  [phone]    Call            Medications Prescribed:  Current Discharge Medication List        START taking these medications    Details   HYDROcodone-acetaminophen  MG Oral Tab Take 1-2 tablets by mouth every 6 (six) hours as needed for Pain.  Qty: 15 tablet, Refills: 0    Associated Diagnoses: Kidney stone      Ketorolac Tromethamine 10 MG Oral Tab Take 1 tablet (10 mg total) by mouth every 6 (six) hours as needed for Pain.  Qty: 20 tablet, Refills: 0      !! tamsulosin (FLOMAX) 0.4 MG Oral Cap Take 1 capsule (0.4 mg total) by mouth daily for 7 days.  Qty: 7  capsule, Refills: 0       !! - Potential duplicate medications found. Please discuss with provider.              Supplementary Documentation:

## 2024-11-02 NOTE — ED INITIAL ASSESSMENT (HPI)
Pt has hx of kidney stones and renal colic. Presents with severe R flank/rlq pain since 1345 today. Pt is writhin in pain.

## 2024-11-02 NOTE — DISCHARGE INSTRUCTIONS
Try Toradol as needed for recurrent pain.  Norco for breakthrough pain.    Increase fluids and take Flomax daily until stone is passed.  If stone is not passed by tomorrow, call Dr. Plascencia

## 2025-01-29 ENCOUNTER — APPOINTMENT (OUTPATIENT)
Dept: CT IMAGING | Age: 30
End: 2025-01-29
Attending: EMERGENCY MEDICINE
Payer: COMMERCIAL

## 2025-01-29 ENCOUNTER — HOSPITAL ENCOUNTER (EMERGENCY)
Age: 30
Discharge: HOME | End: 2025-01-30
Attending: EMERGENCY MEDICINE
Payer: COMMERCIAL

## 2025-01-29 ENCOUNTER — APPOINTMENT (OUTPATIENT)
Dept: GENERAL RADIOLOGY | Age: 30
End: 2025-01-29
Attending: EMERGENCY MEDICINE
Payer: COMMERCIAL

## 2025-01-29 DIAGNOSIS — R10.9 FLANK PAIN: Primary | ICD-10-CM

## 2025-01-29 DIAGNOSIS — R11.0 NAUSEA: ICD-10-CM

## 2025-01-29 LAB
ALBUMIN SERPL-MCNC: 4.5 G/DL (ref 3.2–4.8)
ALBUMIN/GLOB SERPL: 1.6 {RATIO} (ref 1–2)
ALP LIVER SERPL-CCNC: 51 U/L
ALT SERPL-CCNC: 22 U/L
ANION GAP SERPL CALC-SCNC: 8 MMOL/L (ref 0–18)
AST SERPL-CCNC: 20 U/L (ref ?–34)
BASOPHILS # BLD AUTO: 0.01 X10(3) UL (ref 0–0.2)
BASOPHILS NFR BLD AUTO: 0.3 %
BILIRUB SERPL-MCNC: 0.4 MG/DL (ref 0.3–1.2)
BILIRUB UR QL STRIP.AUTO: NEGATIVE
BUN BLD-MCNC: 10 MG/DL (ref 9–23)
CALCIUM BLD-MCNC: 9.5 MG/DL (ref 8.7–10.6)
CHLORIDE SERPL-SCNC: 99 MMOL/L (ref 98–112)
CLARITY UR REFRACT.AUTO: CLEAR
CO2 SERPL-SCNC: 29 MMOL/L (ref 21–32)
COLOR UR AUTO: YELLOW
CREAT BLD-MCNC: 0.86 MG/DL
EGFRCR SERPLBLD CKD-EPI 2021: 120 ML/MIN/1.73M2 (ref 60–?)
EOSINOPHIL # BLD AUTO: 0.04 X10(3) UL (ref 0–0.7)
EOSINOPHIL NFR BLD AUTO: 1.3 %
ERYTHROCYTE [DISTWIDTH] IN BLOOD BY AUTOMATED COUNT: 12.4 %
GLOBULIN PLAS-MCNC: 2.8 G/DL (ref 2–3.5)
GLUCOSE BLD-MCNC: 92 MG/DL (ref 70–99)
GLUCOSE UR STRIP.AUTO-MCNC: NEGATIVE MG/DL
HCT VFR BLD AUTO: 40.8 %
HGB BLD-MCNC: 14.4 G/DL
IMM GRANULOCYTES # BLD AUTO: 0.01 X10(3) UL (ref 0–1)
IMM GRANULOCYTES NFR BLD: 0.3 %
KETONES UR STRIP.AUTO-MCNC: 40 MG/DL
LEUKOCYTE ESTERASE UR QL STRIP.AUTO: NEGATIVE
LIPASE SERPL-CCNC: 77 U/L (ref 12–53)
LYMPHOCYTES # BLD AUTO: 1.42 X10(3) UL (ref 1–4)
LYMPHOCYTES NFR BLD AUTO: 45.4 %
MCH RBC QN AUTO: 31.2 PG (ref 26–34)
MCHC RBC AUTO-ENTMCNC: 35.3 G/DL (ref 31–37)
MCV RBC AUTO: 88.5 FL
MONOCYTES # BLD AUTO: 0.31 X10(3) UL (ref 0.1–1)
MONOCYTES NFR BLD AUTO: 9.9 %
NEUTROPHILS # BLD AUTO: 1.34 X10 (3) UL (ref 1.5–7.7)
NEUTROPHILS # BLD AUTO: 1.34 X10(3) UL (ref 1.5–7.7)
NEUTROPHILS NFR BLD AUTO: 42.8 %
NITRITE UR QL STRIP.AUTO: NEGATIVE
OSMOLALITY SERPL CALC.SUM OF ELEC: 281 MOSM/KG (ref 275–295)
PH UR STRIP.AUTO: 6.5 [PH] (ref 5–8)
PLATELET # BLD AUTO: 239 10(3)UL (ref 150–450)
POTASSIUM SERPL-SCNC: 3.7 MMOL/L (ref 3.5–5.1)
PROT SERPL-MCNC: 7.3 G/DL (ref 5.7–8.2)
PROT UR STRIP.AUTO-MCNC: NEGATIVE MG/DL
RBC # BLD AUTO: 4.61 X10(6)UL
RBC UR QL AUTO: NEGATIVE
SODIUM SERPL-SCNC: 136 MMOL/L (ref 136–145)
SP GR UR STRIP.AUTO: 1.02 (ref 1–1.03)
UROBILINOGEN UR STRIP.AUTO-MCNC: 0.2 MG/DL
WBC # BLD AUTO: 3.1 X10(3) UL (ref 4–11)

## 2025-01-29 PROCEDURE — 96375 TX/PRO/DX INJ NEW DRUG ADDON: CPT

## 2025-01-29 PROCEDURE — 99284 EMERGENCY DEPT VISIT MOD MDM: CPT

## 2025-01-29 PROCEDURE — 81003 URINALYSIS AUTO W/O SCOPE: CPT | Performed by: PHYSICIAN ASSISTANT

## 2025-01-29 PROCEDURE — 83690 ASSAY OF LIPASE: CPT | Performed by: EMERGENCY MEDICINE

## 2025-01-29 PROCEDURE — 0241U SARS-COV-2/FLU A AND B/RSV BY PCR (GENEXPERT): CPT | Performed by: EMERGENCY MEDICINE

## 2025-01-29 PROCEDURE — 74177 CT ABD & PELVIS W/CONTRAST: CPT | Performed by: EMERGENCY MEDICINE

## 2025-01-29 PROCEDURE — 96361 HYDRATE IV INFUSION ADD-ON: CPT

## 2025-01-29 PROCEDURE — 71045 X-RAY EXAM CHEST 1 VIEW: CPT | Performed by: EMERGENCY MEDICINE

## 2025-01-29 PROCEDURE — 85025 COMPLETE CBC W/AUTO DIFF WBC: CPT | Performed by: PHYSICIAN ASSISTANT

## 2025-01-29 PROCEDURE — 99285 EMERGENCY DEPT VISIT HI MDM: CPT

## 2025-01-29 PROCEDURE — 80053 COMPREHEN METABOLIC PANEL: CPT | Performed by: PHYSICIAN ASSISTANT

## 2025-01-29 PROCEDURE — 96374 THER/PROPH/DIAG INJ IV PUSH: CPT

## 2025-01-29 RX ORDER — SODIUM CHLORIDE 9 MG/ML
1000 INJECTION, SOLUTION INTRAVENOUS CONTINUOUS
Status: DISCONTINUED | OUTPATIENT
Start: 2025-01-29 | End: 2025-01-30

## 2025-01-29 RX ORDER — MORPHINE SULFATE 4 MG/ML
4 INJECTION, SOLUTION INTRAMUSCULAR; INTRAVENOUS EVERY 30 MIN PRN
Status: DISCONTINUED | OUTPATIENT
Start: 2025-01-29 | End: 2025-01-30

## 2025-01-29 RX ORDER — ONDANSETRON 2 MG/ML
4 INJECTION INTRAMUSCULAR; INTRAVENOUS ONCE
Status: COMPLETED | OUTPATIENT
Start: 2025-01-29 | End: 2025-01-29

## 2025-01-29 RX ORDER — KETOROLAC TROMETHAMINE 15 MG/ML
15 INJECTION, SOLUTION INTRAMUSCULAR; INTRAVENOUS ONCE
Status: COMPLETED | OUTPATIENT
Start: 2025-01-29 | End: 2025-01-29

## 2025-01-29 RX ORDER — BENZONATATE 200 MG/1
200 CAPSULE ORAL 3 TIMES DAILY PRN
COMMUNITY

## 2025-01-30 VITALS
OXYGEN SATURATION: 98 % | BODY MASS INDEX: 28.25 KG/M2 | DIASTOLIC BLOOD PRESSURE: 83 MMHG | WEIGHT: 180 LBS | HEART RATE: 73 BPM | TEMPERATURE: 98 F | SYSTOLIC BLOOD PRESSURE: 125 MMHG | RESPIRATION RATE: 15 BRPM | HEIGHT: 67 IN

## 2025-01-30 LAB
FLUAV + FLUBV RNA SPEC NAA+PROBE: NEGATIVE
FLUAV + FLUBV RNA SPEC NAA+PROBE: POSITIVE
RSV RNA SPEC NAA+PROBE: NEGATIVE
SARS-COV-2 RNA RESP QL NAA+PROBE: NOT DETECTED

## 2025-01-30 PROCEDURE — S0119 ONDANSETRON 4 MG: HCPCS | Performed by: EMERGENCY MEDICINE

## 2025-01-30 RX ORDER — ONDANSETRON 4 MG/1
4 TABLET, ORALLY DISINTEGRATING ORAL ONCE
Status: COMPLETED | OUTPATIENT
Start: 2025-01-30 | End: 2025-01-30

## 2025-01-30 RX ORDER — ONDANSETRON 4 MG/1
4 TABLET, ORALLY DISINTEGRATING ORAL EVERY 8 HOURS PRN
Qty: 20 TABLET | Refills: 0 | Status: SHIPPED | OUTPATIENT
Start: 2025-01-30 | End: 2025-02-06

## 2025-01-30 NOTE — DISCHARGE INSTRUCTIONS
Return to ED if vomiting, increased pain    Your CT scan shows 2 potential areas of intussusception which are likely not clinically relevant.  You can follow-up with general surgeon, Dr. Srivastava listed above for these    You may take Tylenol or Motrin for pain

## 2025-01-30 NOTE — ED INITIAL ASSESSMENT (HPI)
To to ED with abdominal pain x2 weeks, decreased urinary output,  +nausea and diarrhea. Reports fever at the beginning of the week. Reports having US on Thurs with +kidney stones.

## 2025-01-30 NOTE — ED PROVIDER NOTES
Patient Seen in: [facility] In [place]      History     Chief Complaint   Patient presents with    Abdomen/Flank Pain    Nausea/Vomiting/Diarrhea     Stated Complaint: Right and left flank pain for the past two weeks. N/V. US completed last thursd*    Subjective:   HPI      Patient is a 29-year-old male history of kidney stones presents to ED for evaluation of bilateral flank pain.  Patient with bilateral flank pain for the last couple weeks it fluctuates in intensity described as sharp.  Sometimes radiates to the lower abdomen.  Associate with nausea.  No vomiting.  Patient states he has some dysuria.  No hematuria or frequency.  History of kidney stones in the past.  Had ultrasound 1/23/2025 showing bilateral kidney stones.  Denies abdominal surgeries.  Also complains of URI symptoms including fever, cough, runny nose for the last 5 days.  Temperature up to 103.  Denies other complaints.      Objective:     Past Medical History:    Anxiety state    Calculus of kidney    Cystine disease (HCC)    Depression    IBS (irritable bowel syndrome)    Migraines    OCD (obsessive compulsive disorder)              Past Surgical History:   Procedure Laterality Date    Colonoscopy      Cystoscopy,insert ureteral stent  03/07/2024    Lithotripsy      Other surgical history  02/17/2011    Rt RPG, URS stone manipulation,laser litho,Rt stent, Dr. Mcdonough    Other surgical history  02/21/2011    Cysto stent removal- Dr. Mcdonough    Other surgical history  11/21/2015    Cysto, L URS, laser litho, stone extraction, stent placement, RPG Dr. Finn    Other surgical history  12/01/2015    cysto stent removal    Upper gi endoscopy,exam                  Social History     Socioeconomic History    Marital status: Single   Tobacco Use    Smoking status: Never     Passive exposure: Never    Smokeless tobacco: Never   Vaping Use    Vaping status: Never Used   Substance and Sexual Activity    Alcohol use: No    Drug use: No      Social Drivers of Health     Food Insecurity: No Food Insecurity (6/25/2024)    Food Insecurity     Food Insecurity: Never true   Transportation Needs: No Transportation Needs (6/25/2024)    Transportation Needs     Lack of Transportation: No   Housing Stability: Low Risk  (6/25/2024)    Housing Stability     Housing Instability: No                  Physical Exam     ED Triage Vitals [01/29/25 2143]   BP (!) 146/106   Pulse 85   Resp 19   Temp 98.1 °F (36.7 °C)   Temp src Oral   SpO2 96 %   O2 Device None (Room air)       Current Vitals:   Vital Signs  BP: 125/83  Pulse: 73  Resp: 15  Temp: 98.4 °F (36.9 °C)  Temp src: Oral    Oxygen Therapy  SpO2: 98 %  O2 Device: None (Room air)        Physical Exam  GENERAL: No acute distress, well appearing and non-toxic, Alert and oriented X 3   HEENT: Normocephalic, atraumatic.  Moist mucous membranes.  Pupils equal round reactive to light and accommodation, extraocular motion is intact, sclerae white, conjunctiva is pink.  Oropharynx is unremarkable, no exudate.  NECK: Supple, trachea midline, no lymphadenopathy.   LUNG: Lungs clear to auscultation bilaterally, no wheezing, no rales, no rhonchi.  CARDIOVASCULAR: Regular rate and rhythm.  Normal S1S2.  No S3S4 or murmur.  ABDOMEN: Bowel sounds are present. Soft. nondistended, no pulsatile masses.  right lower quadrant tenderness without rebound, guarding or rigidity  Back: Mild right CVA tenderness  MUSCULOSKELETAL: No calf tenderness.  Dorsalis and Posterior Tibial pulses present. No clubbing. No cyanosis.  No edema.   SKIN EXAMINATIoN: Warm and dry with normal appearance.  No rashes or lesions.  NEUROLOGICAL:  Motor strength intact all groups.  normal sensation, speech intact    ED Course     Labs Reviewed   CBC WITH DIFFERENTIAL WITH PLATELET - Abnormal; Notable for the following components:       Result Value    WBC 3.1 (*)     Neutrophil Absolute Prelim 1.34 (*)     Neutrophil Absolute 1.34 (*)     All other  components within normal limits   URINALYSIS, ROUTINE - Abnormal; Notable for the following components:    Ketones Urine 40 (*)     All other components within normal limits   LIPASE - Abnormal; Notable for the following components:    Lipase 77 (*)     All other components within normal limits   COMP METABOLIC PANEL (14) - Normal   SARS-COV-2/FLU A AND B/RSV BY PCR (GENEXPERT)            I personally reviewd CT images of abdomen and pelvis and independent interpretation shows no sign of obstructing kidney stone.  I also viewed formal radiology report as read by radiology with findings below:  CT ABDOMEN+PELVIS(CONTRAST ONLY)(CPT=74177)    Result Date: 1/29/2025  PROCEDURE:  CT ABDOMEN+PELVIS (CONTRAST ONLY) (CPT=74177)  COMPARISON:  [facility], CT, CT ABDOMEN+PELVIS KIDNEYSTONE 2D RNDR(NO IV,NO ORAL)(CPT=74176), 11/01/2024, 8:44 PM.  [facility], CT, CT ABDOMEN+PELVIS KIDNEYSTONE 2D RNDR(NO IV,NO ORAL)(CPT=74176), 9/19/2024, 9:01 AM.  [facility], CT, CT ABDOMEN+PELVIS KIDNEYSTONE 2D RNDR(NO IV,NO ORAL)(CPT=74176), 7/01/2024, 4:36 PM.  [facility], CT, CT ABDOMEN+PELVIS KIDNEYSTONE 2D RNDR(NO IV,NO ORAL)(CPT=74176), 6/24/2024, 11:03 PM.  INDICATIONS:  Right and left flank pain for the past two weeks. N/V. US completed last thursday with kidney stones present  TECHNIQUE:  CT scanning was performed from the dome of the diaphragm to the pubic symphysis with non-ionic intravenous contrast material. Post contrast coronal MPR imaging was performed.  Dose reduction techniques were used. Dose information is transmitted to the ACR (American College of Radiology) NRDR (National Radiology Data Registry) which includes the Dose Index Registry.  PATIENT STATED HISTORY:(As transcribed by Technologist)  Bilateral flank pain for two weeks with nausea and vomiting.   CONTRAST USED:  80cc of Isovue 370  FINDINGS:  LIVER:  No enlargement, atrophy, abnormal density, or significant focal lesion.  BILIARY:  No visible dilatation or  calcification.  PANCREAS:  No lesion, fluid collection, ductal dilatation, or atrophy.  SPLEEN:  No enlargement or focal lesion.  KIDNEYS:  Previously noted hydronephrosis on the right has resolved.  There is a nonobstructing right upper pole kidney stone measuring 2 mm.  There is a nonobstructing left lower pole kidney stone measuring 2-3 mm. ADRENALS:  No mass or enlargement.  AORTA/VASCULAR:  No aneurysm or dissection.  RETROPERITONEUM:  No mass or adenopathy.  BOWEL/MESENTERY:  No visible mass, obstruction, or bowel wall thickening.  There are small transient intussusception is within the small bowel involving the left abdomen without evidence of obstruction which is likely not clinically significant.  The appendix is normal. ABDOMINAL WALL:  No mass or hernia.  URINARY BLADDER:  No visible focal wall thickening, lesion, or calculus.  PELVIC NODES:  No adenopathy.  PELVIC ORGANS:  No visible mass.  Pelvic organs appropriate for patient age.  BONES:  No bony lesion or fracture.  LUNG BASES:  No visible pulmonary or pleural disease.  OTHER:  Negative.             CONCLUSION:   1.  Nonobstructing bilateral small kidney stones  2. There are 2 separate small intussusceptions which are not obstructing and therefore are likely not clinically relevant.   LOCATION:  [facility]   Dictated by (CST): Robin Lee MD on 1/29/2025 at 11:21 PM     Finalized by (CST): Robin Lee MD on 1/29/2025 at 11:24 PM       XR CHEST AP PORTABLE  (CPT=71045)    Result Date: 1/29/2025  PROCEDURE:  XR CHEST AP PORTABLE  (CPT=71045)  TECHNIQUE:  AP chest radiograph was obtained.  COMPARISON:  [facility], XR, XR CHEST AP PORTABLE  (CPT=71045), 3/29/2024, 9:02 PM.  INDICATIONS:  Right and left flank pain for the past two weeks. N/V. US completed last thursday with kidney stones present  PATIENT STATED HISTORY: (As transcribed by Technologist)  Pt c/o cough and chest pain since 1/26/25.    FINDINGS:  The heart is normal in size.  The lungs are  clear.  There are no pleural effusions.  The bones are unremarkable.            CONCLUSION:  No acute disease.    LOCATION:  [facility]      Dictated by (CST): Robin Lee MD on 1/29/2025 at 11:06 PM     Finalized by (CST): Robin Lee MD on 1/29/2025 at 11:07 PM          Medications   ketorolac (Toradol) 15 MG/ML injection 15 mg (15 mg Intravenous Given 1/29/25 2235)   ondansetron (Zofran) 4 MG/2ML injection 4 mg (4 mg Intravenous Given 1/29/25 2233)   iopamidol 76% (ISOVUE-370) injection for power injector (80 mL Intravenous Given 1/29/25 2259)   ondansetron (Zofran-ODT) disintegrating tab 4 mg (4 mg Oral Given 1/30/25 0037)          MDM      Patient is a 29-year-old male presents to ED for evaluation of bilateral flank pain intermittently for 2 weeks that radiates into his abdomen.  Differential urinary tract infection, pyelonephritis, kidney stone, appendicitis.  Patient had laboratory testing performed including urinalysis.  Labs and urine unremarkable except for white blood cell count of 3.1.  CT abdomen pelvis shows nonobstructing stone within each kidney.  Appendix was normal on CT.  2 separate small intussusceptions which are nonobstructing noted on CT.  Clinically, patient has benign abdomen and no vomiting.  Suspect intussusceptions are not clinically relevant.  Spoke with general surgery, Dr. Srivastava regarding this and he stated the patient had a benign exam he could go home and follow-up in the office.  Patient reexamined and symptoms are improved.  Abdomen nontender.  Patient informed of CT findings and advised return if any vomiting, increased pain or other problems.  Etiology of his flank pain is indeterminate.  Patient be given Zofran for home.  He declined pain medication for home.    Patient was screened and evaluated during this visit.   As a treating physician attending to the patient, I determined, within reasonable clinical confidence and prior to discharge, that an emergency medical condition  was not or was no longer present.  There was no indication for further evaluation, treatment or admission on an emergency basis.  Comprehensive verbal and written discharge and follow-up instructions were provided to help prevent relapse or worsening.  Patient was instructed to follow-up with her primary care provider for further evaluation and treatment, but to return immediately to the ER for worsening, concerning, new, changing or persisting symptoms.  I discussed the case with the patient and they had no questions, complaints, or concerns.  Patient felt comfortable going home.        Medical Decision Making      Disposition and Plan     Clinical Impression:  1. Flank pain    2. Nausea         Disposition:  Discharge  1/30/2025 12:31 am    Follow-up:  Pablo Srivastava MD  [address]  [address]  [facility] [address]  [phone]    Follow up in 2 day(s)            Medications Prescribed:  Discharge Medication List as of 1/30/2025 12:37 AM        START taking these medications    Details   ondansetron 4 MG Oral Tablet Dispersible Take 1 tablet (4 mg total) by mouth every 8 (eight) hours as needed for Nausea., Normal, Disp-20 tablet, R-0                 Supplementary Documentation:

## 2025-04-15 ENCOUNTER — APPOINTMENT (OUTPATIENT)
Dept: CT IMAGING | Age: 30
End: 2025-04-15
Attending: EMERGENCY MEDICINE
Payer: COMMERCIAL

## 2025-04-15 ENCOUNTER — HOSPITAL ENCOUNTER (OUTPATIENT)
Facility: HOSPITAL | Age: 30
Setting detail: OBSERVATION
Discharge: HOME | End: 2025-04-17
Attending: EMERGENCY MEDICINE | Admitting: INTERNAL MEDICINE
Payer: COMMERCIAL

## 2025-04-15 DIAGNOSIS — N20.1 CALCULUS OF PROXIMAL RIGHT URETER: ICD-10-CM

## 2025-04-15 DIAGNOSIS — N23 RENAL COLIC: Primary | ICD-10-CM

## 2025-04-15 LAB
ALBUMIN SERPL-MCNC: 5.1 G/DL (ref 3.2–4.8)
ALBUMIN/GLOB SERPL: 1.7 {RATIO} (ref 1–2)
ALP LIVER SERPL-CCNC: 67 U/L (ref 45–117)
ALT SERPL-CCNC: 53 U/L (ref 10–49)
ANION GAP SERPL CALC-SCNC: 9 MMOL/L (ref 0–18)
AST SERPL-CCNC: 26 U/L (ref ?–34)
BASOPHILS # BLD AUTO: 0.05 X10(3) UL (ref 0–0.2)
BASOPHILS NFR BLD AUTO: 0.8 %
BILIRUB SERPL-MCNC: 0.6 MG/DL (ref 0.3–1.2)
BILIRUB UR QL STRIP.AUTO: NEGATIVE
BUN BLD-MCNC: 16 MG/DL (ref 9–23)
CALCIUM BLD-MCNC: 10 MG/DL (ref 8.7–10.6)
CHLORIDE SERPL-SCNC: 103 MMOL/L (ref 98–112)
CLARITY UR REFRACT.AUTO: CLEAR
CO2 SERPL-SCNC: 27 MMOL/L (ref 21–32)
COLOR UR AUTO: YELLOW
CREAT BLD-MCNC: 1.03 MG/DL (ref 0.7–1.3)
EGFRCR SERPLBLD CKD-EPI 2021: 101 ML/MIN/1.73M2 (ref 60–?)
EOSINOPHIL # BLD AUTO: 0.11 X10(3) UL (ref 0–0.7)
EOSINOPHIL NFR BLD AUTO: 1.7 %
ERYTHROCYTE [DISTWIDTH] IN BLOOD BY AUTOMATED COUNT: 12.5 %
GLOBULIN PLAS-MCNC: 3 G/DL (ref 2–3.5)
GLUCOSE BLD-MCNC: 88 MG/DL (ref 70–99)
GLUCOSE UR STRIP.AUTO-MCNC: NEGATIVE MG/DL
HCT VFR BLD AUTO: 45.4 % (ref 39–53)
HGB BLD-MCNC: 15.8 G/DL (ref 13–17.5)
IMM GRANULOCYTES # BLD AUTO: 0.04 X10(3) UL (ref 0–1)
IMM GRANULOCYTES NFR BLD: 0.6 %
KETONES UR STRIP.AUTO-MCNC: NEGATIVE MG/DL
LEUKOCYTE ESTERASE UR QL STRIP.AUTO: NEGATIVE
LYMPHOCYTES # BLD AUTO: 2.23 X10(3) UL (ref 1–4)
LYMPHOCYTES NFR BLD AUTO: 34.5 %
MCH RBC QN AUTO: 31.2 PG (ref 26–34)
MCHC RBC AUTO-ENTMCNC: 34.8 G/DL (ref 31–37)
MCV RBC AUTO: 89.7 FL (ref 80–100)
MONOCYTES # BLD AUTO: 0.46 X10(3) UL (ref 0.1–1)
MONOCYTES NFR BLD AUTO: 7.1 %
NEUTROPHILS # BLD AUTO: 3.58 X10 (3) UL (ref 1.5–7.7)
NEUTROPHILS # BLD AUTO: 3.58 X10(3) UL (ref 1.5–7.7)
NEUTROPHILS NFR BLD AUTO: 55.3 %
NITRITE UR QL STRIP.AUTO: NEGATIVE
OSMOLALITY SERPL CALC.SUM OF ELEC: 289 MOSM/KG (ref 275–295)
PH UR STRIP.AUTO: 6 [PH] (ref 5–8)
PLATELET # BLD AUTO: 368 10(3)UL (ref 150–450)
POTASSIUM SERPL-SCNC: 4.3 MMOL/L (ref 3.5–5.1)
PROT SERPL-MCNC: 8.1 G/DL (ref 5.7–8.2)
RBC # BLD AUTO: 5.06 X10(6)UL (ref 4.3–5.7)
SODIUM SERPL-SCNC: 139 MMOL/L (ref 136–145)
SP GR UR STRIP.AUTO: >=1.03 (ref 1–1.03)
UROBILINOGEN UR STRIP.AUTO-MCNC: 0.2 MG/DL
WBC # BLD AUTO: 6.5 X10(3) UL (ref 4–11)

## 2025-04-15 PROCEDURE — 80053 COMPREHEN METABOLIC PANEL: CPT

## 2025-04-15 PROCEDURE — 96361 HYDRATE IV INFUSION ADD-ON: CPT

## 2025-04-15 PROCEDURE — 99285 EMERGENCY DEPT VISIT HI MDM: CPT

## 2025-04-15 PROCEDURE — 96374 THER/PROPH/DIAG INJ IV PUSH: CPT

## 2025-04-15 PROCEDURE — 81001 URINALYSIS AUTO W/SCOPE: CPT | Performed by: EMERGENCY MEDICINE

## 2025-04-15 PROCEDURE — 85025 COMPLETE CBC W/AUTO DIFF WBC: CPT | Performed by: EMERGENCY MEDICINE

## 2025-04-15 PROCEDURE — 80053 COMPREHEN METABOLIC PANEL: CPT | Performed by: EMERGENCY MEDICINE

## 2025-04-15 PROCEDURE — 81015 MICROSCOPIC EXAM OF URINE: CPT | Performed by: EMERGENCY MEDICINE

## 2025-04-15 PROCEDURE — 74176 CT ABD & PELVIS W/O CONTRAST: CPT | Performed by: EMERGENCY MEDICINE

## 2025-04-15 PROCEDURE — 85025 COMPLETE CBC W/AUTO DIFF WBC: CPT

## 2025-04-15 RX ORDER — ONDANSETRON 2 MG/ML
INJECTION INTRAMUSCULAR; INTRAVENOUS
Status: COMPLETED
Start: 2025-04-15 | End: 2025-04-15

## 2025-04-15 RX ORDER — MORPHINE SULFATE 4 MG/ML
2 INJECTION, SOLUTION INTRAMUSCULAR; INTRAVENOUS EVERY 2 HOUR PRN
Status: DISCONTINUED | OUTPATIENT
Start: 2025-04-15 | End: 2025-04-17

## 2025-04-15 RX ORDER — KETOROLAC TROMETHAMINE 30 MG/ML
30 INJECTION, SOLUTION INTRAMUSCULAR; INTRAVENOUS EVERY 6 HOURS PRN
Status: DISCONTINUED | OUTPATIENT
Start: 2025-04-15 | End: 2025-04-17

## 2025-04-15 RX ORDER — HYDROMORPHONE HYDROCHLORIDE 1 MG/ML
0.8 INJECTION, SOLUTION INTRAMUSCULAR; INTRAVENOUS; SUBCUTANEOUS EVERY 2 HOUR PRN
Status: DISCONTINUED | OUTPATIENT
Start: 2025-04-15 | End: 2025-04-17

## 2025-04-15 RX ORDER — MORPHINE SULFATE 4 MG/ML
4 INJECTION, SOLUTION INTRAMUSCULAR; INTRAVENOUS EVERY 2 HOUR PRN
Status: DISCONTINUED | OUTPATIENT
Start: 2025-04-15 | End: 2025-04-17

## 2025-04-15 RX ORDER — SODIUM CHLORIDE 9 MG/ML
INJECTION, SOLUTION INTRAVENOUS CONTINUOUS
Status: DISCONTINUED | OUTPATIENT
Start: 2025-04-15 | End: 2025-04-17

## 2025-04-15 RX ORDER — HYDROMORPHONE HYDROCHLORIDE 1 MG/ML
INJECTION, SOLUTION INTRAMUSCULAR; INTRAVENOUS; SUBCUTANEOUS
Status: COMPLETED
Start: 2025-04-15 | End: 2025-04-15

## 2025-04-15 RX ORDER — MORPHINE SULFATE 4 MG/ML
INJECTION, SOLUTION INTRAMUSCULAR; INTRAVENOUS
Status: DISCONTINUED
Start: 2025-04-15 | End: 2025-04-15 | Stop reason: WASHOUT

## 2025-04-15 RX ORDER — MORPHINE SULFATE 4 MG/ML
1 INJECTION, SOLUTION INTRAMUSCULAR; INTRAVENOUS EVERY 2 HOUR PRN
Status: DISCONTINUED | OUTPATIENT
Start: 2025-04-15 | End: 2025-04-17

## 2025-04-15 RX ORDER — HYDROCODONE BITARTRATE AND ACETAMINOPHEN 5; 325 MG/1; MG/1
2 TABLET ORAL EVERY 4 HOURS PRN
Refills: 0 | Status: DISCONTINUED | OUTPATIENT
Start: 2025-04-15 | End: 2025-04-17

## 2025-04-15 RX ORDER — HYDROMORPHONE HYDROCHLORIDE 1 MG/ML
1 INJECTION, SOLUTION INTRAMUSCULAR; INTRAVENOUS; SUBCUTANEOUS ONCE
Refills: 0 | Status: COMPLETED | OUTPATIENT
Start: 2025-04-15 | End: 2025-04-15

## 2025-04-15 RX ORDER — KETOROLAC TROMETHAMINE 15 MG/ML
15 INJECTION, SOLUTION INTRAMUSCULAR; INTRAVENOUS EVERY 6 HOURS PRN
Status: DISCONTINUED | OUTPATIENT
Start: 2025-04-15 | End: 2025-04-17

## 2025-04-15 RX ORDER — ONDANSETRON 2 MG/ML
4 INJECTION INTRAMUSCULAR; INTRAVENOUS EVERY 6 HOURS PRN
Status: DISCONTINUED | OUTPATIENT
Start: 2025-04-15 | End: 2025-04-17

## 2025-04-15 RX ORDER — HEPARIN SODIUM 5000 [USP'U]/ML
5000 INJECTION, SOLUTION INTRAVENOUS; SUBCUTANEOUS EVERY 8 HOURS SCHEDULED
Status: DISCONTINUED | OUTPATIENT
Start: 2025-04-15 | End: 2025-04-17

## 2025-04-15 RX ORDER — HYDROMORPHONE HYDROCHLORIDE 1 MG/ML
0.4 INJECTION, SOLUTION INTRAMUSCULAR; INTRAVENOUS; SUBCUTANEOUS EVERY 2 HOUR PRN
Status: DISCONTINUED | OUTPATIENT
Start: 2025-04-15 | End: 2025-04-17

## 2025-04-15 RX ORDER — HYDROCODONE BITARTRATE AND ACETAMINOPHEN 5; 325 MG/1; MG/1
1 TABLET ORAL EVERY 4 HOURS PRN
Refills: 0 | Status: DISCONTINUED | OUTPATIENT
Start: 2025-04-15 | End: 2025-04-17

## 2025-04-15 RX ORDER — POTASSIUM CITRATE 540 MG/1
20 TABLET, EXTENDED RELEASE ORAL 4 TIMES DAILY
Status: DISCONTINUED | OUTPATIENT
Start: 2025-04-15 | End: 2025-04-17

## 2025-04-15 RX ORDER — KETOROLAC TROMETHAMINE 30 MG/ML
30 INJECTION, SOLUTION INTRAMUSCULAR; INTRAVENOUS ONCE
Status: DISCONTINUED | OUTPATIENT
Start: 2025-04-15 | End: 2025-04-17

## 2025-04-15 RX ORDER — KETOROLAC TROMETHAMINE 15 MG/ML
INJECTION, SOLUTION INTRAMUSCULAR; INTRAVENOUS
Status: COMPLETED
Start: 2025-04-15 | End: 2025-04-15

## 2025-04-15 RX ORDER — HYDROMORPHONE HYDROCHLORIDE 1 MG/ML
0.2 INJECTION, SOLUTION INTRAMUSCULAR; INTRAVENOUS; SUBCUTANEOUS EVERY 2 HOUR PRN
Status: DISCONTINUED | OUTPATIENT
Start: 2025-04-15 | End: 2025-04-17

## 2025-04-15 RX ORDER — TAMSULOSIN HYDROCHLORIDE 0.4 MG/1
0.4 CAPSULE ORAL DAILY
Status: DISCONTINUED | OUTPATIENT
Start: 2025-04-16 | End: 2025-04-17

## 2025-04-15 RX ORDER — HYDROCODONE BITARTRATE AND ACETAMINOPHEN 5; 325 MG/1; MG/1
1-2 TABLET ORAL EVERY 4 HOURS PRN
Refills: 0 | Status: DISCONTINUED | OUTPATIENT
Start: 2025-04-15 | End: 2025-04-15

## 2025-04-15 NOTE — ED QUICK NOTES
Charge RN at [facility] made aware of pts pending arrival to their unit.    Preop Patient Instructions for [facility]    Welcome! We want you to have the best experience! Thank you for choosing us and trusting us with your care. If you have any questions regarding your preop instructions, please call:  [phone] 7:30am-4pm M-F  IF you have questions day before surgery before 8:30pm or morning of surgery after 5am THEN call [phone]   Call your surgeon immediately if you feel that you cannot make it for surgery for any reason (i.e. cold symptoms, fever, family emergency etc.).    WHEN SHOULD I ARRIVE?    Your hospital arrival time will be given the afternoon of the business day prior to your surgical date, via text message or phone call after 4:00 PM. Once you accept the text, we will know you received your arrival time. If your surgery falls the day after a holiday or on a Monday, you will be notified the prior business day.     WHERE DO I GO?    When driving, follow the signs to the Main Entrance, and then follow signs for Parking lot A, which is located to the right of the Main Entrance if you are facing the building.   is available M-F 8-4pm. Walk through the main entrance on the ground level. Then, walk to the East elevators, which are located past the front lobby desk on the right. The [facility] is located up one level on the first floor. Exit the elevator and the reception desk is to your right.    TRANSPORTATION    IMPORTANT SAFETY! You must have a ride arranged to get home and have a responsible adult stay with you for 24 hours after the surgery. You cannot drive or use public transportation or rideshare by yourself.    CAN I HAVE VISITORS?    Yes, two visitors are allowed in the [facility] due to space limitations. Additional visitors would be directed to the waiting area. More visitors may be allowed in the overnight units during visitor hours. Minor visitors must be accompanied by an adult at all times.  Masking  is optional for patients, visitors, and the clinical team with exceptions for higher risk patients.  The clinical team has discretion to limit visitors if a visitor presents a health or safety risk to the patient, themselves, or the care team.   Visitor accommodations are subject to change depending on community infection concerns.      WHAT SHOULD I BRING?    Photo ID and insurance card  Do not bring valuables to the hospital  Bring any asthma/COPD inhalers, eyeglasses, dentures or hearing aids (bring cases)  Home medication list    IF you are staying overnight bring:  CPAP machine - if you use one for sleep apnea.  Self-care items such as hairbrush or hair tie.    HOW TO DRESS, SHOWER OR BATHE THE NIGHT BEFORE SURGERY?    CHG Instructions  Before surgery patients take an important role in prevention of a surgical site infection by using a special wash. A preoperative shower or bath with a special soap called chlorhexidine gluconate (CHG) 4% will need to be done. A common name for this soap is Hibiclens or Aplicare, but any of the mentioned brands are acceptable for use. If there is an allergy to CHG or for any other reason that washing with CHG is not possible, please follow the instructions below BUT use an antibacterial soap.    Patient Instructions for Skin Cleaning for baths or showers:   Please read the \"Drug Facts\" for CHG information and directions on the bottle:   Do not use on the head or face, eyes, ears or mouth.   Do not use in the genital area.   Do not use directly on stoma for ostomy.  Do not use if allergic to chlorhexidine gluconate or any other Ingredient listed. Instead use an antibacterial soap.    Steps for a Bath or Shower the Night Before and Morning of Surgery:  Please remove any nail polish including toes.  When bathing or showering wash hair as usual with regular shampoo.  Rinse hair and body thoroughly to remove any shampoo residue.   Wash face with regular soap or water only.   Wash  genital area with regular soap or water only, NOT with CHG.   Thoroughly rinse body with warm water from the neck down.   Turn off the water to prevent rinsing the CHG soap off too soon.   Apply CHG soap and leave on for one minute. Pay special attention to the area of surgery.   Avoid any open skin areas including open wounds. Your physician should be made aware of these wounds and provide instructions for them.  If having back surgery, please have someone assist in applying CHG to the back area.   Rinse thoroughly with warm water.   Do not use regular soap after applying and rinsing CHG.   Pat dry with a fresh towel.   Do not apply lotion, powders or perfumes.   Do not wear jewelry, this also applies to permanent jewelry. If it is not removed it could result in a burn, or the cancellation of your surgery.   Do not wear makeup, including mascara.  Put on clean clothes and sleep in fresh bed sheets.   Sleeping with pet animals can be a source of infection.   *If a cast or splint is in place that is not to be removed, then sponge bathe exposed skin areas from the neck down.  Please brush and floss the night before and morning of your procedure with a new toothbrush.    WHAT CAN I EAT BEFORE I ARRIVE FOR SURGERY?    8 hours prior to ARRIVAL time: Only if allowed by your surgeon, you can eat a full meal. No solid foods after this.     2 hours before ARRIVAL time: You may drink approved clear liquids up to 2 hours before your arrival time.  To ensure you are hydrated please drink 16 - 20 oz. of Gatorade or similar drink before the cutoff time.  If your arrival time is before 6am, finish your water by 5am.    Approved Clear liquids: Water, Propel, Gatorade (any color except red), Apple juice without pulp (non-organic); Pedialyte, 7-up/ Sprite, PLAIN Black coffee or tea without milk products or lemon. NO NON-DAIRY CREAMERS *If you have diabetes choose low carb/sugar or no carb/sugar options.     Unacceptable Clear  liquids: Coffee or tea with milk products, orange juice, alcohol, soup broth, powder flavoring packets.     IMPORTANT SAFETY! FAILURE TO FOLLOW THIS MAY RESULT IN EITHER DELAY OR CANCELLATION OF YOUR PROCEDURE DUE TO THE RISK OF ASPIRATION.    WHEN SHOULD I STOP TAKING MY MEDICATIONS?    Before Surgery Hold (Do Not Take) these Medications  Medications and Supplements:  - Morning of surgery hold any Vitamins AND for 2 weeks prior hold any Vitamin E or Herbals     Anticoagulation:none on med list    Antidiabetic:   - 7 days before surgery do not take (Semaglutide, 1 MG/DOSE, (1.34 mg/ml) 1 MG/DOSE Solution Pen-injector [[id number]]). This is to avoid delayed gastric emptying and a possible full stomach.      Continue all other medications unless an alternative plan was advised with the surgeon and/or specialist.

## 2025-04-15 NOTE — ED PROVIDER NOTES
Patient Seen in: [facility] In [place]      History     Chief Complaint   Patient presents with    Abdomen/Flank Pain     Stated Complaint: kidney stone    Subjective:   HPI    29-year-old male with past medical history of cystine disease and kidney stones who presents to the ER for left-sided flank pain that started about an hour prior to arrival.  He he denies any nausea, vomiting, fever or any other complaints.  He sees a urologist through Dr Thais hammond.  Most recently had CT imaging completed on 4/9.  No other complaints.      History of Present Illness               Objective:     Past Medical History:    Anxiety state    Calculus of kidney    Cystine disease (HCC)    Depression    IBS (irritable bowel syndrome)    Migraines    OCD (obsessive compulsive disorder)              Past Surgical History:   Procedure Laterality Date    Colonoscopy      Cystoscopy,insert ureteral stent  03/07/2024    Lithotripsy      Other surgical history  02/17/2011    Rt RPG, URS stone manipulation,laser litho,Rt stent, Dr. Mcdonough    Other surgical history  02/21/2011    Cysto stent removal- Dr. Mcdonough    Other surgical history  11/21/2015    Cysto, L URS, laser litho, stone extraction, stent placement, RPG Dr. Finn    Other surgical history  12/01/2015    cysto stent removal    Upper gi endoscopy,exam                  Social History     Socioeconomic History    Marital status: Single   Tobacco Use    Smoking status: Never     Passive exposure: Never    Smokeless tobacco: Never   Vaping Use    Vaping status: Never Used   Substance and Sexual Activity    Alcohol use: No    Drug use: No     Social Drivers of Health     Food Insecurity: No Food Insecurity (6/25/2024)    Food Insecurity     Food Insecurity: Never true   Transportation Needs: No Transportation Needs (6/25/2024)    Transportation Needs     Lack of Transportation: No   Housing Stability: Low Risk  (6/25/2024)    Housing Stability     Housing  Instability: No                                Physical Exam     ED Triage Vitals   BP 04/15/25 1456 134/85   Pulse 04/15/25 1455 (!) 129   Resp 04/15/25 1455 26   Temp 04/15/25 1455 97.9 °F (36.6 °C)   Temp src 04/15/25 1455 Temporal   SpO2 04/15/25 1455 94 %   O2 Device 04/15/25 1455 None (Room air)       Current Vitals:   Vital Signs  BP: (!) 140/99  Pulse: 94  Resp: 18  Temp: 97.9 °F (36.6 °C)  Temp src: Temporal    Oxygen Therapy  SpO2: 97 %  O2 Device: None (Room air)        Physical Exam  GENERAL APPEARANCE:  AxOx4, generally well-appearing, no acute distress.  HEENT:  NC, AT. MMM. EOMI, clear conjunctiva, oropharynx clear.  NECK:  Supple without lymphadenopathy.  No stiffness or restricted ROM.  HEART:  Normal rate and regular rhythm, normal S1/S1, no m/r/g  LUNGS:  CTAB, moving air well. No crackles or wheezes are heard.  ABDOMEN:  Soft, nontender, nondistended with good bowel sounds heard.  BACK: Left sided CVAT, no obvious deformity.  EXTREMITIES:  Without cyanosis, clubbing or edema.  NEUROLOGICAL:  Grossly nonfocal. Alert and oriented, moving all 4 extremities. CN not formally tested but appear grossly intact. Observed to ambulate with normal gait.  Skin:  Warm and dry without any rash.      Physical Exam                ED Course     Labs Reviewed   COMP METABOLIC PANEL (14) - Abnormal; Notable for the following components:       Result Value    ALT 53 (*)     Albumin 5.1 (*)     All other components within normal limits   URINALYSIS WITH CULTURE REFLEX - Abnormal; Notable for the following components:    Blood Urine Moderate (*)     Protein Urine 100 mg/dL (*)     All other components within normal limits   UA MICROSCOPIC ONLY, URINE - Abnormal; Notable for the following components:    RBC Urine 6-10 (*)     Bacteria Urine Rare (*)     Cystine Crystal Moderate (*)     All other components within normal limits   CBC WITH DIFFERENTIAL WITH PLATELET          Results            CT ABDOMEN+PELVIS  KIDNEYSTONE 2D RNDR(NO IV,NO ORAL)(CPT=74176)  Result Date: 4/15/2025  PROCEDURE:  CT ABDOMEN+PELVIS KIDNEYSTONE 2D RNDR(NO IV,NO ORAL)(CPT=74176)  COMPARISON:  [facility], CT, CT ABDOMEN+PELVIS(CONTRAST ONLY)(CPT=74177), 1/29/2025, 10:56 PM.  INDICATIONS:  kidney stone  TECHNIQUE:  Unenhanced multislice CT scanning from above the kidneys to below the urinary bladder.  2D rendering are generated on the CT scanner workstation to localize potential stones in the cranio-caudal plane.  Dose reduction techniques were used. Dose information is transmitted to the ACR (American College of Radiology) NRDR (National Radiology Data Registry) which includes the Dose Index Registry.  PATIENT STATED HISTORY: (As transcribed by Technologist)     FINDINGS:  KIDNEYS:  There is a 1.2 cm calculus within the left renal pelvis with mild upstream pelvocaliectasis.  Nonobstructing 5 mm calculus in the right kidney upper pole.  No hydronephrosis on the right. BLADDER:  No mass, calculus or significant wall thickening. ADRENALS:  No mass or enlargement.  LIVER:  No enlargement, atrophy, abnormal density, or significant focal lesion.  BILIARY:  No visible dilatation or calcification.  PANCREAS:  No lesion, fluid collection, ductal dilatation, or atrophy.  SPLEEN:  No enlargement or focal lesion.  AORTA/VASCULAR:  No aneurysm.  RETROPERITONEUM:  No mass or adenopathy.  BOWEL/MESENTERY:  No dilated bowel or wall thickening.  Normal appendix. ABDOMINAL WALL:  Trace fat containing umbilical hernia. BONES:  No bony lesion or fracture. PELVIC ORGANS:  Normal for age.  LUNG BASES:  No visible pulmonary or pleural disease.  OTHER:  Negative.             CONCLUSION:  1. 1.2 cm calculus within the left renal pelvis with mild upstream pelvocaliectasis. 2. Nonobstructing 5 mm calculus in the right kidney.  No right hydronephrosis. 3. Please see above for further details.    LOCATION:  [facility]   Dictated by (CST): Jonathan Sánchez MD on 4/15/2025 at 4:16 PM      Finalized by (CST): Jonathan Sánchez MD on 4/15/2025 at 4:18 PM                            MDM      29-year-old male who presents to the ER for left-sided flank pain that started about an hour prior to arrival.  Vitals within normal limits on arrival, patient appears very uncomfortable.  Strong history of kidney stones.  Differential also includes pyelonephritis versus dissection versus muscle strain.  CT stone protocol shows 1.2 cm stone within the left renal pelvis with pelvic dilation as well.  CBC and CMP all normal and reassuring.  UA shows blood but no signs of infection.  Patient will require admission for pain control and stone intervention.  Discussed case with Dr. Marino who agrees with plan for admission, asked that patient be n.p.o. at midnight for procedure tomorrow.  Spoke with , Hospitalist who accepted patient for admission.  Patient was also seen and evaluated by Dr. Peñaloza.        Admission disposition: 4/15/2025  5:14 PM           Medical Decision Making      Disposition and Plan     Clinical Impression:  1. Renal colic         Disposition:  Admit  4/15/2025  5:14 pm    Follow-up:  No follow-up provider specified.        Medications Prescribed:  Current Discharge Medication List          Supplementary Documentation:         Hospital Problems       Present on Admission  Date Reviewed: 4/30/2024          ICD-10-CM Noted POA    * (Principal) Renal colic N23 3/7/2024 Unknown

## 2025-04-15 NOTE — ED QUICK NOTES
Orders for admission, patient is aware of plan and ready to go upstairs. Any questions, please call ED RN Kerry at extension [phone].     Patient Covid vaccination status: Fully vaccinated     COVID Test Ordered in ED: None    COVID Suspicion at Admission: N/A    Running Infusions: Medication Infusions[1] None    Mental Status/LOC at time of transport: AOx3    Other pertinent information:   CIWA score: N/A   NIH score:  N/A             [1]

## 2025-04-15 NOTE — ED QUICK NOTES
Pulmonary & Critical Care Medicine   Progress Note      Presenting History/HPI:  24-year-old originally admitted to [facility] on 01/15 with nausea vomiting.  He was found to be in DKA with acute renal failure and severe metabolic abnormalities.  Patient had persistent anion gap acidosis.  MRSA was found in his urine and blood on admit.  Patient had persistent fever and a right-sided infiltrate consistent with pneumonia.  A TTE suggested a vegetation on the PICC line but no vegetation seen on that initial TTE on any heart valves.      Interval History:  Patient continues to have intermittent fever and metabolic abnormalities.  Klebsiella grew in his sputum on 02/10.  Patient continued to have respiratory difficulty and was transferred to the ICU on 02/10.  Progressive respiratory failure occurred over the next several days and he was intubated after cardiac arrest on 02/14.  Patient felt to have right heart strain and possible pulmonary embolus based on echo.  He was taken to the cath lab but no clot was found on pulmonary angiography.  Patient required proning due to persistent hypoxemia.  His neuro status improved after a hypoglycemic episode and possible seizure on 02/20.  He was extubated on 02/22 but then reintubated on 02/26 for possible mucus plugging.  He has continued to require sedation and neuromuscular blockade over the past several days.  He is also required vasopressors.  CRRT continues and appears to be tolerating that well.      Scheduled Medications:    albumin human 5%  60 g Intravenous Once    alteplase  2 mg Intra-Catheter Once    DULoxetine  30 mg Oral Daily    ferrous sulfate  1 tablet Oral BID    heparin (porcine)  2,000 Units Intravenous Q4H    heparin (porcine)  5,000 Units Subcutaneous Q12H    Lactobacillus rhamnosus GG  1 packet Oral Daily    levetiracetam IV  1,000 mg Intravenous Q12H    micafungin (MYCAMINE) IVPB  100 mg Intravenous Q24H    oxybutynin  5 mg Oral TID     Pt is going to [place] by private car. Private car form given and explained to the pt. Pt stated he understood the teaching and had no questions. Form signed by the pt. IV wrapped. Pt left the unit at this time in stable condition with his family.    pantoprazole  40 mg Intravenous BID    piperacillin-tazobactam (ZOSYN) IVPB  4.5 g Intravenous Q8H    potassium chloride  40 mEq Oral Once    sodium bicarbonate  50 mEq Intravenous Once    sodium chloride 0.9%  10 mL Intravenous Q6H    sucralfate  1 g Per OG tube QID (AC & HS)    white petrolatum-mineral oiL   Both Eyes QHS    zinc oxide-cod liver oil   Topical (Top) TID       PRN Medications:   sodium chloride, sodium chloride, sodium chloride, sodium chloride, sodium chloride, sodium chloride, acetaminophen, acetaminophen, camphor-methyl salicyl-menthoL, dextrose 10%, dextrose 10%, diphenoxylate-atropine 2.5-0.025 mg/5 ml, fentaNYL, hydrALAZINE, HYDROmorphone, levalbuterol, methocarbamoL, midazolam, morphine, ondansetron, oxyCODONE, Flushing PICC Protocol **AND** sodium chloride 0.9% **AND** sodium chloride 0.9%, Pharmacy to dose Vancomycin consult **AND** vancomycin - pharmacy to dose      Infusions:     cisatracurium (NIMBEX) infusion 3 mcg/kg/min (03/02/23 1730)    dexmedeTOMIDine (Precedex) infusion (titrating) 1.4 mcg/kg/hr (02/26/23 0120)    DOPamine Stopped (02/17/23 0640)    EPINEPHrine Stopped (03/02/23 0900)    fentanyl Stopped (02/22/23 1000)    insulin regular 1 units/mL infusion orderable (DKA) 0.6 Units/hr (03/02/23 0116)    midazolam Stopped (02/17/23 0810)    NORepinephrine bitartrate-D5W 0.05 mcg/kg/min (03/03/23 0617)    phenylephrine 4 mcg/kg/min (03/03/23 0100)    propofoL 50 mcg/kg/min (03/03/23 0249)    sodium bicarbonate drip 200 mL/hr at 03/03/23 0407    vasopressin Stopped (03/01/23 1035)         Fluid Balance:     Intake/Output Summary (Last 24 hours) at 3/3/2023 0734  Last data filed at 3/3/2023 0715  Gross per 24 hour   Intake 5067.9 ml   Output 7954 ml   Net -2886.1 ml           Vital Signs:   Vitals:    03/03/23 0715   BP: 121/64   Pulse: 79   Resp: (!) 36   Temp: (!) 95 °F (35 °C)         Physical Exam  Gen- sedated, intubated, paralyzed  HENT- ATNC, MMM, Et tube in place  CV-  tachycardic, regular, 2+ pulses bilateral LE and UE   Resp- scattered crackles bilaterally, slightly coarse throughout  With squeaks noted  Abdomen- lessdistended, positive ascites, bowel sounds minimally present  MSK- paralyzed, 1+LE edema  Neuro-sedated, paralyzed on Nimbex    Laboratory Studies:   Recent Labs   Lab 03/03/23  0550   PH 7.28*   PCO2 81*   PO2 79*   HCO3 38.1   POCSATURATED 94     Recent Labs   Lab 03/03/23  0349   WBC 22.4*   RBC 2.65*   HGB 7.7*   HCT 23.0*   PLT 98*   MCV 86.8   MCH 29.1   MCHC 33.5     Recent Labs   Lab 03/03/23  0007   GLUCOSE 151*      K 4.0   CO2 33*   BUN 13.7   CREATININE 0.92   MG 2.30         Microbiology Data:   Microbiology Results (last 7 days)       Procedure Component Value Units Date/Time    Respiratory Culture [[id number]] Collected: 03/02/23 1122    Order Status: Completed Specimen: Bronchial Alveolar Lavage Updated: 03/03/23 0620     Respiratory Culture No Growth At 24 Hours    Medical Device Culture [[id number]] Collected: 03/02/23 2137    Order Status: Sent Specimen: PICC from Central Line Updated: 03/02/23 2137    Blood Culture [[id number]] Collected: 03/02/23 1653    Order Status: Resulted Specimen: Blood Updated: 03/02/23 1919    Blood Culture [[id number]] Collected: 03/02/23 1709    Order Status: Resulted Specimen: Blood Updated: 03/02/23 1918    Cryptococcal antigen, blood [[id number]] Collected: 03/02/23 1506    Order Status: Sent Specimen: Blood, Venous Updated: 03/02/23 1823    Gram Stain [[id number]] Collected: 03/02/23 1122    Order Status: Completed Specimen: Sputum from Bronchial Alveolar Lavage (BAL) Updated: 03/02/23 1342     GRAM STAIN Few WBC observed      No bacteria seen    Blood Culture [[id number]]  (Normal) Collected: 02/28/23 1214    Order Status: Completed Specimen: Blood Updated: 03/02/23 1300     CULTURE, BLOOD ([facility]) No Growth At 48 Hours    Mycobacteria and Nocardia Culture [[id number]] Collected: 03/02/23 1122    Order Status: Sent  Specimen: Respiratory from Bronchial Alveolar Lavage (BAL) Updated: 03/02/23 1135    Fungal Culture [[id number]]     Order Status: Canceled Specimen: Body Fluid from Lung, RML     Body Fluid Culture [[id number]]     Order Status: Canceled Specimen: Body Fluid from Lung, RML     AFB Smear [[id number]] Collected: 03/02/23 1122    Order Status: Sent Specimen: Bronchial Alveolar Lavage Updated: 03/02/23 1122    Culture, AFB reflex to MTBRIF PCR [[id number]] Collected: 03/02/23 1122    Order Status: Canceled Specimen: Sputum Updated: 03/02/23 1122    Fungal Culture [[id number]] Collected: 03/02/23 1122    Order Status: Sent Specimen: Bronchial Alveolar Lavage Updated: 03/02/23 1122    Blood Culture [[id number]]  (Normal) Collected: 02/28/23 0826    Order Status: Completed Specimen: Blood Updated: 03/02/23 1000     CULTURE, BLOOD ([facility]) No Growth At 48 Hours    Fungal Culture [[id number]]  (Abnormal) Collected: 02/19/23 1503    Order Status: Completed Specimen: Respiratory from Endotracheal Updated: 02/28/23 1301     Fungal Culture Many Yeast, not Cryptococcus neoformans              Imaging:   X-Ray Chest 1 View for Line/Tube Placement  Narrative: EXAMINATION:  XR CHEST 1 VIEW FOR LINE/TUBE PLACEMENT    CLINICAL HISTORY:  picc;    TECHNIQUE:  Single frontal view of the chest was performed.    COMPARISON:  03/02/2023    FINDINGS:  LINES AND TUBES: Endotracheal tube projects over the trachea with tip above the jim.  Enteric tube courses below the diaphragm.  Right upper extremity PICC tip projects over the SVC.  Right IJ CVC tip projects over the SVC.  EKG/telemetry leads overlie the chest.  Left upper extremity PICC tip is partially obscured by EKG leads, last visualized in the expected region of the superior cavoatrial junction..    MEDIASTINUM AND YOSEF: Cardiac silhouette is at the upper limits of normal, likely exaggerated by portable technique.    LUNGS: Lung volumes are low with associated atelectatic change.  Bilateral  interstitial and confluent alveolar opacities.    PLEURA:Small right pleural effusion.No pneumothorax.    OTHER: No acute osseous abnormality.  Impression: Persistent interstitial and confluent alveolar opacities with small right pleural effusion.    Electronically signed by: Brittney Lemus  Date:    03/02/2023  Time:    19:59  X-Ray Abdomen AP 1 View  EXAMINATION  XR ABDOMEN AP 1 VIEW    CLINICAL HISTORY  abdomen distention;    TECHNIQUE  A total of 3 AP image(s) of the abdomen.    COMPARISON  17 February 2023    FINDINGS  Lines/tubes/devices: Enteric tube remains in similar position.    Detection of air-fluid levels and low-volume pneumoperitoneum is limited due to supine technique. There is similar appearance of diffuse hazy opacification throughout the abdominal cavity, with paucity of bowel gas.    The visualized osseous structures demonstrate no significant short interval change.    IMPRESSION  1. Similar findings suggestive of ascites.  2. No evidence of new or worsened intra-abdominal process.    Electronically signed by: Zachary Kumar  Date:    03/02/2023  Time:    17:18  US Abdomen Complete  EXAMINATION  US ABDOMEN COMPLETE    CLINICAL HISTORY  abdominal distention;    TECHNIQUE  Multiplanar grayscale sonographic evaluation was performed, with focused Doppler assessment of the portal vein.    COMPARISON  18 February 2023    FINDINGS  Exam quality: adequate for evaluation    Pancreas: No focal abnormality or peripancreatic collection appreciated at the visualized pancreas.    Hepatobiliary: No new findings or other significant interval change appreciated of the liver.  Normal hepatopetal flow visualized within the main portal vein and branch vessels.  No intra-/extrahepatic biliary dilatation suggestive of obstructing pathology.    Gallbladder: Sludge is visualized within the gallbladder lumen, with no echogenic or shadowing stone appreciated.  No wall thickening or pericholecystic fluid.  Sonographic  Barron Sign is negative, per the ultrasonographer's report.    Kidneys: No suspicious mass lesion, shadowing stone, or hydronephrosis.    Spleen: No abnormal contour, focal parenchymal abnormality, or significant enlargement.    Other findings: The visualized aorta and IVC are unremarkable. There is increased volume free fluid throughout the visualized abdomen.  Additionally, findings suggestive of right pleural effusion are now appreciated.    IMPRESSION  1. Increased volume ascites throughout the abdomen, as well as incidental visualization of right pleural effusion.  2. Gallbladder sludge without evidence of cholelithiasis or acute cholecystitis.  3. Remaining visualized abdominal structures are unremarkable for sonographic assessment.    Electronically signed by: Zachary Kumar  Date:    03/02/2023  Time:    16:40  X-Ray Chest 1 View  Narrative: EXAMINATION:  XR CHEST 1 VIEW    CLINICAL HISTORY:  intubated;    COMPARISON:  Yesterday    FINDINGS:  Portable frontal view of the chest was obtained. Support structures appear unchanged.  Heart is not significantly enlarged.  Continued bilateral interstitial predominant opacities with right pleural effusion.  There is no pneumothorax.  Impression: No significant interval change.    Electronically signed by: Ramirez Teran  Date:    03/02/2023  Time:    07:52          Assessment and Plan    Assessment:  ARDS  Hypoxia progressed to intubation 02/14/2023.  Extubated 2/22, re-intubated 2/26  S/P paralysis and prone positioning   Sputum culture from February 10th shows sensitive Klebsiella,  2.    MRSA bacteremia 1/15  ID following, on Zyvox until 03/02/2023  3    Acute kidney injury on chronic kidney disease stage IIIB  In setting of ATN, recovering   4    Respiratory Acidosis    Still difficult to ventilate but seems to be slowly improving probably secondary to volume optimization via CRRT. Holding off on additional volume removal today as per renal.  5   Left-sided  hydronephrosis with bladder outlet obstruction  requiring      Castellanos catheter placement  6   IDDM post DKA  7   Elevated rheumatoid factor and aldolase of unclear significance  8   Mauriac syndrome          Plan:  Additional paracentesis today.  Discussed with Dr. Braun.  Continue CRRT  Begin tube feedings as tolerated  Follow-up on bronchoscopy results.  No alveolar hemorrhage detected so steroids being tapered.  CV surgery consulted  Continue Keppra  Wean vasopressors as tolerated    Critical care diagnoses:  Acute respiratory failure, acute kidney injury  Critical care intervention: Direct hands-on bedside care, review of database and imaging studies, ventilator management, vasopressor management  Critical care time spent face to face with this patient only and not including procedure time 45 minutes        W Geraldo Steele MD  3/3/2023  Pulmonology/Critical Care

## 2025-04-16 ENCOUNTER — ANESTHESIA (OUTPATIENT)
Dept: SURGERY | Facility: HOSPITAL | Age: 30
End: 2025-04-16
Payer: COMMERCIAL

## 2025-04-16 ENCOUNTER — APPOINTMENT (OUTPATIENT)
Dept: GENERAL RADIOLOGY | Facility: HOSPITAL | Age: 30
End: 2025-04-16
Attending: INTERNAL MEDICINE
Payer: COMMERCIAL

## 2025-04-16 ENCOUNTER — ANESTHESIA EVENT (OUTPATIENT)
Dept: SURGERY | Facility: HOSPITAL | Age: 30
End: 2025-04-16
Payer: COMMERCIAL

## 2025-04-16 PROBLEM — J02.9 ACUTE PHARYNGITIS: Status: RESOLVED | Noted: 2025-04-16 | Resolved: 2025-04-16

## 2025-04-16 PROBLEM — F20.9 SCHIZOPHRENIA (HCC): Status: ACTIVE | Noted: 2019-04-28

## 2025-04-16 PROBLEM — J02.9 ACUTE PHARYNGITIS: Status: RESOLVED | Noted: 2023-06-29 | Resolved: 2025-04-16

## 2025-04-16 PROCEDURE — 90791 PSYCH DIAGNOSTIC EVALUATION: CPT

## 2025-04-16 DEVICE — URETERAL STENT
Type: IMPLANTABLE DEVICE | Site: URETER | Status: FUNCTIONAL
Brand: ASCERTA™

## 2025-04-16 RX ORDER — LIDOCAINE HYDROCHLORIDE 20 MG/ML
JELLY TOPICAL AS NEEDED
Status: DISCONTINUED | OUTPATIENT
Start: 2025-04-16 | End: 2025-04-16 | Stop reason: HOSPADM

## 2025-04-16 RX ORDER — LIDOCAINE HYDROCHLORIDE 10 MG/ML
INJECTION, SOLUTION EPIDURAL; INFILTRATION; INTRACAUDAL; PERINEURAL AS NEEDED
Status: DISCONTINUED | OUTPATIENT
Start: 2025-04-16 | End: 2025-04-16 | Stop reason: SURG

## 2025-04-16 RX ORDER — LABETALOL HYDROCHLORIDE 5 MG/ML
5 INJECTION, SOLUTION INTRAVENOUS EVERY 5 MIN PRN
Status: DISCONTINUED | OUTPATIENT
Start: 2025-04-16 | End: 2025-04-16 | Stop reason: HOSPADM

## 2025-04-16 RX ORDER — ONDANSETRON 2 MG/ML
4 INJECTION INTRAMUSCULAR; INTRAVENOUS EVERY 6 HOURS PRN
Status: DISCONTINUED | OUTPATIENT
Start: 2025-04-16 | End: 2025-04-17

## 2025-04-16 RX ORDER — SODIUM CHLORIDE, SODIUM LACTATE, POTASSIUM CHLORIDE, CALCIUM CHLORIDE 600; 310; 30; 20 MG/100ML; MG/100ML; MG/100ML; MG/100ML
INJECTION, SOLUTION INTRAVENOUS CONTINUOUS
Status: DISCONTINUED | OUTPATIENT
Start: 2025-04-16 | End: 2025-04-17

## 2025-04-16 RX ORDER — ONDANSETRON 2 MG/ML
4 INJECTION INTRAMUSCULAR; INTRAVENOUS EVERY 6 HOURS PRN
Status: DISCONTINUED | OUTPATIENT
Start: 2025-04-16 | End: 2025-04-16 | Stop reason: HOSPADM

## 2025-04-16 RX ORDER — PROCHLORPERAZINE EDISYLATE 5 MG/ML
5 INJECTION INTRAMUSCULAR; INTRAVENOUS EVERY 8 HOURS PRN
Status: DISCONTINUED | OUTPATIENT
Start: 2025-04-16 | End: 2025-04-16 | Stop reason: HOSPADM

## 2025-04-16 RX ORDER — HYDROMORPHONE HYDROCHLORIDE 1 MG/ML
0.6 INJECTION, SOLUTION INTRAMUSCULAR; INTRAVENOUS; SUBCUTANEOUS EVERY 5 MIN PRN
Status: DISCONTINUED | OUTPATIENT
Start: 2025-04-16 | End: 2025-04-16 | Stop reason: HOSPADM

## 2025-04-16 RX ORDER — DIAZEPAM 2 MG/1
5 TABLET ORAL EVERY 8 HOURS PRN
Status: DISCONTINUED | OUTPATIENT
Start: 2025-04-16 | End: 2025-04-17

## 2025-04-16 RX ORDER — ACETAMINOPHEN 500 MG
1000 TABLET ORAL ONCE AS NEEDED
Status: DISCONTINUED | OUTPATIENT
Start: 2025-04-16 | End: 2025-04-16 | Stop reason: HOSPADM

## 2025-04-16 RX ORDER — DEXAMETHASONE SODIUM PHOSPHATE 4 MG/ML
VIAL (ML) INJECTION AS NEEDED
Status: DISCONTINUED | OUTPATIENT
Start: 2025-04-16 | End: 2025-04-16 | Stop reason: SURG

## 2025-04-16 RX ORDER — OXYBUTYNIN CHLORIDE 5 MG/1
5 TABLET ORAL EVERY 6 HOURS PRN
Status: DISCONTINUED | OUTPATIENT
Start: 2025-04-16 | End: 2025-04-17

## 2025-04-16 RX ORDER — ONDANSETRON 4 MG/1
4 TABLET, FILM COATED ORAL EVERY 6 HOURS PRN
Status: DISCONTINUED | OUTPATIENT
Start: 2025-04-16 | End: 2025-04-17

## 2025-04-16 RX ORDER — HYDROMORPHONE HYDROCHLORIDE 1 MG/ML
0.4 INJECTION, SOLUTION INTRAMUSCULAR; INTRAVENOUS; SUBCUTANEOUS EVERY 5 MIN PRN
Status: DISCONTINUED | OUTPATIENT
Start: 2025-04-16 | End: 2025-04-16 | Stop reason: HOSPADM

## 2025-04-16 RX ORDER — HYDROMORPHONE HYDROCHLORIDE 1 MG/ML
INJECTION, SOLUTION INTRAMUSCULAR; INTRAVENOUS; SUBCUTANEOUS
Status: COMPLETED
Start: 2025-04-16 | End: 2025-04-16

## 2025-04-16 RX ORDER — KETOROLAC TROMETHAMINE 30 MG/ML
INJECTION, SOLUTION INTRAMUSCULAR; INTRAVENOUS AS NEEDED
Status: DISCONTINUED | OUTPATIENT
Start: 2025-04-16 | End: 2025-04-16 | Stop reason: SURG

## 2025-04-16 RX ORDER — PHENAZOPYRIDINE HYDROCHLORIDE 200 MG/1
200 TABLET, FILM COATED ORAL 3 TIMES DAILY PRN
Status: DISCONTINUED | OUTPATIENT
Start: 2025-04-16 | End: 2025-04-17

## 2025-04-16 RX ORDER — SODIUM CHLORIDE, SODIUM LACTATE, POTASSIUM CHLORIDE, CALCIUM CHLORIDE 600; 310; 30; 20 MG/100ML; MG/100ML; MG/100ML; MG/100ML
INJECTION, SOLUTION INTRAVENOUS CONTINUOUS PRN
Status: DISCONTINUED | OUTPATIENT
Start: 2025-04-16 | End: 2025-04-16 | Stop reason: SURG

## 2025-04-16 RX ORDER — NALOXONE HYDROCHLORIDE 0.4 MG/ML
0.08 INJECTION, SOLUTION INTRAMUSCULAR; INTRAVENOUS; SUBCUTANEOUS AS NEEDED
Status: DISCONTINUED | OUTPATIENT
Start: 2025-04-16 | End: 2025-04-16 | Stop reason: HOSPADM

## 2025-04-16 RX ORDER — HYDROMORPHONE HYDROCHLORIDE 1 MG/ML
0.2 INJECTION, SOLUTION INTRAMUSCULAR; INTRAVENOUS; SUBCUTANEOUS EVERY 5 MIN PRN
Status: DISCONTINUED | OUTPATIENT
Start: 2025-04-16 | End: 2025-04-16 | Stop reason: HOSPADM

## 2025-04-16 RX ORDER — ONDANSETRON 2 MG/ML
INJECTION INTRAMUSCULAR; INTRAVENOUS AS NEEDED
Status: DISCONTINUED | OUTPATIENT
Start: 2025-04-16 | End: 2025-04-16 | Stop reason: SURG

## 2025-04-16 RX ORDER — ZOLPIDEM TARTRATE 5 MG/1
5 TABLET ORAL NIGHTLY PRN
Status: CANCELLED | OUTPATIENT
Start: 2025-04-16

## 2025-04-16 RX ORDER — CEFAZOLIN SODIUM 1 G/3ML
INJECTION, POWDER, FOR SOLUTION INTRAMUSCULAR; INTRAVENOUS AS NEEDED
Status: DISCONTINUED | OUTPATIENT
Start: 2025-04-16 | End: 2025-04-16 | Stop reason: SURG

## 2025-04-16 RX ORDER — HYDROCODONE BITARTRATE AND ACETAMINOPHEN 5; 325 MG/1; MG/1
2 TABLET ORAL ONCE AS NEEDED
Status: DISCONTINUED | OUTPATIENT
Start: 2025-04-16 | End: 2025-04-16 | Stop reason: HOSPADM

## 2025-04-16 RX ORDER — SODIUM CHLORIDE, SODIUM LACTATE, POTASSIUM CHLORIDE, CALCIUM CHLORIDE 600; 310; 30; 20 MG/100ML; MG/100ML; MG/100ML; MG/100ML
INJECTION, SOLUTION INTRAVENOUS CONTINUOUS
Status: DISCONTINUED | OUTPATIENT
Start: 2025-04-16 | End: 2025-04-16 | Stop reason: HOSPADM

## 2025-04-16 RX ORDER — HYDROCODONE BITARTRATE AND ACETAMINOPHEN 5; 325 MG/1; MG/1
1 TABLET ORAL ONCE AS NEEDED
Status: DISCONTINUED | OUTPATIENT
Start: 2025-04-16 | End: 2025-04-16 | Stop reason: HOSPADM

## 2025-04-16 RX ADMIN — DEXAMETHASONE SODIUM PHOSPHATE 4 MG: 4 MG/ML VIAL (ML) INJECTION at 18:08:00

## 2025-04-16 RX ADMIN — SODIUM CHLORIDE, SODIUM LACTATE, POTASSIUM CHLORIDE, CALCIUM CHLORIDE: 600; 310; 30; 20 INJECTION, SOLUTION INTRAVENOUS at 17:58:00

## 2025-04-16 RX ADMIN — ONDANSETRON 4 MG: 2 INJECTION INTRAMUSCULAR; INTRAVENOUS at 19:00:00

## 2025-04-16 RX ADMIN — LIDOCAINE HYDROCHLORIDE 5 ML: 10 INJECTION, SOLUTION EPIDURAL; INFILTRATION; INTRACAUDAL; PERINEURAL at 18:03:00

## 2025-04-16 RX ADMIN — KETOROLAC TROMETHAMINE 15 MG: 30 INJECTION, SOLUTION INTRAMUSCULAR; INTRAVENOUS at 19:00:00

## 2025-04-16 RX ADMIN — SODIUM CHLORIDE, SODIUM LACTATE, POTASSIUM CHLORIDE, CALCIUM CHLORIDE: 600; 310; 30; 20 INJECTION, SOLUTION INTRAVENOUS at 18:08:00

## 2025-04-16 RX ADMIN — CEFAZOLIN SODIUM 2 G: 1 INJECTION, POWDER, FOR SOLUTION INTRAMUSCULAR; INTRAVENOUS at 18:08:00

## 2025-04-16 NOTE — DISCHARGE INSTRUCTIONS
You were assessed for a Behavioral Health Level of Care Assessment by JALEN Gannon with [facility] who consulted with MEE Gaytan and Dr. Manley. Please follow up with your outpatient therapy providers. Below are a list of psychiatry providers and crisis resources should you need in the future.   [facility]iatry - [facility]   [address]  [place] [address]  [phone]  Psychiatry - [facility]  [address] [place] [address]  [phone]  Therapy & Psychiatry - [facility]  [address]  [address]  [place] [address]  [phone]    [facility]: [phone]    Crisis Resources:    [facility] 24/7 Crisis Line: (268) 883-3129 and [phone] [phone]    [facility]'s National Helpline: 3-450-157-HELP (5354)  The National [place] on Mental Illness, (SHADIA) Call 1-552.930.5871, or chat, or text \"Friend\" to 82347, or email helpline@[facility].org to connect with us.  [place] Warm Line available Mon-Sat 8am-8pm (462) 755-4075  [facility] Crisis Services 24/7: [phone]) [phone]    --------------  Having a Ureteral Stent  A ureteral stent is a soft plastic tube with holes in it. It’s temporarily inserted into a ureter to help drain urine into the bladder. One end goes in the kidney. The other end goes in the bladder. A coil on each end holds the stent in place. The stent can’t be seen from outside the body. It shouldn’t interfere with your normal routine. Your stent will be put in by a doctor trained in treating the urinary tract (a urologist) or another specialist. The procedure is done in a hospital or surgery center. You’ll likely go home the same day.     THE URETERAL STENT IS NOT A PERMAMENT IMPLANT AND DOES NEED TO BE REMOVED.  DANGLER STENT TO BE REMOVED WITH NURSE ON MONDAY 4/21/25  When is a ureteral stent used?  A ureteral stent may be used:  To bypass a blockage in a kidney or ureter.  During kidney stone  removal.  To let a ureter heal after surgery.    Before the procedure  Your healthcare provider will give you instructions to prepare for the procedure. X-rays or other imaging tests of your kidneys and ureters may be done beforehand.   During the procedure  You receive medicine to prevent pain and help you relax or sleep during the procedure. Once this takes effect, the procedure starts.  The doctor inserts a cystoscope (lighted instrument) through the urethra and into the bladder. This shows the opening to the ureter.  A thin wire is carefully threaded through the cystoscope, up the ureter, and into the kidney. The stent is inserted over the wire.  A fluoroscope (special X-ray machine) is used to help position the stent. When the stent is in place, the wire and cystoscope are removed.     While you have a stent  Some discomfort is normal. Certain movements may trigger pain or a feeling that you need to urinate. You may also feel mild soreness or pressure before or during urination. These symptoms will go away a few days after the stent is removed.  Medicine to control pain or bladder spasms or to prevent infection may be prescribed. Take this as directed.  Drink plenty of fluids to help flush out your urinary tract.  Your urine may be slightly pink or red. This is due to bleeding caused by minor irritation from the stent. This may happen on and off while you have the stent.  As with any synthetic device placed in the body, there is a risk of infection. The stent may have to be removed if this happens.      How long will you need a stent?  The stent is often taken out after the blockage in the ureter is treated or the ureter has healed. This may take 1 week to 2 weeks, or longer. If a stent is needed for a long time, it may need to be changed every few months.   When to call your healthcare provider  Contact your healthcare provider right away if:  Your urine contains blood clots or you see a large amount of  blood-tinged urine  You have symptoms similar to those you had before the stent was placed  You constantly leak urine  You have a fever over 100.4°F (38°C), or as advised by your health care provider  You have chills  You experience nausea or vomiting  Your pain is not relieved with medicine  The end of the stent comes out of the urethra  You experience new or worsening symptoms  Jose last reviewed this educational content on 4/1/2020 © 2000-2020 [employer]. [address] [place] [address]. All rights reserved. This information is not intended as a substitute for professional medical care. Always follow your healthcare professional's instructions.

## 2025-04-16 NOTE — PROGRESS NOTES
[facility] Hospitalist Progress Note     PCP: Spenser Mccormick MD    Chief Complaint: follow-up   Follow up for: The encounter diagnosis was Renal colic.    Overnight/Interim Events:      SUBJECTIVE:  Feels \"so-so\", up/down. No fever/blood in urine. R flank pain.     OBJECTIVE:  Temp:  [97.5 °F (36.4 °C)-98.7 °F (37.1 °C)] 97.7 °F (36.5 °C)  Pulse:  [] 68  Resp:  [16-26] 16  BP: (112-148)/(76-99) 127/94  SpO2:  [92 %-97 %] 93 %    Intake/Output:    Intake/Output Summary (Last 24 hours) at 4/16/2025 1249  Last data filed at 4/16/2025 0600  Gross per 24 hour   Intake 2490 ml   Output 1250 ml   Net 1240 ml       Last 3 Weights   04/15/25 1849 189 lb 1.6 oz (85.8 kg)   04/15/25 1455 197 lb (89.4 kg)   01/29/25 2143 180 lb (81.6 kg)   11/01/24 2019 184 lb (83.5 kg)       Exam    General: Alert, no distress, appears stated age.     Head:  Normocephalic, without obvious abnormality, atraumatic.   Eyes:  Sclera anicteric, EOMs intact.    Nose: Nares normal,  Mucosa normal    Throat: Lips normal   Neck: Supple, symmetrical, trachea midline   Lungs:   Clear to auscultation bilaterally. Normal effort   Chest wall:  No tenderness or deformity   Heart:  Regular rate and rhythm, S1, S2 normal, no murmur, rub or gallop appreciated   Abdomen:   Soft, mild abd pain, Bowel sounds normal. No masses,  No organomegaly.    Extremities: Extremities normal, atraumatic, no cyanosis or LE edema.   Skin: Skin color, texture, turgor normal. No rashes or lesions.    Neurologic: Moving all extremities spontaneously, no focal deficit appreciated      Data Review:       Labs:     Recent Labs   Lab 04/15/25  1504   WBC 6.5   HGB 15.8   MCV 89.7   .0       Recent Labs   Lab 04/15/25  1506      K 4.3      CO2 27.0   BUN 16   CREATSERUM 1.03   CA 10.0   GLU 88       Recent Labs   Lab 04/15/25  1506   ALT 53*   AST 26   ALB 5.1*       No results for input(s): \"PGLU\" in the last 168 hours.    No results for input(s): \"TROP\" in the  last 168 hours.      Meds:     Scheduled Medications[1]  Medication Infusions[2]  PRN Medications[3]       Assessment/Plan:     29 year old male with PMH including but not limited to anxiety, cysteine dz, depression, IBS who p/t PF ED c abd pain and stone.         # Abd pain 2/2 renal stone.   - CT reviewed showing 1.2 cm calculus within the left renal pelvis with mild upstream pelvocaliectasis.   - Nonobstructing 5 mm calculus in the right kidney.  No right hydronephrosis.   -  following, apprec, plan for likely   Recommend cystoscopy, bilateral retrograde pyelogram, left ureteroscopy with laser lithotripsy, left ureteral stent placement, possible right ureteroscopy, possible right ureteral stent   - clears, NPO for procedure, IVF  - IV pain/nausea meds PRN  - on flomax  - CMP/CBC ok, UA rare bacteria, 6-10 RBCs  - had prior stones and tolerated procedures        # Major Depression/ Generalized anxiety d/o / OCD  -reviewed home meds, continue SSRI   - d/w Selma Haley, LSW, apprec recs  - cont close o/p f/u, resources provided        # cysteine dz  - K citrate         # Proph  - sqh, Held am dose     Dispo: med  -      Questions/concerns were discussed with patient and mom by bedside. D/w RN    Total Time spent with patient and coordinating care:  55 minutes    Alfredo Manley MD  [facility] Hospitalist  [phone]  4/16/2025  12:49 PM           [1]    ceFAZolin  2 g Intravenous On Call to OR    ketorolac  30 mg Intravenous Once    potassium citrate  20 mEq Oral QID    sertraline  100 mg Oral Daily    tamsulosin  0.4 mg Oral Daily    heparin  5,000 Units Subcutaneous Q8H [facility]   [2]    sodium chloride 125 mL/hr at 04/16/25 0405   [3]   morphINE **OR** morphINE **OR** morphINE    ketorolac **OR** ketorolac    HYDROmorphone **OR** HYDROmorphone **OR** HYDROmorphone    ondansetron    HYDROcodone-acetaminophen **OR** HYDROcodone-acetaminophen

## 2025-04-16 NOTE — CONSULTS
[facility]  Department of Urology   Consultation Note    Devon East Patient Status:  Observation    1995 MRN [id number]   Location [facility] 0SW-A Attending Alfredo Manley MD   Hosp Day # 0 PCP Spenser Mccormick MD     Reason for Consultation:  Left flank pain  1.2 cm calculus within the left renal pelvis with mild upstream pelvocaliectasis     History of Present Illness:  Devon East is a a(n) 29 year old male with history of anxiety, depression, migraines, OCD, IBS, cystinuria, kidney stones presenting with left flank pain.    He reports issues with pain over the past few weeks, but pain got worse yesterday.  +nausea.  No vomiting, fever, or chills.  +dysuria  No gross hematuria  No change in urinary pattern    Labs:  WBC 6.5  Hgb 15.8  Platelet count 368  Serum creat 1.03    UA 4/15/25: 1-5 WBC/hpf, 6-10 RBC/hpf, rare bacteria    Abdominal/pelvic CT scan without contrast 4/15/25:  There is a 1.2 cm calculus within the left renal pelvis with mild upstream pelvocaliectasis.  Nonobstructing 5 mm calculus in the right kidney upper pole.  No hydronephrosis on the right.     Urology was consulted.    Patient is known to our service for a history of cystinuria and recurrent nephrolithiasis s/p stone procedures.  On potassium citrate        History:  Past Medical History[1]  Past Surgical History[2]  Family History[3]   reports that he has never smoked. He has never been exposed to tobacco smoke. He has never used smokeless tobacco. He reports that he does not drink alcohol and does not use drugs.    Allergies:  Allergies[4]    Medications:  Current Hospital Medications[5]    Review of Systems:  Pertinent items are noted in HPI.  10 point review of systems completed.    Physical Exam:  /76 (BP Location: Right arm)   Pulse 63   Temp 98.7 °F (37.1 °C) (Oral)   Resp 19   Ht 5' 7\" (1.702 m)   Wt 189 lb 1.6 oz (85.8 kg)   SpO2 92%   BMI 29.62 kg/m²    GENERAL: the patient is resting in bed in no acute distress.    HEENT: Unremarkable.  NECK: Supple.   LUNGS: non-labored respirations.    ABDOMEN:  The abdomen is soft and nontender without rebound or guarding.     BACK: Without CVA tenderness.   SKIN: Without stigmata.   NEUROLOGIC: Grossly intact.  EXTREMITIES: Without edema.      Laboratory Data:  Recent Labs   Lab 04/15/25  1504   RBC 5.06   HGB 15.8   HCT 45.4   MCV 89.7   MCH 31.2   MCHC 34.8   RDW 12.5   NEPRELIM 3.58   WBC 6.5   .0       Recent Labs   Lab 04/15/25  1506   GLU 88   BUN 16   CREATSERUM 1.03   EGFRCR 101   CA 10.0   ALB 5.1*      K 4.3      CO2 27.0   ALKPHO 67   AST 26   ALT 53*   BILT 0.6   TP 8.1     UA 4/15/25: 1-5 WBC/hpf, 6-10 RBC/hpf, rare bacteria    Imaging:  CT ABDOMEN+PELVIS KIDNEYSTONE 2D RNDR(NO IV,NO ORAL)(CPT=74176)  Result Date: 4/15/2025  CONCLUSION:  1. 1.2 cm calculus within the left renal pelvis with mild upstream pelvocaliectasis. 2. Nonobstructing 5 mm calculus in the right kidney.  No right hydronephrosis. 3. Please see above for further details.    LOCATION:  [facility]   Dictated by (CST): Jonathan Sánchez MD on 4/15/2025 at 4:16 PM     Finalized by (CST): Jonathan Sánchez MD on 4/15/2025 at 4:18 PM        Impression:  Problem List[6]    HISTORY OF CYSTINURIA  BILATERAL FLANK PAIN, 1.2 CM STONE WITHIN LEFT RENAL PELVIS WITH MILD UPSTREAM PELVOCALIECTASIS, NON-OBSTRUCTING 5 MM RIGHT UPPER POLE STONE  Afebrile, VSS  WBC 6.5  Hgb 15.8  Platelet count 368  Serum creat 1.03  UA 4/15/25: 1-5 WBC/hpf, 6-10 RBC/hpf, rare bacteria    Recommendations:  We discussed ureteroscopy as a minimally invasive surgical procedure whereby a small scope is placed through the urethra, through the bladder, up the ureter and potentially into the kidney to allow treatment of disease. A balloon or dilator is often used to stretch the ureter to ease scope passage or treat a scar or stricture. Contrast is often placed into the ureter  and kidney to evaluate the anatomy with fluoroscopic x-ray. Lasers are often used to treat any obstruction such as stone or stricture. In most cases, the goal is complete removal of stone, occasionally this is not possible or indicated and in those cases there may be need for future procedures to remove remaining stones. Typically, the procedure causes some swelling in the ureter which can cause obstruction and pain, for that reason a ureteral stent is often left in place afterwards to alleviate those symptoms. Also, the stent allows easy access back into the kidney should there be residual stones to treat. The stent must be removed within 3 months or otherwise risk that the stent may become encrusted making removal difficult and risk permanent renal damage. As with any procedure there are risks of bleeding, anesthesia, and infection. Also, the scope and laser are capable of perforating the ureter or kidney which could result in a scar or stricture requiring future procedures. These considerations were thoroughly discussed with the patient.      Recommend cystoscopy, bilateral retrograde pyelogram, left ureteroscopy with laser lithotripsy, left ureteral stent placement, possible right ureteroscopy, possible right ureteral stent.  Reviewed risks, benefits, alternatives, and complications of the procedure including but not limited to risk of anesthesia, bladder/ureteral injury, use of nephrostomy tube, bleeding, infection, inability to reach the stone necessitating a subsequent procedure, possibility he may require more than one treatment, and ureteral stent related symptoms with the patient who understands and wishes to proceed.  Patient informed if there is any concern for infection intraoperatively, he would only have a stent placement and require a subsequent procedure to address the stone.  Patient also informed the ureteral stent is not a permament implant and would eventually need to be removed (timing of stent  removal per urologist). Patient agrees and understands.    NPO  Consent to be signed  Ancef on call to OR    In the interim, continue with IV fluids, straining all urine, pain management.  Follow labs, temp,    Nurse to notify urology ASAP if patient was to spike a fever and/or signs of sepsis.       Above discussed with patient, nurse  Will update Dr. Mcdonough    Thank you for allowing me to participate in the care of your patient.    Angeles Shea PA-C  [facility]  Department of Urology  4/16/2025  8:02 AM         [1]   Past Medical History:   Anxiety state    Calculus of kidney    Cystine disease (HCC)    Depression    IBS (irritable bowel syndrome)    Migraines    OCD (obsessive compulsive disorder)   [2]   Past Surgical History:  Procedure Laterality Date    Colonoscopy      Cystoscopy,insert ureteral stent  03/07/2024    Lithotripsy      Other surgical history  02/17/2011    Rt RPG, URS stone manipulation,laser litho,Rt stent, Dr. Mcdonough    Other surgical history  02/21/2011    Cysto stent removal- Dr. Mcdonough    Other surgical history  11/21/2015    Cysto, L URS, laser litho, stone extraction, stent placement, RPG Dr. Finn    Other surgical history  12/01/2015    cysto stent removal    Upper gi endoscopy,exam     [3]   Family History  Problem Relation Age of Onset    Heart Disorder Maternal Grandfather     Diabetes Paternal Grandfather     Cancer Paternal Grandfather         meloma    Hypertension Father     Other (Other) Father     Cancer Maternal Grandmother     Cancer Paternal Grandmother    [4]   Allergies  Allergen Reactions    Ciprofloxacin DIZZINESS     Dizziness, difficulty with walking    Seroquel [Quetiapine] RESTLESSNESS     Shaking, neurological side effects.    Tiopronin OTHER (SEE COMMENTS)     WEIGHT LOSS AND HAIR LOSS PER PT REPORT and protein excretion   [5]   Current Facility-Administered Medications:     ketorolac (Toradol) 30 MG/ML injection 30 mg, 30 mg, Intravenous, Once     morphINE PF 4 MG/ML injection 1 mg, 1 mg, Intravenous, Q2H PRN **OR** morphINE PF 4 MG/ML injection 2 mg, 2 mg, Intravenous, Q2H PRN **OR** morphINE PF 4 MG/ML injection 4 mg, 4 mg, Intravenous, Q2H PRN    ketorolac (Toradol) 15 MG/ML injection 15 mg, 15 mg, Intravenous, Q6H PRN **OR** ketorolac (Toradol) 30 MG/ML injection 30 mg, 30 mg, Intravenous, Q6H PRN    HYDROmorphone (Dilaudid) 1 MG/ML injection 0.2 mg, 0.2 mg, Intravenous, Q2H PRN **OR** HYDROmorphone (Dilaudid) 1 MG/ML injection 0.4 mg, 0.4 mg, Intravenous, Q2H PRN **OR** HYDROmorphone (Dilaudid) 1 MG/ML injection 0.8 mg, 0.8 mg, Intravenous, Q2H PRN    ondansetron (Zofran) 4 MG/2ML injection 4 mg, 4 mg, Intravenous, Q6H PRN    potassium citrate (Urocit-K) tab 20 mEq, 20 mEq, Oral, QID    sertraline (Zoloft) tab 100 mg, 100 mg, Oral, Daily    tamsulosin (Flomax) cap 0.4 mg, 0.4 mg, Oral, Daily    HYDROcodone-acetaminophen (Norco) 5-325 MG per tab 1 tablet, 1 tablet, Oral, Q4H PRN **OR** HYDROcodone-acetaminophen (Norco) 5-325 MG per tab 2 tablet, 2 tablet, Oral, Q4H PRN    sodium chloride 0.9% infusion, , Intravenous, Continuous    heparin (Porcine) 5000 UNIT/ML injection 5,000 Units, 5,000 Units, Subcutaneous, Q8H CIARRA  [6]   Patient Active Problem List  Diagnosis    Cystinuria (HCC)    Cystine stones (HCC)    Proteinuria    IBS (irritable bowel syndrome)    Major depression with psychotic features (HCC)    Major depressive disorder, recurrent episode    Migraine without aura and with status migrainosus, not intractable    Obstructive uropathy    Ureteral stone with hydronephrosis    Calculus of left ureter    Renal colic    Hydronephrosis with ureteropelvic junction (UPJ) obstruction    Hydroureteronephrosis    Calculus of proximal right ureter

## 2025-04-16 NOTE — PLAN OF CARE
[facility] care 0730.  Alert and oriented x4.  NPO.  Consent signed for procedure.  Cysto today.  PIV infusing 0.9@ 125 ml/hr.  Afebrile  Heparin on hold for procedure  No tele  Straining urine.   PRN pain medications given. See MAR.   Up at josé miguel.       Problem: PAIN - ADULT  Goal: Verbalizes/displays adequate comfort level or patient's stated pain goal  Description: INTERVENTIONS:- Encourage pt to monitor pain and request assistance- Assess pain using appropriate pain scale- Administer analgesics based on type and severity of pain and evaluate response- Implement non-pharmacological measures as appropriate and evaluate response- Consider cultural and social influences on pain and pain management- Manage/alleviate anxiety- Utilize distraction and/or relaxation techniques- Monitor for opioid side effects- Notify MD/LIP if interventions unsuccessful or patient reports new pain- Anticipate increased pain with activity and pre-medicate as appropriate  Outcome: Progressing     Problem: SAFETY ADULT - FALL  Goal: Free from fall injury  Description: INTERVENTIONS:- Assess pt frequently for physical needs- Identify cognitive and physical deficits and behaviors that affect risk of falls.- [place] fall precautions as indicated by assessment.- Educate pt/family on patient safety including physical limitations- Instruct pt to call for assistance with activity based on assessment- Modify environment to reduce risk of injury- Provide assistive devices as appropriate- Consider OT/PT consult to assist with strengthening/mobility- Encourage toileting schedule  Outcome: Progressing     Problem: GASTROINTESTINAL - ADULT  Goal: Minimal or absence of nausea and vomiting  Description: INTERVENTIONS:- Maintain adequate hydration with IV or PO as ordered and tolerated- Nasogastric tube to low intermittent suction as ordered- Evaluate effectiveness of ordered antiemetic medications- Provide nonpharmacologic comfort measures as appropriate-  Advance diet as tolerated, if ordered- Obtain nutritional consult as needed- Evaluate fluid balance  Outcome: Progressing     Problem: GENITOURINARY - ADULT  Goal: Absence of urinary retention  Description: INTERVENTIONS:- Assess patient's ability to void and empty bladder- Monitor intake/output and perform bladder scan as needed- Follow urinary retention protocol/standard of care- Consider collaborating with pharmacy to review patient's medication profile- Implement strategies to promote bladder emptying  Outcome: Progressing
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
## 2025-04-16 NOTE — H&P
General Medicine H&P     Chief Complaint   Patient presents with    Abdomen/Flank Pain        PCP: Spenser Mccormick MD    History of Present Illness: Patient is a 29 year old male with PMH including but not limited to anxiety, cysteine dz, depression, IBS who p/t PF ED c abd pain and stone.     Pt reports sxs started 4/5 when first noticed, went to  Wednesday (see a prior 3/28 note c influenza), stone sxs have been up/down, now worse. Today around 1420 pain intensified. No f/c, +nausea. Has had stones in past given hx.       Past Medical History[1]   Past Surgical History[2]     ALL:  Allergies[3]     Scheduled Meds[4]    Social History     Tobacco Use    Smoking status: Never     Passive exposure: Never    Smokeless tobacco: Never   Substance Use Topics    Alcohol use: No        Fam Hx  Family History[5]    Review of Systems  Comprehensive ROS reviewed and negative except for what's stated above. \    OBJECTIVE:  BP (!) 148/94 (BP Location: Right arm)   Pulse 109   Temp 97.9 °F (36.6 °C) (Temporal)   Resp 18   Ht 5' 7\" (1.702 m)   Wt 189 lb 1.6 oz (85.8 kg)   SpO2 96%   BMI 29.62 kg/m²     General:  Alert, no distress, appears stated age.    Head:  Normocephalic, without obvious abnormality, atraumatic.   Eyes:  Sclera anicteric, EOMs intact.    Nose: Nares normal,  Mucosa normal    Throat: Lips normal   Neck: Supple, symmetrical, trachea midline   Lungs:   Clear to auscultation bilaterally. Normal effort   Chest wall:  No tenderness or deformity   Heart:  Regular rate and rhythm, S1, S2 normal, no murmur, rub or gallop appreciated   Abdomen:   Soft, ++LLQ/mid abd tenderness, Bowel sounds normal. No masses,  No organomegaly.    Extremities/MSK: Extremities normal/normal movement, atraumatic, no cyanosis  or edema.   Skin: Skin color, texture, turgor normal. No rashes or lesions.    Neurologic: Moving all extremities spontaneously, no focal deficit appreciated          LABS:   Lab Results   Component  Value Date    WBC 6.5 04/15/2025    HGB 15.8 04/15/2025    HCT 45.4 04/15/2025    .0 04/15/2025    CREATSERUM 1.03 04/15/2025    BUN 16 04/15/2025     04/15/2025    K 4.3 04/15/2025     04/15/2025    CO2 27.0 04/15/2025    GLU 88 04/15/2025    CA 10.0 04/15/2025    ALB 5.1 04/15/2025    ALKPHO 67 04/15/2025    BILT 0.6 04/15/2025    TP 8.1 04/15/2025    AST 26 04/15/2025    ALT 53 04/15/2025       Radiology: CT ABDOMEN+PELVIS KIDNEYSTONE 2D RNDR(NO IV,NO ORAL)(CPT=74176)  Result Date: 4/15/2025  PROCEDURE:  CT ABDOMEN+PELVIS KIDNEYSTONE 2D RNDR(NO IV,NO ORAL)(CPT=74176)  COMPARISON:  [facility], CT, CT ABDOMEN+PELVIS(CONTRAST ONLY)(CPT=74177), 1/29/2025, 10:56 PM.  INDICATIONS:  kidney stone  TECHNIQUE:  Unenhanced multislice CT scanning from above the kidneys to below the urinary bladder.  2D rendering are generated on the CT scanner workstation to localize potential stones in the cranio-caudal plane.  Dose reduction techniques were used. Dose information is transmitted to the ACR (American College of Radiology) NRDR (National Radiology Data Registry) which includes the Dose Index Registry.  PATIENT STATED HISTORY: (As transcribed by Technologist)     FINDINGS:  KIDNEYS:  There is a 1.2 cm calculus within the left renal pelvis with mild upstream pelvocaliectasis.  Nonobstructing 5 mm calculus in the right kidney upper pole.  No hydronephrosis on the right. BLADDER:  No mass, calculus or significant wall thickening. ADRENALS:  No mass or enlargement.  LIVER:  No enlargement, atrophy, abnormal density, or significant focal lesion.  BILIARY:  No visible dilatation or calcification.  PANCREAS:  No lesion, fluid collection, ductal dilatation, or atrophy.  SPLEEN:  No enlargement or focal lesion.  AORTA/VASCULAR:  No aneurysm.  RETROPERITONEUM:  No mass or adenopathy.  BOWEL/MESENTERY:  No dilated bowel or wall thickening.  Normal appendix. ABDOMINAL WALL:  Trace fat containing umbilical hernia. BONES:   No bony lesion or fracture. PELVIC ORGANS:  Normal for age.  LUNG BASES:  No visible pulmonary or pleural disease.  OTHER:  Negative.             CONCLUSION:  1. 1.2 cm calculus within the left renal pelvis with mild upstream pelvocaliectasis. 2. Nonobstructing 5 mm calculus in the right kidney.  No right hydronephrosis. 3. Please see above for further details.    LOCATION:  [facility]   Dictated by (CST): Jonathan Sánchez MD on 4/15/2025 at 4:16 PM     Finalized by (CST): Jonathan Sánchez MD on 4/15/2025 at 4:18 PM            ASSESSMENT / PLAN:    29 year old male with PMH including but not limited to anxiety, cysteine dz, depression, IBS who p/t PF ED c abd pain and stone.       # Abd pain 2/2 renal stone.   - CT reviewed showing 1.2 cm calculus within the left renal pelvis with mild upstream pelvocaliectasis.   - Nonobstructing 5 mm calculus in the right kidney.  No right hydronephrosis.   -  following, apprec, likely procedure in AM  - clears, NPO p MN, IVF  - IV pain/nausea meds PRN  - on flomax  - CMP/CBC ok, UA rare bacteria, 6-10 RBCs  - had prior stones and tolerated procedures      # Major Depression/ Generalized anxiety d/o / OCD  -reviewed home meds, continue SSRI currently appears stable     # cysteine dz  - K citrate       # Proph  - sqh, Hold am dose          Outpatient records or previous hospital records reviewed. [facility] hospitalist to continue to follow patient while in house. A total of 75 minutes taken.  Greater than 50% face to face encounter.  D/w pt, parents, Heather Vilalvicencio PA Charles Yohannan, MD  [facility] Hospitalist  Pager: [phone]  4/15/2025  7:22 PM               [1]   Past Medical History:   Anxiety state    Calculus of kidney    Cystine disease (HCC)    Depression    IBS (irritable bowel syndrome)    Migraines    OCD (obsessive compulsive disorder)   [2]   Past Surgical History:  Procedure Laterality Date    Colonoscopy      Cystoscopy,insert ureteral stent  03/07/2024     Lithotripsy      Other surgical history  02/17/2011    Rt RPG, URS stone manipulation,laser litho,Rt stent, Dr. Mcdonough    Other surgical history  02/21/2011    Cysto stent removal- Dr. Mcdonough    Other surgical history  11/21/2015    Cysto, L URS, laser litho, stone extraction, stent placement, RPG Dr. Finn    Other surgical history  12/01/2015    cysto stent removal    Upper gi endoscopy,exam     [3]   Allergies  Allergen Reactions    Ciprofloxacin DIZZINESS     Dizziness, difficulty with walking    Seroquel [Quetiapine] RESTLESSNESS     Shaking, neurological side effects.    Tiopronin OTHER (SEE COMMENTS)     WEIGHT LOSS AND HAIR LOSS PER PT REPORT and protein excretion   [4] ketorolac, 30 mg, Once  potassium citrate, 20 mEq, QID  sertraline, 100 mg, Daily  tamsulosin, 0.4 mg, Daily  [5]   Family History  Problem Relation Age of Onset    Heart Disorder Maternal Grandfather     Diabetes Paternal Grandfather     Cancer Paternal Grandfather         meloma    Hypertension Father     Other (Other) Father     Cancer Maternal Grandmother     Cancer Paternal Grandmother

## 2025-04-16 NOTE — BRIEF OP NOTE
Pre-Operative Diagnosis: Calculi, ureter [N20.1]     Post-Operative Diagnosis: Calculi, ureter [N20.1]      Procedure Performed:   CYSTOSCOPY, BILATERAL RETROGRADE PYELOGRAMS, LEFT URETEROSCOPY and pyeloscopy WITH LASER LITHOTRIPSY, LEFT URETERAL STENT PLACEMENT    Surgeons and Role:     * Jaden Mcdonough MD - Primary    Assistant(s):        Surgical Findings: successful procedure     Specimen: none     Estimated Blood Loss: none    Jaden Mcdonough MD  4/16/2025  5:33 PM

## 2025-04-16 NOTE — ANESTHESIA PROCEDURE NOTES
Airway  Date/Time: 4/16/2025 6:05 PM  Reason: elective      General Information and Staff   Patient location during procedure: OR  Anesthesiologist: Ej Cartagena MD  Performed: anesthesiologist   Performed by: Ej Cartagena MD  Authorized by: Ej Cartagena MD        Indications and Patient Condition  Indications for airway management: anesthesia  Sedation level: deep      Preoxygenated: yesPatient position: sniffing    Mask difficulty assessment: 1 - vent by mask    Final Airway Details    Final airway type: supraglottic airway      Successful airway: classic  Size: 4     Number of attempts at approach: 1  Number of other approaches attempted: 0

## 2025-04-16 NOTE — PLAN OF CARE
Patient alert and appropriate. Cooperative with care. He denies chest pain and shortness of breath. No signs of distress noted. IVF continue to infuse..Due meds given . PRN pain meds and anti emetics available. Continue to strain urine. Will keep patient NPO after midnight as ordered. Falls and safety precautions maintained.  Problem: PAIN - ADULT  Goal: Verbalizes/displays adequate comfort level or patient's stated pain goal  Description: INTERVENTIONS:- Encourage pt to monitor pain and request assistance- Assess pain using appropriate pain scale- Administer analgesics based on type and severity of pain and evaluate response- Implement non-pharmacological measures as appropriate and evaluate response- Consider cultural and social influences on pain and pain management- Manage/alleviate anxiety- Utilize distraction and/or relaxation techniques- Monitor for opioid side effects- Notify MD/LIP if interventions unsuccessful or patient reports new pain- Anticipate increased pain with activity and pre-medicate as appropriate  Outcome: Progressing     Problem: GENITOURINARY - ADULT  Goal: Absence of urinary retention  Description: INTERVENTIONS:- Assess patient's ability to void and empty bladder- Monitor intake/output and perform bladder scan as needed- Follow urinary retention protocol/standard of care- Consider collaborating with pharmacy to review patient's medication profile- Implement strategies to promote bladder emptying  Outcome: Progressing     Problem: GASTROINTESTINAL - ADULT  Goal: Minimal or absence of nausea and vomiting  Description: INTERVENTIONS:- Maintain adequate hydration with IV or PO as ordered and tolerated- Nasogastric tube to low intermittent suction as ordered- Evaluate effectiveness of ordered antiemetic medications- Provide nonpharmacologic comfort measures as appropriate- Advance diet as tolerated, if ordered- Obtain nutritional consult as needed- Evaluate fluid balance  Outcome: Progressing      Problem: SAFETY ADULT - FALL  Goal: Free from fall injury  Description: INTERVENTIONS:- Assess pt frequently for physical needs- Identify cognitive and physical deficits and behaviors that affect risk of falls.- [place] fall precautions as indicated by assessment.- Educate pt/family on patient safety including physical limitations- Instruct pt to call for assistance with activity based on assessment- Modify environment to reduce risk of injury- Provide assistive devices as appropriate- Consider OT/PT consult to assist with strengthening/mobility- Encourage toileting schedule  Outcome: Progressing     Problem: Patient/Family Goals  Goal: Patient/Family Long Term Goal  Description: Description: Patient's Long Term Goal: \"stay healthy at home\"    Interventions:  - urology consult  -take medications as prescribed  -attend follow up appointments as recommended  -report new or worsening symptoms to physician    Outcome: Progressing  Goal: Patient/Family Short Term Goal  Description: Patient's Short Term Goal: \" adequate pain management    Interventions:   -monitor for complaints of pain  -offer PRN pain meds  Outcome: Progressing

## 2025-04-16 NOTE — ANESTHESIA PREPROCEDURE EVALUATION
PRE-OP EVALUATION    Patient Name: Devon East    Admit Diagnosis: Renal colic [N23]    Pre-op Diagnosis: Calculi, ureter [N20.1]    CYSTOSCOPY, BILATERAL RETROGRADE PYELOGRAMS, POSSIBLE RIGHT URETEROSCOPY, POSSIBLE RIGHT URETERAL STENT, LEFT URETEROSCOPY WITH LASER LITHOTRIPSY, LEFT URETERAL STENT PLACEMENT    Anesthesia Procedure: CYSTOSCOPY, BILATERAL RETROGRADE PYELOGRAMS, POSSIBLE RIGHT URETEROSCOPY, POSSIBLE RIGHT URETERAL STENT, LEFT URETEROSCOPY WITH LASER LITHOTRIPSY, LEFT URETERAL STENT PLACEMENT (Bilateral: Ureter)    Surgeons and Role:     * Jaden Mcdonough MD - Primary    Pre-op vitals reviewed.  Temp: 97.5 °F (36.4 °C)  Pulse: 95  Resp: 16  BP: 137/89  SpO2: 95 %  Body mass index is 29.62 kg/m².    Current medications reviewed.  Hospital Medications:  Current Medications[1]    Outpatient Medications:   Prescriptions Prior to Admission[2]    Allergies: Ciprofloxacin, Seroquel [quetiapine], and Tiopronin      Anesthesia Evaluation    Patient summary reviewed.    Anesthetic Complications           GI/Hepatic/Renal                                 Cardiovascular                (+) obesity                                       Endo/Other                                  Pulmonary                           Neuro/Psych      (+) depression                              Past Surgical History[3]  Social Hx on file[4]  History   Drug Use No     Available pre-op labs reviewed.  Lab Results   Component Value Date    WBC 6.5 04/15/2025    RBC 5.06 04/15/2025    HGB 15.8 04/15/2025    HCT 45.4 04/15/2025    MCV 89.7 04/15/2025    MCH 31.2 04/15/2025    MCHC 34.8 04/15/2025    RDW 12.5 04/15/2025    .0 04/15/2025     Lab Results   Component Value Date     04/15/2025    K 4.3 04/15/2025     04/15/2025    CO2 27.0 04/15/2025    BUN 16 04/15/2025    CREATSERUM 1.03 04/15/2025    GLU 88 04/15/2025    CA 10.0 04/15/2025            Airway      Mallampati: II  Mouth opening: 3 FB  TM distance: 4 - 6  cm  Neck ROM: full Cardiovascular    Cardiovascular exam normal.  Rhythm: regular  Rate: normal     Dental             Pulmonary    Pulmonary exam normal.                 Other findings          ASA: 2   Plan: general  NPO status verified and patient meets guidelines.          Plan/risks discussed with: patient            Present on Admission:  **None**               [1]  • ceFAZolin (Ancef) 2g in 10mL IV syringe premix  2 g Intravenous On Call to OR   • ketorolac (Toradol) 30 MG/ML injection 30 mg  30 mg Intravenous Once   • [COMPLETED] ketorolac (Toradol) 15 MG/ML injection       • [COMPLETED] sodium chloride 0.9 % IV bolus 1,000 mL  1,000 mL Intravenous Once   • [COMPLETED] HYDROmorphone (Dilaudid) 1 MG/ML injection 1 mg  1 mg Intravenous Once   • morphINE PF 4 MG/ML injection 1 mg  1 mg Intravenous Q2H PRN    Or   • morphINE PF 4 MG/ML injection 2 mg  2 mg Intravenous Q2H PRN    Or   • morphINE PF 4 MG/ML injection 4 mg  4 mg Intravenous Q2H PRN   • ketorolac (Toradol) 15 MG/ML injection 15 mg  15 mg Intravenous Q6H PRN    Or   • ketorolac (Toradol) 30 MG/ML injection 30 mg  30 mg Intravenous Q6H PRN   • HYDROmorphone (Dilaudid) 1 MG/ML injection 0.2 mg  0.2 mg Intravenous Q2H PRN    Or   • HYDROmorphone (Dilaudid) 1 MG/ML injection 0.4 mg  0.4 mg Intravenous Q2H PRN    Or   • HYDROmorphone (Dilaudid) 1 MG/ML injection 0.8 mg  0.8 mg Intravenous Q2H PRN   • ondansetron (Zofran) 4 MG/2ML injection 4 mg  4 mg Intravenous Q6H PRN   • potassium citrate (Urocit-K) tab 20 mEq  20 mEq Oral QID   • sertraline (Zoloft) tab 100 mg  100 mg Oral Daily   • tamsulosin (Flomax) cap 0.4 mg  0.4 mg Oral Daily   • HYDROcodone-acetaminophen (Norco) 5-325 MG per tab 1 tablet  1 tablet Oral Q4H PRN    Or   • HYDROcodone-acetaminophen (Norco) 5-325 MG per tab 2 tablet  2 tablet Oral Q4H PRN   • sodium chloride 0.9% infusion   Intravenous Continuous   • heparin (Porcine) 5000 UNIT/ML injection 5,000 Units  5,000 Units Subcutaneous Q8H  [facility]   [2]  Medications Prior to Admission   Medication Sig Dispense Refill Last Dose/Taking   • diphenhydrAMINE HCl, Sleep, (ZZZQUIL OR) Take 1 tablet by mouth nightly as needed (insomnia).   Taking As Needed   • tamsulosin 0.4 MG Oral Cap Take 1 capsule (0.4 mg total) by mouth daily.   4/15/2025 Morning   • potassium citrate 10 MEQ (1080 MG) Oral Tab CR Take 2 tablets (20 mEq total) by mouth 4 (four) times daily.   4/15/2025 Noon   • sertraline 100 MG Oral Tab Take 1 tablet (100 mg total) by mouth in the morning.   4/15/2025 Morning   • [] ondansetron 4 MG Oral Tablet Dispersible Take 1 tablet (4 mg total) by mouth every 8 (eight) hours as needed for Nausea. 20 tablet 0    • benzonatate 200 MG Oral Cap Take 1 capsule (200 mg total) by mouth as needed in the morning and 1 capsule (200 mg total) as needed at noon and 1 capsule (200 mg total) as needed in the evening for cough.      • HYDROcodone-acetaminophen 5-325 MG Oral Tab Take 1-2 tablets by mouth every 4 (four) hours as needed for Pain. 12 tablet 0    • albuterol 108 (90 Base) MCG/ACT Inhalation Aero Soln Inhale 2 puffs into the lungs every 6 (six) hours as needed. (Patient not taking: Reported on 2024)   Unknown   [3]  Past Surgical History:  Procedure Laterality Date   • Colonoscopy     • Cystoscopy,insert ureteral stent  2024   • Lithotripsy     • Other surgical history  2011    Rt RPG, URS stone manipulation,laser litho,Rt stent, Dr. Mcdonough   • Other surgical history  2011    Cysto stent removal- Dr. Mcdonough   • Other surgical history  2015    Cysto, L URS, laser litho, stone extraction, stent placement, RPG Dr. Finn   • Other surgical history  2015    cysto stent removal   • Upper gi endoscopy,exam     [4]  Social History  Socioeconomic History   • Marital status: Single   Tobacco Use   • Smoking status: Never     Passive exposure: Never   • Smokeless tobacco: Never   Vaping Use   • Vaping status: Never Used    Substance and Sexual Activity   • Alcohol use: No   • Drug use: No

## 2025-04-17 VITALS
BODY MASS INDEX: 29.69 KG/M2 | WEIGHT: 189.13 LBS | OXYGEN SATURATION: 96 % | RESPIRATION RATE: 17 BRPM | HEIGHT: 67 IN | DIASTOLIC BLOOD PRESSURE: 84 MMHG | HEART RATE: 72 BPM | SYSTOLIC BLOOD PRESSURE: 125 MMHG | TEMPERATURE: 98 F

## 2025-04-17 RX ORDER — ONDANSETRON 4 MG/1
4 TABLET, FILM COATED ORAL EVERY 6 HOURS PRN
Qty: 20 TABLET | Refills: 0 | Status: SHIPPED | OUTPATIENT
Start: 2025-04-17

## 2025-04-17 RX ORDER — KETOROLAC TROMETHAMINE 10 MG/1
10 TABLET, FILM COATED ORAL EVERY 6 HOURS PRN
Qty: 20 TABLET | Refills: 0 | Status: SHIPPED | OUTPATIENT
Start: 2025-04-17

## 2025-04-17 RX ORDER — HYDROCODONE BITARTRATE AND ACETAMINOPHEN 5; 325 MG/1; MG/1
1-2 TABLET ORAL EVERY 4 HOURS PRN
Qty: 25 TABLET | Refills: 0 | Status: SHIPPED | OUTPATIENT
Start: 2025-04-17

## 2025-04-17 NOTE — PROGRESS NOTES
NURSING DISCHARGE NOTE     Discharged Home via Wheelchair.  Accompanied by Support staff  Belongings Taken by patient/family.    Patient is stable to discharge home. Medically cleared by all consults and hospitalist. Reviewed discharge instructions about medications and follow-up appointments with the patient. Patient verbalized understanding.  Questions and concerns addressed.  IV line dc'ed. Pressure and dressing applied. Clean/Dry/Intact. Discharge navigator completed.Tele box removed,cleaned and returned to drawer. All belongings sent with patient.

## 2025-04-17 NOTE — OPERATIVE REPORT
[facility]   part of [facility]    Operative Note         Devon East Location: OR   [facility] [id number] MRN [id number]   Admission Date 4/15/2025 Operation Date 4/16/2025   Attending Physician Alfredo Manley MD       Patient Name: Devon East     Preoperative Diagnosis: Calculi, ureter [N20.1]     Postoperative Diagnosis: Calculi, ureter [N20.1]     Procedure(s):  CYSTOSCOPY, LEFT RETROGRADE PYELOGRAMS, left ureteral dilatation, LEFT URETEROSCOPYand pyeloscopy with LASER LITHOTRIPSY, LEFT URETERAL STENT PLACEMENT     Primary Surgeon: Jaden Mcdonough MD           Anesthesia: General     History: Patient is a 29-year-old male with a history of recurrent cystine stones.  He presented earlier today with severe left flank pain with a CT scan noting a 12 mm UPJ stone with obstruction.  Due to severity of symptoms he presents at this time for intervention.    Operative Summary:  The patient was prepped and draped in a sterile fashion after undergoing successful administration of general anesthesia while supine.  2% anesthetic lubricant was placed into the urethra.  The 21 Norwegian cystoscope was advanced to the urethra into the bladder.  The bladder mucosa was inspected and was normal.  The left ureteral orifice was catheterized with a 5 Norwegian open-ended catheter and a retrograde pyelogram was performed.  The obstructing stone was seen with significant hydronephrosis proximal to the stone.  An angled 0.38 Glidewire was introduced through the open-ended catheter and advanced up to the level of the stone.  Careful manipulation of the Glidewire allowed for advancement of the Glidewire proximal to the stone into the intrarenal collecting system seen under fluoroscopic guidance.  The open-ended catheter and cystoscope were then withdrawn.  The ureter was then dilated using an 8/10 ureteral dilator.  The ureteral dilator was then withdrawn.  The flexible digital ureteroscope was then advanced over the Glidewire  through the urethra into the bladder into the ureter successfully up to the level of the 12 mm stone.  The Glidewire was then withdrawn and a 200 µm holmium laser was placed. Laser lithotripsy was then performed fragmenting the stone carefully with attention to the ureteral wall.  Once fragmented the stone could then be manipulated into the intrarenal collecting system successfully.  Nephroscopy was performed with manipulation of the sizable stone fragments into calyces of the upper pole collecting system for further laser lithotripsy. Laser lithotripsy was then performed of the stones until all fragments were less than 1/2 mm in size.  The laser was then withdrawn and a 0.38 Glidewire was introduced through the ureteroscope into the intrarenal collecting system.  The ureteroscope was then withdrawn and a 6 Estonian 26 centimeter double-J stent was then advanced into position over the Glidewire with a good coil seen in the intrarenal collecting system as well as the bladder upon removal of the Glidewire through fluoroscopic and cystoscopic guidance.  The bladder was then evacuated of urine the string attached to the distal end of the stent was exited out through the urethra.  The patient was then transferred to the recovery room in good condition.    Estimated blood loss: None    Drains: 6 Estonian 26 centimeter double-J stent    Condition: Good    Implants:   Implant Name Type Inv. Item Serial No.  Lot No. LRB No. Used Action   STENT URO [id number] PGTL SFT HYDRPHLC COAT - SN/A  STENT URO [id number] PGTL SFT HYDRPHLC COAT N/A tu.nr  [id number] Left 1 Implanted        Specimen: None      Jaden Mcdonough MD

## 2025-04-17 NOTE — ANESTHESIA POSTPROCEDURE EVALUATION
[facility]    Devon East Patient Status:  Observation   Age/Gender 29 year old male MRN [id number]   Location [facility] POST ANESTHESIA CARE UNIT Attending Alfredo Manley MD   Hosp Day # 0 PCP Spenser Mccormick MD       Anesthesia Post-op Note    CYSTOSCOPY, LEFT RETROGRADE PYELOGRAMS, LEFT URETEROSCOPY, LEFT URETEROSCOPY WITH LASER LITHOTRIPSY, LEFT URETERAL STENT PLACEMENT    Procedure Summary       Date: 04/16/25 Room / Location:  MAIN OR 07 /  MAIN OR    Anesthesia Start: 1758 Anesthesia Stop: 1916    Procedure: CYSTOSCOPY, LEFT RETROGRADE PYELOGRAMS, LEFT URETEROSCOPY, LEFT URETEROSCOPY WITH LASER LITHOTRIPSY, LEFT URETERAL STENT PLACEMENT (Bilateral: Ureter) Diagnosis:       Calculi, ureter      (Calculi, ureter [N20.1])    Surgeons: Jaden Mcdonough MD Anesthesiologist: Ej Cartagena MD    Anesthesia Type: general ASA Status: 2            Anesthesia Type: general    Vitals Value Taken Time   /84 04/16/25 19:30   Temp 98.1 °F (36.7 °C) 04/16/25 19:15   Pulse 98 04/16/25 19:38   Resp 11 04/16/25 19:38   SpO2 99 % 04/16/25 19:38   Vitals shown include unfiled device data.        Patient Location: PACU    Anesthesia Type: general    Airway Patency: patent    Postop Pain Control: adequate    Mental Status: preanesthetic baseline    Nausea/Vomiting: none    Cardiopulmonary/Hydration status: stable euvolemic    Complications: no apparent anesthesia related complications    Postop vital signs: stable    Dental Exam: Unchanged from Preop    Patient to be discharged from PACU when criteria met.

## 2025-04-17 NOTE — PROGRESS NOTES
[facility] Hospitalist Progress Note     PCP: Spenser Mccormick MD    Chief Complaint: follow-up   Follow up for: The primary encounter diagnosis was Renal colic. A diagnosis of Calculus of proximal right ureter was also pertinent to this visit.    Overnight/Interim Events:      SUBJECTIVE:  Still some burning c u/o. No blood, dark orange.     OBJECTIVE:  Temp:  [97.5 °F (36.4 °C)-98.9 °F (37.2 °C)] 97.5 °F (36.4 °C)  Pulse:  [] 72  Resp:  [5-20] 17  BP: (108-130)/(75-94) 125/84  SpO2:  [91 %-98 %] 96 %    Intake/Output:    Intake/Output Summary (Last 24 hours) at 4/17/2025 1802  Last data filed at 4/17/2025 0700  Gross per 24 hour   Intake 2796 ml   Output 3000 ml   Net -204 ml       Last 3 Weights   04/15/25 1849 189 lb 1.6 oz (85.8 kg)   04/15/25 1455 197 lb (89.4 kg)   01/29/25 2143 180 lb (81.6 kg)   11/01/24 2019 184 lb (83.5 kg)       Exam    General: Alert, no distress, appears stated age.     Head:  Normocephalic, without obvious abnormality, atraumatic.   Eyes:  Sclera anicteric, EOMs intact.    Nose: Nares normal,  Mucosa normal    Throat: Lips normal   Neck: Supple, symmetrical, trachea midline   Lungs:   Clear to auscultation bilaterally. Normal effort   Chest wall:  No tenderness or deformity   Heart:  Regular rate and rhythm, S1, S2 normal, no murmur, rub or gallop appreciated   Abdomen:   Soft, mild abd pain, Bowel sounds normal. No masses,  No organomegaly.    Extremities: Extremities normal, atraumatic, no cyanosis or LE edema.   Skin: Skin color, texture, turgor normal. No rashes or lesions.    Neurologic: Moving all extremities spontaneously, no focal deficit appreciated      Data Review:       Labs:     Recent Labs   Lab 04/15/25  1504   WBC 6.5   HGB 15.8   MCV 89.7   .0       Recent Labs   Lab 04/15/25  1506      K 4.3      CO2 27.0   BUN 16   CREATSERUM 1.03   CA 10.0   GLU 88       Recent Labs   Lab 04/15/25  1506   ALT 53*   AST 26   ALB 5.1*       No results for  input(s): \"PGLU\" in the last 168 hours.    No results for input(s): \"TROP\" in the last 168 hours.      Meds:     Scheduled Medications[1]  Medication Infusions[2]  PRN Medications[3]       Assessment/Plan:     29 year old male with PMH including but not limited to anxiety, cysteine dz, depression, IBS who p/t PF ED c abd pain and stone.         # Abd pain 2/2 renal stone.   - CT reviewed showing 1.2 cm calculus within the left renal pelvis with mild upstream pelvocaliectasis.   - Nonobstructing 5 mm calculus in the right kidney.  No right hydronephrosis.   -  following, apprec, s/p CYSTOSCOPY, LEFT RETROGRADE PYELOGRAMS, left ureteral dilatation, LEFT URETEROSCOPYand pyeloscopy with LASER LITHOTRIPSY, LEFT URETERAL STENT PLACEMENT 4/16  - clears, adat, NPO for procedure, IVF  - IV pain/nausea meds PRN  - on flomax  - CMP/CBC ok, UA rare bacteria, 6-10 RBCs  - had prior stones and tolerated procedures  - dc on norco/toradol/zofran         # Major Depression/ Generalized anxiety d/o / OCD  -reviewed home meds, continue SSRI   - d/w Selma Haley, LSW, apprec recs  - cont close o/p f/u, resources provided        # cysteine dz  - K citrate         # Proph  - sqh, Held for procedure      Dispo: med, dc   -      Questions/concerns were discussed with patient and mom by bedside. D/w RN    Total Time spent with patient and coordinating care:  55 minutes    Alfredo Manley MD  [facility]  [phone]  4/17/2025  6:05 PM         [1] [2] [3]

## 2025-04-17 NOTE — PLAN OF CARE
Pt  back from SX. Complaining of pain, medicated. No nausea. VSS on RA. Tolerating Clear liquids. Voiding but complains of some burning, urine pink in color. VSS on RA. All current needs met. Call light in reach

## 2025-04-17 NOTE — PROGRESS NOTES
[facility]  Urology Progress Note    Devon East Patient Status:  Observation    1995 MRN [id number]   Location [facility] 0SW-A Attending Alfredo Manley MD   Hosp Day # 0 PCP Spenser Mccormick MD     Subjective:  Devon East is a(n) 29 year old male.    S/P cystoscopy, left RGPG, left ureteral dilatation, left URS with pyeloscopy with laser lithotripsy, left ureteral stent placement 25 with Dr. Mcdonough    Current complaints: No abdominal/flank pain.  +dysuria. Unsure about hematuria - taking azo.  +irritative voiding symptoms.  No fever.     Objective:  General appearance: alert, appears stated age, and cooperative  Blood pressure 108/78, pulse 64, temperature 97.5 °F (36.4 °C), temperature source Oral, resp. rate 18, height 5' 7\" (1.702 m), weight 189 lb 1.6 oz (85.8 kg), SpO2 91%.  Lungs: non-labored respirations.   Abdomen: soft, non-tender  No flank tenderness        XR OR - N/C  Result Date: 2025  CONCLUSION:  Fluoroscopy provided intraoperatively by the radiology technologist.   LOCATION:  [place]    Dictated by (CST): Sheryl Frey MD on 2025 at 9:44 PM     Finalized by (CST): Sheryl Frey MD on 2025 at 9:44 PM       CT ABDOMEN+PELVIS KIDNEYSTONE 2D RNDR(NO IV,NO ORAL)(CPT=74176)  Result Date: 4/15/2025  CONCLUSION:  1. 1.2 cm calculus within the left renal pelvis with mild upstream pelvocaliectasis. 2. Nonobstructing 5 mm calculus in the right kidney.  No right hydronephrosis. 3. Please see above for further details.    LOCATION:  [facility]   Dictated by (CST): Jonathan Sánchez MD on 4/15/2025 at 4:16 PM     Finalized by (CST): Jonathan Sánchez MD on 4/15/2025 at 4:18 PM          Assessment:    HISTORY OF CYSTINURIA  BILATERAL FLANK PAIN, 1.2 CM STONE WITHIN LEFT RENAL PELVIS WITH MILD UPSTREAM PELVOCALIECTASIS, NON-OBSTRUCTING 5 MM RIGHT UPPER POLE STONE  S/P cystoscopy, left RGPG, left ureteral dilatation, left URS with pyeloscopy with laser lithotripsy, left  ureteral stent placement 4/16/25 with Dr. Mcdonough  Afebrile, VSS  WBC 6.5  Hgb 15.8  Platelet count 368  Serum creat 1.03  UA 4/15/25: 1-5 WBC/hpf, 6-10 RBC/hpf, rare bacteria    Plan:    Ureteral stent related symptoms reviewed with patient.  Patient informed the ureteral stent is not a permament implant and does need to be removed.  TE sent through [facility] system to help arrange nurse visit for Monday 4/21/25 for dangler stent removal  Pain management    Discharge pending pain management, approval from hospitalist.    Above discussed with patient, nurse, Dr. Ceasar Shea PA-C  [facility]  Department of Urology  4/17/2025  6:52 AM

## 2025-04-17 NOTE — PLAN OF CARE
Pt is a&ox4. RA. No tele. Refusing heparin. IVF's. Voids per urinal or BRP, up ad josé miguel. Tolearting diet. Pt c/o nausea meds given per MAR. Pt and mom at bedside updated on poc. No further needs at this time.    Problem: PAIN - ADULT  Goal: Verbalizes/displays adequate comfort level or patient's stated pain goal  Description: INTERVENTIONS:- Encourage pt to monitor pain and request assistance- Assess pain using appropriate pain scale- Administer analgesics based on type and severity of pain and evaluate response- Implement non-pharmacological measures as appropriate and evaluate response- Consider cultural and social influences on pain and pain management- Manage/alleviate anxiety- Utilize distraction and/or relaxation techniques- Monitor for opioid side effects- Notify MD/LIP if interventions unsuccessful or patient reports new pain- Anticipate increased pain with activity and pre-medicate as appropriate  Outcome: Progressing

## 2025-04-18 ENCOUNTER — PATIENT OUTREACH (OUTPATIENT)
Dept: CASE MANAGEMENT | Age: 30
End: 2025-04-18

## 2025-04-18 NOTE — PROGRESS NOTES
PCP / Specialist appointment request (discharged 04/17)    Dr Spenser Mccormick  Internal Medicine  [facility]  [address]  [place] [address]  [phone]    Dr Jaden Mcdonough  Urology  [facility]  [address]  [place], [place] [id number] [phone]  Patient has existing appointment Mon 04/21 @10:20am    Attempt #1:  Left message on voicemail for patient to call transitions specialist back to schedule follow up appointments. Provided Transitions specialist scheduling phone number [phone].

## 2025-04-21 NOTE — PROGRESS NOTES
PCP / Specialist appointment request (discharged 04/17)     Dr Spenser Mccormick  Internal Medicine  [facility]  [address]  [place] [address]  [phone]     Dr Jaden Mcdonough  Urology  [facility]  [address]  [place], [place] [id number] [phone]  Patient has existing appointment Mon 04/21 @10:20am     Attempt #2:  Left message on voicemail for patient to call transitions specialist back to schedule follow up appointments. Provided Transitions specialist scheduling phone number [phone].

## 2025-04-22 NOTE — PROGRESS NOTES
PCP / Specialist appointment request (discharged 04/17)     Dr Spenser Mccormick  Internal Medicine  [facility]  [address]  [place] [address]  [phone]     Dr Jaden Mcdonough  Urology  [facility]  [address]  [place] [address]  [phone]  Patient has existing appointment Mon 04/21 @10:20am  Multiple attempts w/no calls back; no appointment made    Attempt #3:  Left message on voicemail for patient to call transitions specialist back to schedule follow up appointments. Provided Transitions specialist scheduling phone number [phone]. Closing encounter. Will re-open if patient returns call.

## 2025-06-13 ENCOUNTER — HOSPITAL ENCOUNTER (EMERGENCY)
Age: 30
Discharge: HOME | End: 2025-06-14
Attending: EMERGENCY MEDICINE
Payer: COMMERCIAL

## 2025-06-13 ENCOUNTER — APPOINTMENT (OUTPATIENT)
Dept: CT IMAGING | Age: 30
End: 2025-06-13
Attending: EMERGENCY MEDICINE
Payer: COMMERCIAL

## 2025-06-13 VITALS
HEART RATE: 97 BPM | DIASTOLIC BLOOD PRESSURE: 98 MMHG | SYSTOLIC BLOOD PRESSURE: 145 MMHG | WEIGHT: 185 LBS | RESPIRATION RATE: 18 BRPM | OXYGEN SATURATION: 97 % | BODY MASS INDEX: 29.03 KG/M2 | HEIGHT: 67 IN | TEMPERATURE: 98 F

## 2025-06-13 DIAGNOSIS — R10.9 FLANK PAIN: Primary | ICD-10-CM

## 2025-06-13 LAB
ALBUMIN SERPL-MCNC: 4.6 G/DL (ref 3.2–4.8)
ALBUMIN/GLOB SERPL: 1.8 {RATIO} (ref 1–2)
ALP LIVER SERPL-CCNC: 88 U/L (ref 45–117)
ALT SERPL-CCNC: 29 U/L (ref 10–49)
ANION GAP SERPL CALC-SCNC: 6 MMOL/L (ref 0–18)
AST SERPL-CCNC: 17 U/L (ref ?–34)
BASOPHILS # BLD AUTO: 0.05 X10(3) UL (ref 0–0.2)
BASOPHILS NFR BLD AUTO: 0.7 %
BILIRUB SERPL-MCNC: 0.3 MG/DL (ref 0.3–1.2)
BILIRUB UR QL STRIP.AUTO: NEGATIVE
BUN BLD-MCNC: 15 MG/DL (ref 9–23)
CALCIUM BLD-MCNC: 8.7 MG/DL (ref 8.7–10.6)
CHLORIDE SERPL-SCNC: 103 MMOL/L (ref 98–112)
CLARITY UR REFRACT.AUTO: CLEAR
CO2 SERPL-SCNC: 27 MMOL/L (ref 21–32)
COLOR UR AUTO: YELLOW
CREAT BLD-MCNC: 1.15 MG/DL (ref 0.7–1.3)
EGFRCR SERPLBLD CKD-EPI 2021: 88 ML/MIN/1.73M2 (ref 60–?)
EOSINOPHIL # BLD AUTO: 0.17 X10(3) UL (ref 0–0.7)
EOSINOPHIL NFR BLD AUTO: 2.3 %
ERYTHROCYTE [DISTWIDTH] IN BLOOD BY AUTOMATED COUNT: 12.4 %
GLOBULIN PLAS-MCNC: 2.6 G/DL (ref 2–3.5)
GLUCOSE BLD-MCNC: 102 MG/DL (ref 70–99)
GLUCOSE UR STRIP.AUTO-MCNC: NEGATIVE MG/DL
HCT VFR BLD AUTO: 42.4 % (ref 39–53)
HGB BLD-MCNC: 14.8 G/DL (ref 13–17.5)
IMM GRANULOCYTES # BLD AUTO: 0.03 X10(3) UL (ref 0–1)
IMM GRANULOCYTES NFR BLD: 0.4 %
KETONES UR STRIP.AUTO-MCNC: NEGATIVE MG/DL
LEUKOCYTE ESTERASE UR QL STRIP.AUTO: NEGATIVE
LYMPHOCYTES # BLD AUTO: 2.59 X10(3) UL (ref 1–4)
LYMPHOCYTES NFR BLD AUTO: 34.9 %
MCH RBC QN AUTO: 31 PG (ref 26–34)
MCHC RBC AUTO-ENTMCNC: 34.9 G/DL (ref 31–37)
MCV RBC AUTO: 88.9 FL (ref 80–100)
MONOCYTES # BLD AUTO: 0.6 X10(3) UL (ref 0.1–1)
MONOCYTES NFR BLD AUTO: 8.1 %
NEUTROPHILS # BLD AUTO: 3.99 X10 (3) UL (ref 1.5–7.7)
NEUTROPHILS # BLD AUTO: 3.99 X10(3) UL (ref 1.5–7.7)
NEUTROPHILS NFR BLD AUTO: 53.6 %
NITRITE UR QL STRIP.AUTO: NEGATIVE
OSMOLALITY SERPL CALC.SUM OF ELEC: 283 MOSM/KG (ref 275–295)
PH UR STRIP.AUTO: 7 [PH] (ref 5–8)
PLATELET # BLD AUTO: 325 10(3)UL (ref 150–450)
POTASSIUM SERPL-SCNC: 4 MMOL/L (ref 3.5–5.1)
PROT SERPL-MCNC: 7.2 G/DL (ref 5.7–8.2)
RBC # BLD AUTO: 4.77 X10(6)UL (ref 4.3–5.7)
RBC UR QL AUTO: NEGATIVE
SODIUM SERPL-SCNC: 136 MMOL/L (ref 136–145)
SP GR UR STRIP.AUTO: 1.02 (ref 1–1.03)
UROBILINOGEN UR STRIP.AUTO-MCNC: 0.2 MG/DL
WBC # BLD AUTO: 7.4 X10(3) UL (ref 4–11)

## 2025-06-13 PROCEDURE — 99285 EMERGENCY DEPT VISIT HI MDM: CPT

## 2025-06-13 PROCEDURE — 81003 URINALYSIS AUTO W/O SCOPE: CPT | Performed by: EMERGENCY MEDICINE

## 2025-06-13 PROCEDURE — 96375 TX/PRO/DX INJ NEW DRUG ADDON: CPT

## 2025-06-13 PROCEDURE — 80053 COMPREHEN METABOLIC PANEL: CPT | Performed by: EMERGENCY MEDICINE

## 2025-06-13 PROCEDURE — 80053 COMPREHEN METABOLIC PANEL: CPT

## 2025-06-13 PROCEDURE — 85025 COMPLETE CBC W/AUTO DIFF WBC: CPT | Performed by: EMERGENCY MEDICINE

## 2025-06-13 PROCEDURE — 74176 CT ABD & PELVIS W/O CONTRAST: CPT | Performed by: EMERGENCY MEDICINE

## 2025-06-13 PROCEDURE — 99284 EMERGENCY DEPT VISIT MOD MDM: CPT

## 2025-06-13 PROCEDURE — 96374 THER/PROPH/DIAG INJ IV PUSH: CPT

## 2025-06-13 PROCEDURE — 96361 HYDRATE IV INFUSION ADD-ON: CPT

## 2025-06-13 PROCEDURE — 85025 COMPLETE CBC W/AUTO DIFF WBC: CPT

## 2025-06-13 RX ORDER — ONDANSETRON 2 MG/ML
4 INJECTION INTRAMUSCULAR; INTRAVENOUS ONCE
Status: COMPLETED | OUTPATIENT
Start: 2025-06-13 | End: 2025-06-13

## 2025-06-13 RX ORDER — KETOROLAC TROMETHAMINE 15 MG/ML
15 INJECTION, SOLUTION INTRAMUSCULAR; INTRAVENOUS ONCE
Status: COMPLETED | OUTPATIENT
Start: 2025-06-13 | End: 2025-06-13

## 2025-06-13 RX ORDER — HYDROMORPHONE HYDROCHLORIDE 1 MG/ML
1 INJECTION, SOLUTION INTRAMUSCULAR; INTRAVENOUS; SUBCUTANEOUS ONCE
Refills: 0 | Status: COMPLETED | OUTPATIENT
Start: 2025-06-13 | End: 2025-06-13

## 2025-06-13 RX ORDER — POTASSIUM CITRATE 1080 MG/1
TABLET, EXTENDED RELEASE ORAL
COMMUNITY

## 2025-06-14 NOTE — ED PROVIDER NOTES
Patient Seen in: [facility] [place]        History  Chief Complaint   Patient presents with    Abdomen/Flank Pain     Stated Complaint: right flank pain    Subjective:   HPI            Patient is a 29-year-old male with history of cystine disease and multiple kidney stone requiring lithotripsy presenting to the ED with sudden onset of severe right-sided flank pain that radiates around the right lower quadrant of the abdomen.  Pain is constant, without identified exacerbating or alleviating factors.  No associated nausea or vomiting.  The patient does have \"leftover Norco\" from previous surgery stating that he was diagnosed with kidney stones in April and May requiring intervention per mom.  Patient is followed by duly urology.  The patient is also followed by nephrology.  He denies any fever, no gross hematuria.  Patient has had some associated urinary hesitancy since onset of symptoms.  Pain feels similar to kidney stone pain he is experienced in the past.  No complaints of any chest pain or shortness of breath.  No lower extremity edema.      Objective:     Past Medical History:    Acute pharyngitis    Acute pharyngitis    Anxiety state    Calculus of kidney    Cystine disease (HCC)    Depression    IBS (irritable bowel syndrome)    Migraines    OCD (obsessive compulsive disorder)              Past Surgical History:   Procedure Laterality Date    Colonoscopy      Cystoscopy,insert ureteral stent  03/07/2024    Lithotripsy      Other surgical history  02/17/2011    Rt RPG, URS stone manipulation,laser litho,Rt stent, Dr. Mcdonough    Other surgical history  02/21/2011    Cysto stent removal- Dr. Mcdonough    Other surgical history  11/21/2015    Cysto, L URS, laser litho, stone extraction, stent placement, RPG Dr. Finn    Other surgical history  12/01/2015    cysto stent removal    Upper gi endoscopy,exam                  Social History     Socioeconomic History    Marital status: Single   Tobacco  Use    Smoking status: Never     Passive exposure: Never    Smokeless tobacco: Never   Vaping Use    Vaping status: Never Used   Substance and Sexual Activity    Alcohol use: No    Drug use: No     Social Drivers of Health     Food Insecurity: No Food Insecurity (4/15/2025)    NCSS - Food Insecurity     Worried About Running Out of Food in the Last Year: No     Ran Out of Food in the Last Year: No   Transportation Needs: No Transportation Needs (4/15/2025)    NCSS - Transportation     Lack of Transportation: No   Housing Stability: Not At Risk (4/15/2025)    NCSS - Housing/Utilities     Has Housing: Yes     Worried About Losing Housing: No     Unable to Get Utilities: No                                Physical Exam    ED Triage Vitals   BP 06/13/25 2151 (!) 175/111   Pulse 06/13/25 2151 (!) 121   Resp 06/13/25 2151 22   Temp 06/13/25 2153 97.7 °F (36.5 °C)   Temp src 06/13/25 2153 Temporal   SpO2 06/13/25 2151 96 %   O2 Device 06/13/25 2151 None (Room air)       Current Vitals:   Vital Signs  BP: (!) 145/98  Pulse: 97  Resp: 18  Temp: 97.7 °F (36.5 °C)  Temp src: Temporal    Oxygen Therapy  SpO2: 97 %  O2 Device: None (Room air)            Physical Exam  Vitals and nursing note reviewed.   Constitutional:       General: He is not in acute distress.     Appearance: Normal appearance. He is not ill-appearing.   HENT:      Head: Normocephalic and atraumatic.      Right Ear: External ear normal.      Left Ear: External ear normal.      Nose: Nose normal.      Mouth/Throat:      Mouth: Mucous membranes are moist.      Pharynx: Oropharynx is clear. No posterior oropharyngeal erythema.   Eyes:      Conjunctiva/sclera: Conjunctivae normal.   Cardiovascular:      Rate and Rhythm: Normal rate and regular rhythm.   Pulmonary:      Effort: Pulmonary effort is normal. No respiratory distress.      Breath sounds: Normal breath sounds.   Abdominal:      General: Abdomen is flat. Bowel sounds are normal. There is no distension.       Palpations: Abdomen is soft.      Tenderness: There is abdominal tenderness. There is right CVA tenderness. There is no guarding or rebound.      Comments: Reproducible right lower quadrant tenderness   Musculoskeletal:      Right lower leg: No edema.      Left lower leg: No edema.   Skin:     General: Skin is warm.      Capillary Refill: Capillary refill takes less than 2 seconds.      Findings: No rash.   Neurological:      General: No focal deficit present.      Mental Status: He is alert and oriented to person, place, and time.   Psychiatric:         Mood and Affect: Mood normal.         Behavior: Behavior normal.                 ED Course  Labs Reviewed   COMP METABOLIC PANEL (14) - Abnormal; Notable for the following components:       Result Value    Glucose 102 (*)     All other components within normal limits   URINALYSIS, ROUTINE - Abnormal; Notable for the following components:    Protein Urine Trace (*)     All other components within normal limits   CBC WITH DIFFERENTIAL WITH PLATELET   RAINBOW DRAW LAVENDER   RAINBOW DRAW LIGHT GREEN                            MDM     History obtained from patient.     Differential diagnosis includes kidney stone (cystine kidney stone), pyelonephritis, appendicitis, RAGHU, less likely biliary colic, musculoskeletal pain.    Previous records reviewed.  Patient's last right to uteroscopy and pyeloscopy with laser lithotripsy for right ureteral stent placement was performed May 23, 2025 per Dr. Mcdonough.    Testing considered and ordered includes CBC, CMP, UA, CT scan kidney stone protocol    I reviewed all results.    CBC is unremarkable.  CMP reviewed with a GFR of 88 which is slightly decreased from 101 compared to a month ago but still within normal range.  UA negative.    I also reviewed the official report which shows   CT ABDOMEN+PELVIS KIDNEYSTONE 2D RNDR(NO IV,NO ORAL)(CPT=74176)  Result Date: 6/13/2025  PROCEDURE:  CT ABDOMEN+PELVIS KIDNEYSTONE 2D RNDR(NO IV,NO  ORAL)(CPT=74176)  COMPARISON:  [facility], CT, CT ABDOMEN+PELVIS KIDNEYSTONE 2D RNDR(NO IV,NO ORAL)(CPT=74176), 4/15/2025, 3:49 PM.  INDICATIONS:  right flank pain  TECHNIQUE:  Unenhanced multislice CT scanning from above the kidneys to below the urinary bladder.  2D rendering are generated on the CT scanner workstation to localize potential stones in the cranio-caudal plane.  Dose reduction techniques were used. Dose information is transmitted to the ACR (American College of Radiology) NRDR (National Radiology Data Registry) which includes the Dose Index Registry.  PATIENT STATED HISTORY: (As transcribed by Technologist)  Patient reports right flank pain. History of kidney stones.    FINDINGS:  KIDNEYS:  No mass, obstruction, or calcification. BLADDER:  No mass, calculus or significant wall thickening. ADRENALS:  No mass or enlargement.  LIVER:  No enlargement, atrophy, abnormal density, or significant focal lesion.  BILIARY:  No visible dilatation or calcification.  PANCREAS:  No lesion, fluid collection, ductal dilatation, or atrophy.  SPLEEN:  No enlargement or focal lesion.  AORTA/VASCULAR:  No aneurysm.  RETROPERITONEUM:  No mass or adenopathy.  BOWEL/MESENTERY:  No visible mass, obstruction, or bowel wall thickening.  ABDOMINAL WALL:  Tiny fat containing umbilical hernia. BONES:  No bony lesion or fracture. PELVIC ORGANS:  Normal for age.  LUNG BASES:  No visible pulmonary or pleural disease.  OTHER:  Negative.             CONCLUSION:   No calculi in the kidneys.  No hydronephrosis.  The appendix is normal.  No evidence of an acute inflammatory process.    LOCATION:  [facility]   Dictated by (CST): Glenn Albright MD on 6/13/2025 at 11:16 PM     Finalized by (CST): Glenn Albright MD on 6/13/2025 at 11:18 PM         Interventions in care included IV fluids, Zofran, Toradol, Dilaudid.  Patient's pain has resolved.  Patient states he feels much better.  Patient states this has occurred once in the past where  he experiences symptoms of renal colic without any findings on CT.  Reviewed CT images with patient at bedside per his request.  Discussed close outpatient follow-up for reevaluation.  Heart rate and blood pressure improved with improvement of pain.  Patient may have passed small punctate stone although this is not present at this time.  Advised to return to the ED immediately if any symptoms worsen, persist, or new symptoms develop.  Otherwise close outpatient follow-up with primary care, urology, nephrology.              Medical Decision Making      Disposition and Plan     Clinical Impression:  1. Flank pain         Disposition:  Discharge  6/13/2025 11:52 pm    Follow-up:  Spenser Mccormick MD  [address]  [address] [place] [id number] [phone]    Schedule an appointment as soon as possible for a visit in 2 day(s)      [facility] in [place]  [address] [place] [id number]  [phone]  Follow up  IF SYMPTOMS WORSEN, PERSIST, OR NEW SYMPTOMS DEVEL          Medications Prescribed:  Current Discharge Medication List                Supplementary Documentation:

## 2025-08-11 ENCOUNTER — HOSPITAL ENCOUNTER (EMERGENCY)
Facility: HOSPITAL | Age: 30
Discharge: LEFT BEFORE DISCHARGE | End: 2025-08-11
Attending: EMERGENCY MEDICINE

## 2025-08-11 VITALS
RESPIRATION RATE: 18 BRPM | SYSTOLIC BLOOD PRESSURE: 143 MMHG | BODY MASS INDEX: 30.61 KG/M2 | WEIGHT: 195 LBS | TEMPERATURE: 98 F | HEIGHT: 67 IN | OXYGEN SATURATION: 95 % | DIASTOLIC BLOOD PRESSURE: 86 MMHG | HEART RATE: 114 BPM

## 2025-08-11 LAB
ALBUMIN SERPL-MCNC: 5 G/DL (ref 3.2–4.8)
ALBUMIN/GLOB SERPL: 1.7 (ref 1–2)
ALP LIVER SERPL-CCNC: 70 U/L (ref 45–117)
ALT SERPL-CCNC: 31 U/L (ref 10–49)
ANION GAP SERPL CALC-SCNC: 13 MMOL/L (ref 0–18)
AST SERPL-CCNC: 23 U/L (ref ?–34)
BASOPHILS # BLD AUTO: 0.04 X10(3) UL (ref 0–0.2)
BASOPHILS NFR BLD AUTO: 0.7 %
BILIRUB SERPL-MCNC: 0.5 MG/DL (ref 0.3–1.2)
BUN BLD-MCNC: 9 MG/DL (ref 9–23)
CALCIUM BLD-MCNC: 9.5 MG/DL (ref 8.7–10.6)
CHLORIDE SERPL-SCNC: 102 MMOL/L (ref 98–112)
CO2 SERPL-SCNC: 25 MMOL/L (ref 21–32)
CREAT BLD-MCNC: 1.13 MG/DL (ref 0.7–1.3)
EGFRCR SERPLBLD CKD-EPI 2021: 90 ML/MIN/1.73M2 (ref 60–?)
EOSINOPHIL # BLD AUTO: 0.05 X10(3) UL (ref 0–0.7)
EOSINOPHIL NFR BLD AUTO: 0.9 %
ERYTHROCYTE [DISTWIDTH] IN BLOOD BY AUTOMATED COUNT: 11.9 %
GLOBULIN PLAS-MCNC: 2.9 G/DL (ref 2–3.5)
GLUCOSE BLD-MCNC: 90 MG/DL (ref 70–99)
HCT VFR BLD AUTO: 43.3 % (ref 39–53)
HGB BLD-MCNC: 14.9 G/DL (ref 13–17.5)
IMM GRANULOCYTES # BLD AUTO: 0.03 X10(3) UL (ref 0–1)
IMM GRANULOCYTES NFR BLD: 0.5 %
LIPASE SERPL-CCNC: 30 U/L (ref 12–53)
LYMPHOCYTES # BLD AUTO: 1.66 X10(3) UL (ref 1–4)
LYMPHOCYTES NFR BLD AUTO: 28.4 %
MCH RBC QN AUTO: 30.7 PG (ref 26–34)
MCHC RBC AUTO-ENTMCNC: 34.4 G/DL (ref 31–37)
MCV RBC AUTO: 89.1 FL (ref 80–100)
MONOCYTES # BLD AUTO: 0.39 X10(3) UL (ref 0.1–1)
MONOCYTES NFR BLD AUTO: 6.7 %
NEUTROPHILS # BLD AUTO: 3.68 X10 (3) UL (ref 1.5–7.7)
NEUTROPHILS # BLD AUTO: 3.68 X10(3) UL (ref 1.5–7.7)
NEUTROPHILS NFR BLD AUTO: 62.8 %
OSMOLALITY SERPL CALC.SUM OF ELEC: 288 MOSM/KG (ref 275–295)
PLATELET # BLD AUTO: 328 10(3)UL (ref 150–450)
POTASSIUM SERPL-SCNC: 4.1 MMOL/L (ref 3.5–5.1)
PROT SERPL-MCNC: 7.9 G/DL (ref 5.7–8.2)
RBC # BLD AUTO: 4.86 X10(6)UL (ref 4.3–5.7)
SODIUM SERPL-SCNC: 140 MMOL/L (ref 136–145)
WBC # BLD AUTO: 5.9 X10(3) UL (ref 4–11)

## 2025-08-11 PROCEDURE — 83690 ASSAY OF LIPASE: CPT | Performed by: EMERGENCY MEDICINE

## 2025-08-11 PROCEDURE — 80053 COMPREHEN METABOLIC PANEL: CPT

## 2025-08-11 PROCEDURE — 36415 COLL VENOUS BLD VENIPUNCTURE: CPT

## 2025-08-11 PROCEDURE — 85025 COMPLETE CBC W/AUTO DIFF WBC: CPT

## 2025-08-11 PROCEDURE — 85025 COMPLETE CBC W/AUTO DIFF WBC: CPT | Performed by: EMERGENCY MEDICINE

## 2025-08-11 PROCEDURE — 83690 ASSAY OF LIPASE: CPT

## 2025-08-11 PROCEDURE — 99281 EMR DPT VST MAYX REQ PHY/QHP: CPT

## 2025-08-11 PROCEDURE — 80053 COMPREHEN METABOLIC PANEL: CPT | Performed by: EMERGENCY MEDICINE

## (undated) DEVICE — SHEATH URET L36CM OD13FR ID11FR HYDRPHLC

## (undated) DEVICE — SINGLE-USE DIGITAL FLEXIBLE URETEROSCOPE: Brand: LITHOVUE

## (undated) DEVICE — CATHETER URET 5FR L70CM FLX OPN TIP NONPORTED

## (undated) DEVICE — SUTURE SILK 0 FSL

## (undated) DEVICE — SEAL BIOPSY PORT ACMI

## (undated) DEVICE — FIBER LSR 200UM 2J 80HZ 60W DL FOR LITHO

## (undated) DEVICE — 3M™ MEDIPORE™ SOFT CLOTH TAPE, 4 INCH X 10 YARDS, 12 ROLLS/CASE, 2964: Brand: 3M™ MEDIPORE™

## (undated) DEVICE — SYRINGE 30ML LL TIP

## (undated) DEVICE — GLOVE SUR 7.5 PROTEXIS PI PIP CRM PWD F

## (undated) DEVICE — SOLUTION IRRIG 3000ML 0.9% NACL FLX CONT

## (undated) DEVICE — STERILE POLYISOPRENE POWDER-FREE SURGICAL GLOVES: Brand: PROTEXIS

## (undated) DEVICE — FIBER LSR 365 MIC 365 DL DISP FOR HOLM LSR

## (undated) DEVICE — NITINOL STONE RETRIEVAL BASKET: Brand: ZERO TIP

## (undated) DEVICE — HIGH PRESSURE NEPHROSTOMY BALLOON CATHETER KIT: Brand: NEPHROMAX KIT

## (undated) DEVICE — SKN PREP SPNG STKS PVP PNT STR: Brand: MEDLINE INDUSTRIES, INC.

## (undated) DEVICE — DILATOR/SHEATH SET: Brand: 8/10 DILATOR/SHEATH SET

## (undated) DEVICE — SEAL Y BIOPSY PORT P6R SCOPE

## (undated) DEVICE — GUIDEWIRE .038X150 STR ZIPWIRE

## (undated) DEVICE — STICK SPNG 8IN MED POVIDONE IOD PAINT

## (undated) DEVICE — PACK PBDS CYSTOSCOPY

## (undated) DEVICE — SLEEVE COMPR M KNEE LEN SGL USE KENDALL SCD

## (undated) DEVICE — NITINOL WIRE WITH HYDROPHILIC TIP: Brand: SENSOR

## (undated) DEVICE — SOL H2O 1000ML BTL

## (undated) DEVICE — SOL  .9 3000ML

## (undated) DEVICE — ZIPWIRE GUIDEWIRE .035X150 STR

## (undated) DEVICE — ZIPWIRE GUIDEWIRE .038X150 ANG

## (undated) DEVICE — ZIPWIRE GUIDEWIRE .038X150 STR

## (undated) DEVICE — SEAL BIOPSY PORT ACMI BX BLACK CAP

## (undated) DEVICE — 20 ML SYRINGE LUER-LOCK TIP: Brand: MONOJECT

## (undated) DEVICE — KENDALL SCD EXPRESS SLEEVES, KNEE LENGTH, MEDIUM: Brand: KENDALL SCD

## (undated) DEVICE — SPECIMEN CONTAINER,POSITIVE SEAL INDICATOR, OR PACKAGED: Brand: PRECISION

## (undated) DEVICE — Device

## (undated) DEVICE — FIBER LSR 365UM 6J 80HZ 120W DL FOR LITHO

## (undated) DEVICE — SOL H2O 3000ML IRRIG

## (undated) DEVICE — SLEEVE COMPR MD KNEE LEN SGL USE KENDALL SCD

## (undated) DEVICE — MEDI-VAC NON-CONDUCTIVE SUCTION TUBING: Brand: CARDINAL HEALTH

## (undated) DEVICE — ABDOMINAL PAD: Brand: DERMACEA

## (undated) DEVICE — TUBING CYSTO

## (undated) DEVICE — PERCUTANEOUS ACCESS NEEDLE

## (undated) DEVICE — URETERAL ACCESS SHEATH SET: Brand: NAVIGATOR HD

## (undated) DEVICE — TIGERTAIL 5F FLXTIP 70CM

## (undated) DEVICE — CYSTO CDS-LF: Brand: MEDLINE INDUSTRIES, INC.

## (undated) DEVICE — SYRINGE MED 20ML STD CLR PLAS LL TIP N CTRL

## (undated) DEVICE — COVER,MAYO STAND,STERILE: Brand: MEDLINE

## (undated) DEVICE — CATH SECURING DEVICE STATLOCK

## (undated) DEVICE — ADAPTER BX SEAL Y BIOPSY PORT ADJ FOR HYSTEROSCOPE

## (undated) DEVICE — ADHESIVE LIQ 2/3ML VI MASTISOL

## (undated) DEVICE — PERC NCIRCLE, NITINOL TIPLESS STONE EXTRACTOR: Brand: PERC NCIRCLE

## (undated) DEVICE — GLOVE SUR 7.5 SENSICARE PI PIP CRM PWD F

## (undated) DEVICE — ZIPWIRE GUIDE .035X150 STR/STF

## (undated) DEVICE — CYSTOGRAFIN 300ML

## (undated) DEVICE — FLEXIVA  PULSE  AND  FLEXIVA  PULSE  TRACTIP  LASER  FIBERS  ARE  HIGH  POWER  SINGLE-USE FIBER: Brand: FLEXIVA PULSE ID

## (undated) DEVICE — GLOVE SUR 7 SENSICARE PI PIP CRM PWD F

## (undated) DEVICE — BAG DRAIN INFECTION CNTRL 2000

## (undated) DEVICE — 3M(TM) TEGADERM(TM) TRANSPARENT FILM DRESSING FRAME STYLE 9505W: Brand: 3M™ TEGADERM™

## (undated) DEVICE — SYRINGE MED 10ML LL TIP W/O SFTY DISP

## (undated) DEVICE — SYRINGE 20CC LL TIP

## (undated) DEVICE — GLOVE SUR 7.5 SENSICARE NEOPR PWD F

## (undated) DEVICE — GLOVE RADIATION SZ 7-1/2

## (undated) DEVICE — LITHOTRIPTER SRG STON CTCH TBG

## (undated) DEVICE — CATH FOLEY COUNCIL 18FR 5CC

## (undated) DEVICE — SUTURE MONOCRYL 4-0 PS-2

## (undated) DEVICE — TOWEL OR BLU 16X26 STRL

## (undated) DEVICE — 3M(TM) MEDIPORE(TM) +PAD SOFT CLOTH ADHESIVE WOUND DRESSING 3566: Brand: 3M™ MEDIPORE™

## (undated) DEVICE — SENS GUIW URO 150CM NIT SS

## (undated) DEVICE — SET ADMINISTRATION 20 GTT/ML L

## (undated) DEVICE — PERCUTANEUOS NEPHROLOGY CDS: Brand: MEDLINE INDUSTRIES, INC.

## (undated) DEVICE — 3M™ TEGADERM™ TRANSPARENT FILM DRESSING FRAME STYLE, 1626W, 4 IN X 4-3/4 IN (10 CM X 12 CM), 50/CT 4CT/CASE: Brand: 3M™ TEGADERM™

## (undated) DEVICE — URETERAL STENT
Type: IMPLANTABLE DEVICE | Site: URETER | Status: NON-FUNCTIONAL
Brand: ASCERTA™

## (undated) DEVICE — CHLORAPREP 26ML APPLICATOR

## (undated) DEVICE — SOL  .9 1000ML BTL

## (undated) DEVICE — BASKET STONE RETRV L120CM DIA12MM SHTH 1.9FR

## (undated) DEVICE — 3M™ TEGADERM™ TRANSPARENT FILM DRESSING, 1626W, 4 IN X 4-3/4 IN (10 CM X 12 CM), 50 EACH/CARTON, 4 CARTON/CASE: Brand: 3M™ TEGADERM™

## (undated) DEVICE — RIGID PNEUMATIC PROBE: Brand: RIGID PNEUMATIC PROBE

## (undated) DEVICE — GAUZE SPONGES,12 PLY: Brand: CURITY

## (undated) NOTE — ED AVS SNAPSHOT
THE [facility] in [address]    Phone:  [phone]    Fax:  [phone]           Samir Sender   MRN: [id number]    Department:  THE [facility] in [place]   Date of Visit Take 1 capsule (0.4 mg total) by mouth daily.             Where to Get Your Medications      You can get these medications from any pharmacy     Bring a paper prescription for each of these medications    - HYDROcodone-acetaminophen 5-325 MG Tabs  - ondanse to a primary care or a specialist physician for a follow-up visit, please tell this physician (or your personal doctor if your instructions are to return to your personal doctor) about any new or lasting problems.  The primary care or specialist physician w We are concerned for your overall well being:    - If you are a smoker or have smoked in the last 12 months, we encourage you to explore options for quitting.     - If you have concerns related to behavioral health issues or thoughts of harming yourself, CT ABDOMEN+PELVIS KIDNEYSTONE 2D RNDR(NO IV,NO ORAL)(CPT=74176) (Final result) Result time:  01/06/17 19:44:16    Final result    Impression:    PROCEDURE:  CT ABDOMEN+PELVIS KIDNEYSTONE 2D RNDR(NO IV,NO ORAL)(CPT=74176)     COMPARISON:  [facility] , CT ABDOM If you have questions, you can call [phone] to talk to our [facility] Staff. Remember, [employer] is NOT to be used for urgent needs. For medical emergencies, dial 911. Visit [email or web address] to learn more.

## (undated) NOTE — LETTER
[address]  [address]  Authorization for Surgical Operation and Procedure     Date:___________                                                                                                         Time:__________  I hereby authorize * Surgery not found *, my physician and his/her assistants (if applicable), which may include medical students, residents, and/or fellows, to perform the following surgical operation/ procedure and administer such anesthesia as may be determined necessary by my physician:  Operation/Procedure name (s)  on Devon MARY CARMEN East   2.   I recognize that during the surgical operation/procedure, unforeseen conditions may necessitate additional or different procedures than those listed above.  I, therefore, further authorize and request that the above-named surgeon, assistants, or designees perform such procedures as are, in their judgment, necessary and desirable.    3.   My surgeon/physician has discussed prior to my surgery the potential benefits, risks and side effects of this procedure; the likelihood of achieving goals; and potential problems that might occur during recuperation.  They also discussed reasonable alternatives to the procedure, including risks, benefits, and side effects related to the alternatives and risks related to not receiving this procedure.  I have had all my questions answered and I acknowledge that no guarantee has been made as to the result that may be obtained.    4.   Should the need arise during my operation/procedure, which includes change of level of care prior to discharge, I also consent to the administration of blood and/or blood products.  Further, I understand that despite careful testing and screening of blood or blood products by collecting agencies, I may still be subject to ill effects as a result of receiving a blood transfusion and/or blood products.  The following are some, but not all, of the potential risks  that can occur: fever and allergic reactions, hemolytic reactions, transmission of diseases such as Hepatitis, AIDS and Cytomegalovirus (CMV) and fluid overload.  In the event that I wish to have an autologous transfusion of my own blood, or a directed donor transfusion, I will discuss this with my physician.  Check only if Refusing Blood or Blood Products  I understand refusal of blood or blood products as deemed necessary by my physician may have serious consequences to my condition to include possible death. I hereby assume responsibility for my refusal and release the hospital, its personnel, and my physicians from any responsibility for the consequences of my refusal.          o  Refuse      5.   I authorize the use of any specimen, organs, tissues, body parts or foreign objects that may be removed from my body during the operation/procedure for diagnosis, research or teaching purposes and their subsequent disposal by hospital authorities.  I also authorize the release of specimen test results and/or written reports to my treating physician on the hospital medical staff or other referring or consulting physicians involved in my care, at the discretion of the Pathologist or my treating physician.    6.   I consent to the photographing or videotaping of the operations or procedures to be performed, including appropriate portions of my body for medical, scientific, or educational purposes, provided my identity is not revealed by the pictures or by descriptive texts accompanying them.  If the procedure has been photographed/videotaped, the surgeon will obtain the original picture, image, videotape or CD.  The hospital will not be responsible for storage, release or maintenance of the picture, image, tape or CD.    7.   I consent to the presence of a  or observers in the operating room as deemed necessary by my physician or their designees.    8.   I recognize that in the event my procedure results  in extended X-Ray/fluoroscopy time, I may develop a skin reaction.    9. If I have a Do Not Attempt Resuscitation (DNAR) order in place, that status will be suspended while in the operating room, procedural suite, and during the recovery period unless otherwise explicitly stated by me (or a person authorized to consent on my behalf). The surgeon or my attending physician will determine when the applicable recovery period ends for purposes of reinstating the DNAR order.  10. Patients having a sterilization procedure: I understand that if the procedure is successful the results will be permanent and it will therefore be impossible for me to inseminate, conceive, or bear children.  I also understand that the procedure is intended to result in sterility, although the result has not been guaranteed.   11. I acknowledge that my physician has explained sedation/analgesia administration to me including the risk and benefits I consent to the administration of sedation/analgesia as may be necessary or desirable in the judgment of my physician.    I CERTIFY THAT I HAVE READ AND FULLY UNDERSTAND THE ABOVE CONSENT TO OPERATION and/or OTHER PROCEDURE.    _________________________________________  __________________________________  Signature of Patient     Signature of Responsible Person         ___________________________________         Printed Name of Responsible Person           _________________________________                 Relationship to Patient  _________________________________________  ______________________________  Signature of Witness          Date  Time      Patient Name: Devon East     : 1995                 Printed: 2025     Medical Record #: [id number]                     Page 1 [address]  [address]    Consent for Anesthesia    I, Devon East agree to be cared for by an anesthesiologist, who is specially  trained to monitor me and give me medicine to put me to sleep or keep me comfortable during my procedure    I understand that my anesthesiologist is not an employee or agent of [facility] or [employer]. He or she works for [employer].    As the patient asking for anesthesia services, I agree to:  Allow the anesthesiologist (anesthesia doctor) to give me medicine and do additional procedures as necessary. Some examples are: Starting or using an “IV” to give me medicine, fluids or blood during my procedure, and having a breathing tube placed to help me breathe when I’m asleep (intubation). In the event that my heart stops working properly, I understand that my anesthesiologist will make every effort to sustain my life, unless otherwise directed by [facility] Do Not Resuscitate documents.  Tell my anesthesia doctor before my procedure:  If I am pregnant.  The last time that I ate or drank.  All of the medicines I take (including prescriptions, herbal supplements, and pills I can buy without a prescription (including street drugs/illegal medications). Failure to inform my anesthesiologist about these medicines may increase my risk of anesthetic complications.  If I am allergic to anything or have had a reaction to anesthesia before.  I understand how the anesthesia medicine will help me (benefits).  I understand that with any type of anesthesia medicine there are risks:  The most common risks are: nausea, vomiting, sore throat, muscle soreness, damage to my eyes, mouth, or teeth (from breathing tube placement).  Rare risks include: remembering what happened during my procedure, allergic reactions to medications, injury to my airway, heart, lungs, vision, nerves, or muscles and in extremely rare instances death.  My doctor has explained to me other choices available to me for my care (alternatives).  Pregnant Patients (“epidural”):  I understand that the risks of having an  epidural (medicine given into my back to help control pain during labor), include itching, low blood pressure, difficulty urinating, headache or slowing of the baby’s heart. Very rare risks include infection, bleeding, seizure, irregular heart rhythms and nerve injury.  Regional Anesthesia (“spinal”, “epidural”, & “nerve blocks”):  I understand that rare but potential complications include headache, bleeding, infection, seizure, irregular heart rhythms, and nerve injury.    I can change my mind about having anesthesia services at any time before I get the medicine.    _____________________________________________________________________________  Patient (or Representative) Signature/Relationship to Patient  Date   Time    _____________________________________________________________________________   Name (if used)    Language/Organization   Time    _____________________________________________________________________________  Anesthesiologist Signature     Date   Time  I have discussed the procedure and information above with the patient (or patient’s representative) and answered their questions. The patient or their representative has agreed to have anesthesia services.    _____________________________________________________________________________  Witness        Date   Time  I have verified that the signature is that of the patient or patient’s representative, and that it was signed before the procedure  Patient Name: Devon East     : 1995                 Printed: 2025     Medical Record #: [id number]                     Page 2 of 2

## (undated) NOTE — LETTER
[address]  [address]  Authorization for Surgical Operation and Procedure     Date:___________                                                                                                         Time:__________  I hereby authorize Surgeon(s):  Crow Plascencia MD, my physician and his/her assistants (if applicable), which may include medical students, residents, and/or fellows, to perform the following surgical operation/ procedure and administer such anesthesia as may be determined necessary by my physician:  Operation/Procedure name (s) Procedure(s):  CYSTOSCOPY, LEFT RETROGRADE PYELOGRAM, LEFT URETEROSCOPY WITH LASER LITHOTRIPSY, INSERTION OF LEFT URETERAL STENT on Devon East   2.   I recognize that during the surgical operation/procedure, unforeseen conditions may necessitate additional or different procedures than those listed above.  I, therefore, further authorize and request that the above-named surgeon, assistants, or designees perform such procedures as are, in their judgment, necessary and desirable.    3.   My surgeon/physician has discussed prior to my surgery the potential benefits, risks and side effects of this procedure; the likelihood of achieving goals; and potential problems that might occur during recuperation.  They also discussed reasonable alternatives to the procedure, including risks, benefits, and side effects related to the alternatives and risks related to not receiving this procedure.  I have had all my questions answered and I acknowledge that no guarantee has been made as to the result that may be obtained.    4.   Should the need arise during my operation/procedure, which includes change of level of care prior to discharge, I also consent to the administration of blood and/or blood products.  Further, I understand that despite careful testing and screening of blood or blood products by collecting agencies, I may still be subject to ill  effects as a result of receiving a blood transfusion and/or blood products.  The following are some, but not all, of the potential risks that can occur: fever and allergic reactions, hemolytic reactions, transmission of diseases such as Hepatitis, AIDS and Cytomegalovirus (CMV) and fluid overload.  In the event that I wish to have an autologous transfusion of my own blood, or a directed donor transfusion, I will discuss this with my physician.  Check only if Refusing Blood or Blood Products  I understand refusal of blood or blood products as deemed necessary by my physician may have serious consequences to my condition to include possible death. I hereby assume responsibility for my refusal and release the hospital, its personnel, and my physicians from any responsibility for the consequences of my refusal.          o  Refuse      5.   I authorize the use of any specimen, organs, tissues, body parts or foreign objects that may be removed from my body during the operation/procedure for diagnosis, research or teaching purposes and their subsequent disposal by hospital authorities.  I also authorize the release of specimen test results and/or written reports to my treating physician on the hospital medical staff or other referring or consulting physicians involved in my care, at the discretion of the Pathologist or my treating physician.    6.   I consent to the photographing or videotaping of the operations or procedures to be performed, including appropriate portions of my body for medical, scientific, or educational purposes, provided my identity is not revealed by the pictures or by descriptive texts accompanying them.  If the procedure has been photographed/videotaped, the surgeon will obtain the original picture, image, videotape or CD.  The hospital will not be responsible for storage, release or maintenance of the picture, image, tape or CD.    7.   I consent to the presence of a  or observers  in the operating room as deemed necessary by my physician or their designees.    8.   I recognize that in the event my procedure results in extended X-Ray/fluoroscopy time, I may develop a skin reaction.    9. If I have a Do Not Attempt Resuscitation (DNAR) order in place, that status will be suspended while in the operating room, procedural suite, and during the recovery period unless otherwise explicitly stated by me (or a person authorized to consent on my behalf). The surgeon or my attending physician will determine when the applicable recovery period ends for purposes of reinstating the DNAR order.  10. Patients having a sterilization procedure: I understand that if the procedure is successful the results will be permanent and it will therefore be impossible for me to inseminate, conceive, or bear children.  I also understand that the procedure is intended to result in sterility, although the result has not been guaranteed.   11. I acknowledge that my physician has explained sedation/analgesia administration to me including the risk and benefits I consent to the administration of sedation/analgesia as may be necessary or desirable in the judgment of my physician.    I CERTIFY THAT I HAVE READ AND FULLY UNDERSTAND THE ABOVE CONSENT TO OPERATION and/or OTHER PROCEDURE.    _________________________________________  __________________________________  Signature of Patient     Signature of Responsible Person         ___________________________________         Printed Name of Responsible Person           _________________________________                 Relationship to Patient  _________________________________________  ______________________________  Signature of Witness          Date  Time      Patient Name: Devon Montanokristofer     : 1995                 Printed: 2024     Medical Record #: [id number]                     Page 1 of 2                                    [address]  [place]  [address]    Consent for Anesthesia    IDevon agree to be cared for by an anesthesiologist, who is specially trained to monitor me and give me medicine to put me to sleep or keep me comfortable during my procedure    I understand that my anesthesiologist is not an employee or agent of [facility] or [employer]. He or she works for [employer].    As the patient asking for anesthesia services, I agree to:  Allow the anesthesiologist (anesthesia doctor) to give me medicine and do additional procedures as necessary. Some examples are: Starting or using an “IV” to give me medicine, fluids or blood during my procedure, and having a breathing tube placed to help me breathe when I’m asleep (intubation). In the event that my heart stops working properly, I understand that my anesthesiologist will make every effort to sustain my life, unless otherwise directed by [facility] Do Not Resuscitate documents.  Tell my anesthesia doctor before my procedure:  If I am pregnant.  The last time that I ate or drank.  All of the medicines I take (including prescriptions, herbal supplements, and pills I can buy without a prescription (including street drugs/illegal medications). Failure to inform my anesthesiologist about these medicines may increase my risk of anesthetic complications.  If I am allergic to anything or have had a reaction to anesthesia before.  I understand how the anesthesia medicine will help me (benefits).  I understand that with any type of anesthesia medicine there are risks:  The most common risks are: nausea, vomiting, sore throat, muscle soreness, damage to my eyes, mouth, or teeth (from breathing tube placement).  Rare risks include: remembering what happened during my procedure, allergic reactions to medications, injury to my airway, heart, lungs, vision, nerves, or muscles and in extremely rare instances death.  My doctor has explained to me  other choices available to me for my care (alternatives).  Pregnant Patients (“epidural”):  I understand that the risks of having an epidural (medicine given into my back to help control pain during labor), include itching, low blood pressure, difficulty urinating, headache or slowing of the baby’s heart. Very rare risks include infection, bleeding, seizure, irregular heart rhythms and nerve injury.  Regional Anesthesia (“spinal”, “epidural”, & “nerve blocks”):  I understand that rare but potential complications include headache, bleeding, infection, seizure, irregular heart rhythms, and nerve injury.    I can change my mind about having anesthesia services at any time before I get the medicine.    _____________________________________________________________________________  Patient (or Representative) Signature/Relationship to Patient  Date   Time    _____________________________________________________________________________   Name (if used)    Language/Organization   Time    _____________________________________________________________________________  Anesthesiologist Signature     Date   Time  I have discussed the procedure and information above with the patient (or patient’s representative) and answered their questions. The patient or their representative has agreed to have anesthesia services.    _____________________________________________________________________________  Witness        Date   Time  I have verified that the signature is that of the patient or patient’s representative, and that it was signed before the procedure  Patient Name: Devon East     : 1995                 Printed: 2024     Medical Record #: [id number]                     Page 2 of 2

## (undated) NOTE — LETTER
[address]  [address]  Authorization for Surgical Operation and Procedure     Date:___________                                                                                                         Time:__________  I hereby authorize Surgeon(s):  Crow Plascencia MD, my physician and his/her assistants (if applicable), which may include medical students, residents, and/or fellows, to perform the following surgical operation/ procedure and administer such anesthesia as may be determined necessary by my physician:  Operation/Procedure name (s) Procedure(s):  CYSTOSCOPY, LEFT RETROGRADE PYELOGRAM, LEFT URETEROSCOPY WITH LASER LITHOTRIPSY, INSERTION OF LEFT URETERAL STENT on Devon East   2.   I recognize that during the surgical operation/procedure, unforeseen conditions may necessitate additional or different procedures than those listed above.  I, therefore, further authorize and request that the above-named surgeon, assistants, or designees perform such procedures as are, in their judgment, necessary and desirable.    3.   My surgeon/physician has discussed prior to my surgery the potential benefits, risks and side effects of this procedure; the likelihood of achieving goals; and potential problems that might occur during recuperation.  They also discussed reasonable alternatives to the procedure, including risks, benefits, and side effects related to the alternatives and risks related to not receiving this procedure.  I have had all my questions answered and I acknowledge that no guarantee has been made as to the result that may be obtained.    4.   Should the need arise during my operation/procedure, which includes change of level of care prior to discharge, I also consent to the administration of blood and/or blood products.  Further, I understand that despite careful testing and screening of blood or blood products by collecting agencies, I may still be subject to ill  effects as a result of receiving a blood transfusion and/or blood products.  The following are some, but not all, of the potential risks that can occur: fever and allergic reactions, hemolytic reactions, transmission of diseases such as Hepatitis, AIDS and Cytomegalovirus (CMV) and fluid overload.  In the event that I wish to have an autologous transfusion of my own blood, or a directed donor transfusion, I will discuss this with my physician.  Check only if Refusing Blood or Blood Products  I understand refusal of blood or blood products as deemed necessary by my physician may have serious consequences to my condition to include possible death. I hereby assume responsibility for my refusal and release the hospital, its personnel, and my physicians from any responsibility for the consequences of my refusal.          o  Refuse      5.   I authorize the use of any specimen, organs, tissues, body parts or foreign objects that may be removed from my body during the operation/procedure for diagnosis, research or teaching purposes and their subsequent disposal by hospital authorities.  I also authorize the release of specimen test results and/or written reports to my treating physician on the hospital medical staff or other referring or consulting physicians involved in my care, at the discretion of the Pathologist or my treating physician.    6.   I consent to the photographing or videotaping of the operations or procedures to be performed, including appropriate portions of my body for medical, scientific, or educational purposes, provided my identity is not revealed by the pictures or by descriptive texts accompanying them.  If the procedure has been photographed/videotaped, the surgeon will obtain the original picture, image, videotape or CD.  The hospital will not be responsible for storage, release or maintenance of the picture, image, tape or CD.    7.   I consent to the presence of a  or observers  in the operating room as deemed necessary by my physician or their designees.    8.   I recognize that in the event my procedure results in extended X-Ray/fluoroscopy time, I may develop a skin reaction.    9. If I have a Do Not Attempt Resuscitation (DNAR) order in place, that status will be suspended while in the operating room, procedural suite, and during the recovery period unless otherwise explicitly stated by me (or a person authorized to consent on my behalf). The surgeon or my attending physician will determine when the applicable recovery period ends for purposes of reinstating the DNAR order.  10. Patients having a sterilization procedure: I understand that if the procedure is successful the results will be permanent and it will therefore be impossible for me to inseminate, conceive, or bear children.  I also understand that the procedure is intended to result in sterility, although the result has not been guaranteed.   11. I acknowledge that my physician has explained sedation/analgesia administration to me including the risk and benefits I consent to the administration of sedation/analgesia as may be necessary or desirable in the judgment of my physician.    I CERTIFY THAT I HAVE READ AND FULLY UNDERSTAND THE ABOVE CONSENT TO OPERATION and/or OTHER PROCEDURE.    _________________________________________  __________________________________  Signature of Patient     Signature of Responsible Person         ___________________________________         Printed Name of Responsible Person           _________________________________                 Relationship to Patient  _________________________________________  ______________________________  Signature of Witness          Date  Time      Patient Name: Devon Montanokristofer     : 1995                 Printed: 2024     Medical Record #: [id number]                     Page 1 of 2                                    [address]  [place]  [address]    Consent for Anesthesia    IDevon agree to be cared for by an anesthesiologist, who is specially trained to monitor me and give me medicine to put me to sleep or keep me comfortable during my procedure    I understand that my anesthesiologist is not an employee or agent of [facility] or [employer]. He or she works for [employer].    As the patient asking for anesthesia services, I agree to:  Allow the anesthesiologist (anesthesia doctor) to give me medicine and do additional procedures as necessary. Some examples are: Starting or using an “IV” to give me medicine, fluids or blood during my procedure, and having a breathing tube placed to help me breathe when I’m asleep (intubation). In the event that my heart stops working properly, I understand that my anesthesiologist will make every effort to sustain my life, unless otherwise directed by [facility] Do Not Resuscitate documents.  Tell my anesthesia doctor before my procedure:  If I am pregnant.  The last time that I ate or drank.  All of the medicines I take (including prescriptions, herbal supplements, and pills I can buy without a prescription (including street drugs/illegal medications). Failure to inform my anesthesiologist about these medicines may increase my risk of anesthetic complications.  If I am allergic to anything or have had a reaction to anesthesia before.  I understand how the anesthesia medicine will help me (benefits).  I understand that with any type of anesthesia medicine there are risks:  The most common risks are: nausea, vomiting, sore throat, muscle soreness, damage to my eyes, mouth, or teeth (from breathing tube placement).  Rare risks include: remembering what happened during my procedure, allergic reactions to medications, injury to my airway, heart, lungs, vision, nerves, or muscles and in extremely rare instances death.  My doctor has explained to me  other choices available to me for my care (alternatives).  Pregnant Patients (“epidural”):  I understand that the risks of having an epidural (medicine given into my back to help control pain during labor), include itching, low blood pressure, difficulty urinating, headache or slowing of the baby’s heart. Very rare risks include infection, bleeding, seizure, irregular heart rhythms and nerve injury.  Regional Anesthesia (“spinal”, “epidural”, & “nerve blocks”):  I understand that rare but potential complications include headache, bleeding, infection, seizure, irregular heart rhythms, and nerve injury.    I can change my mind about having anesthesia services at any time before I get the medicine.    _____________________________________________________________________________  Patient (or Representative) Signature/Relationship to Patient  Date   Time    _____________________________________________________________________________   Name (if used)    Language/Organization   Time    _____________________________________________________________________________  Anesthesiologist Signature     Date   Time  I have discussed the procedure and information above with the patient (or patient’s representative) and answered their questions. The patient or their representative has agreed to have anesthesia services.    _____________________________________________________________________________  Witness        Date   Time  I have verified that the signature is that of the patient or patient’s representative, and that it was signed before the procedure  Patient Name: Devon East     : 1995                 Printed: 2024     Medical Record #: [id number]                     Page 2 of 2

## (undated) NOTE — LETTER
[address]  [address]  Authorization for Surgical Operation and Procedure     Date:___________                                                                                                         Time:__________  I hereby authorize Surgeon(s):  Crow Plascencia MD, my physician and his/her assistants (if applicable), which may include medical students, residents, and/or fellows, to perform the following surgical operation/ procedure and administer such anesthesia as may be determined necessary by my physician:  Operation/Procedure name (s) Procedure(s):  CYSTOSCOPY, LEFT RETROGRADE PYELOGRAM, LEFT URETEROSCOPY WITH LASER LITHOTRIPSY, LEFT URETERAL STENT PLACEMENT on Devon [place]   2.   I recognize that during the surgical operation/procedure, unforeseen conditions may necessitate additional or different procedures than those listed above.  I, therefore, further authorize and request that the above-named surgeon, assistants, or designees perform such procedures as are, in their judgment, necessary and desirable.    3.   My surgeon/physician has discussed prior to my surgery the potential benefits, risks and side effects of this procedure; the likelihood of achieving goals; and potential problems that might occur during recuperation.  They also discussed reasonable alternatives to the procedure, including risks, benefits, and side effects related to the alternatives and risks related to not receiving this procedure.  I have had all my questions answered and I acknowledge that no guarantee has been made as to the result that may be obtained.    4.   Should the need arise during my operation/procedure, which includes change of level of care prior to discharge, I also consent to the administration of blood and/or blood products.  Further, I understand that despite careful testing and screening of blood or blood products by collecting agencies, I may still be subject to ill  effects as a result of receiving a blood transfusion and/or blood products.  The following are some, but not all, of the potential risks that can occur: fever and allergic reactions, hemolytic reactions, transmission of diseases such as Hepatitis, AIDS and Cytomegalovirus (CMV) and fluid overload.  In the event that I wish to have an autologous transfusion of my own blood, or a directed donor transfusion, I will discuss this with my physician.  Check only if Refusing Blood or Blood Products  I understand refusal of blood or blood products as deemed necessary by my physician may have serious consequences to my condition to include possible death. I hereby assume responsibility for my refusal and release the hospital, its personnel, and my physicians from any responsibility for the consequences of my refusal.          o  Refuse      5.   I authorize the use of any specimen, organs, tissues, body parts or foreign objects that may be removed from my body during the operation/procedure for diagnosis, research or teaching purposes and their subsequent disposal by hospital authorities.  I also authorize the release of specimen test results and/or written reports to my treating physician on the hospital medical staff or other referring or consulting physicians involved in my care, at the discretion of the Pathologist or my treating physician.    6.   I consent to the photographing or videotaping of the operations or procedures to be performed, including appropriate portions of my body for medical, scientific, or educational purposes, provided my identity is not revealed by the pictures or by descriptive texts accompanying them.  If the procedure has been photographed/videotaped, the surgeon will obtain the original picture, image, videotape or CD.  The hospital will not be responsible for storage, release or maintenance of the picture, image, tape or CD.    7.   I consent to the presence of a  or observers  in the operating room as deemed necessary by my physician or their designees.    8.   I recognize that in the event my procedure results in extended X-Ray/fluoroscopy time, I may develop a skin reaction.    9. If I have a Do Not Attempt Resuscitation (DNAR) order in place, that status will be suspended while in the operating room, procedural suite, and during the recovery period unless otherwise explicitly stated by me (or a person authorized to consent on my behalf). The surgeon or my attending physician will determine when the applicable recovery period ends for purposes of reinstating the DNAR order.  10. Patients having a sterilization procedure: I understand that if the procedure is successful the results will be permanent and it will therefore be impossible for me to inseminate, conceive, or bear children.  I also understand that the procedure is intended to result in sterility, although the result has not been guaranteed.   11. I acknowledge that my physician has explained sedation/analgesia administration to me including the risk and benefits I consent to the administration of sedation/analgesia as may be necessary or desirable in the judgment of my physician.    I CERTIFY THAT I HAVE READ AND FULLY UNDERSTAND THE ABOVE CONSENT TO OPERATION and/or OTHER PROCEDURE.    _________________________________________  __________________________________  Signature of Patient     Signature of Responsible Person         ___________________________________         Printed Name of Responsible Person           _________________________________                 Relationship to Patient  _________________________________________  ______________________________  Signature of Witness          Date  Time      Patient Name: Devon Montanokristofer     : 1995                 Printed: February 10, 2024     Medical Record #: [id number]                     Page 1 of 2                                    [address]  [place]  [address]    Consent for Anesthesia    IDevon agree to be cared for by an anesthesiologist, who is specially trained to monitor me and give me medicine to put me to sleep or keep me comfortable during my procedure    I understand that my anesthesiologist is not an employee or agent of [facility] or [email or web address]. He or she works for [employer].    As the patient asking for anesthesia services, I agree to:  Allow the anesthesiologist (anesthesia doctor) to give me medicine and do additional procedures as necessary. Some examples are: Starting or using an “IV” to give me medicine, fluids or blood during my procedure, and having a breathing tube placed to help me breathe when I’m asleep (intubation). In the event that my heart stops working properly, I understand that my anesthesiologist will make every effort to sustain my life, unless otherwise directed by [facility] Do Not Resuscitate documents.  Tell my anesthesia doctor before my procedure:  If I am pregnant.  The last time that I ate or drank.  All of the medicines I take (including prescriptions, herbal supplements, and pills I can buy without a prescription (including street drugs/illegal medications). Failure to inform my anesthesiologist about these medicines may increase my risk of anesthetic complications.  If I am allergic to anything or have had a reaction to anesthesia before.  I understand how the anesthesia medicine will help me (benefits).  I understand that with any type of anesthesia medicine there are risks:  The most common risks are: nausea, vomiting, sore throat, muscle soreness, damage to my eyes, mouth, or teeth (from breathing tube placement).  Rare risks include: remembering what happened during my procedure, allergic reactions to medications, injury to my airway, heart, lungs, vision, nerves, or muscles and in extremely rare instances death.  My doctor has explained to me  other choices available to me for my care (alternatives).  Pregnant Patients (“epidural”):  I understand that the risks of having an epidural (medicine given into my back to help control pain during labor), include itching, low blood pressure, difficulty urinating, headache or slowing of the baby’s heart. Very rare risks include infection, bleeding, seizure, irregular heart rhythms and nerve injury.  Regional Anesthesia (“spinal”, “epidural”, & “nerve blocks”):  I understand that rare but potential complications include headache, bleeding, infection, seizure, irregular heart rhythms, and nerve injury.    I can change my mind about having anesthesia services at any time before I get the medicine.    _____________________________________________________________________________  Patient (or Representative) Signature/Relationship to Patient  Date   Time    _____________________________________________________________________________   Name (if used)    Language/Organization   Time    _____________________________________________________________________________  Anesthesiologist Signature     Date   Time  I have discussed the procedure and information above with the patient (or patient’s representative) and answered their questions. The patient or their representative has agreed to have anesthesia services.    _____________________________________________________________________________  Witness        Date   Time  I have verified that the signature is that of the patient or patient’s representative, and that it was signed before the procedure  Patient Name: Devon East     : 1995                 Printed: February 10, 2024     Medical Record #: [id number]                     Page 2 of 2

## (undated) NOTE — ED AVS SNAPSHOT
Amy Whelan   MRN: [id number]    Department:  [facility] Emergency Department in [place]   Date of Visit:  3/21/2020           Disclosure     Insurance plans vary and the physician(s) referred by the ER may not be covered by your plan.  Please conta tell this physician (or your personal doctor if your instructions are to return to your personal doctor) about any new or lasting problems. The primary care or specialist physician will see patients referred from the [facility] Emergency Department.  Fanny Boyer

## (undated) NOTE — LETTER
[address]  [address]  Authorization for Surgical Operation and Procedure     Date:___________                                                                                                         Time:__________  I hereby authorize Surgeon(s):  Valdemar Hilliard MD, my physician and his/her assistants (if applicable), which may include medical students, residents, and/or fellows, to perform the following surgical operation/ procedure and administer such anesthesia as may be determined necessary by my physician:  Operation/Procedure name (s) Procedure(s):  CYSTOSCOPY, LEFT URETEROSCOPIC STONE EXTRACTION, WITH POSSIBLE HOLMIUM LASER LITHOTRIPSY, LEFT RETROGRADE PYELOGRAM, LEFT URETERAL STENT PLACEMENT, POSSIBLE  RIGHT URETEROSCOPY, POSSIBLE RIGHT RETROGRADE PYLEOGRAM, POSSIBLE RIGHT STENT PLACEMENT on Devon East   2.   I recognize that during the surgical operation/procedure, unforeseen conditions may necessitate additional or different procedures than those listed above.  I, therefore, further authorize and request that the above-named surgeon, assistants, or designees perform such procedures as are, in their judgment, necessary and desirable.    3.   My surgeon/physician has discussed prior to my surgery the potential benefits, risks and side effects of this procedure; the likelihood of achieving goals; and potential problems that might occur during recuperation.  They also discussed reasonable alternatives to the procedure, including risks, benefits, and side effects related to the alternatives and risks related to not receiving this procedure.  I have had all my questions answered and I acknowledge that no guarantee has been made as to the result that may be obtained.    4.   Should the need arise during my operation/procedure, which includes change of level of care prior to discharge, I also consent to the administration of blood and/or blood products.  Further, I  understand that despite careful testing and screening of blood or blood products by collecting agencies, I may still be subject to ill effects as a result of receiving a blood transfusion and/or blood products.  The following are some, but not all, of the potential risks that can occur: fever and allergic reactions, hemolytic reactions, transmission of diseases such as Hepatitis, AIDS and Cytomegalovirus (CMV) and fluid overload.  In the event that I wish to have an autologous transfusion of my own blood, or a directed donor transfusion, I will discuss this with my physician.  Check only if Refusing Blood or Blood Products  I understand refusal of blood or blood products as deemed necessary by my physician may have serious consequences to my condition to include possible death. I hereby assume responsibility for my refusal and release the hospital, its personnel, and my physicians from any responsibility for the consequences of my refusal.          o  Refuse      5.   I authorize the use of any specimen, organs, tissues, body parts or foreign objects that may be removed from my body during the operation/procedure for diagnosis, research or teaching purposes and their subsequent disposal by hospital authorities.  I also authorize the release of specimen test results and/or written reports to my treating physician on the hospital medical staff or other referring or consulting physicians involved in my care, at the discretion of the Pathologist or my treating physician.    6.   I consent to the photographing or videotaping of the operations or procedures to be performed, including appropriate portions of my body for medical, scientific, or educational purposes, provided my identity is not revealed by the pictures or by descriptive texts accompanying them.  If the procedure has been photographed/videotaped, the surgeon will obtain the original picture, image, videotape or CD.  The hospital will not be responsible for  storage, release or maintenance of the picture, image, tape or CD.    7.   I consent to the presence of a  or observers in the operating room as deemed necessary by my physician or their designees.    8.   I recognize that in the event my procedure results in extended X-Ray/fluoroscopy time, I may develop a skin reaction.    9. If I have a Do Not Attempt Resuscitation (DNAR) order in place, that status will be suspended while in the operating room, procedural suite, and during the recovery period unless otherwise explicitly stated by me (or a person authorized to consent on my behalf). The surgeon or my attending physician will determine when the applicable recovery period ends for purposes of reinstating the DNAR order.  10. Patients having a sterilization procedure: I understand that if the procedure is successful the results will be permanent and it will therefore be impossible for me to inseminate, conceive, or bear children.  I also understand that the procedure is intended to result in sterility, although the result has not been guaranteed.   11. I acknowledge that my physician has explained sedation/analgesia administration to me including the risk and benefits I consent to the administration of sedation/analgesia as may be necessary or desirable in the judgment of my physician.    I CERTIFY THAT I HAVE READ AND FULLY UNDERSTAND THE ABOVE CONSENT TO OPERATION and/or OTHER PROCEDURE.    _________________________________________  __________________________________  Signature of Patient     Signature of Responsible Person         ___________________________________         Printed Name of Responsible Person           _________________________________                 Relationship to Patient  _________________________________________  ______________________________  Signature of Witness          Date  Time      Patient Name: Devon East     : 1995                 Printed: May 1, 2024      Medical Record #: [id number]                     Page 2 of 3                                    [address]  [address]    Consent for Anesthesia    IDevon agree to be cared for by an anesthesiologist, who is specially trained to monitor me and give me medicine to put me to sleep or keep me comfortable during my procedure    I understand that my anesthesiologist is not an employee or agent of [facility] or [employer]. He or she works for [employer].    As the patient asking for anesthesia services, I agree to:  Allow the anesthesiologist (anesthesia doctor) to give me medicine and do additional procedures as necessary. Some examples are: Starting or using an “IV” to give me medicine, fluids or blood during my procedure, and having a breathing tube placed to help me breathe when I’m asleep (intubation). In the event that my heart stops working properly, I understand that my anesthesiologist will make every effort to sustain my life, unless otherwise directed by [facility] Do Not Resuscitate documents.  Tell my anesthesia doctor before my procedure:  If I am pregnant.  The last time that I ate or drank.  All of the medicines I take (including prescriptions, herbal supplements, and pills I can buy without a prescription (including street drugs/illegal medications). Failure to inform my anesthesiologist about these medicines may increase my risk of anesthetic complications.  If I am allergic to anything or have had a reaction to anesthesia before.  I understand how the anesthesia medicine will help me (benefits).  I understand that with any type of anesthesia medicine there are risks:  The most common risks are: nausea, vomiting, sore throat, muscle soreness, damage to my eyes, mouth, or teeth (from breathing tube placement).  Rare risks include: remembering what happened during my procedure, allergic reactions to medications,  injury to my airway, heart, lungs, vision, nerves, or muscles and in extremely rare instances death.  My doctor has explained to me other choices available to me for my care (alternatives).  Pregnant Patients (“epidural”):  I understand that the risks of having an epidural (medicine given into my back to help control pain during labor), include itching, low blood pressure, difficulty urinating, headache or slowing of the baby’s heart. Very rare risks include infection, bleeding, seizure, irregular heart rhythms and nerve injury.  Regional Anesthesia (“spinal”, “epidural”, & “nerve blocks”):  I understand that rare but potential complications include headache, bleeding, infection, seizure, irregular heart rhythms, and nerve injury.    I can change my mind about having anesthesia services at any time before I get the medicine.    _____________________________________________________________________________  Patient (or Representative) Signature/Relationship to Patient  Date   Time    _____________________________________________________________________________   Name (if used)    Language/Organization   Time    _____________________________________________________________________________  Anesthesiologist Signature     Date   Time  I have discussed the procedure and information above with the patient (or patient’s representative) and answered their questions. The patient or their representative has agreed to have anesthesia services.    _____________________________________________________________________________  Witness        Date   Time  I have verified that the signature is that of the patient or patient’s representative, and that it was signed before the procedure  Patient Name: Devon East     : 1995                 Printed: May 1, 2024     Medical Record #: [id number]                     Page 3 of 3

## (undated) NOTE — LETTER
Glenna Zeng [address] [address]  [place], [address] [facility]    Consent for Operation  Date: __________________                                Time: _______________    1.  I authorize the performance upon Devon East the following operation:  Guerrero revealed by the pictures or by descriptive texts accompanying them. If the procedure has been videotaped, the surgeon will obtain the original videotape. The hospital will not be responsible for storage or maintenance of this tape.   6. For the purpose of a THAT MY DOCTOR PROVIDED ME WITH THE ABOVE EXPLANATIONS, THAT ALL BLANKS OR STATEMENTS REQUIRING INSERTION OR COMPLETION WERE FILLED IN.     Signature of Patient:   ___________________________    When the patient is a minor or mentally incompetent to give co iii. All of the medicines I take (including prescriptions, herbal supplements, and pills I can buy without a prescription (including street drugs/illegal medications).  Failure to inform my anesthesiologist about these medicines may increase my risk of anes _____________________________________________________________________________  Anesthesiologist Signature     Date   Time  I have discussed the procedure and information above with the patient (or patient’s representative) and answered their questions.  The

## (undated) NOTE — LETTER
[address]  [address]  Authorization for Surgical Operation and Procedure     Date:___________                                                                                                         Time:__________  I hereby authorize Surgeon(s):  Crow Plascencia MD, my physician and his/her assistants (if applicable), which may include medical students, residents, and/or fellows, to perform the following surgical operation/ procedure and administer such anesthesia as may be determined necessary by my physician:  Operation/Procedure name (s) Procedure(s):  CYSTOSCOPY, RIGHT URETEROSCOPY, LASER LITHOTRIPSY, RETROGRADE, PYELOGRAM, STENT INSERTION on Devon East   2.   I recognize that during the surgical operation/procedure, unforeseen conditions may necessitate additional or different procedures than those listed above.  I, therefore, further authorize and request that the above-named surgeon, assistants, or designees perform such procedures as are, in their judgment, necessary and desirable.    3.   My surgeon/physician has discussed prior to my surgery the potential benefits, risks and side effects of this procedure; the likelihood of achieving goals; and potential problems that might occur during recuperation.  They also discussed reasonable alternatives to the procedure, including risks, benefits, and side effects related to the alternatives and risks related to not receiving this procedure.  I have had all my questions answered and I acknowledge that no guarantee has been made as to the result that may be obtained.    4.   Should the need arise during my operation/procedure, which includes change of level of care prior to discharge, I also consent to the administration of blood and/or blood products.  Further, I understand that despite careful testing and screening of blood or blood products by collecting agencies, I may still be subject to ill effects as a result of  receiving a blood transfusion and/or blood products.  The following are some, but not all, of the potential risks that can occur: fever and allergic reactions, hemolytic reactions, transmission of diseases such as Hepatitis, AIDS and Cytomegalovirus (CMV) and fluid overload.  In the event that I wish to have an autologous transfusion of my own blood, or a directed donor transfusion, I will discuss this with my physician.  Check only if Refusing Blood or Blood Products  I understand refusal of blood or blood products as deemed necessary by my physician may have serious consequences to my condition to include possible death. I hereby assume responsibility for my refusal and release the hospital, its personnel, and my physicians from any responsibility for the consequences of my refusal.          o  Refuse      5.   I authorize the use of any specimen, organs, tissues, body parts or foreign objects that may be removed from my body during the operation/procedure for diagnosis, research or teaching purposes and their subsequent disposal by hospital authorities.  I also authorize the release of specimen test results and/or written reports to my treating physician on the hospital medical staff or other referring or consulting physicians involved in my care, at the discretion of the Pathologist or my treating physician.    6.   I consent to the photographing or videotaping of the operations or procedures to be performed, including appropriate portions of my body for medical, scientific, or educational purposes, provided my identity is not revealed by the pictures or by descriptive texts accompanying them.  If the procedure has been photographed/videotaped, the surgeon will obtain the original picture, image, videotape or CD.  The hospital will not be responsible for storage, release or maintenance of the picture, image, tape or CD.    7.   I consent to the presence of a  or observers in the operating room  as deemed necessary by my physician or their designees.    8.   I recognize that in the event my procedure results in extended X-Ray/fluoroscopy time, I may develop a skin reaction.    9. If I have a Do Not Attempt Resuscitation (DNAR) order in place, that status will be suspended while in the operating room, procedural suite, and during the recovery period unless otherwise explicitly stated by me (or a person authorized to consent on my behalf). The surgeon or my attending physician will determine when the applicable recovery period ends for purposes of reinstating the DNAR order.  10. Patients having a sterilization procedure: I understand that if the procedure is successful the results will be permanent and it will therefore be impossible for me to inseminate, conceive, or bear children.  I also understand that the procedure is intended to result in sterility, although the result has not been guaranteed.   11. I acknowledge that my physician has explained sedation/analgesia administration to me including the risk and benefits I consent to the administration of sedation/analgesia as may be necessary or desirable in the judgment of my physician.    I CERTIFY THAT I HAVE READ AND FULLY UNDERSTAND THE ABOVE CONSENT TO OPERATION and/or OTHER PROCEDURE.    _________________________________________  __________________________________  Signature of Patient     Signature of Responsible Person         ___________________________________         Printed Name of Responsible Person           _________________________________                 Relationship to Patient  _________________________________________  ______________________________  Signature of Witness          Date  Time      Patient Name: Devon LENTZ Cruzito     : 1995                 Printed: 2024     Medical Record #: [id number]                     Page 1 of 2                                    [address]   [address]    Consent for Anesthesia    IDevon agree to be cared for by an anesthesiologist, who is specially trained to monitor me and give me medicine to put me to sleep or keep me comfortable during my procedure    I understand that my anesthesiologist is not an employee or agent of [facility] or [employer]. He or she works for [employer].    As the patient asking for anesthesia services, I agree to:  Allow the anesthesiologist (anesthesia doctor) to give me medicine and do additional procedures as necessary. Some examples are: Starting or using an “IV” to give me medicine, fluids or blood during my procedure, and having a breathing tube placed to help me breathe when I’m asleep (intubation). In the event that my heart stops working properly, I understand that my anesthesiologist will make every effort to sustain my life, unless otherwise directed by [facility] Do Not Resuscitate documents.  Tell my anesthesia doctor before my procedure:  If I am pregnant.  The last time that I ate or drank.  All of the medicines I take (including prescriptions, herbal supplements, and pills I can buy without a prescription (including street drugs/illegal medications). Failure to inform my anesthesiologist about these medicines may increase my risk of anesthetic complications.  If I am allergic to anything or have had a reaction to anesthesia before.  I understand how the anesthesia medicine will help me (benefits).  I understand that with any type of anesthesia medicine there are risks:  The most common risks are: nausea, vomiting, sore throat, muscle soreness, damage to my eyes, mouth, or teeth (from breathing tube placement).  Rare risks include: remembering what happened during my procedure, allergic reactions to medications, injury to my airway, heart, lungs, vision, nerves, or muscles and in extremely rare instances death.  My doctor has explained to me other choices  available to me for my care (alternatives).  Pregnant Patients (“epidural”):  I understand that the risks of having an epidural (medicine given into my back to help control pain during labor), include itching, low blood pressure, difficulty urinating, headache or slowing of the baby’s heart. Very rare risks include infection, bleeding, seizure, irregular heart rhythms and nerve injury.  Regional Anesthesia (“spinal”, “epidural”, & “nerve blocks”):  I understand that rare but potential complications include headache, bleeding, infection, seizure, irregular heart rhythms, and nerve injury.    I can change my mind about having anesthesia services at any time before I get the medicine.    _____________________________________________________________________________  Patient (or Representative) Signature/Relationship to Patient  Date   Time    _____________________________________________________________________________   Name (if used)    Language/Organization   Time    _____________________________________________________________________________  Anesthesiologist Signature     Date   Time  I have discussed the procedure and information above with the patient (or patient’s representative) and answered their questions. The patient or their representative has agreed to have anesthesia services.    _____________________________________________________________________________  Witness        Date   Time  I have verified that the signature is that of the patient or patient’s representative, and that it was signed before the procedure  Patient Name: Devon East     : 1995                 Printed: 2024     Medical Record #: [id number]                     Page 2 of 2

## (undated) NOTE — LETTER
Glenna Zeng [address]  [place], [address] [facility]    Consent for Operation  Date: __________________                                Time: _______________    1.  I authorize the performance upon Devon East the following operation:  Procedur revealed by the pictures or by descriptive texts accompanying them. If the procedure has been videotaped, the surgeon will obtain the original videotape. The hospital will not be responsible for storage or maintenance of this tape.   7. For the purpose of a THAT MY DOCTOR PROVIDED ME WITH THE ABOVE EXPLANATIONS, THAT ALL BLANKS OR STATEMENTS REQUIRING INSERTION OR COMPLETION WERE FILLED IN.     Signature of Patient:   ___________________________    When the patient is a minor or mentally incompetent to give co iii. All of the medicines I take (including prescriptions, herbal supplements, and pills I can buy without a prescription (including street drugs/illegal medications).  Failure to inform my anesthesiologist about these medicines may increase my risk of anes _____________________________________________________________________________  Anesthesiologist Signature     Date   Time  I have discussed the procedure and information above with the patient (or patient’s representative) and answered their questions.  The

## (undated) NOTE — LETTER
[address]  [address]  Authorization for Surgical Operation and Procedure     Date:___________                                                                                                         Time:__________  I hereby authorize * Surgery not found *, my physician and his/her assistants (if applicable), which may include medical students, residents, and/or fellows, to perform the following surgical operation/ procedure and administer such anesthesia as may be determined necessary by my physician:  Operation/Procedure name (s)  on Devon MARY CARMEN East   2.   I recognize that during the surgical operation/procedure, unforeseen conditions may necessitate additional or different procedures than those listed above.  I, therefore, further authorize and request that the above-named surgeon, assistants, or designees perform such procedures as are, in their judgment, necessary and desirable.    3.   My surgeon/physician has discussed prior to my surgery the potential benefits, risks and side effects of this procedure; the likelihood of achieving goals; and potential problems that might occur during recuperation.  They also discussed reasonable alternatives to the procedure, including risks, benefits, and side effects related to the alternatives and risks related to not receiving this procedure.  I have had all my questions answered and I acknowledge that no guarantee has been made as to the result that may be obtained.    4.   Should the need arise during my operation/procedure, which includes change of level of care prior to discharge, I also consent to the administration of blood and/or blood products.  Further, I understand that despite careful testing and screening of blood or blood products by collecting agencies, I may still be subject to ill effects as a result of receiving a blood transfusion and/or blood products.  The following are some, but not all, of the potential risks  that can occur: fever and allergic reactions, hemolytic reactions, transmission of diseases such as Hepatitis, AIDS and Cytomegalovirus (CMV) and fluid overload.  In the event that I wish to have an autologous transfusion of my own blood, or a directed donor transfusion, I will discuss this with my physician.  Check only if Refusing Blood or Blood Products  I understand refusal of blood or blood products as deemed necessary by my physician may have serious consequences to my condition to include possible death. I hereby assume responsibility for my refusal and release the hospital, its personnel, and my physicians from any responsibility for the consequences of my refusal.          o  Refuse      5.   I authorize the use of any specimen, organs, tissues, body parts or foreign objects that may be removed from my body during the operation/procedure for diagnosis, research or teaching purposes and their subsequent disposal by hospital authorities.  I also authorize the release of specimen test results and/or written reports to my treating physician on the hospital medical staff or other referring or consulting physicians involved in my care, at the discretion of the Pathologist or my treating physician.    6.   I consent to the photographing or videotaping of the operations or procedures to be performed, including appropriate portions of my body for medical, scientific, or educational purposes, provided my identity is not revealed by the pictures or by descriptive texts accompanying them.  If the procedure has been photographed/videotaped, the surgeon will obtain the original picture, image, videotape or CD.  The hospital will not be responsible for storage, release or maintenance of the picture, image, tape or CD.    7.   I consent to the presence of a  or observers in the operating room as deemed necessary by my physician or their designees.    8.   I recognize that in the event my procedure results  in extended X-Ray/fluoroscopy time, I may develop a skin reaction.    9. If I have a Do Not Attempt Resuscitation (DNAR) order in place, that status will be suspended while in the operating room, procedural suite, and during the recovery period unless otherwise explicitly stated by me (or a person authorized to consent on my behalf). The surgeon or my attending physician will determine when the applicable recovery period ends for purposes of reinstating the DNAR order.  10. Patients having a sterilization procedure: I understand that if the procedure is successful the results will be permanent and it will therefore be impossible for me to inseminate, conceive, or bear children.  I also understand that the procedure is intended to result in sterility, although the result has not been guaranteed.   11. I acknowledge that my physician has explained sedation/analgesia administration to me including the risk and benefits I consent to the administration of sedation/analgesia as may be necessary or desirable in the judgment of my physician.    I CERTIFY THAT I HAVE READ AND FULLY UNDERSTAND THE ABOVE CONSENT TO OPERATION and/or OTHER PROCEDURE.    _________________________________________  __________________________________  Signature of Patient     Signature of Responsible Person         ___________________________________         Printed Name of Responsible Person           _________________________________                 Relationship to Patient  _________________________________________  ______________________________  Signature of Witness          Date  Time      Patient Name: Devon East     : 1995                 Printed: 2024     Medical Record #: [id number]                     Page 1 [address]  [address]    Consent for Anesthesia    I, Devon East agree to be cared for by an anesthesiologist, who is specially  trained to monitor me and give me medicine to put me to sleep or keep me comfortable during my procedure    I understand that my anesthesiologist is not an employee or agent of [facility] or [employer]. He or she works for [employer].    As the patient asking for anesthesia services, I agree to:  Allow the anesthesiologist (anesthesia doctor) to give me medicine and do additional procedures as necessary. Some examples are: Starting or using an “IV” to give me medicine, fluids or blood during my procedure, and having a breathing tube placed to help me breathe when I’m asleep (intubation). In the event that my heart stops working properly, I understand that my anesthesiologist will make every effort to sustain my life, unless otherwise directed by [facility] Do Not Resuscitate documents.  Tell my anesthesia doctor before my procedure:  If I am pregnant.  The last time that I ate or drank.  All of the medicines I take (including prescriptions, herbal supplements, and pills I can buy without a prescription (including street drugs/illegal medications). Failure to inform my anesthesiologist about these medicines may increase my risk of anesthetic complications.  If I am allergic to anything or have had a reaction to anesthesia before.  I understand how the anesthesia medicine will help me (benefits).  I understand that with any type of anesthesia medicine there are risks:  The most common risks are: nausea, vomiting, sore throat, muscle soreness, damage to my eyes, mouth, or teeth (from breathing tube placement).  Rare risks include: remembering what happened during my procedure, allergic reactions to medications, injury to my airway, heart, lungs, vision, nerves, or muscles and in extremely rare instances death.  My doctor has explained to me other choices available to me for my care (alternatives).  Pregnant Patients (“epidural”):  I understand that the risks of having an  epidural (medicine given into my back to help control pain during labor), include itching, low blood pressure, difficulty urinating, headache or slowing of the baby’s heart. Very rare risks include infection, bleeding, seizure, irregular heart rhythms and nerve injury.  Regional Anesthesia (“spinal”, “epidural”, & “nerve blocks”):  I understand that rare but potential complications include headache, bleeding, infection, seizure, irregular heart rhythms, and nerve injury.    I can change my mind about having anesthesia services at any time before I get the medicine.    _____________________________________________________________________________  Patient (or Representative) Signature/Relationship to Patient  Date   Time    _____________________________________________________________________________   Name (if used)    Language/Organization   Time    _____________________________________________________________________________  Anesthesiologist Signature     Date   Time  I have discussed the procedure and information above with the patient (or patient’s representative) and answered their questions. The patient or their representative has agreed to have anesthesia services.    _____________________________________________________________________________  Witness        Date   Time  I have verified that the signature is that of the patient or patient’s representative, and that it was signed before the procedure  Patient Name: Devon East     : 1995                 Printed: 2024     Medical Record #: [id number]                     Page 2 of 2

## (undated) NOTE — LETTER
[address]  [address]  Authorization for Surgical Operation and Procedure     Date:___________                                                                                                         Time:__________  I hereby authorize , Dr. Jaden Mcdonough, my physician and his/her assistants (if applicable), which may include medical students, residents, and/or fellows, to perform the following surgical operation/ procedure and administer such anesthesia as may be determined necessary by my physician:  Operation/Procedure name (s) cystoscopy, bilateral retrograde pyelogram, left ureteroscopic stone extraction with laser lithotripsy, insertion of left ureteral stent, possible right ureteroscopy, possible right ureteral stent  on Devon East   2.   I recognize that during the surgical operation/procedure, unforeseen conditions may necessitate additional or different procedures than those listed above.  I, therefore, further authorize and request that the above-named surgeon, assistants, or designees perform such procedures as are, in their judgment, necessary and desirable.    3.   My surgeon/physician has discussed prior to my surgery the potential benefits, risks and side effects of this procedure; the likelihood of achieving goals; and potential problems that might occur during recuperation.  They also discussed reasonable alternatives to the procedure, including risks, benefits, and side effects related to the alternatives and risks related to not receiving this procedure.  I have had all my questions answered and I acknowledge that no guarantee has been made as to the result that may be obtained.    4.   Should the need arise during my operation/procedure, which includes change of level of care prior to discharge, I also consent to the administration of blood and/or blood products.  Further, I understand that despite careful testing and screening of blood or blood  products by collecting agencies, I may still be subject to ill effects as a result of receiving a blood transfusion and/or blood products.  The following are some, but not all, of the potential risks that can occur: fever and allergic reactions, hemolytic reactions, transmission of diseases such as Hepatitis, AIDS and Cytomegalovirus (CMV) and fluid overload.  In the event that I wish to have an autologous transfusion of my own blood, or a directed donor transfusion, I will discuss this with my physician.  Check only if Refusing Blood or Blood Products  I understand refusal of blood or blood products as deemed necessary by my physician may have serious consequences to my condition to include possible death. I hereby assume responsibility for my refusal and release the hospital, its personnel, and my physicians from any responsibility for the consequences of my refusal.          o  Refuse      5.   I authorize the use of any specimen, organs, tissues, body parts or foreign objects that may be removed from my body during the operation/procedure for diagnosis, research or teaching purposes and their subsequent disposal by hospital authorities.  I also authorize the release of specimen test results and/or written reports to my treating physician on the hospital medical staff or other referring or consulting physicians involved in my care, at the discretion of the Pathologist or my treating physician.    6.   I consent to the photographing or videotaping of the operations or procedures to be performed, including appropriate portions of my body for medical, scientific, or educational purposes, provided my identity is not revealed by the pictures or by descriptive texts accompanying them.  If the procedure has been photographed/videotaped, the surgeon will obtain the original picture, image, videotape or CD.  The hospital will not be responsible for storage, release or maintenance of the picture, image, tape or CD.    7.    I consent to the presence of a  or observers in the operating room as deemed necessary by my physician or their designees.    8.   I recognize that in the event my procedure results in extended X-Ray/fluoroscopy time, I may develop a skin reaction.    9. If I have a Do Not Attempt Resuscitation (DNAR) order in place, that status will be suspended while in the operating room, procedural suite, and during the recovery period unless otherwise explicitly stated by me (or a person authorized to consent on my behalf). The surgeon or my attending physician will determine when the applicable recovery period ends for purposes of reinstating the DNAR order.  10. Patients having a sterilization procedure: I understand that if the procedure is successful the results will be permanent and it will therefore be impossible for me to inseminate, conceive, or bear children.  I also understand that the procedure is intended to result in sterility, although the result has not been guaranteed.   11. I acknowledge that my physician has explained sedation/analgesia administration to me including the risk and benefits I consent to the administration of sedation/analgesia as may be necessary or desirable in the judgment of my physician.    I CERTIFY THAT I HAVE READ AND FULLY UNDERSTAND THE ABOVE CONSENT TO OPERATION and/or OTHER PROCEDURE.    _________________________________________  __________________________________  Signature of Patient     Signature of Responsible Person         ___________________________________         Printed Name of Responsible Person           _________________________________                 Relationship to Patient  _________________________________________  ______________________________  Signature of Witness          Date  Time      Patient Name: Devon LENTZ Cruzito     : 1995                 Printed: 2025     Medical Record #: [id number]                     Page 1 of 2                                     [address]  [address]    Consent for Anesthesia    IDevon agree to be cared for by an anesthesiologist, who is specially trained to monitor me and give me medicine to put me to sleep or keep me comfortable during my procedure    I understand that my anesthesiologist is not an employee or agent of [facility] or [employer]. He or she works for [employer].    As the patient asking for anesthesia services, I agree to:  Allow the anesthesiologist (anesthesia doctor) to give me medicine and do additional procedures as necessary. Some examples are: Starting or using an “IV” to give me medicine, fluids or blood during my procedure, and having a breathing tube placed to help me breathe when I’m asleep (intubation). In the event that my heart stops working properly, I understand that my anesthesiologist will make every effort to sustain my life, unless otherwise directed by [facility] Do Not Resuscitate documents.  Tell my anesthesia doctor before my procedure:  If I am pregnant.  The last time that I ate or drank.  All of the medicines I take (including prescriptions, herbal supplements, and pills I can buy without a prescription (including street drugs/illegal medications). Failure to inform my anesthesiologist about these medicines may increase my risk of anesthetic complications.  If I am allergic to anything or have had a reaction to anesthesia before.  I understand how the anesthesia medicine will help me (benefits).  I understand that with any type of anesthesia medicine there are risks:  The most common risks are: nausea, vomiting, sore throat, muscle soreness, damage to my eyes, mouth, or teeth (from breathing tube placement).  Rare risks include: remembering what happened during my procedure, allergic reactions to medications, injury to my airway, heart, lungs, vision, nerves, or muscles and in  extremely rare instances death.  My doctor has explained to me other choices available to me for my care (alternatives).  Pregnant Patients (“epidural”):  I understand that the risks of having an epidural (medicine given into my back to help control pain during labor), include itching, low blood pressure, difficulty urinating, headache or slowing of the baby’s heart. Very rare risks include infection, bleeding, seizure, irregular heart rhythms and nerve injury.  Regional Anesthesia (“spinal”, “epidural”, & “nerve blocks”):  I understand that rare but potential complications include headache, bleeding, infection, seizure, irregular heart rhythms, and nerve injury.    I can change my mind about having anesthesia services at any time before I get the medicine.    _____________________________________________________________________________  Patient (or Representative) Signature/Relationship to Patient  Date   Time    _____________________________________________________________________________   Name (if used)    Language/Organization   Time    _____________________________________________________________________________  Anesthesiologist Signature     Date   Time  I have discussed the procedure and information above with the patient (or patient’s representative) and answered their questions. The patient or their representative has agreed to have anesthesia services.    _____________________________________________________________________________  Witness        Date   Time  I have verified that the signature is that of the patient or patient’s representative, and that it was signed before the procedure  Patient Name: Devon East     : 1995                 Printed: 2025     Medical Record #: [id number]                     Page 2 of 2

## (undated) NOTE — LETTER
[address]  [address]  Authorization for Surgical Operation and Procedure     Date:___________                                                                                                         Time:__________  I hereby authorize Surgeon(s):  Jaden Mcdonough MD, my physician and his/her assistants (if applicable), which may include medical students, residents, and/or fellows, to perform the following surgical operation/ procedure and administer such anesthesia as may be determined necessary by my physician:  Operation/Procedure name (s) Procedure(s):  CYSTOSCOPY, BILATERAL RETROGRADE PYELOGRAMS, POSSIBLE RIGHT URETEROSCOPY, POSSIBLE RIGHT URETERAL STENT, LEFT URETEROSCOPY WITH LASER LITHOTRIPSY, LEFT URETERAL STENT PLACEMENT on [place]   2.   I recognize that during the surgical operation/procedure, unforeseen conditions may necessitate additional or different procedures than those listed above.  I, therefore, further authorize and request that the above-named surgeon, assistants, or designees perform such procedures as are, in their judgment, necessary and desirable.    3.   My surgeon/physician has discussed prior to my surgery the potential benefits, risks and side effects of this procedure; the likelihood of achieving goals; and potential problems that might occur during recuperation.  They also discussed reasonable alternatives to the procedure, including risks, benefits, and side effects related to the alternatives and risks related to not receiving this procedure.  I have had all my questions answered and I acknowledge that no guarantee has been made as to the result that may be obtained.    4.   Should the need arise during my operation/procedure, which includes change of level of care prior to discharge, I also consent to the administration of blood and/or blood products.  Further, I understand that despite careful testing and screening of blood or blood  products by collecting agencies, I may still be subject to ill effects as a result of receiving a blood transfusion and/or blood products.  The following are some, but not all, of the potential risks that can occur: fever and allergic reactions, hemolytic reactions, transmission of diseases such as Hepatitis, AIDS and Cytomegalovirus (CMV) and fluid overload.  In the event that I wish to have an autologous transfusion of my own blood, or a directed donor transfusion, I will discuss this with my physician.  Check only if Refusing Blood or Blood Products  I understand refusal of blood or blood products as deemed necessary by my physician may have serious consequences to my condition to include possible death. I hereby assume responsibility for my refusal and release the hospital, its personnel, and my physicians from any responsibility for the consequences of my refusal.          o  Refuse      5.   I authorize the use of any specimen, organs, tissues, body parts or foreign objects that may be removed from my body during the operation/procedure for diagnosis, research or teaching purposes and their subsequent disposal by hospital authorities.  I also authorize the release of specimen test results and/or written reports to my treating physician on the hospital medical staff or other referring or consulting physicians involved in my care, at the discretion of the Pathologist or my treating physician.    6.   I consent to the photographing or videotaping of the operations or procedures to be performed, including appropriate portions of my body for medical, scientific, or educational purposes, provided my identity is not revealed by the pictures or by descriptive texts accompanying them.  If the procedure has been photographed/videotaped, the surgeon will obtain the original picture, image, videotape or CD.  The hospital will not be responsible for storage, release or maintenance of the picture, image, tape or CD.    7.    I consent to the presence of a  or observers in the operating room as deemed necessary by my physician or their designees.    8.   I recognize that in the event my procedure results in extended X-Ray/fluoroscopy time, I may develop a skin reaction.    9. If I have a Do Not Attempt Resuscitation (DNAR) order in place, that status will be suspended while in the operating room, procedural suite, and during the recovery period unless otherwise explicitly stated by me (or a person authorized to consent on my behalf). The surgeon or my attending physician will determine when the applicable recovery period ends for purposes of reinstating the DNAR order.  10. Patients having a sterilization procedure: I understand that if the procedure is successful the results will be permanent and it will therefore be impossible for me to inseminate, conceive, or bear children.  I also understand that the procedure is intended to result in sterility, although the result has not been guaranteed.   11. I acknowledge that my physician has explained sedation/analgesia administration to me including the risk and benefits I consent to the administration of sedation/analgesia as may be necessary or desirable in the judgment of my physician.    I CERTIFY THAT I HAVE READ AND FULLY UNDERSTAND THE ABOVE CONSENT TO OPERATION and/or OTHER PROCEDURE.    _________________________________________  __________________________________  Signature of Patient     Signature of Responsible Person         ___________________________________         Printed Name of Responsible Person           _________________________________                 Relationship to Patient  _________________________________________  ______________________________  Signature of Witness          Date  Time      Patient Name: Devon LENTZ Cruzito     : 1995                 Printed: 2025     Medical Record #: [id number]                     Page 2 of 3                                     [address]  [address]    Consent for Anesthesia    IDevon agree to be cared for by an anesthesiologist, who is specially trained to monitor me and give me medicine to put me to sleep or keep me comfortable during my procedure    I understand that my anesthesiologist is not an employee or agent of [facility] or [employer]. He or she works for [employer].    As the patient asking for anesthesia services, I agree to:  Allow the anesthesiologist (anesthesia doctor) to give me medicine and do additional procedures as necessary. Some examples are: Starting or using an “IV” to give me medicine, fluids or blood during my procedure, and having a breathing tube placed to help me breathe when I’m asleep (intubation). In the event that my heart stops working properly, I understand that my anesthesiologist will make every effort to sustain my life, unless otherwise directed by [facility] Do Not Resuscitate documents.  Tell my anesthesia doctor before my procedure:  If I am pregnant.  The last time that I ate or drank.  All of the medicines I take (including prescriptions, herbal supplements, and pills I can buy without a prescription (including street drugs/illegal medications). Failure to inform my anesthesiologist about these medicines may increase my risk of anesthetic complications.  If I am allergic to anything or have had a reaction to anesthesia before.  I understand how the anesthesia medicine will help me (benefits).  I understand that with any type of anesthesia medicine there are risks:  The most common risks are: nausea, vomiting, sore throat, muscle soreness, damage to my eyes, mouth, or teeth (from breathing tube placement).  Rare risks include: remembering what happened during my procedure, allergic reactions to medications, injury to my airway, heart, lungs, vision, nerves, or muscles and in  extremely rare instances death.  My doctor has explained to me other choices available to me for my care (alternatives).  Pregnant Patients (“epidural”):  I understand that the risks of having an epidural (medicine given into my back to help control pain during labor), include itching, low blood pressure, difficulty urinating, headache or slowing of the baby’s heart. Very rare risks include infection, bleeding, seizure, irregular heart rhythms and nerve injury.  Regional Anesthesia (“spinal”, “epidural”, & “nerve blocks”):  I understand that rare but potential complications include headache, bleeding, infection, seizure, irregular heart rhythms, and nerve injury.    I can change my mind about having anesthesia services at any time before I get the medicine.    _____________________________________________________________________________  Patient (or Representative) Signature/Relationship to Patient  Date   Time    _____________________________________________________________________________   Name (if used)    Language/Organization   Time    _____________________________________________________________________________  Anesthesiologist Signature     Date   Time  I have discussed the procedure and information above with the patient (or patient’s representative) and answered their questions. The patient or their representative has agreed to have anesthesia services.    _____________________________________________________________________________  Witness        Date   Time  I have verified that the signature is that of the patient or patient’s representative, and that it was signed before the procedure  Patient Name: Devon East     : 1995                 Printed: 2025     Medical Record #: [id number]                     Page 3 of 3

## (undated) NOTE — LETTER
[address]  [address]  Authorization for Surgical Operation and Procedure     Date:___________                                                                                                         Time:__________  I hereby authorize Surgeon(s):  Jaden Mcdonough MD, my physician and his/her assistants (if applicable), which may include medical students, residents, and/or fellows, to perform the following surgical operation/ procedure and administer such anesthesia as may be determined necessary by my physician:  Operation/Procedure name (s) Procedure(s):  CYSTOSCOPY, RIGHT RETROGRADE PYLEOGRAM, RIGHT URETEROSCOPY, LASER LITHOTRIPSY, RIGHT STENT PLACEMENT on [place]   2.   I recognize that during the surgical operation/procedure, unforeseen conditions may necessitate additional or different procedures than those listed above.  I, therefore, further authorize and request that the above-named surgeon, assistants, or designees perform such procedures as are, in their judgment, necessary and desirable.    3.   My surgeon/physician has discussed prior to my surgery the potential benefits, risks and side effects of this procedure; the likelihood of achieving goals; and potential problems that might occur during recuperation.  They also discussed reasonable alternatives to the procedure, including risks, benefits, and side effects related to the alternatives and risks related to not receiving this procedure.  I have had all my questions answered and I acknowledge that no guarantee has been made as to the result that may be obtained.    4.   Should the need arise during my operation/procedure, which includes change of level of care prior to discharge, I also consent to the administration of blood and/or blood products.  Further, I understand that despite careful testing and screening of blood or blood products by collecting agencies, I may still be subject to ill effects as a  result of receiving a blood transfusion and/or blood products.  The following are some, but not all, of the potential risks that can occur: fever and allergic reactions, hemolytic reactions, transmission of diseases such as Hepatitis, AIDS and Cytomegalovirus (CMV) and fluid overload.  In the event that I wish to have an autologous transfusion of my own blood, or a directed donor transfusion, I will discuss this with my physician.  Check only if Refusing Blood or Blood Products  I understand refusal of blood or blood products as deemed necessary by my physician may have serious consequences to my condition to include possible death. I hereby assume responsibility for my refusal and release the hospital, its personnel, and my physicians from any responsibility for the consequences of my refusal.          o  Refuse      5.   I authorize the use of any specimen, organs, tissues, body parts or foreign objects that may be removed from my body during the operation/procedure for diagnosis, research or teaching purposes and their subsequent disposal by hospital authorities.  I also authorize the release of specimen test results and/or written reports to my treating physician on the hospital medical staff or other referring or consulting physicians involved in my care, at the discretion of the Pathologist or my treating physician.    6.   I consent to the photographing or videotaping of the operations or procedures to be performed, including appropriate portions of my body for medical, scientific, or educational purposes, provided my identity is not revealed by the pictures or by descriptive texts accompanying them.  If the procedure has been photographed/videotaped, the surgeon will obtain the original picture, image, videotape or CD.  The hospital will not be responsible for storage, release or maintenance of the picture, image, tape or CD.    7.   I consent to the presence of a  or observers in the  operating room as deemed necessary by my physician or their designees.    8.   I recognize that in the event my procedure results in extended X-Ray/fluoroscopy time, I may develop a skin reaction.    9. If I have a Do Not Attempt Resuscitation (DNAR) order in place, that status will be suspended while in the operating room, procedural suite, and during the recovery period unless otherwise explicitly stated by me (or a person authorized to consent on my behalf). The surgeon or my attending physician will determine when the applicable recovery period ends for purposes of reinstating the DNAR order.  10. Patients having a sterilization procedure: I understand that if the procedure is successful the results will be permanent and it will therefore be impossible for me to inseminate, conceive, or bear children.  I also understand that the procedure is intended to result in sterility, although the result has not been guaranteed.   11. I acknowledge that my physician has explained sedation/analgesia administration to me including the risk and benefits I consent to the administration of sedation/analgesia as may be necessary or desirable in the judgment of my physician.    I CERTIFY THAT I HAVE READ AND FULLY UNDERSTAND THE ABOVE CONSENT TO OPERATION and/or OTHER PROCEDURE.    _________________________________________  __________________________________  Signature of Patient     Signature of Responsible Person         ___________________________________         Printed Name of Responsible Person           _________________________________                 Relationship to Patient  _________________________________________  ______________________________  Signature of Witness          Date  Time      Patient Name: Devon Montanokristofer     : 1995                 Printed: 2024     Medical Record #: [id number]                     Page 1 of 2                                    [address]  [place]  [address]    Consent for Anesthesia    IDevon agree to be cared for by an anesthesiologist, who is specially trained to monitor me and give me medicine to put me to sleep or keep me comfortable during my procedure    I understand that my anesthesiologist is not an employee or agent of [facility] or [employer]. He or she works for [employer].    As the patient asking for anesthesia services, I agree to:  Allow the anesthesiologist (anesthesia doctor) to give me medicine and do additional procedures as necessary. Some examples are: Starting or using an “IV” to give me medicine, fluids or blood during my procedure, and having a breathing tube placed to help me breathe when I’m asleep (intubation). In the event that my heart stops working properly, I understand that my anesthesiologist will make every effort to sustain my life, unless otherwise directed by [facility] Do Not Resuscitate documents.  Tell my anesthesia doctor before my procedure:  If I am pregnant.  The last time that I ate or drank.  All of the medicines I take (including prescriptions, herbal supplements, and pills I can buy without a prescription (including street drugs/illegal medications). Failure to inform my anesthesiologist about these medicines may increase my risk of anesthetic complications.  If I am allergic to anything or have had a reaction to anesthesia before.  I understand how the anesthesia medicine will help me (benefits).  I understand that with any type of anesthesia medicine there are risks:  The most common risks are: nausea, vomiting, sore throat, muscle soreness, damage to my eyes, mouth, or teeth (from breathing tube placement).  Rare risks include: remembering what happened during my procedure, allergic reactions to medications, injury to my airway, heart, lungs, vision, nerves, or muscles and in extremely rare instances death.  My doctor has explained to me  other choices available to me for my care (alternatives).  Pregnant Patients (“epidural”):  I understand that the risks of having an epidural (medicine given into my back to help control pain during labor), include itching, low blood pressure, difficulty urinating, headache or slowing of the baby’s heart. Very rare risks include infection, bleeding, seizure, irregular heart rhythms and nerve injury.  Regional Anesthesia (“spinal”, “epidural”, & “nerve blocks”):  I understand that rare but potential complications include headache, bleeding, infection, seizure, irregular heart rhythms, and nerve injury.    I can change my mind about having anesthesia services at any time before I get the medicine.    _____________________________________________________________________________  Patient (or Representative) Signature/Relationship to Patient  Date   Time    _____________________________________________________________________________   Name (if used)    Language/Organization   Time    _____________________________________________________________________________  Anesthesiologist Signature     Date   Time  I have discussed the procedure and information above with the patient (or patient’s representative) and answered their questions. The patient or their representative has agreed to have anesthesia services.    _____________________________________________________________________________  Witness        Date   Time  I have verified that the signature is that of the patient or patient’s representative, and that it was signed before the procedure  Patient Name: Devon East     : 1995                 Printed: 2024     Medical Record #: [id number]                     Page 2 of 2

## (undated) NOTE — LETTER
[address]  [address]  Authorization for Surgical Operation and Procedure     Date:___________                                                                                                         Time:__________  I hereby authorize Surgeon(s):  Jaden Mcdonough MD, my physician and his/her assistants (if applicable), which may include medical students, residents, and/or fellows, to perform the following surgical operation/ procedure and administer such anesthesia as may be determined necessary by my physician:  Operation/Procedure name (s) Procedure(s):  CYSTOSCOPY, RIGHT RETROGRADE PYLEOGRAM, RIGHT URETEROSCOPY, LASER LITHOTRIPSY, RIGHT STENT PLACEMENT on Devon East   2.   I recognize that during the surgical operation/procedure, unforeseen conditions may necessitate additional or different procedures than those listed above.  I, therefore, further authorize and request that the above-named surgeon, assistants, or designees perform such procedures as are, in their judgment, necessary and desirable.    3.   My surgeon/physician has discussed prior to my surgery the potential benefits, risks and side effects of this procedure; the likelihood of achieving goals; and potential problems that might occur during recuperation.  They also discussed reasonable alternatives to the procedure, including risks, benefits, and side effects related to the alternatives and risks related to not receiving this procedure.  I have had all my questions answered and I acknowledge that no guarantee has been made as to the result that may be obtained.    4.   Should the need arise during my operation/procedure, which includes change of level of care prior to discharge, I also consent to the administration of blood and/or blood products.  Further, I understand that despite careful testing and screening of blood or blood products by collecting agencies, I may still be subject to ill effects as a  result of receiving a blood transfusion and/or blood products.  The following are some, but not all, of the potential risks that can occur: fever and allergic reactions, hemolytic reactions, transmission of diseases such as Hepatitis, AIDS and Cytomegalovirus (CMV) and fluid overload.  In the event that I wish to have an autologous transfusion of my own blood, or a directed donor transfusion, I will discuss this with my physician.  Check only if Refusing Blood or Blood Products  I understand refusal of blood or blood products as deemed necessary by my physician may have serious consequences to my condition to include possible death. I hereby assume responsibility for my refusal and release the hospital, its personnel, and my physicians from any responsibility for the consequences of my refusal.          o  Refuse      5.   I authorize the use of any specimen, organs, tissues, body parts or foreign objects that may be removed from my body during the operation/procedure for diagnosis, research or teaching purposes and their subsequent disposal by hospital authorities.  I also authorize the release of specimen test results and/or written reports to my treating physician on the hospital medical staff or other referring or consulting physicians involved in my care, at the discretion of the Pathologist or my treating physician.    6.   I consent to the photographing or videotaping of the operations or procedures to be performed, including appropriate portions of my body for medical, scientific, or educational purposes, provided my identity is not revealed by the pictures or by descriptive texts accompanying them.  If the procedure has been photographed/videotaped, the surgeon will obtain the original picture, image, videotape or CD.  The hospital will not be responsible for storage, release or maintenance of the picture, image, tape or CD.    7.   I consent to the presence of a  or observers in the  operating room as deemed necessary by my physician or their designees.    8.   I recognize that in the event my procedure results in extended X-Ray/fluoroscopy time, I may develop a skin reaction.    9. If I have a Do Not Attempt Resuscitation (DNAR) order in place, that status will be suspended while in the operating room, procedural suite, and during the recovery period unless otherwise explicitly stated by me (or a person authorized to consent on my behalf). The surgeon or my attending physician will determine when the applicable recovery period ends for purposes of reinstating the DNAR order.  10. Patients having a sterilization procedure: I understand that if the procedure is successful the results will be permanent and it will therefore be impossible for me to inseminate, conceive, or bear children.  I also understand that the procedure is intended to result in sterility, although the result has not been guaranteed.   11. I acknowledge that my physician has explained sedation/analgesia administration to me including the risk and benefits I consent to the administration of sedation/analgesia as may be necessary or desirable in the judgment of my physician.    I CERTIFY THAT I HAVE READ AND FULLY UNDERSTAND THE ABOVE CONSENT TO OPERATION and/or OTHER PROCEDURE.    _________________________________________  __________________________________  Signature of Patient     Signature of Responsible Person         ___________________________________         Printed Name of Responsible Person           _________________________________                 Relationship to Patient  _________________________________________  ______________________________  Signature of Witness          Date  Time      Patient Name: Devon Montanokirstofer     : 1995                 Printed: 2024     Medical Record #: [id number]                     Page 1 of 2                                    [address]  [place]  [address]    Consent for Anesthesia    IDevon agree to be cared for by an anesthesiologist, who is specially trained to monitor me and give me medicine to put me to sleep or keep me comfortable during my procedure    I understand that my anesthesiologist is not an employee or agent of [facility] [employer] or [employer]. He or she works for [employer].    As the patient asking for anesthesia services, I agree to:  Allow the anesthesiologist (anesthesia doctor) to give me medicine and do additional procedures as necessary. Some examples are: Starting or using an “IV” to give me medicine, fluids or blood during my procedure, and having a breathing tube placed to help me breathe when I’m asleep (intubation). In the event that my heart stops working properly, I understand that my anesthesiologist will make every effort to sustain my life, unless otherwise directed by [facility] Do Not Resuscitate documents.  Tell my anesthesia doctor before my procedure:  If I am pregnant.  The last time that I ate or drank.  All of the medicines I take (including prescriptions, herbal supplements, and pills I can buy without a prescription (including street drugs/illegal medications). Failure to inform my anesthesiologist about these medicines may increase my risk of anesthetic complications.  If I am allergic to anything or have had a reaction to anesthesia before.  I understand how the anesthesia medicine will help me (benefits).  I understand that with any type of anesthesia medicine there are risks:  The most common risks are: nausea, vomiting, sore throat, muscle soreness, damage to my eyes, mouth, or teeth (from breathing tube placement).  Rare risks include: remembering what happened during my procedure, allergic reactions to medications, injury to my airway, heart, lungs, vision, nerves, or muscles and in extremely rare instances death.  My doctor has explained to me  other choices available to me for my care (alternatives).  Pregnant Patients (“epidural”):  I understand that the risks of having an epidural (medicine given into my back to help control pain during labor), include itching, low blood pressure, difficulty urinating, headache or slowing of the baby’s heart. Very rare risks include infection, bleeding, seizure, irregular heart rhythms and nerve injury.  Regional Anesthesia (“spinal”, “epidural”, & “nerve blocks”):  I understand that rare but potential complications include headache, bleeding, infection, seizure, irregular heart rhythms, and nerve injury.    I can change my mind about having anesthesia services at any time before I get the medicine.    _____________________________________________________________________________  Patient (or Representative) Signature/Relationship to Patient  Date   Time    _____________________________________________________________________________   Name (if used)    Language/Organization   Time    _____________________________________________________________________________  Anesthesiologist Signature     Date   Time  I have discussed the procedure and information above with the patient (or patient’s representative) and answered their questions. The patient or their representative has agreed to have anesthesia services.    _____________________________________________________________________________  Witness        Date   Time  I have verified that the signature is that of the patient or patient’s representative, and that it was signed before the procedure  Patient Name: Devon East     : 1995                 Printed: 2024     Medical Record #: [id number]                     Page 2 of 2

## (undated) NOTE — ED AVS SNAPSHOT
THE [facility] Emergency Department in [address] [facility]    Phone:  [phone]    Fax:  [phone]           Jens Ruiz   MRN: [id number]    Department:  THE [facility] Emergency Department in [place]   Date of Visit IF THERE IS ANY CHANGE OR WORSENING OF YOUR CONDITION, CALL YOUR PRIMARY CARE PHYSICIAN AT ONCE OR RETURN IMMEDIATELY TO THE EMERGENCY DEPARTMENT.     If you have been prescribed any medication(s), please fill your prescription right away and begin taking t

## (undated) NOTE — LETTER
[address]  [address]  Authorization for Surgical Operation and Procedure     Date:___________                                                                                                         Time:__________  I hereby authorize Surgeon(s):  Valdemar Hilliard MD, my physician and his/her assistants (if applicable), which may include medical students, residents, and/or fellows, to perform the following surgical operation/ procedure and administer such anesthesia as may be determined necessary by my physician:  Operation/Procedure name (s) Procedure(s):  CYSTOSCOPY, LEFT RETROGRADE PYELOGRAM, LEFT URETEROSCOPY, POSSIBLE LASER LITHOTRIPSY, LEFT URETERAL STENT INSERTION on Devon East   2.   I recognize that during the surgical operation/procedure, unforeseen conditions may necessitate additional or different procedures than those listed above.  I, therefore, further authorize and request that the above-named surgeon, assistants, or designees perform such procedures as are, in their judgment, necessary and desirable.    3.   My surgeon/physician has discussed prior to my surgery the potential benefits, risks and side effects of this procedure; the likelihood of achieving goals; and potential problems that might occur during recuperation.  They also discussed reasonable alternatives to the procedure, including risks, benefits, and side effects related to the alternatives and risks related to not receiving this procedure.  I have had all my questions answered and I acknowledge that no guarantee has been made as to the result that may be obtained.    4.   Should the need arise during my operation/procedure, which includes change of level of care prior to discharge, I also consent to the administration of blood and/or blood products.  Further, I understand that despite careful testing and screening of blood or blood products by collecting agencies, I may still be subject to ill  effects as a result of receiving a blood transfusion and/or blood products.  The following are some, but not all, of the potential risks that can occur: fever and allergic reactions, hemolytic reactions, transmission of diseases such as Hepatitis, AIDS and Cytomegalovirus (CMV) and fluid overload.  In the event that I wish to have an autologous transfusion of my own blood, or a directed donor transfusion, I will discuss this with my physician.  Check only if Refusing Blood or Blood Products  I understand refusal of blood or blood products as deemed necessary by my physician may have serious consequences to my condition to include possible death. I hereby assume responsibility for my refusal and release the hospital, its personnel, and my physicians from any responsibility for the consequences of my refusal.          o  Refuse      5.   I authorize the use of any specimen, organs, tissues, body parts or foreign objects that may be removed from my body during the operation/procedure for diagnosis, research or teaching purposes and their subsequent disposal by hospital authorities.  I also authorize the release of specimen test results and/or written reports to my treating physician on the hospital medical staff or other referring or consulting physicians involved in my care, at the discretion of the Pathologist or my treating physician.    6.   I consent to the photographing or videotaping of the operations or procedures to be performed, including appropriate portions of my body for medical, scientific, or educational purposes, provided my identity is not revealed by the pictures or by descriptive texts accompanying them.  If the procedure has been photographed/videotaped, the surgeon will obtain the original picture, image, videotape or CD.  The hospital will not be responsible for storage, release or maintenance of the picture, image, tape or CD.    7.   I consent to the presence of a  or observers  in the operating room as deemed necessary by my physician or their designees.    8.   I recognize that in the event my procedure results in extended X-Ray/fluoroscopy time, I may develop a skin reaction.    9. If I have a Do Not Attempt Resuscitation (DNAR) order in place, that status will be suspended while in the operating room, procedural suite, and during the recovery period unless otherwise explicitly stated by me (or a person authorized to consent on my behalf). The surgeon or my attending physician will determine when the applicable recovery period ends for purposes of reinstating the DNAR order.  10. Patients having a sterilization procedure: I understand that if the procedure is successful the results will be permanent and it will therefore be impossible for me to inseminate, conceive, or bear children.  I also understand that the procedure is intended to result in sterility, although the result has not been guaranteed.   11. I acknowledge that my physician has explained sedation/analgesia administration to me including the risk and benefits I consent to the administration of sedation/analgesia as may be necessary or desirable in the judgment of my physician.    I CERTIFY THAT I HAVE READ AND FULLY UNDERSTAND THE ABOVE CONSENT TO OPERATION and/or OTHER PROCEDURE.    _________________________________________  __________________________________  Signature of Patient     Signature of Responsible Person         ___________________________________         Printed Name of Responsible Person           _________________________________                 Relationship to Patient  _________________________________________  ______________________________  Signature of Witness          Date  Time      Patient Name: Devon Montanokristofer     : 1995                 Printed: 2024     Medical Record #: [id number]                     Page 1 of 2                                    [address]  [place]  [address]    Consent for Anesthesia    IDevon agree to be cared for by an anesthesiologist, who is specially trained to monitor me and give me medicine to put me to sleep or keep me comfortable during my procedure    I understand that my anesthesiologist is not an employee or agent of [facility] or [email or web address]. He or she works for [employer].    As the patient asking for anesthesia services, I agree to:  Allow the anesthesiologist (anesthesia doctor) to give me medicine and do additional procedures as necessary. Some examples are: Starting or using an “IV” to give me medicine, fluids or blood during my procedure, and having a breathing tube placed to help me breathe when I’m asleep (intubation). In the event that my heart stops working properly, I understand that my anesthesiologist will make every effort to sustain my life, unless otherwise directed by [facility] Do Not Resuscitate documents.  Tell my anesthesia doctor before my procedure:  If I am pregnant.  The last time that I ate or drank.  All of the medicines I take (including prescriptions, herbal supplements, and pills I can buy without a prescription (including street drugs/illegal medications). Failure to inform my anesthesiologist about these medicines may increase my risk of anesthetic complications.  If I am allergic to anything or have had a reaction to anesthesia before.  I understand how the anesthesia medicine will help me (benefits).  I understand that with any type of anesthesia medicine there are risks:  The most common risks are: nausea, vomiting, sore throat, muscle soreness, damage to my eyes, mouth, or teeth (from breathing tube placement).  Rare risks include: remembering what happened during my procedure, allergic reactions to medications, injury to my airway, heart, lungs, vision, nerves, or muscles and in extremely rare instances death.  My doctor has explained to me  other choices available to me for my care (alternatives).  Pregnant Patients (“epidural”):  I understand that the risks of having an epidural (medicine given into my back to help control pain during labor), include itching, low blood pressure, difficulty urinating, headache or slowing of the baby’s heart. Very rare risks include infection, bleeding, seizure, irregular heart rhythms and nerve injury.  Regional Anesthesia (“spinal”, “epidural”, & “nerve blocks”):  I understand that rare but potential complications include headache, bleeding, infection, seizure, irregular heart rhythms, and nerve injury.    I can change my mind about having anesthesia services at any time before I get the medicine.    _____________________________________________________________________________  Patient (or Representative) Signature/Relationship to Patient  Date   Time    _____________________________________________________________________________   Name (if used)    Language/Organization   Time    _____________________________________________________________________________  Anesthesiologist Signature     Date   Time  I have discussed the procedure and information above with the patient (or patient’s representative) and answered their questions. The patient or their representative has agreed to have anesthesia services.    _____________________________________________________________________________  Witness        Date   Time  I have verified that the signature is that of the patient or patient’s representative, and that it was signed before the procedure  Patient Name: Devon East     : 1995                 Printed: 2024     Medical Record #: [id number]                     Page 2 of 2

## (undated) NOTE — LETTER
Glenna Zeng [address] [address]  [place], [address] [facility]    Consent for Operation  Date: __________________                                Time: _______________    1.  I authorize the performance upon Devon East the following operation:  Procedur revealed by the pictures or by descriptive texts accompanying them. If the procedure has been videotaped, the surgeon will obtain the original videotape. The hospital will not be responsible for storage or maintenance of this tape.   7. For the purpose of a THAT MY DOCTOR PROVIDED ME WITH THE ABOVE EXPLANATIONS, THAT ALL BLANKS OR STATEMENTS REQUIRING INSERTION OR COMPLETION WERE FILLED IN.     Signature of Patient:   ___________________________    When the patient is a minor or mentally incompetent to give co iii. All of the medicines I take (including prescriptions, herbal supplements, and pills I can buy without a prescription (including street drugs/illegal medications).  Failure to inform my anesthesiologist about these medicines may increase my risk of anes _____________________________________________________________________________  Anesthesiologist Signature     Date   Time  I have discussed the procedure and information above with the patient (or patient’s representative) and answered their questions.  The

## (undated) NOTE — ED AVS SNAPSHOT
Krupa Edmundo   MRN: [id number]    Department:  [facility] Emergency Department   Date of Visit:  6/22/2019           Disclosure     Insurance plans vary and the physician(s) referred by the ER may not be covered by your plan.  Please contact yo tell this physician (or your personal doctor if your instructions are to return to your personal doctor) about any new or lasting problems. The primary care or specialist physician will see patients referred from the [facility] Emergency Department.  Corrina Marx